# Patient Record
Sex: MALE | Race: WHITE | Employment: FULL TIME | URBAN - METROPOLITAN AREA
[De-identification: names, ages, dates, MRNs, and addresses within clinical notes are randomized per-mention and may not be internally consistent; named-entity substitution may affect disease eponyms.]

---

## 2017-02-13 ENCOUNTER — OFFICE VISIT (OUTPATIENT)
Dept: INTERNAL MEDICINE CLINIC | Age: 37
End: 2017-02-13

## 2017-02-13 VITALS
RESPIRATION RATE: 14 BRPM | DIASTOLIC BLOOD PRESSURE: 90 MMHG | BODY MASS INDEX: 30.22 KG/M2 | WEIGHT: 248.2 LBS | HEART RATE: 100 BPM | OXYGEN SATURATION: 97 % | HEIGHT: 76 IN | SYSTOLIC BLOOD PRESSURE: 140 MMHG

## 2017-02-13 DIAGNOSIS — R63.5 WEIGHT GAIN: ICD-10-CM

## 2017-02-13 DIAGNOSIS — Z00.00 ROUTINE GENERAL MEDICAL EXAMINATION AT A HEALTH CARE FACILITY: ICD-10-CM

## 2017-02-13 DIAGNOSIS — Z00.00 ROUTINE GENERAL MEDICAL EXAMINATION AT A HEALTH CARE FACILITY: Primary | ICD-10-CM

## 2017-02-13 DIAGNOSIS — Q87.42 MARFAN'S SYNDROME WITH OCULAR MANIFESTATION: ICD-10-CM

## 2017-02-13 DIAGNOSIS — J01.00 ACUTE NON-RECURRENT MAXILLARY SINUSITIS: ICD-10-CM

## 2017-02-13 DIAGNOSIS — R03.0 ELEVATED BLOOD PRESSURE (NOT HYPERTENSION): ICD-10-CM

## 2017-02-13 LAB
A/G RATIO: 2.3 (ref 1.1–2.2)
ALBUMIN SERPL-MCNC: 4.8 G/DL (ref 3.4–5)
ALP BLD-CCNC: 85 U/L (ref 40–129)
ALT SERPL-CCNC: 32 U/L (ref 10–40)
ANION GAP SERPL CALCULATED.3IONS-SCNC: 20 MMOL/L (ref 3–16)
AST SERPL-CCNC: 24 U/L (ref 15–37)
BASOPHILS ABSOLUTE: 0 K/UL (ref 0–0.2)
BASOPHILS RELATIVE PERCENT: 0.2 %
BILIRUB SERPL-MCNC: 0.3 MG/DL (ref 0–1)
BUN BLDV-MCNC: 9 MG/DL (ref 7–20)
CALCIUM SERPL-MCNC: 9.3 MG/DL (ref 8.3–10.6)
CHLORIDE BLD-SCNC: 101 MMOL/L (ref 99–110)
CHOLESTEROL, TOTAL: 204 MG/DL (ref 0–199)
CO2: 24 MMOL/L (ref 21–32)
CREAT SERPL-MCNC: 0.9 MG/DL (ref 0.9–1.3)
EOSINOPHILS ABSOLUTE: 0.1 K/UL (ref 0–0.6)
EOSINOPHILS RELATIVE PERCENT: 1.3 %
GFR AFRICAN AMERICAN: >60
GFR NON-AFRICAN AMERICAN: >60
GLOBULIN: 2.1 G/DL
GLUCOSE BLD-MCNC: 87 MG/DL (ref 70–99)
HCT VFR BLD CALC: 50 % (ref 40.5–52.5)
HDLC SERPL-MCNC: 67 MG/DL (ref 40–60)
HEMOGLOBIN: 16.3 G/DL (ref 13.5–17.5)
LDL CHOLESTEROL CALCULATED: 106 MG/DL
LYMPHOCYTES ABSOLUTE: 1.1 K/UL (ref 1–5.1)
LYMPHOCYTES RELATIVE PERCENT: 22 %
MCH RBC QN AUTO: 30.1 PG (ref 26–34)
MCHC RBC AUTO-ENTMCNC: 32.6 G/DL (ref 31–36)
MCV RBC AUTO: 92.4 FL (ref 80–100)
MONOCYTES ABSOLUTE: 0.8 K/UL (ref 0–1.3)
MONOCYTES RELATIVE PERCENT: 16.4 %
NEUTROPHILS ABSOLUTE: 3 K/UL (ref 1.7–7.7)
NEUTROPHILS RELATIVE PERCENT: 60.1 %
PDW BLD-RTO: 13.2 % (ref 12.4–15.4)
PLATELET # BLD: 148 K/UL (ref 135–450)
PMV BLD AUTO: 9 FL (ref 5–10.5)
POTASSIUM SERPL-SCNC: 4.4 MMOL/L (ref 3.5–5.1)
RBC # BLD: 5.41 M/UL (ref 4.2–5.9)
SODIUM BLD-SCNC: 145 MMOL/L (ref 136–145)
TOTAL PROTEIN: 6.9 G/DL (ref 6.4–8.2)
TRIGL SERPL-MCNC: 157 MG/DL (ref 0–150)
TSH SERPL DL<=0.05 MIU/L-ACNC: 1.73 UIU/ML (ref 0.27–4.2)
VLDLC SERPL CALC-MCNC: 31 MG/DL
WBC # BLD: 5 K/UL (ref 4–11)

## 2017-02-13 PROCEDURE — 99395 PREV VISIT EST AGE 18-39: CPT | Performed by: INTERNAL MEDICINE

## 2017-02-13 RX ORDER — BENZONATATE 100 MG/1
100 CAPSULE ORAL 3 TIMES DAILY PRN
Qty: 30 CAPSULE | Refills: 0 | Status: SHIPPED | OUTPATIENT
Start: 2017-02-13 | End: 2017-02-23

## 2017-02-13 RX ORDER — PREDNISONE 1 MG/1
TABLET ORAL
Qty: 21 TABLET | Refills: 0 | Status: SHIPPED | OUTPATIENT
Start: 2017-02-13 | End: 2018-01-08 | Stop reason: ALTCHOICE

## 2017-02-13 ASSESSMENT — PATIENT HEALTH QUESTIONNAIRE - PHQ9
SUM OF ALL RESPONSES TO PHQ9 QUESTIONS 1 & 2: 0
2. FEELING DOWN, DEPRESSED OR HOPELESS: 0
SUM OF ALL RESPONSES TO PHQ QUESTIONS 1-9: 0
1. LITTLE INTEREST OR PLEASURE IN DOING THINGS: 0

## 2018-01-08 ENCOUNTER — OFFICE VISIT (OUTPATIENT)
Dept: INTERNAL MEDICINE CLINIC | Age: 38
End: 2018-01-08

## 2018-01-08 VITALS
SYSTOLIC BLOOD PRESSURE: 146 MMHG | HEIGHT: 76 IN | RESPIRATION RATE: 16 BRPM | HEART RATE: 79 BPM | DIASTOLIC BLOOD PRESSURE: 105 MMHG | OXYGEN SATURATION: 97 % | WEIGHT: 242 LBS | BODY MASS INDEX: 29.47 KG/M2

## 2018-01-08 DIAGNOSIS — M79.601 RIGHT ARM PAIN: ICD-10-CM

## 2018-01-08 DIAGNOSIS — Z00.00 ROUTINE GENERAL MEDICAL EXAMINATION AT A HEALTH CARE FACILITY: Primary | ICD-10-CM

## 2018-01-08 DIAGNOSIS — G89.29 CHRONIC MIDLINE LOW BACK PAIN WITHOUT SCIATICA: ICD-10-CM

## 2018-01-08 DIAGNOSIS — M54.50 CHRONIC MIDLINE LOW BACK PAIN WITHOUT SCIATICA: ICD-10-CM

## 2018-01-08 DIAGNOSIS — I10 ESSENTIAL HYPERTENSION: ICD-10-CM

## 2018-01-08 PROCEDURE — 99395 PREV VISIT EST AGE 18-39: CPT | Performed by: INTERNAL MEDICINE

## 2018-01-08 RX ORDER — AMLODIPINE BESYLATE 5 MG/1
5 TABLET ORAL DAILY
Qty: 30 TABLET | Refills: 3 | Status: SHIPPED | OUTPATIENT
Start: 2018-01-08 | End: 2019-08-05 | Stop reason: SDUPTHER

## 2018-01-08 RX ORDER — PREDNISONE 1 MG/1
TABLET ORAL
Qty: 36 TABLET | Refills: 0 | Status: SHIPPED | OUTPATIENT
Start: 2018-01-08 | End: 2019-05-10

## 2018-01-08 RX ORDER — CETIRIZINE HYDROCHLORIDE 10 MG/1
10 TABLET ORAL DAILY
Status: ON HOLD | COMMUNITY
End: 2022-05-02

## 2018-01-08 RX ORDER — CYCLOBENZAPRINE HCL 10 MG
10 TABLET ORAL 2 TIMES DAILY PRN
Qty: 30 TABLET | Refills: 3 | Status: SHIPPED | OUTPATIENT
Start: 2018-01-08 | End: 2019-05-10

## 2018-01-08 NOTE — PATIENT INSTRUCTIONS
Consider a BP monitor at home. Check BP daily for a week, then if fluctuating high >140/90, start norvasc once a day. Watch for leg/ankle swelling, lt headedness. Call us back w BP readings off first, then on med. Patient Education        DASH Diet: Care Instructions  Your Care Instructions    The DASH diet is an eating plan that can help lower your blood pressure. DASH stands for Dietary Approaches to Stop Hypertension. Hypertension is high blood pressure. The DASH diet focuses on eating foods that are high in calcium, potassium, and magnesium. These nutrients can lower blood pressure. The foods that are highest in these nutrients are fruits, vegetables, low-fat dairy products, nuts, seeds, and legumes. But taking calcium, potassium, and magnesium supplements instead of eating foods that are high in those nutrients does not have the same effect. The DASH diet also includes whole grains, fish, and poultry. The DASH diet is one of several lifestyle changes your doctor may recommend to lower your high blood pressure. Your doctor may also want you to decrease the amount of sodium in your diet. Lowering sodium while following the DASH diet can lower blood pressure even further than just the DASH diet alone. Follow-up care is a key part of your treatment and safety. Be sure to make and go to all appointments, and call your doctor if you are having problems. It's also a good idea to know your test results and keep a list of the medicines you take. How can you care for yourself at home? Following the DASH diet  · Eat 4 to 5 servings of fruit each day. A serving is 1 medium-sized piece of fruit, ½ cup chopped or canned fruit, 1/4 cup dried fruit, or 4 ounces (½ cup) of fruit juice. Choose fruit more often than fruit juice. · Eat 4 to 5 servings of vegetables each day. A serving is 1 cup of lettuce or raw leafy vegetables, ½ cup of chopped or cooked vegetables, or 4 ounces (½ cup) of vegetable juice.  Choose

## 2018-01-08 NOTE — PROGRESS NOTES
N/A     Occupational History          Social History Main Topics    Smoking status: Never Smoker    Smokeless tobacco: Never Used    Alcohol use Yes      Comment: Rarely    Drug use: Unknown    Sexual activity: Not on file     Other Topics Concern    Not on file     Social History Narrative    No narrative on file         OBJECTIVE:    BP (!) 146/105   Pulse 79   Resp 16   Ht 6' 4\" (1.93 m)   Wt 242 lb (109.8 kg)   SpO2 97%   BMI 29.46 kg/m²     Physical Exam   Constitutional: He appears well-developed and well-nourished. Neck: Neck supple. Cardiovascular: Normal rate, regular rhythm and normal heart sounds. Exam reveals no gallop and no friction rub. No murmur heard. Pulmonary/Chest: Effort normal and breath sounds normal. No respiratory distress. He has no wheezes. He has no rales. Abdominal: Soft. Bowel sounds are normal. He exhibits no distension. There is no tenderness. Musculoskeletal: He exhibits tenderness (R biceps tender and sl worse w pallpation, also forced resistance to pronation/supination, biceps flexion). He exhibits no edema. Neurological: He is alert. Psychiatric: He has a normal mood and affect. Vitals reviewed. ASSESSMENT:    1. Routine general medical examination at a health care facility    2. Right arm pain    3. Acute midline low back pain without sciatica    4. Chronic midline low back pain without sciatica    5. Essential hypertension        PLAN:    Kwaku Martin was seen today for arm pain. Diagnoses and all orders for this visit:    Routine general medical examination at a health care facility - Overall general medical exam appears benign, other assessments as noted and testing is as noted below.     -     Urinalysis  -     Lipid Panel  -     Comprehensive Metabolic Panel  -     TSH with Reflex  -     CBC Auto Differential    Right arm pain - suspect sprain and tendinitis, trial pred taper and set up for PT eval, already sched for first

## 2019-05-10 ENCOUNTER — HOSPITAL ENCOUNTER (EMERGENCY)
Age: 39
Discharge: HOME | End: 2019-05-10
Attending: EMERGENCY MEDICINE

## 2019-05-10 ENCOUNTER — APPOINTMENT (OUTPATIENT)
Dept: CT IMAGING | Age: 39
End: 2019-05-10

## 2019-05-10 VITALS
OXYGEN SATURATION: 100 % | RESPIRATION RATE: 17 BRPM | SYSTOLIC BLOOD PRESSURE: 145 MMHG | TEMPERATURE: 98.2 F | DIASTOLIC BLOOD PRESSURE: 91 MMHG | WEIGHT: 230 LBS | HEART RATE: 74 BPM | HEIGHT: 76 IN | BODY MASS INDEX: 28.01 KG/M2

## 2019-05-10 DIAGNOSIS — R10.9 RIGHT FLANK PAIN: Primary | ICD-10-CM

## 2019-05-10 LAB
ALBUMIN SERPL-MCNC: 5 GM/DL (ref 3.4–5)
ALP BLD-CCNC: 61 IU/L (ref 40–129)
ALT SERPL-CCNC: 24 U/L (ref 10–40)
ANION GAP SERPL CALCULATED.3IONS-SCNC: 8 MMOL/L (ref 4–16)
AST SERPL-CCNC: 19 IU/L (ref 15–37)
BACTERIA: NEGATIVE /HPF
BASOPHILS ABSOLUTE: 0 K/CU MM
BASOPHILS RELATIVE PERCENT: 0.5 % (ref 0–1)
BILIRUB SERPL-MCNC: 0.5 MG/DL (ref 0–1)
BILIRUBIN URINE: NEGATIVE MG/DL
BLOOD, URINE: NEGATIVE
BUN BLDV-MCNC: 8 MG/DL (ref 6–23)
CALCIUM SERPL-MCNC: 9.3 MG/DL (ref 8.3–10.6)
CHLORIDE BLD-SCNC: 100 MMOL/L (ref 99–110)
CLARITY: CLEAR
CO2: 31 MMOL/L (ref 21–32)
COLOR: YELLOW
CREAT SERPL-MCNC: 0.8 MG/DL (ref 0.9–1.3)
DIFFERENTIAL TYPE: ABNORMAL
EOSINOPHILS ABSOLUTE: 0.2 K/CU MM
EOSINOPHILS RELATIVE PERCENT: 2.2 % (ref 0–3)
GFR AFRICAN AMERICAN: >60 ML/MIN/1.73M2
GFR NON-AFRICAN AMERICAN: >60 ML/MIN/1.73M2
GLUCOSE BLD-MCNC: 96 MG/DL (ref 70–99)
GLUCOSE, URINE: NEGATIVE MG/DL
HCT VFR BLD CALC: 50.1 % (ref 42–52)
HEMOGLOBIN: 16.5 GM/DL (ref 13.5–18)
IMMATURE NEUTROPHIL %: 0.1 % (ref 0–0.43)
KETONES, URINE: NEGATIVE MG/DL
LEUKOCYTE ESTERASE, URINE: NEGATIVE
LYMPHOCYTES ABSOLUTE: 1.4 K/CU MM
LYMPHOCYTES RELATIVE PERCENT: 18.4 % (ref 24–44)
MCH RBC QN AUTO: 31.2 PG (ref 27–31)
MCHC RBC AUTO-ENTMCNC: 32.9 % (ref 32–36)
MCV RBC AUTO: 94.7 FL (ref 78–100)
MONOCYTES ABSOLUTE: 0.9 K/CU MM
MONOCYTES RELATIVE PERCENT: 12.5 % (ref 0–4)
MUCUS: ABNORMAL HPF
NITRITE URINE, QUANTITATIVE: NEGATIVE
NUCLEATED RBC %: 0 %
PDW BLD-RTO: 12.4 % (ref 11.7–14.9)
PH, URINE: 5 (ref 5–8)
PLATELET # BLD: 228 K/CU MM (ref 140–440)
PMV BLD AUTO: 10 FL (ref 7.5–11.1)
POTASSIUM SERPL-SCNC: 4.7 MMOL/L (ref 3.5–5.1)
PROTEIN UA: NEGATIVE MG/DL
RBC # BLD: 5.29 M/CU MM (ref 4.6–6.2)
RBC URINE: <1 /HPF (ref 0–3)
SEGMENTED NEUTROPHILS ABSOLUTE COUNT: 4.9 K/CU MM
SEGMENTED NEUTROPHILS RELATIVE PERCENT: 66.3 % (ref 36–66)
SODIUM BLD-SCNC: 139 MMOL/L (ref 135–145)
SPECIFIC GRAVITY UA: 1.02 (ref 1–1.03)
SQUAMOUS EPITHELIAL: <1 /HPF
TOTAL IMMATURE NEUTOROPHIL: 0.01 K/CU MM
TOTAL NUCLEATED RBC: 0 K/CU MM
TOTAL PROTEIN: 7.2 GM/DL (ref 6.4–8.2)
TRICHOMONAS: ABNORMAL /HPF
UROBILINOGEN, URINE: NORMAL MG/DL (ref 0.2–1)
WBC # BLD: 7.4 K/CU MM (ref 4–10.5)
WBC UA: <1 /HPF (ref 0–2)

## 2019-05-10 PROCEDURE — 6360000002 HC RX W HCPCS: Performed by: EMERGENCY MEDICINE

## 2019-05-10 PROCEDURE — 96374 THER/PROPH/DIAG INJ IV PUSH: CPT

## 2019-05-10 PROCEDURE — 85025 COMPLETE CBC W/AUTO DIFF WBC: CPT

## 2019-05-10 PROCEDURE — 99284 EMERGENCY DEPT VISIT MOD MDM: CPT

## 2019-05-10 PROCEDURE — 74176 CT ABD & PELVIS W/O CONTRAST: CPT

## 2019-05-10 PROCEDURE — 80053 COMPREHEN METABOLIC PANEL: CPT

## 2019-05-10 PROCEDURE — 81001 URINALYSIS AUTO W/SCOPE: CPT

## 2019-05-10 PROCEDURE — 96375 TX/PRO/DX INJ NEW DRUG ADDON: CPT

## 2019-05-10 RX ORDER — CYCLOBENZAPRINE HCL 10 MG
10 TABLET ORAL NIGHTLY PRN
Qty: 10 TABLET | Refills: 0 | Status: SHIPPED | OUTPATIENT
Start: 2019-05-10 | End: 2019-05-20

## 2019-05-10 RX ORDER — NAPROXEN 500 MG/1
500 TABLET ORAL 2 TIMES DAILY PRN
Qty: 30 TABLET | Refills: 0 | Status: SHIPPED | OUTPATIENT
Start: 2019-05-10 | End: 2020-06-30

## 2019-05-10 RX ORDER — MORPHINE SULFATE 4 MG/ML
6 INJECTION, SOLUTION INTRAMUSCULAR; INTRAVENOUS EVERY 30 MIN PRN
Status: DISCONTINUED | OUTPATIENT
Start: 2019-05-10 | End: 2019-05-10 | Stop reason: HOSPADM

## 2019-05-10 RX ORDER — ONDANSETRON 2 MG/ML
4 INJECTION INTRAMUSCULAR; INTRAVENOUS EVERY 6 HOURS PRN
Status: DISCONTINUED | OUTPATIENT
Start: 2019-05-10 | End: 2019-05-10 | Stop reason: HOSPADM

## 2019-05-10 RX ADMIN — MORPHINE SULFATE 6 MG: 4 INJECTION INTRAVENOUS at 10:59

## 2019-05-10 RX ADMIN — ONDANSETRON 4 MG: 2 INJECTION INTRAMUSCULAR; INTRAVENOUS at 11:00

## 2019-05-10 ASSESSMENT — PAIN - FUNCTIONAL ASSESSMENT: PAIN_FUNCTIONAL_ASSESSMENT: 0-10

## 2019-05-10 ASSESSMENT — PAIN DESCRIPTION - LOCATION
LOCATION: FLANK
LOCATION: FLANK

## 2019-05-10 ASSESSMENT — PAIN DESCRIPTION - PAIN TYPE
TYPE: ACUTE PAIN
TYPE: ACUTE PAIN

## 2019-05-10 ASSESSMENT — PAIN SCALES - GENERAL
PAINLEVEL_OUTOF10: 6
PAINLEVEL_OUTOF10: 5
PAINLEVEL_OUTOF10: 3

## 2019-05-10 ASSESSMENT — PAIN DESCRIPTION - FREQUENCY
FREQUENCY: CONTINUOUS
FREQUENCY: CONTINUOUS

## 2019-05-10 ASSESSMENT — PAIN DESCRIPTION - DESCRIPTORS: DESCRIPTORS: DULL;SHARP

## 2019-05-10 ASSESSMENT — PAIN DESCRIPTION - ORIENTATION: ORIENTATION: RIGHT

## 2019-05-10 NOTE — ED PROVIDER NOTES
eMERGENCY dEPARTMENT eNCOUnter        [facility]    Chief Complaint   Patient presents with    Flank Pain     right       HPI    Mya Murry is a 45 y.o. male who presents with Right flank pain. Onset of right flank pain suddenly 2 days ago. States that the pain is been constant since onset, but intermittently worsens with sharp pain. Reports a little bit of discomfort in the suprapubic area. Denies history of similar symptoms. Reports dark urine, and denies blood in urine, nausea, vomiting, dysuria. REVIEW OF SYSTEMS    Constitutional:  Denies fever or chills. Eyes:  Denies vision change. HENT:  Denies sore throat or ear pain   Respiratory:  Denies cough or shortness of breath   Cardiovascular:  Denies chest pain, palpitations or swelling. :  + flank pain  GI:  Denies diarrhea, constipation  Musculoskeletal:  Other than flank pain, denies back pain   Skin:  Denies rash, color change  Neurologic:  Denies headache, focal weakness or sensory changes     See HPI and nursing notes additional information  All other review of systems negative at this time      PAST MEDICAL HISTORY/SURGICAL HISTORY    Past Medical History:   Diagnosis Date    Allergic rhinitis     Ankle pain     INACTIVE PROBLEM    Anxiety     Epigastric abdominal pain     INACTIVE PROBLEM    Essential hypertension 01/08/2018    Family history of coronary artery disease     Herniated lumbar intervertebral disc     LBP (low back pain)     Marfan's syndrome with ocular manifestation     DX'D BY OPT[employer] DR CANTU AT 6Y0    Oral herpes      Past Surgical History:   Procedure Laterality Date    TONSILLECTOMY         CURRENT MEDICATIONS    Current Outpatient Rx   Medication Sig Dispense Refill    cetirizine (ZYRTEC) 10 MG tablet Take 10 mg by mouth daily      fluticasone (FLONASE) 50 MCG/ACT nasal spray 1 spray by Nasal route daily.  1 Bottle 1    amLODIPine (NORVASC) 5 MG tablet Take 1 tablet by mouth daily 30 tablet 3       ALLERGIES    No Known Allergies    FAMILY HISTORY/SOCIAL HISTORY  History reviewed. No pertinent family history. Social History     Socioeconomic History    Marital status:      Spouse name: None    Number of children: None    Years of education: None    Highest education level: None   Occupational History    Occupation: [employer]   Social Needs    Financial resource strain: None    Food insecurity:     Worry: None     Inability: None    Transportation needs:     Medical: None     Non-medical: None   Tobacco Use    Smoking status: Never Smoker    Smokeless tobacco: Never Used   Substance and Sexual Activity    Alcohol use: Yes     Comment: Rarely    Drug use: None    Sexual activity: None   Lifestyle    Physical activity:     Days per week: None     Minutes per session: None    Stress: None   Relationships    Social connections:     Talks on phone: None     Gets together: None     Attends Protestant service: None     Active member of club or organization: None     Attends meetings of clubs or organizations: None     Relationship status: None    Intimate partner violence:     Fear of current or ex partner: None     Emotionally abused: None     Physically abused: None     Forced sexual activity: None   Other Topics Concern    None   Social History Narrative    None       PHYSICAL EXAM    VITAL SIGNS: BP (!) 160/115   Pulse 80   Temp 98.2 °F (36.8 °C) (Oral)   Resp 18   Ht 6' 4\" (1.93 m)   Wt 230 lb (104.3 kg)   SpO2 100%   BMI 28.00 kg/m²    Constitutional:  Awake, alert. No acute distress. Eyes:  PERRL. EOM intact. HENT:  Normocephalic, atraumatic, external ears normal, nose normal, oropharynx moist.  Neck: supple without rigidity  Respiratory:  No respiratory distress, normal breath sounds   Cardiovascular:  Regular rate and rhythm, no murmurs  GI:  Nontender, nondistended. No discoloration. Bowel sounds present. No abdominal bruit. No guarding or rebound.   :  right CVA tenderness  Integument:  Well hydrated, nondiaphoretic, no obvious rashes,      LABS:  Labs Reviewed   URINALYSIS   CBC WITH AUTO DIFFERENTIAL   COMPREHENSIVE METABOLIC PANEL     Labs Reviewed   URINALYSIS - Abnormal; Notable for the following components:       Result Value    Mucus, UA RARE (*)     All other components within normal limits   CBC WITH AUTO DIFFERENTIAL - Abnormal; Notable for the following components:    MCH 31.2 (*)     Segs Relative 66.3 (*)     Lymphocytes % 18.4 (*)     Monocytes % 12.5 (*)     All other components within normal limits   COMPREHENSIVE METABOLIC PANEL - Abnormal; Notable for the following components:    CREATININE 0.8 (*)     All other components within normal limits         RADIOLOGY/PROCEDURES    Ct Abdomen Pelvis Wo Contrast    Result Date: 5/10/2019  EXAMINATION: CT OF THE ABDOMEN AND PELVIS WITHOUT CONTRAST 5/10/2019 10:29 am TECHNIQUE: CT of the abdomen and pelvis was performed without the administration of intravenous contrast. Multiplanar reformatted images are provided for review. Dose modulation, iterative reconstruction, and/or weight based adjustment of the mA/kV was utilized to reduce the radiation dose to as low as reasonably achievable. COMPARISON: None HISTORY: ORDERING SYSTEM PROVIDED HISTORY: right flank pain TECHNOLOGIST PROVIDED HISTORY: Ordering Physician Provided Reason for Exam: right flank pain; nki; came out of nowhere Acuity: Acute Type of Exam: Initial Relevant Medical/Surgical History: none FINDINGS: Lower Chest: Visualized portions of the lower thorax are unremarkable. Organs: Unenhanced liver, spleen, pancreas, adrenal glands, and kidneys are unremarkable. No radiodense gallstones. GI/Bowel: No bowel obstruction. Normal appendix. Pelvis: Urinary bladder is unremarkable. Peritoneum/Retroperitoneum: No free intraperitoneal air, fluid, or lymphadenopathy by size criteria. Bones/Soft Tissues: Osseous structures appear unremarkable.      No CT evidence of acute intra-abdominal process. ED COURSE & MEDICAL DECISION MAKING      Vital signs and nursing notes reviewed during ED course. All pertinent Lab data and radiographic results reviewed with patient at bedside. The patient and / or the family were informed of the results of any tests, a time was given to answer questions, a plan was proposed and they agreed with plan. 43-year-old male who presented with right-sided flank pain. Workup was initiated. CT scan shows no evidence of acute abnormality, no struck uropathy. Urinalysis without acute findings. Laboratory workup unremarkable. At this point suspect musculoskeletal etiology. We'll treat for musculoskeletal pain and have him follow up outpatient with his primary care physician. Is instructed to return here at any point with any new or worsening signs or symptoms. Clinical  IMPRESSION    Flank pain     Diagnosis and plan discussed in detail with patient who understands and agrees. Patient agrees to return emergency department if symptoms worsen or any new symptoms develop.       (Please note the MDM and HPI sections of this note were completed with a voice recognition program.  Efforts were made to edit the dictations but occasionally words are mis-transcribed.)     Shelia Craft, DO  05/10/19 [id number]

## 2019-05-10 NOTE — ED TRIAGE NOTES
Patient presents to the ED with complaints of right flank pain that started on Wednesday and progressively became worse the past 2 days. Patient states he has never had pain like this before and no immediate family history of kidney problems or kidney stones. Patient rates pain constant 5/10 with periods of sharp 9/10 pain. Patient denies change of color, oror, or blood in urine, but affirms increased frequency.

## 2019-05-10 NOTE — ED NOTES
Patient discharged reviewed with patient and SO; verbalizes understanding and denies questions. Patient appears in no acute distress; A+Ox4, respirations equal and unlabored, pain better controlled. Patient with all belongings, including discharge paperwork and prescriptions, and ambulated from the ED to the waiting area with a steady gait without incident.      Amanda Peñaloza RN  05/10/19 [id number]

## 2019-08-05 ENCOUNTER — OFFICE VISIT (OUTPATIENT)
Dept: INTERNAL MEDICINE CLINIC | Age: 39
End: 2019-08-05
Payer: COMMERCIAL

## 2019-08-05 VITALS
HEART RATE: 84 BPM | SYSTOLIC BLOOD PRESSURE: 144 MMHG | BODY MASS INDEX: 28.48 KG/M2 | RESPIRATION RATE: 15 BRPM | OXYGEN SATURATION: 97 % | DIASTOLIC BLOOD PRESSURE: 94 MMHG | WEIGHT: 234 LBS

## 2019-08-05 DIAGNOSIS — J02.9 ACUTE PHARYNGITIS, UNSPECIFIED ETIOLOGY: ICD-10-CM

## 2019-08-05 DIAGNOSIS — Z00.00 ROUTINE GENERAL MEDICAL EXAMINATION AT A HEALTH CARE FACILITY: Primary | ICD-10-CM

## 2019-08-05 DIAGNOSIS — I10 ESSENTIAL HYPERTENSION: ICD-10-CM

## 2019-08-05 DIAGNOSIS — M25.512 ACUTE PAIN OF LEFT SHOULDER: ICD-10-CM

## 2019-08-05 LAB — S PYO AG THROAT QL: NORMAL

## 2019-08-05 PROCEDURE — 87880 STREP A ASSAY W/OPTIC: CPT | Performed by: INTERNAL MEDICINE

## 2019-08-05 PROCEDURE — 99395 PREV VISIT EST AGE 18-39: CPT | Performed by: INTERNAL MEDICINE

## 2019-08-05 RX ORDER — CYCLOBENZAPRINE HCL 10 MG
10 TABLET ORAL 2 TIMES DAILY PRN
Qty: 60 TABLET | Refills: 0 | Status: SHIPPED | OUTPATIENT
Start: 2019-08-05 | End: 2019-09-30 | Stop reason: SDUPTHER

## 2019-08-05 RX ORDER — AMLODIPINE BESYLATE 5 MG/1
5 TABLET ORAL DAILY
Qty: 30 TABLET | Refills: 90 | Status: SHIPPED | OUTPATIENT
Start: 2019-08-05 | End: 2020-06-11 | Stop reason: SDUPTHER

## 2019-08-05 RX ORDER — LIDOCAINE HYDROCHLORIDE 20 MG/ML
15 SOLUTION OROPHARYNGEAL 4 TIMES DAILY PRN
Qty: 1 BOTTLE | Refills: 0 | Status: SHIPPED | OUTPATIENT
Start: 2019-08-05 | End: 2019-08-15

## 2019-08-05 RX ORDER — AZITHROMYCIN 250 MG/1
250 TABLET, FILM COATED ORAL SEE ADMIN INSTRUCTIONS
Qty: 6 TABLET | Refills: 0 | Status: SHIPPED | OUTPATIENT
Start: 2019-08-05 | End: 2019-08-10

## 2019-08-05 RX ORDER — PREDNISONE 1 MG/1
TABLET ORAL
Qty: 21 TABLET | Refills: 0 | Status: SHIPPED | OUTPATIENT
Start: 2019-08-05 | End: 2019-09-18 | Stop reason: CLARIF

## 2019-08-05 ASSESSMENT — PATIENT HEALTH QUESTIONNAIRE - PHQ9
1. LITTLE INTEREST OR PLEASURE IN DOING THINGS: 0
SUM OF ALL RESPONSES TO PHQ QUESTIONS 1-9: 0
SUM OF ALL RESPONSES TO PHQ9 QUESTIONS 1 & 2: 0
2. FEELING DOWN, DEPRESSED OR HOPELESS: 0
SUM OF ALL RESPONSES TO PHQ QUESTIONS 1-9: 0

## 2019-08-05 NOTE — PROGRESS NOTES
 fluticasone (FLONASE) 50 MCG/ACT nasal spray 1 spray by Nasal route daily. 1 Bottle 1     No current facility-administered medications for this visit. Past Medical History:   Diagnosis Date    Allergic rhinitis     Ankle pain     INACTIVE PROBLEM    Anxiety     Epigastric abdominal pain     INACTIVE PROBLEM    Essential hypertension 01/08/2018    Family history of coronary artery disease     Herniated lumbar intervertebral disc     LBP (low back pain)     Marfan's syndrome with ocular manifestation     DX'D BY OPT[employer] DR CANTU AT 6Y0    Oral herpes      Past Surgical History:   Procedure Laterality Date    TONSILLECTOMY       No family history on file.   Social History     Socioeconomic History    Marital status:      Spouse name: Not on file    Number of children: Not on file    Years of education: Not on file    Highest education level: Not on file   Occupational History    Occupation:    Social Needs    Financial resource strain: Not on file    Food insecurity:     Worry: Not on file     Inability: Not on file    Transportation needs:     Medical: Not on file     Non-medical: Not on file   Tobacco Use    Smoking status: Never Smoker    Smokeless tobacco: Never Used   Substance and Sexual Activity    Alcohol use: Yes     Comment: Rarely    Drug use: Not on file    Sexual activity: Not on file   Lifestyle    Physical activity:     Days per week: Not on file     Minutes per session: Not on file    Stress: Not on file   Relationships    Social connections:     Talks on phone: Not on file     Gets together: Not on file     Attends Muslim service: Not on file     Active member of club or organization: Not on file     Attends meetings of clubs or organizations: Not on file     Relationship status: Not on file    Intimate partner violence:     Fear of current or ex partner: Not on file     Emotionally abused: Not on file     Physically abused: Not on file

## 2019-08-08 ENCOUNTER — TELEPHONE (OUTPATIENT)
Dept: INTERNAL MEDICINE CLINIC | Age: 39
End: 2019-08-08

## 2019-08-08 RX ORDER — AMOXICILLIN 500 MG/1
500 CAPSULE ORAL 2 TIMES DAILY
Qty: 20 CAPSULE | Refills: 0 | Status: SHIPPED | OUTPATIENT
Start: 2019-08-08 | End: 2019-08-18

## 2019-08-08 NOTE — TELEPHONE ENCOUNTER
Pt called stating he started a zpak on Monday. He is not feeling any better and has the same symptoms. Please advise.

## 2019-09-18 ENCOUNTER — OFFICE VISIT (OUTPATIENT)
Dept: INTERNAL MEDICINE CLINIC | Age: 39
End: 2019-09-18
Payer: COMMERCIAL

## 2019-09-18 VITALS
SYSTOLIC BLOOD PRESSURE: 160 MMHG | HEIGHT: 76 IN | BODY MASS INDEX: 28.27 KG/M2 | DIASTOLIC BLOOD PRESSURE: 101 MMHG | RESPIRATION RATE: 20 BRPM | OXYGEN SATURATION: 98 % | HEART RATE: 90 BPM | WEIGHT: 232.2 LBS

## 2019-09-18 DIAGNOSIS — R11.0 NAUSEA: ICD-10-CM

## 2019-09-18 DIAGNOSIS — J01.00 ACUTE NON-RECURRENT MAXILLARY SINUSITIS: Primary | ICD-10-CM

## 2019-09-18 PROCEDURE — 99213 OFFICE O/P EST LOW 20 MIN: CPT | Performed by: NURSE PRACTITIONER

## 2019-09-18 RX ORDER — PROMETHAZINE HYDROCHLORIDE 12.5 MG/1
12.5 TABLET ORAL EVERY 8 HOURS PRN
Qty: 15 TABLET | Refills: 0 | Status: SHIPPED | OUTPATIENT
Start: 2019-09-18 | End: 2020-04-01 | Stop reason: SDUPTHER

## 2019-09-18 RX ORDER — AMOXICILLIN AND CLAVULANATE POTASSIUM 875; 125 MG/1; MG/1
1 TABLET, FILM COATED ORAL 2 TIMES DAILY
Qty: 14 TABLET | Refills: 0 | Status: SHIPPED | OUTPATIENT
Start: 2019-09-18 | End: 2019-09-25

## 2019-09-18 RX ORDER — PREDNISONE 10 MG/1
TABLET ORAL
Qty: 20 TABLET | Refills: 0 | Status: SHIPPED | OUTPATIENT
Start: 2019-09-18 | End: 2020-06-11 | Stop reason: ALTCHOICE

## 2019-09-18 ASSESSMENT — ENCOUNTER SYMPTOMS
COUGH: 0
WHEEZING: 0
RHINORRHEA: 0
VOMITING: 1
CONSTIPATION: 0
EYES NEGATIVE: 1
ABDOMINAL DISTENTION: 0
SHORTNESS OF BREATH: 0
SINUS PAIN: 1
COLOR CHANGE: 0
ALLERGIC/IMMUNOLOGIC NEGATIVE: 1
ABDOMINAL PAIN: 0
CHEST TIGHTNESS: 0
DIARRHEA: 0
SINUS PRESSURE: 1
SORE THROAT: 0
NAUSEA: 1

## 2019-09-30 DIAGNOSIS — M25.512 ACUTE PAIN OF LEFT SHOULDER: ICD-10-CM

## 2019-09-30 RX ORDER — CYCLOBENZAPRINE HCL 10 MG
10 TABLET ORAL 2 TIMES DAILY PRN
Qty: 60 TABLET | Refills: 0 | Status: SHIPPED | OUTPATIENT
Start: 2019-09-30 | End: 2019-11-29

## 2020-04-01 ENCOUNTER — TELEPHONE (OUTPATIENT)
Dept: INTERNAL MEDICINE CLINIC | Age: 40
End: 2020-04-01

## 2020-04-01 RX ORDER — PROMETHAZINE HYDROCHLORIDE 12.5 MG/1
12.5 TABLET ORAL EVERY 8 HOURS PRN
Qty: 15 TABLET | Refills: 0 | Status: SHIPPED | OUTPATIENT
Start: 2020-04-01 | End: 2020-10-26 | Stop reason: SDUPTHER

## 2020-04-01 NOTE — TELEPHONE ENCOUNTER
Pt's wife Robin Addison called stating pt was having nausea requesting phenergan. She stated pt denies fever, cough, SOB, V/D. Per Dr. Eliana Henriquez verbal, okay to refill prior rx for phenergan. Order placed to [facility]. [address] [place] [address].

## 2020-06-11 ENCOUNTER — OFFICE VISIT (OUTPATIENT)
Dept: INTERNAL MEDICINE CLINIC | Age: 40
End: 2020-06-11
Payer: COMMERCIAL

## 2020-06-11 VITALS
DIASTOLIC BLOOD PRESSURE: 110 MMHG | RESPIRATION RATE: 16 BRPM | HEART RATE: 80 BPM | WEIGHT: 239.6 LBS | SYSTOLIC BLOOD PRESSURE: 158 MMHG | BODY MASS INDEX: 29.17 KG/M2

## 2020-06-11 PROCEDURE — 99395 PREV VISIT EST AGE 18-39: CPT | Performed by: INTERNAL MEDICINE

## 2020-06-11 RX ORDER — ATENOLOL 25 MG/1
25 TABLET ORAL DAILY
Qty: 90 TABLET | Refills: 3 | Status: SHIPPED | OUTPATIENT
Start: 2020-06-11 | End: 2021-11-17 | Stop reason: SDUPTHER

## 2020-06-11 RX ORDER — AMLODIPINE BESYLATE 5 MG/1
5 TABLET ORAL DAILY
Qty: 90 TABLET | Refills: 3 | Status: SHIPPED | OUTPATIENT
Start: 2020-06-11 | End: 2022-02-10 | Stop reason: ALTCHOICE

## 2020-06-11 ASSESSMENT — PATIENT HEALTH QUESTIONNAIRE - PHQ9
2. FEELING DOWN, DEPRESSED OR HOPELESS: 0
SUM OF ALL RESPONSES TO PHQ QUESTIONS 1-9: 0
SUM OF ALL RESPONSES TO PHQ QUESTIONS 1-9: 0
1. LITTLE INTEREST OR PLEASURE IN DOING THINGS: 0
SUM OF ALL RESPONSES TO PHQ9 QUESTIONS 1 & 2: 0

## 2020-06-11 NOTE — PROGRESS NOTES
visit:    Routine general medical examination at a health care facility - Overall general medical exam appears benign, other assessments as noted and testing is as noted below. -     Comprehensive Metabolic Panel; Future  -     CBC Auto Differential; Future  -     Lipid Panel; Future  -     Urinalysis with Microscopic; Future    Essential hypertension- CONT NORVASC AND ADD ATENOLOL. CHECK LAB, IS TO CALL US BACK W HIS BPs IN ~ 2 WEEKS. -     amLODIPine (NORVASC) 5 MG tablet; Take 1 tablet by mouth daily  -     atenolol (TENORMIN) 25 MG tablet; Take 1 tablet by mouth daily  -     Comprehensive Metabolic Panel; Future  -     CBC Auto Differential; Future  -     Lipid Panel; Future  -     Urinalysis with Microscopic;  Future    Umbilical hernia without obstruction and without gangrene- [id number] Reuben Jordan MD, General Surgery, [facility]

## 2020-06-11 NOTE — PATIENT INSTRUCTIONS
Please check your bp periodically, if remains consistently high >140/90, (better if <130/80), then call us back. Call us back regardless in 2 weeks w your BPs after starting atenolol in addition to norvasc. Patient Education        DASH Diet: Care Instructions  Your Care Instructions     The DASH diet is an eating plan that can help lower your blood pressure. DASH stands for Dietary Approaches to Stop Hypertension. Hypertension is high blood pressure. The DASH diet focuses on eating foods that are high in calcium, potassium, and magnesium. These nutrients can lower blood pressure. The foods that are highest in these nutrients are fruits, vegetables, low-fat dairy products, nuts, seeds, and legumes. But taking calcium, potassium, and magnesium supplements instead of eating foods that are high in those nutrients does not have the same effect. The DASH diet also includes whole grains, fish, and poultry. The DASH diet is one of several lifestyle changes your doctor may recommend to lower your high blood pressure. Your doctor may also want you to decrease the amount of sodium in your diet. Lowering sodium while following the DASH diet can lower blood pressure even further than just the DASH diet alone. Follow-up care is a key part of your treatment and safety. Be sure to make and go to all appointments, and call your doctor if you are having problems. It's also a good idea to know your test results and keep a list of the medicines you take. How can you care for yourself at home? Following the DASH diet  · Eat 4 to 5 servings of fruit each day. A serving is 1 medium-sized piece of fruit, ½ cup chopped or canned fruit, 1/4 cup dried fruit, or 4 ounces (½ cup) of fruit juice. Choose fruit more often than fruit juice. · Eat 4 to 5 servings of vegetables each day. A serving is 1 cup of lettuce or raw leafy vegetables, ½ cup of chopped or cooked vegetables, or 4 ounces (½ cup) of vegetable juice.  Choose vegetables more often than vegetable juice. · Get 2 to 3 servings of low-fat and fat-free dairy each day. A serving is 8 ounces of milk, 1 cup of yogurt, or 1 ½ ounces of cheese. · Eat 6 to 8 servings of grains each day. A serving is 1 slice of bread, 1 ounce of dry cereal, or ½ cup of cooked rice, pasta, or cooked cereal. Try to choose whole-grain products as much as possible. · Limit lean meat, poultry, and fish to 2 servings each day. A serving is 3 ounces, about the size of a deck of cards. · Eat 4 to 5 servings of nuts, seeds, and legumes (cooked dried beans, lentils, and split peas) each week. A serving is 1/3 cup of nuts, 2 tablespoons of seeds, or ½ cup of cooked beans or peas. · Limit fats and oils to 2 to 3 servings each day. A serving is 1 teaspoon of vegetable oil or 2 tablespoons of salad dressing. · Limit sweets and added sugars to 5 servings or less a week. A serving is 1 tablespoon jelly or jam, ½ cup sorbet, or 1 cup of lemonade. · Eat less than 2,300 milligrams (mg) of sodium a day. If you limit your sodium to 1,500 mg a day, you can lower your blood pressure even more. Tips for success  · Start small. Do not try to make dramatic changes to your diet all at once. You might feel that you are missing out on your favorite foods and then be more likely to not follow the plan. Make small changes, and stick with them. Once those changes become habit, add a few more changes. · Try some of the following:  ? Make it a goal to eat a fruit or vegetable at every meal and at snacks. This will make it easy to get the recommended amount of fruits and vegetables each day. ? Try yogurt topped with fruit and nuts for a snack or healthy dessert. ? Add lettuce, tomato, cucumber, and onion to sandwiches. ? Combine a ready-made pizza crust with low-fat mozzarella cheese and lots of vegetable toppings. Try using tomatoes, squash, spinach, broccoli, carrots, cauliflower, and onions. ?  Have a variety of cut-up

## 2020-06-12 ENCOUNTER — TELEPHONE (OUTPATIENT)
Dept: SURGERY | Age: 40
End: 2020-06-12

## 2020-06-15 ENCOUNTER — TELEMEDICINE (OUTPATIENT)
Dept: SURGERY | Age: 40
End: 2020-06-15
Payer: COMMERCIAL

## 2020-06-15 PROCEDURE — 99203 OFFICE O/P NEW LOW 30 MIN: CPT | Performed by: SURGERY

## 2020-06-22 ENCOUNTER — TELEPHONE (OUTPATIENT)
Dept: SURGERY | Age: 40
End: 2020-06-22

## 2020-06-29 ENCOUNTER — HOSPITAL ENCOUNTER (OUTPATIENT)
Age: 40
Discharge: HOME | End: 2020-06-29
Payer: COMMERCIAL

## 2020-06-29 PROCEDURE — U0002 COVID-19 LAB TEST NON-CDC: HCPCS

## 2020-07-01 ENCOUNTER — ANESTHESIA EVENT (OUTPATIENT)
Dept: OPERATING ROOM | Age: 40
End: 2020-07-01

## 2020-07-01 ASSESSMENT — ENCOUNTER SYMPTOMS
NAUSEA: 1
BACK PAIN: 0
RECTAL PAIN: 0
SORE THROAT: 0
EYE ITCHING: 0
COLOR CHANGE: 0
PHOTOPHOBIA: 0
CONSTIPATION: 0
EYE REDNESS: 0
STRIDOR: 0
APNEA: 0
ABDOMINAL PAIN: 1
ANAL BLEEDING: 0
CHOKING: 0

## 2020-07-01 NOTE — ANESTHESIA PRE PROCEDURE
Department of Anesthesiology  Preprocedure Note       Name:  Ashleigh Oden   Age:  44 y.o.  :  1980                                          MRN:  [id number]         Date:  2020      Surgeon: Miguel Baptiste):  Gavi Bentley MD    Procedure: Procedure(s):  OPEN HERNIA UMBILICAL REPAIR    Medications prior to admission:   Prior to Admission medications    Medication Sig Start Date End Date Taking? Authorizing Provider   amLODIPine (NORVASC) 5 MG tablet Take 1 tablet by mouth daily 20   Melvia Landau, MD   atenolol (TENORMIN) 25 MG tablet Take 1 tablet by mouth daily 20   Melvia Landau, MD   cetirizine (ZYRTEC) 10 MG tablet Take 10 mg by mouth daily    Historical Provider, MD   fluticasone (FLONASE) 50 MCG/ACT nasal spray 1 spray by Nasal route daily. Patient taking differently: 1 spray by Nasal route daily as needed  14  Lorraine Covington MD       Current medications:    No current facility-administered medications for this encounter. Current Outpatient Medications   Medication Sig Dispense Refill    amLODIPine (NORVASC) 5 MG tablet Take 1 tablet by mouth daily 90 tablet 3    atenolol (TENORMIN) 25 MG tablet Take 1 tablet by mouth daily 90 tablet 3    cetirizine (ZYRTEC) 10 MG tablet Take 10 mg by mouth daily      fluticasone (FLONASE) 50 MCG/ACT nasal spray 1 spray by Nasal route daily. (Patient taking differently: 1 spray by Nasal route daily as needed ) 1 Bottle 1       Allergies:     Allergies   Allergen Reactions    No Known Allergies        Problem List:    Patient Active Problem List   Diagnosis Code    Low back pain M54.5    Herniated lumbar intervertebral disc M51.26    Marfan's syndrome with ocular manifestation Q87.42    Essential hypertension I10       Past Medical History:        Diagnosis Date    Allergic rhinitis     Ankle pain     INACTIVE PROBLEM    Anxiety     Epigastric abdominal pain     INACTIVE PROBLEM    Essential hypertension Applicable): No results found for: PREGTESTUR, PREGSERUM, HCG, HCGQUANT     ABGs: No results found for: PHART, PO2ART, ZIN7NZW, YOA8ZVB, BEART, I7CCIPWD     Type & Screen (If Applicable):  No results found for: LABABO, LABRH    Drug/Infectious Status (If Applicable):  No results found for: HIV, HEPCAB    COVID-19 Screening (If Applicable): No results found for: COVID19      Anesthesia Evaluation    Airway: Mallampati: II  TM distance: >3 FB   Neck ROM: full  Mouth opening: > = 3 FB Dental: normal exam         Pulmonary:normal exam                               Cardiovascular:    (+) hypertension:,                ROS comment: Marfan's syndrome     Neuro/Psych:   (+) depression/anxiety              ROS comment: Marfan's Syndrome GI/Hepatic/Renal:             Endo/Other:                     Abdominal:           Vascular:                                      Anesthesia Plan      general and regional     ASA 2     (Chart review)  Induction: intravenous. MIPS: Postoperative opioids intended. Anesthetic plan and risks discussed with patient. Plan discussed with CRNA.     Attending anesthesiologist reviewed and agrees with Pre Eval content            FLORIDA Villaseñor - CRNA   7/1/2020

## 2020-07-02 ENCOUNTER — HOSPITAL ENCOUNTER (OUTPATIENT)
Age: 40
Setting detail: OUTPATIENT SURGERY
Discharge: HOME | End: 2020-07-02
Attending: SURGERY | Admitting: SURGERY
Payer: COMMERCIAL

## 2020-07-02 ENCOUNTER — ANESTHESIA (OUTPATIENT)
Dept: OPERATING ROOM | Age: 40
End: 2020-07-02

## 2020-07-02 VITALS
HEART RATE: 64 BPM | WEIGHT: 235 LBS | SYSTOLIC BLOOD PRESSURE: 174 MMHG | HEIGHT: 76 IN | OXYGEN SATURATION: 96 % | DIASTOLIC BLOOD PRESSURE: 102 MMHG | TEMPERATURE: 97.6 F | RESPIRATION RATE: 16 BRPM | BODY MASS INDEX: 28.62 KG/M2

## 2020-07-02 VITALS
DIASTOLIC BLOOD PRESSURE: 93 MMHG | SYSTOLIC BLOOD PRESSURE: 155 MMHG | TEMPERATURE: 97.8 F | OXYGEN SATURATION: 94 % | RESPIRATION RATE: 5 BRPM

## 2020-07-02 PROBLEM — K42.9 UMBILICAL HERNIA WITHOUT OBSTRUCTION AND WITHOUT GANGRENE: Status: ACTIVE | Noted: 2020-07-02

## 2020-07-02 LAB
SARS-COV-2: NOT DETECTED
SOURCE: NORMAL

## 2020-07-02 PROCEDURE — 3600000013 HC SURGERY LEVEL 3 ADDTL 15MIN: Performed by: SURGERY

## 2020-07-02 PROCEDURE — 3600000003 HC SURGERY LEVEL 3 BASE: Performed by: SURGERY

## 2020-07-02 PROCEDURE — 6360000002 HC RX W HCPCS: Performed by: PHYSICIAN ASSISTANT

## 2020-07-02 PROCEDURE — 6360000002 HC RX W HCPCS: Performed by: ANESTHESIOLOGY

## 2020-07-02 PROCEDURE — 2580000003 HC RX 258: Performed by: ANESTHESIOLOGY

## 2020-07-02 PROCEDURE — 7100000000 HC PACU RECOVERY - FIRST 15 MIN: Performed by: SURGERY

## 2020-07-02 PROCEDURE — 3700000000 HC ANESTHESIA ATTENDED CARE: Performed by: SURGERY

## 2020-07-02 PROCEDURE — 7100000001 HC PACU RECOVERY - ADDTL 15 MIN: Performed by: SURGERY

## 2020-07-02 PROCEDURE — 7100000010 HC PHASE II RECOVERY - FIRST 15 MIN: Performed by: SURGERY

## 2020-07-02 PROCEDURE — 6360000002 HC RX W HCPCS

## 2020-07-02 PROCEDURE — 2709999900 HC NON-CHARGEABLE SUPPLY: Performed by: SURGERY

## 2020-07-02 PROCEDURE — 2500000003 HC RX 250 WO HCPCS: Performed by: SURGERY

## 2020-07-02 PROCEDURE — 2500000003 HC RX 250 WO HCPCS: Performed by: NURSE ANESTHETIST, CERTIFIED REGISTERED

## 2020-07-02 PROCEDURE — C1781 MESH (IMPLANTABLE): HCPCS | Performed by: SURGERY

## 2020-07-02 PROCEDURE — 7100000011 HC PHASE II RECOVERY - ADDTL 15 MIN: Performed by: SURGERY

## 2020-07-02 PROCEDURE — 2720000010 HC SURG SUPPLY STERILE: Performed by: SURGERY

## 2020-07-02 PROCEDURE — 49585 REPAIR UMBILICAL HERN,5+Y/O,REDUC: CPT | Performed by: SURGERY

## 2020-07-02 PROCEDURE — 3700000001 HC ADD 15 MINUTES (ANESTHESIA): Performed by: SURGERY

## 2020-07-02 PROCEDURE — 6360000002 HC RX W HCPCS: Performed by: NURSE ANESTHETIST, CERTIFIED REGISTERED

## 2020-07-02 DEVICE — MESH HERN M DIA6.4CM VENTRAL POLYPR EPTFE CIR SELF EXP PTCH: Type: IMPLANTABLE DEVICE | Site: UMBILICAL | Status: FUNCTIONAL

## 2020-07-02 RX ORDER — FENTANYL CITRATE 50 UG/ML
25 INJECTION, SOLUTION INTRAMUSCULAR; INTRAVENOUS EVERY 5 MIN PRN
Status: DISCONTINUED | OUTPATIENT
Start: 2020-07-02 | End: 2020-07-02 | Stop reason: HOSPADM

## 2020-07-02 RX ORDER — MORPHINE SULFATE 2 MG/ML
2 INJECTION, SOLUTION INTRAMUSCULAR; INTRAVENOUS ONCE
Status: COMPLETED | OUTPATIENT
Start: 2020-07-02 | End: 2020-07-02

## 2020-07-02 RX ORDER — ONDANSETRON 2 MG/ML
INJECTION INTRAMUSCULAR; INTRAVENOUS PRN
Status: DISCONTINUED | OUTPATIENT
Start: 2020-07-02 | End: 2020-07-02 | Stop reason: SDUPTHER

## 2020-07-02 RX ORDER — BUPIVACAINE HYDROCHLORIDE 5 MG/ML
INJECTION, SOLUTION EPIDURAL; INTRACAUDAL
Status: COMPLETED | OUTPATIENT
Start: 2020-07-02 | End: 2020-07-02

## 2020-07-02 RX ORDER — HYDROCODONE BITARTRATE AND ACETAMINOPHEN 5; 325 MG/1; MG/1
1 TABLET ORAL EVERY 6 HOURS PRN
Qty: 20 TABLET | Refills: 0 | Status: SHIPPED | OUTPATIENT
Start: 2020-07-02 | End: 2020-07-09

## 2020-07-02 RX ORDER — PROPOFOL 10 MG/ML
INJECTION, EMULSION INTRAVENOUS PRN
Status: DISCONTINUED | OUTPATIENT
Start: 2020-07-02 | End: 2020-07-02 | Stop reason: SDUPTHER

## 2020-07-02 RX ORDER — CEFAZOLIN SODIUM 2 G/100ML
2 INJECTION, SOLUTION INTRAVENOUS ONCE
Status: COMPLETED | OUTPATIENT
Start: 2020-07-02 | End: 2020-07-02

## 2020-07-02 RX ORDER — KETAMINE HYDROCHLORIDE 10 MG/ML
INJECTION, SOLUTION INTRAMUSCULAR; INTRAVENOUS PRN
Status: DISCONTINUED | OUTPATIENT
Start: 2020-07-02 | End: 2020-07-02 | Stop reason: SDUPTHER

## 2020-07-02 RX ORDER — HYDRALAZINE HYDROCHLORIDE 20 MG/ML
5 INJECTION INTRAMUSCULAR; INTRAVENOUS EVERY 10 MIN PRN
Status: DISCONTINUED | OUTPATIENT
Start: 2020-07-02 | End: 2020-07-02 | Stop reason: HOSPADM

## 2020-07-02 RX ORDER — LIDOCAINE HYDROCHLORIDE 20 MG/ML
INJECTION, SOLUTION INTRAVENOUS PRN
Status: DISCONTINUED | OUTPATIENT
Start: 2020-07-02 | End: 2020-07-02 | Stop reason: SDUPTHER

## 2020-07-02 RX ORDER — FENTANYL CITRATE 50 UG/ML
INJECTION, SOLUTION INTRAMUSCULAR; INTRAVENOUS PRN
Status: DISCONTINUED | OUTPATIENT
Start: 2020-07-02 | End: 2020-07-02 | Stop reason: SDUPTHER

## 2020-07-02 RX ORDER — DEXAMETHASONE SODIUM PHOSPHATE 4 MG/ML
INJECTION, SOLUTION INTRA-ARTICULAR; INTRALESIONAL; INTRAMUSCULAR; INTRAVENOUS; SOFT TISSUE PRN
Status: DISCONTINUED | OUTPATIENT
Start: 2020-07-02 | End: 2020-07-02 | Stop reason: SDUPTHER

## 2020-07-02 RX ORDER — KETOROLAC TROMETHAMINE 30 MG/ML
INJECTION, SOLUTION INTRAMUSCULAR; INTRAVENOUS PRN
Status: DISCONTINUED | OUTPATIENT
Start: 2020-07-02 | End: 2020-07-02 | Stop reason: SDUPTHER

## 2020-07-02 RX ORDER — SODIUM CHLORIDE, SODIUM LACTATE, POTASSIUM CHLORIDE, CALCIUM CHLORIDE 600; 310; 30; 20 MG/100ML; MG/100ML; MG/100ML; MG/100ML
INJECTION, SOLUTION INTRAVENOUS CONTINUOUS
Status: DISCONTINUED | OUTPATIENT
Start: 2020-07-02 | End: 2020-07-02 | Stop reason: HOSPADM

## 2020-07-02 RX ORDER — MIDAZOLAM HYDROCHLORIDE 1 MG/ML
INJECTION INTRAMUSCULAR; INTRAVENOUS PRN
Status: DISCONTINUED | OUTPATIENT
Start: 2020-07-02 | End: 2020-07-02 | Stop reason: SDUPTHER

## 2020-07-02 RX ORDER — FENTANYL CITRATE 50 UG/ML
INJECTION, SOLUTION INTRAMUSCULAR; INTRAVENOUS
Status: COMPLETED
Start: 2020-07-02 | End: 2020-07-02

## 2020-07-02 RX ORDER — ROCURONIUM BROMIDE 10 MG/ML
INJECTION, SOLUTION INTRAVENOUS PRN
Status: DISCONTINUED | OUTPATIENT
Start: 2020-07-02 | End: 2020-07-02 | Stop reason: SDUPTHER

## 2020-07-02 RX ORDER — FENTANYL CITRATE 50 UG/ML
25 INJECTION, SOLUTION INTRAMUSCULAR; INTRAVENOUS EVERY 5 MIN PRN
Status: COMPLETED | OUTPATIENT
Start: 2020-07-02 | End: 2020-07-02

## 2020-07-02 RX ORDER — ONDANSETRON 2 MG/ML
4 INJECTION INTRAMUSCULAR; INTRAVENOUS
Status: DISCONTINUED | OUTPATIENT
Start: 2020-07-02 | End: 2020-07-02 | Stop reason: HOSPADM

## 2020-07-02 RX ADMIN — FENTANYL CITRATE 25 MCG: 50 INJECTION, SOLUTION INTRAMUSCULAR; INTRAVENOUS at 11:55

## 2020-07-02 RX ADMIN — KETAMINE HYDROCHLORIDE 10 MG: 10 INJECTION INTRAMUSCULAR; INTRAVENOUS at 11:30

## 2020-07-02 RX ADMIN — KETAMINE HYDROCHLORIDE 20 MG: 10 INJECTION INTRAMUSCULAR; INTRAVENOUS at 10:53

## 2020-07-02 RX ADMIN — SUGAMMADEX 100 MG: 100 INJECTION, SOLUTION INTRAVENOUS at 11:30

## 2020-07-02 RX ADMIN — DEXAMETHASONE SODIUM PHOSPHATE 8 MG: 4 INJECTION, SOLUTION INTRAMUSCULAR; INTRAVENOUS at 11:11

## 2020-07-02 RX ADMIN — KETOROLAC TROMETHAMINE 15 MG: 30 INJECTION, SOLUTION INTRAMUSCULAR; INTRAVENOUS at 11:32

## 2020-07-02 RX ADMIN — SODIUM CHLORIDE, POTASSIUM CHLORIDE, SODIUM LACTATE AND CALCIUM CHLORIDE: 600; 310; 30; 20 INJECTION, SOLUTION INTRAVENOUS at 10:50

## 2020-07-02 RX ADMIN — SODIUM CHLORIDE, POTASSIUM CHLORIDE, SODIUM LACTATE AND CALCIUM CHLORIDE: 600; 310; 30; 20 INJECTION, SOLUTION INTRAVENOUS at 08:18

## 2020-07-02 RX ADMIN — SUGAMMADEX 100 MG: 100 INJECTION, SOLUTION INTRAVENOUS at 11:32

## 2020-07-02 RX ADMIN — FENTANYL CITRATE 100 MCG: 50 INJECTION INTRAMUSCULAR; INTRAVENOUS at 10:57

## 2020-07-02 RX ADMIN — ONDANSETRON 4 MG: 2 INJECTION INTRAMUSCULAR; INTRAVENOUS at 11:30

## 2020-07-02 RX ADMIN — PROPOFOL 200 MG: 10 INJECTION, EMULSION INTRAVENOUS at 10:57

## 2020-07-02 RX ADMIN — FENTANYL CITRATE 25 MCG: 50 INJECTION, SOLUTION INTRAMUSCULAR; INTRAVENOUS at 12:20

## 2020-07-02 RX ADMIN — MORPHINE SULFATE 2 MG: 2 INJECTION, SOLUTION INTRAMUSCULAR; INTRAVENOUS at 13:32

## 2020-07-02 RX ADMIN — CEFAZOLIN SODIUM 2 G: 2 INJECTION, SOLUTION INTRAVENOUS at 11:05

## 2020-07-02 RX ADMIN — FENTANYL CITRATE 25 MCG: 50 INJECTION, SOLUTION INTRAMUSCULAR; INTRAVENOUS at 12:05

## 2020-07-02 RX ADMIN — FENTANYL CITRATE 25 MCG: 50 INJECTION, SOLUTION INTRAMUSCULAR; INTRAVENOUS at 12:10

## 2020-07-02 RX ADMIN — ROCURONIUM BROMIDE 50 MG: 10 INJECTION INTRAVENOUS at 10:58

## 2020-07-02 RX ADMIN — LIDOCAINE HYDROCHLORIDE 100 MG: 20 INJECTION, SOLUTION INTRAVENOUS at 10:57

## 2020-07-02 RX ADMIN — MIDAZOLAM 2 MG: 1 INJECTION INTRAMUSCULAR; INTRAVENOUS at 10:48

## 2020-07-02 ASSESSMENT — PAIN DESCRIPTION - PAIN TYPE
TYPE: SURGICAL PAIN

## 2020-07-02 ASSESSMENT — PULMONARY FUNCTION TESTS
PIF_VALUE: 20
PIF_VALUE: 5
PIF_VALUE: 18
PIF_VALUE: 24
PIF_VALUE: 23
PIF_VALUE: 21
PIF_VALUE: 26
PIF_VALUE: 20
PIF_VALUE: 1
PIF_VALUE: 2
PIF_VALUE: 18
PIF_VALUE: 19
PIF_VALUE: 19
PIF_VALUE: 18
PIF_VALUE: 18
PIF_VALUE: 20
PIF_VALUE: 20
PIF_VALUE: 3
PIF_VALUE: 18
PIF_VALUE: 2
PIF_VALUE: 27
PIF_VALUE: 19
PIF_VALUE: 20
PIF_VALUE: 20
PIF_VALUE: 4
PIF_VALUE: 20
PIF_VALUE: 20
PIF_VALUE: 18
PIF_VALUE: 19
PIF_VALUE: 22
PIF_VALUE: 20
PIF_VALUE: 19
PIF_VALUE: 24
PIF_VALUE: 18
PIF_VALUE: 21
PIF_VALUE: 1
PIF_VALUE: 2
PIF_VALUE: 19
PIF_VALUE: 22
PIF_VALUE: 19
PIF_VALUE: 18
PIF_VALUE: 1
PIF_VALUE: 20
PIF_VALUE: 20
PIF_VALUE: 18
PIF_VALUE: 3
PIF_VALUE: 18
PIF_VALUE: 1

## 2020-07-02 ASSESSMENT — PAIN SCALES - GENERAL
PAINLEVEL_OUTOF10: 5
PAINLEVEL_OUTOF10: 4
PAINLEVEL_OUTOF10: 5
PAINLEVEL_OUTOF10: 6
PAINLEVEL_OUTOF10: 7
PAINLEVEL_OUTOF10: 4
PAINLEVEL_OUTOF10: 6
PAINLEVEL_OUTOF10: 0
PAINLEVEL_OUTOF10: 4

## 2020-07-02 ASSESSMENT — PAIN DESCRIPTION - LOCATION
LOCATION: ABDOMEN

## 2020-07-02 ASSESSMENT — PAIN - FUNCTIONAL ASSESSMENT: PAIN_FUNCTIONAL_ASSESSMENT: 0-10

## 2020-07-02 NOTE — PROGRESS NOTES
Voices relief from pain med given earlier. Dressing dry. Binder on. Abdomen soft. Ambulated to bathroom, Gait steady. Void qs and returned to bed. Awaiting Dr Roberto Carlos Sood to return call for  discharge order and home RX.

## 2020-07-02 NOTE — OP NOTE
subcuticular closure. Steri-Strips were applied at the end of the operation. Instrument, sponge, and needle counts were correct prior to closure and at the conclusion of the case.              Feliciano Vera MD

## 2020-07-02 NOTE — H&P
Kristie Moss MD      General Surgery       Subjective:     Patient is a 44 y.o. male scheduled for umbilical hernia repair. Indications for procedure are umbilical hernia that has been present for about 6 months with associated pain and intermittent nausea. The hernia has been reduced by the pt in the past to help relieve his symptoms. Patient Active Problem List    Diagnosis Date Noted    Essential hypertension 01/08/2018    Marfan's syndrome with ocular manifestation     Low back pain     Herniated lumbar intervertebral disc      Past Medical History:   Diagnosis Date    Allergic rhinitis     Ankle pain     INACTIVE PROBLEM    Anxiety     Epigastric abdominal pain     INACTIVE PROBLEM    Essential hypertension 01/08/2018    Family history of coronary artery disease     Herniated lumbar intervertebral disc     LBP (low back pain)     Marfan's syndrome with ocular manifestation     DX'D BY OPT[employer] DR CANTU AT 6Y0    Oral herpes       Past Surgical History:   Procedure Laterality Date    BACK SURGERY      herniated ruptured disc 2008    TONSILLECTOMY        Prior to Admission medications    Medication Sig Start Date End Date Taking? Authorizing Provider   amLODIPine (NORVASC) 5 MG tablet Take 1 tablet by mouth daily 6/11/20   Tray Mccormick MD   atenolol (TENORMIN) 25 MG tablet Take 1 tablet by mouth daily 6/11/20   Tray Mccormick MD   cetirizine (ZYRTEC) 10 MG tablet Take 10 mg by mouth daily    Historical Provider, MD   fluticasone (FLONASE) 50 MCG/ACT nasal spray 1 spray by Nasal route daily. Patient taking differently: 1 spray by Nasal route daily as needed  7/23/14 6/11/20  Darletta Blizzard, MD     Allergies   Allergen Reactions    No Known Allergies       Social History     Tobacco Use    Smoking status: Never Smoker    Smokeless tobacco: Never Used   Substance Use Topics    Alcohol use: Yes     Comment: Rarely      History reviewed.  No pertinent family history. Review of Systems  Review of Systems   Constitutional: Negative for chills and fever. HENT: Negative for ear pain, mouth sores, sore throat and tinnitus. Eyes: Negative for photophobia, redness and itching. Respiratory: Negative for apnea, choking and stridor. Cardiovascular: Negative for chest pain and palpitations. Gastrointestinal: Positive for abdominal pain and nausea (intermittent). Negative for anal bleeding, constipation and rectal pain. Endocrine: Negative for polydipsia. Genitourinary: Negative for enuresis, flank pain and hematuria. Musculoskeletal: Negative for back pain, joint swelling and myalgias. Skin: Negative for color change and pallor. Allergic/Immunologic: Negative for environmental allergies. Neurological: Negative for syncope and speech difficulty. Psychiatric/Behavioral: Negative for confusion and hallucinations. Objective:     No data found. Physical Exam  Constitutional:       Appearance: He is well-developed. HENT:      Head: Normocephalic. Eyes:      Pupils: Pupils are equal, round, and reactive to light. Neck:      Musculoskeletal: Normal range of motion and neck supple. Cardiovascular:      Rate and Rhythm: Normal rate. Pulmonary:      Effort: Pulmonary effort is normal.   Abdominal:      General: There is no distension. Palpations: Abdomen is soft. There is no mass. Tenderness: There is abdominal tenderness. There is no guarding or rebound. Hernia: A hernia (umbilical) is present. Musculoskeletal: Normal range of motion. Skin:     General: Skin is warm. Neurological:      Mental Status: He is alert and oriented to person, place, and time.          Data Review  CBC:   Lab Results   Component Value Date    WBC 7.4 05/10/2019    RBC 5.29 05/10/2019     BMP:   Lab Results   Component Value Date    GLUCOSE 96 05/10/2019    CO2 31 05/10/2019    BUN 8 05/10/2019    CREATININE 0.8 05/10/2019    CALCIUM 9.3 05/10/2019       Assessment:     Umbilical hernia    Plan: Will proceed with open umbilical hernia repair. Risks, benefits and alternatives to treatment discussed with the pt, who has elected to proceed. The patient was counseled at length about the risks of anmol Covid-19 during their perioperative period and any recovery window from their procedure. The patient was made aware that anmol Covid-19  may worsen their prognosis for recovering from their procedure  and lend to a higher morbidity and/or mortality risk. All material risks, benefits, and reasonable alternatives including postponing the procedure were discussed. The patient does wish to proceed with the procedure at this time.       Miladys Paula PA-C

## 2020-07-02 NOTE — PROGRESS NOTES
Returned to room from  PACU. Awake, alert. Abdomen soft with Abdominal Binder on. Small dressing to umbilical area dry and intact. No C/O nausea. Marla Mist and Crackers given po.

## 2020-07-03 NOTE — ANESTHESIA POSTPROCEDURE EVALUATION
Department of Anesthesiology  Postprocedure Note    Patient: Jennifer Trivedi  MRN: [id number]  YOB: 1980  Date of evaluation: 7/2/2020  Time:  8:16 PM     Procedure Summary     Date:  07/02/20 Room / Location:  [address] [place] [address]    Anesthesia Start:  1050 Anesthesia Stop:  1149    Procedure:  OPEN HERNIA UMBILICAL REPAIR (N/A Abdomen) Diagnosis:  (Umbilical Hernia)    Surgeon:  Christiano Ricci MD Responsible Provider:  FLORIDA Johnson CRNA    Anesthesia Type:  general, regional ASA Status:  2          Anesthesia Type: general, regional    Laura Phase I: Laura Score: 10    Laura Phase II: Laura Score: 10    Last vitals: Reviewed and per EMR flowsheets.        Anesthesia Post Evaluation    Patient location during evaluation: PACU  Patient participation: complete - patient participated  Level of consciousness: awake  Airway patency: patent  Nausea & Vomiting: no nausea and no vomiting  Complications: no  Cardiovascular status: blood pressure returned to baseline  Respiratory status: acceptable  Hydration status: euvolemic

## 2020-07-13 ENCOUNTER — OFFICE VISIT (OUTPATIENT)
Dept: SURGERY | Age: 40
End: 2020-07-13

## 2020-07-13 VITALS
BODY MASS INDEX: 29.64 KG/M2 | TEMPERATURE: 97.9 F | DIASTOLIC BLOOD PRESSURE: 100 MMHG | SYSTOLIC BLOOD PRESSURE: 160 MMHG | WEIGHT: 243.4 LBS | OXYGEN SATURATION: 98 % | HEIGHT: 76 IN | HEART RATE: 85 BPM

## 2020-07-13 PROCEDURE — 99024 POSTOP FOLLOW-UP VISIT: CPT | Performed by: SURGERY

## 2020-07-16 ASSESSMENT — ENCOUNTER SYMPTOMS
EYE ITCHING: 0
RECTAL PAIN: 0
CONSTIPATION: 0
ABDOMINAL PAIN: 1
BACK PAIN: 0
ANAL BLEEDING: 0
APNEA: 0
PHOTOPHOBIA: 0
EYE REDNESS: 0
COLOR CHANGE: 0
CHOKING: 0
STRIDOR: 0
SORE THROAT: 0

## 2020-07-16 NOTE — PROGRESS NOTES
Chief Complaint   Patient presents with    Post-Op Check     Pt 1st PO Visit Unbilical hernia repair 7/2 wound check         SUBJECTIVE:  Patient here for post op visit  Pain is minimal.  Wounds: minbruising and no discharge. Past Surgical History:   Procedure Laterality Date    BACK SURGERY      herniated ruptured disc 2008    TONSILLECTOMY      UMBILICAL HERNIA REPAIR N/A 7/2/2020    OPEN HERNIA UMBILICAL REPAIR performed by Canelo Ramos MD at [facility] OR     Past Medical History:   Diagnosis Date    Allergic rhinitis     Ankle pain     INACTIVE PROBLEM    Anxiety     Epigastric abdominal pain     INACTIVE PROBLEM    Essential hypertension 01/08/2018    Family history of coronary artery disease     Herniated lumbar intervertebral disc     LBP (low back pain)     Marfan's syndrome with ocular manifestation     DX'D BY OPT[employer] DR CANTU AT 6Y0    Oral herpes      No family history on file.   Social History     Socioeconomic History    Marital status:      Spouse name: Not on file    Number of children: Not on file    Years of education: Not on file    Highest education level: Not on file   Occupational History    Occupation:    Social Needs    Financial resource strain: Not on file    Food insecurity     Worry: Not on file     Inability: Not on file   [employer] needs     Medical: Not on file     Non-medical: Not on file   Tobacco Use    Smoking status: Never Smoker    Smokeless tobacco: Never Used   Substance and Sexual Activity    Alcohol use: Yes     Comment: Rarely    Drug use: Not on file    Sexual activity: Not on file   Lifestyle    Physical activity     Days per week: Not on file     Minutes per session: Not on file    Stress: Not on file   Relationships    Social connections     Talks on phone: Not on file     Gets together: Not on file     Attends Worship service: Not on file     Active member of club or organization: Not on file     Attends meetings of clubs or organizations: Not on file     Relationship status: Not on file    Intimate partner violence     Fear of current or ex partner: Not on file     Emotionally abused: Not on file     Physically abused: Not on file     Forced sexual activity: Not on file   Other Topics Concern    Not on file   Social History Narrative    Not on file       OBJECTIVE:   Physical Exam    Wound well healed without signs of active infection. Suture line intact. Abdomen soft, nontender, nondistended. ASSESSMENT:  Umbilical swelling  Patient doing well on this post operative check. Wounds well healed. 1. Umbilical hernia without obstruction and without gangrene        PLAN:  F/u in 2 wks via video call  Continue same  Increase activity as tolerated        No orders of the defined types were placed in this encounter. No orders of the defined types were placed in this encounter. Follow Up: No follow-ups on file.     Rubio Gupta MD

## 2020-07-16 NOTE — PROGRESS NOTES
file     Minutes per session: Not on file    Stress: Not on file   Relationships    Social connections     Talks on phone: Not on file     Gets together: Not on file     Attends Hinduism service: Not on file     Active member of club or organization: Not on file     Attends meetings of clubs or organizations: Not on file     Relationship status: Not on file    Intimate partner violence     Fear of current or ex partner: Not on file     Emotionally abused: Not on file     Physically abused: Not on file     Forced sexual activity: Not on file   Other Topics Concern    Not on file   Social History Narrative    Not on file       Current Outpatient Medications   Medication Sig Dispense Refill    amLODIPine (NORVASC) 5 MG tablet Take 1 tablet by mouth daily 90 tablet 3    atenolol (TENORMIN) 25 MG tablet Take 1 tablet by mouth daily 90 tablet 3    cetirizine (ZYRTEC) 10 MG tablet Take 10 mg by mouth daily      fluticasone (FLONASE) 50 MCG/ACT nasal spray 1 spray by Nasal route daily. (Patient taking differently: 1 spray by Nasal route daily as needed ) 1 Bottle 1     No current facility-administered medications for this visit. Allergies   Allergen Reactions    No Known Allergies        Review of Systems:         Review of Systems   Constitutional: Negative for chills and fever. HENT: Negative for ear pain, mouth sores, sore throat and tinnitus. Eyes: Negative for photophobia, redness and itching. Respiratory: Negative for apnea, choking and stridor. Cardiovascular: Negative for chest pain and palpitations. Gastrointestinal: Positive for abdominal pain. Negative for anal bleeding, constipation and rectal pain. Endocrine: Negative for polydipsia. Genitourinary: Negative for enuresis, flank pain and hematuria. Musculoskeletal: Negative for back pain, joint swelling and myalgias. Skin: Negative for color change and pallor. Allergic/Immunologic: Negative for environmental allergies. Neurological: Negative for syncope and speech difficulty. Psychiatric/Behavioral: Negative for confusion and hallucinations. OBJECTIVE:  Physical Exam:    There were no vitals taken for this visit. Physical Exam  Constitutional:       Appearance: He is well-developed. HENT:      Head: Normocephalic. Eyes:      Pupils: Pupils are equal, round, and reactive to light. Neck:      Musculoskeletal: Normal range of motion and neck supple. Cardiovascular:      Rate and Rhythm: Normal rate. Pulmonary:      Effort: Pulmonary effort is normal.   Abdominal:      General: There is no distension. Palpations: Abdomen is soft. There is no mass. Tenderness: There is abdominal tenderness. There is no guarding or rebound. Hernia: A hernia (umbilical hernia) is present. Musculoskeletal: Normal range of motion. Skin:     General: Skin is warm. Neurological:      Mental Status: He is alert and oriented to person, place, and time. ASSESSMENT:  1. Umbilical hernia without obstruction and without gangrene          PLAN:  Treatment: We will proceed with open umbilical hernia repair with mesh. Patient counseled on risks, benefits, and alternatives of treatment plan at length. Patient states anunderstanding and willingness to proceed with plan. No orders of the defined types were placed in this encounter. No orders of the defined types were placed in this encounter. Follow Up:  No follow-ups on file.       Shahla Renee MD

## 2020-07-30 ENCOUNTER — VIRTUAL VISIT (OUTPATIENT)
Dept: SURGERY | Age: 40
End: 2020-07-30

## 2020-07-30 PROCEDURE — 99024 POSTOP FOLLOW-UP VISIT: CPT | Performed by: SURGERY

## 2020-09-03 ENCOUNTER — VIRTUAL VISIT (OUTPATIENT)
Dept: INTERNAL MEDICINE CLINIC | Age: 40
End: 2020-09-03
Payer: COMMERCIAL

## 2020-09-03 PROCEDURE — 99213 OFFICE O/P EST LOW 20 MIN: CPT | Performed by: INTERNAL MEDICINE

## 2020-09-03 RX ORDER — AMOXICILLIN 500 MG/1
500 CAPSULE ORAL 2 TIMES DAILY
Qty: 20 CAPSULE | Refills: 0 | Status: SHIPPED | OUTPATIENT
Start: 2020-09-03 | End: 2020-09-13

## 2020-09-03 RX ORDER — ONDANSETRON 4 MG/1
4 TABLET, FILM COATED ORAL 3 TIMES DAILY PRN
Qty: 30 TABLET | Refills: 0 | Status: SHIPPED | OUTPATIENT
Start: 2020-09-03 | End: 2020-10-26 | Stop reason: ALTCHOICE

## 2020-09-03 RX ORDER — PREDNISONE 1 MG/1
TABLET ORAL
Qty: 36 TABLET | Refills: 0 | Status: SHIPPED | OUTPATIENT
Start: 2020-09-03 | End: 2020-10-26 | Stop reason: ALTCHOICE

## 2020-09-03 NOTE — LETTER
[facility] INTERNAL MEDICINE  [address] [facility] [address]  Phone: [phone]  Fax: [phone]    Napoleon Montes MD        September 3, 2020     Patient: Paulina Rivera   YOB: 1980   Date of Visit: 9/3/2020       To Whom it May Concern:    Christine Gramajo was had a visit today 9/3/20. He may be excused from work 8/31/20 through 9/8/20. If you have any questions or concerns, please don't hesitate to call.     Sincerely,         Napoleon Montes MD

## 2020-09-03 NOTE — PROGRESS NOTES
[] No visualized mass     Pulmonary/Chest: [] Respiratory effort normal.  [] No visualized signs of difficulty breathing or respiratory distress        [] Abnormal-      Musculoskeletal:   [] Normal gait with no signs of ataxia         [] Normal range of motion of neck        [] Abnormal-       Neurological:        [] No Facial Asymmetry (Cranial nerve 7 motor function) (limited exam to video visit)          [] No gaze palsy        [] Abnormal-         Skin:        [] No significant exanthematous lesions or discoloration noted on facial skin         [] Abnormal-            Psychiatric:       [x] Normal Affect [] No Hallucinations        [] Abnormal-     Other pertinent observable physical exam findings-     ASSESSMENT/PLAN:  1. Acute non-recurrent maxillary sinusitis  Consistent w presentation, rec rx as below and fluids, rest.  Pt to call back one week if not improving.      - predniSONE (DELTASONE) 5 MG tablet; Take 8 pills, then 7,6,5,4,3,2,1  Dispense: 36 tablet; Refill: 0  - amoxicillin (AMOXIL) 500 MG capsule; Take 1 capsule by mouth 2 times daily for 10 days  Dispense: 20 capsule; Refill: 0  - ondansetron (ZOFRAN) 4 MG tablet; Take 1 tablet by mouth 3 times daily as needed for Nausea or Vomiting  Dispense: 30 tablet; Refill: 0    2. Essential hypertension  The current medical regimen is effective;  continue present plan and medications. 3. Flu-like symptoms  rec also screen for COVID  - Covid-19, Antibody; Future      Return if symptoms worsen or fail to improve. Laine Mcgee is a 44 y.o. male being evaluated by a Virtual Visit (video visit) encounter to address concerns as mentioned above. A caregiver was present when appropriate. Due to this being a TeleHealth encounter (During [facility]- public health emergency), evaluation of the following organ systems was limited: Vitals/Constitutional/EENT/Resp/CV/GI//MS/Neuro/Skin/Heme-Lymph-Imm.   Pursuant to the emergency declaration under the [facility]3] Act and the [address] waiver authority and the [employer] and [employer] Act, this Virtual Visit was conducted with patient's (and/or legal guardian's) consent, to reduce the patient's risk of exposure to COVID-19 and provide necessary medical care. The patient (and/or legal guardian) has also been advised to contact this office for worsening conditions or problems, and seek emergency medical treatment and/or call 911 if deemed necessary. Patient identification was verified at the start of the visit: Yes    Total time spent on this encounter: Not billed by time    Services were provided through a video synchronous discussion virtually to substitute for in-person clinic visit. Patient and provider were located at their individual homes. --Ernesto Salas MD on 9/3/2020 at 11:02 AM    An electronic signature was used to authenticate this note.

## 2020-09-10 ENCOUNTER — TELEPHONE (OUTPATIENT)
Dept: INTERNAL MEDICINE CLINIC | Age: 40
End: 2020-09-10

## 2020-10-26 RX ORDER — PROMETHAZINE HYDROCHLORIDE 12.5 MG/1
12.5 TABLET ORAL EVERY 8 HOURS PRN
Qty: 15 TABLET | Refills: 0 | Status: SHIPPED | OUTPATIENT
Start: 2020-10-26 | End: 2020-10-31

## 2020-11-23 NOTE — PROGRESS NOTES
Chief Complaint   Patient presents with    Post-Op Check         SUBJECTIVE:  Patient here for post op visit. Pain is minimal.  Wounds: minbruising and no discharge. Past Surgical History:   Procedure Laterality Date    BACK SURGERY      herniated ruptured disc 2008    TONSILLECTOMY      UMBILICAL HERNIA REPAIR N/A 7/2/2020    OPEN HERNIA UMBILICAL REPAIR performed by Monica Skinner MD at [facility] OR     Past Medical History:   Diagnosis Date    Allergic rhinitis     Ankle pain     INACTIVE PROBLEM    Anxiety     Epigastric abdominal pain     INACTIVE PROBLEM    Essential hypertension 01/08/2018    Family history of coronary artery disease     Herniated lumbar intervertebral disc     LBP (low back pain)     Marfan's syndrome with ocular manifestation     DX'D BY OPT[employer] DR CANTU AT 6Y0    Oral herpes      No family history on file.   Social History     Socioeconomic History    Marital status:      Spouse name: Not on file    Number of children: Not on file    Years of education: Not on file    Highest education level: Not on file   Occupational History    Occupation:    Social Needs    Financial resource strain: Not on file    Food insecurity     Worry: Not on file     Inability: Not on file   [employer] needs     Medical: Not on file     Non-medical: Not on file   Tobacco Use    Smoking status: Never Smoker    Smokeless tobacco: Never Used   Substance and Sexual Activity    Alcohol use: Yes     Comment: Rarely    Drug use: Not on file    Sexual activity: Not on file   Lifestyle    Physical activity     Days per week: Not on file     Minutes per session: Not on file    Stress: Not on file   Relationships    Social connections     Talks on phone: Not on file     Gets together: Not on file     Attends Holiness service: Not on file     Active member of club or organization: Not on file     Attends meetings of clubs or organizations: Not on file     Relationship status: Not on file    Intimate partner violence     Fear of current or ex partner: Not on file     Emotionally abused: Not on file     Physically abused: Not on file     Forced sexual activity: Not on file   Other Topics Concern    Not on file   Social History Narrative    Not on file       OBJECTIVE:   Physical Exam    Wound well healed without signs of active infection. Suture line intact. Abdomen soft, nontender, nondistended. ASSESSMENT:  Patient doing well on this post operative check. Wounds well healed. 1. Umbilical hernia without obstruction and without gangrene        PLAN:  Continue same  Increase activity as tolerated        No orders of the defined types were placed in this encounter. No orders of the defined types were placed in this encounter. Follow Up: No follow-ups on file.     Min Jamison MD

## 2021-11-08 PROCEDURE — 30903 CONTROL OF NOSEBLEED: CPT

## 2021-11-08 PROCEDURE — 99283 EMERGENCY DEPT VISIT LOW MDM: CPT

## 2021-11-09 ENCOUNTER — HOSPITAL ENCOUNTER (EMERGENCY)
Age: 41
Discharge: HOME | End: 2021-11-09
Attending: EMERGENCY MEDICINE

## 2021-11-09 VITALS
BODY MASS INDEX: 28.62 KG/M2 | HEART RATE: 93 BPM | TEMPERATURE: 97.9 F | WEIGHT: 235 LBS | SYSTOLIC BLOOD PRESSURE: 215 MMHG | RESPIRATION RATE: 18 BRPM | HEIGHT: 76 IN | DIASTOLIC BLOOD PRESSURE: 143 MMHG | OXYGEN SATURATION: 96 %

## 2021-11-09 DIAGNOSIS — R04.0 EPISTAXIS: Primary | ICD-10-CM

## 2021-11-09 DIAGNOSIS — I10 UNCONTROLLED HYPERTENSION: ICD-10-CM

## 2021-11-09 PROCEDURE — 6370000000 HC RX 637 (ALT 250 FOR IP): Performed by: EMERGENCY MEDICINE

## 2021-11-09 RX ORDER — ATENOLOL 25 MG/1
25 TABLET ORAL DAILY
Qty: 14 TABLET | Refills: 0 | Status: SHIPPED | OUTPATIENT
Start: 2021-11-09 | End: 2021-11-17

## 2021-11-09 RX ORDER — ATENOLOL 25 MG/1
25 TABLET ORAL ONCE
Status: COMPLETED | OUTPATIENT
Start: 2021-11-09 | End: 2021-11-09

## 2021-11-09 RX ORDER — CEPHALEXIN 250 MG/1
500 CAPSULE ORAL ONCE
Status: COMPLETED | OUTPATIENT
Start: 2021-11-09 | End: 2021-11-09

## 2021-11-09 RX ORDER — AMLODIPINE BESYLATE 5 MG/1
5 TABLET ORAL DAILY
Qty: 14 TABLET | Refills: 0 | Status: SHIPPED | OUTPATIENT
Start: 2021-11-09 | End: 2021-11-17

## 2021-11-09 RX ORDER — ONDANSETRON 4 MG/1
8 TABLET, ORALLY DISINTEGRATING ORAL ONCE
Status: COMPLETED | OUTPATIENT
Start: 2021-11-09 | End: 2021-11-09

## 2021-11-09 RX ORDER — CEPHALEXIN 500 MG/1
500 CAPSULE ORAL 4 TIMES DAILY
Qty: 28 CAPSULE | Refills: 0 | Status: SHIPPED | OUTPATIENT
Start: 2021-11-09 | End: 2021-11-16

## 2021-11-09 RX ORDER — OXYMETAZOLINE HYDROCHLORIDE 0.05 G/100ML
2 SPRAY NASAL ONCE
Status: COMPLETED | OUTPATIENT
Start: 2021-11-09 | End: 2021-11-09

## 2021-11-09 RX ORDER — AMLODIPINE BESYLATE 5 MG/1
5 TABLET ORAL ONCE
Status: COMPLETED | OUTPATIENT
Start: 2021-11-09 | End: 2021-11-09

## 2021-11-09 RX ADMIN — ONDANSETRON 8 MG: 4 TABLET, ORALLY DISINTEGRATING ORAL at 01:57

## 2021-11-09 RX ADMIN — CEPHALEXIN 500 MG: 250 CAPSULE ORAL at 02:58

## 2021-11-09 RX ADMIN — ATENOLOL 25 MG: 25 TABLET ORAL at 02:58

## 2021-11-09 RX ADMIN — OXYMETAZOLINE HYDROCHLORIDE 2 SPRAY: 5 SPRAY NASAL at 01:34

## 2021-11-09 RX ADMIN — AMLODIPINE BESYLATE 5 MG: 5 TABLET ORAL at 01:58

## 2021-11-09 NOTE — ED TRIAGE NOTES
C/o hypertension,patient has not been taking his b/p medication x 4 months but restarted it today also has a nose bleed since 1600

## 2021-11-09 NOTE — ED NOTES
Discharge paperwork reviewed with Pt, all questions answered.  Pt ambulated to sahara Berman RN  11/09/21 3640

## 2021-11-09 NOTE — ED PROVIDER NOTES
CHIEF COMPLAINT    Chief Complaint   Patient presents with    Epistaxis    Hypertension     HPI  Riana Salinas is a 36 y.o. male with history of anxiety and hypertension who presents to the ED with complaints of elevated blood pressure and nosebleed. Patient has known diagnosis of hypertension and is supposed to take atenolol as well as amlodipine but has been noncompliant over the last 4 months or so. He has noticed blood pressure has been elevated recently and he started taking his amlodipine during the day today. He went to go to the gym around 4 PM today and started to notice bleeding from his right nare. Bleeding has continued since that time which prompted him to seek evaluation in the emergency department. No known trauma to the nose. Patient has not been picking at his nose. He has not experienced any episode of nosebleeds in the past.  Does not take blood thinners. His symptoms as moderate to severe in severity. He denies fevers, chills, chest pain, shortness of breath, headache, numbness, tingling. REVIEW OF SYSTEMS  Constitutional: No fever, chills or recent illness. Eye: No visual changes  HENT: No earache or sore throat. Complains of nosebleed  Resp: No SOB or productive cough. Cardio: No chest pain or palpitations. GI: No abdominal pain, nausea, vomiting, constipation or diarrhea. No melena. : No dysuria, urgency or frequency. Endocrine: No heat intolerance, no cold intolerance, no polydipsia   Lymphatics: No adenopathy  Musculoskeletal: No new muscle aches or joint pain. Neuro: No headaches. Psych: No homicidal or suicidal thoughts  Skin: No rash, No itching. ?  ?   PAST MEDICAL HISTORY  Past Medical History:   Diagnosis Date    Allergic rhinitis     Ankle pain     INACTIVE PROBLEM    Anxiety     Epigastric abdominal pain     INACTIVE PROBLEM    Essential hypertension 01/08/2018    Family history of coronary artery disease     Herniated lumbar intervertebral disc     LBP (low back pain)     Marfan's syndrome with ocular manifestation     DX'D BY OPT[employer] DR CANTU AT 6Y0    Oral herpes      FAMILY HISTORY  History reviewed. No pertinent family history. SOCIAL HISTORY  Social History     Socioeconomic History    Marital status:      Spouse name: None    Number of children: None    Years of education: None    Highest education level: None   Occupational History    Occupation:    Tobacco Use    Smoking status: Never Smoker    Smokeless tobacco: Never Used   Vaping Use    Vaping Use: None   Substance and Sexual Activity    Alcohol use: Yes     Comment: Rarely    Drug use: Never    Sexual activity: None   Other Topics Concern    None   Social History Narrative    None     Social Determinants of Health     Financial Resource Strain:     Difficulty of Paying Living Expenses: Not on file   Food Insecurity:     Worried About Running Out of Food in the Last Year: Not on file    Yeimy of Food in the Last Year: Not on file   Transportation Needs:     Lack of Transportation (Medical): Not on file    Lack of Transportation (Non-Medical):  Not on file   Physical Activity:     Days of Exercise per Week: Not on file    Minutes of Exercise per Session: Not on file   Stress:     Feeling of Stress : Not on file   Social Connections:     Frequency of Communication with Friends and Family: Not on file    Frequency of Social Gatherings with Friends and Family: Not on file    Attends Religion Services: Not on file    Active Member of Clubs or Organizations: Not on file    Attends Club or Organization Meetings: Not on file    Marital Status: Not on file   Intimate Partner Violence:     Fear of Current or Ex-Partner: Not on file    Emotionally Abused: Not on file    Physically Abused: Not on file    Sexually Abused: Not on file   Housing Stability:     Unable to Pay for Housing in the Last Year: Not on file    Number of [place] in the Last Year: Not on file    Unstable Housing in the Last Year: Not on file       SURGICAL HISTORY  Past Surgical History:   Procedure Laterality Date    BACK SURGERY      herniated ruptured disc 2008    TONSILLECTOMY      UMBILICAL HERNIA REPAIR N/A 7/2/2020    OPEN HERNIA UMBILICAL REPAIR performed by Kerrie Florez MD at [facility]  Previous Medications    AMLODIPINE (NORVASC) 5 MG TABLET    Take 1 tablet by mouth daily    ATENOLOL (TENORMIN) 25 MG TABLET    Take 1 tablet by mouth daily    CETIRIZINE (ZYRTEC) 10 MG TABLET    Take 10 mg by mouth daily    FLUTICASONE (FLONASE) 50 MCG/ACT NASAL SPRAY    1 spray by Nasal route daily. ALLERGIES  Allergies   Allergen Reactions    No Known Allergies        Nursing notes reviewed by myself for past medical history, family history, social history, surgical history, current medications, and allergies. PHYSICAL EXAM  VITAL SIGNS: Triage VS:    ED Triage Vitals [11/08/21 2320]   Enc Vitals Group      BP (!) 229/140      Pulse 93      Resp 18      Temp 97.9 °F (36.6 °C)      Temp src       SpO2 98 %      Weight 235 lb (106.6 kg)      Height 6' 4\" (1.93 m)      Head Circumference       Peak Flow       Pain Score       Pain Loc       Pain Edu? Excl. in [address]? Constitutional: Well developed, Well nourished, nontoxic appearing  HENT: Normocephalic, Atraumatic, Bilateral external ears normal, Oropharynx moist, amount of blood present within the posterior oropharynx, No oral exudates, patient has small amount of venous bleeding within the right nare that is nonpulsatile, no active bleeding from left nare,  Eyes:  Conjunctiva normal, No discharge. No scleral icterus. Neck: Trachea is midline  Lymphatic: No lymphadenopathy noted. Cardiovascular: Normal heart rate  Thorax & Lungs:  No respiratory distress  Skin: Warm, Dry, Pink, No mottling, No erythema, No rash. Extremities: No edema, No tenderness, No cyanosis, Normal perfusion, No clubbing. Musculoskeletal:  No major deformities noted. Neurologic: Alert & oriented x 3,No focal deficits noted. Psychiatric: Affect normal, Judgment normal, Mood normal.       RADIOLOGY  Labs Reviewed - No data to display  I personally reviewed the images. The radiologist's interpretation reveals:  Last Imaging results   No orders to display       MEDS GIVEN IN ED:  Medications   oxymetazoline (AFRIN) 0.05 % nasal spray 2 spray (2 sprays Each Nostril Given 11/9/21 0134)   ondansetron (ZOFRAN-ODT) disintegrating tablet 8 mg (8 mg Oral Given 11/9/21 0157)   amLODIPine (NORVASC) tablet 5 mg (5 mg Oral Given 11/9/21 0158)   atenolol (TENORMIN) tablet 25 mg (25 mg Oral Given 11/9/21 0258)   cephALEXin (KEFLEX) capsule 500 mg (500 mg Oral Given 11/9/21 0258)     COURSE & MEDICAL DECISION MAKING  77-year-old male presents emergency department complaints of nosebleed that started around 4 PM.  Also complains of elevated blood pressure. Initial vital signs demonstrate blood pressure was systolic in the 090F. On exam he is active bleeding from the right nare but no bleeding from the left. There is a small amount of blood in the posterior oropharynx. Patient was provided with a dose of his normal amlodipine and atenolol while here. We first attempted to use Afrin after patient blew his nose and applied nasal clamp for 15 minutes. Unfortunately continue to have bleeding and we packed his nose with Afrin soaked gauze for 20 minutes. He continued to have significant bleeding from the right nare and therefore we did place a 7.5 cm Rhino Rocket. Following this the patient had cessation of bleeding. His blood pressure remains elevated but he remains uncomfortable and has been noncompliant with his blood pressure medication for 4 months.   He has no other complaints of chest pain or shortness of breath and therefore we will refill his prescriptions for atenolol and amlodipine and have him follow-up with his primary care provider with respect to his hypertension. We also provide the patient with follow-up resource information for ENT to have the Rhino Rocket removed. Provided the dose of Keflex here to prevent toxic shock syndrome and discharged home with a prescription for the same. Return precautions provided. Appropriate PPE utilized as indicated for entire patient encounter? Time of Disposition: See timeline  ? New Prescriptions    AMLODIPINE (NORVASC) 5 MG TABLET    Take 1 tablet by mouth daily    ATENOLOL (TENORMIN) 25 MG TABLET    Take 1 tablet by mouth daily    CEPHALEXIN (KEFLEX) 500 MG CAPSULE    Take 1 capsule by mouth 4 times daily for 7 days     FINAL IMPRESSION  1. Epistaxis    2. Uncontrolled hypertension        Electronically signed by:  Solo Garcia DO, 11/9/2021         Solo Garcia DO  11/09/21 [id number]

## 2021-11-17 ENCOUNTER — VIRTUAL VISIT (OUTPATIENT)
Dept: INTERNAL MEDICINE CLINIC | Age: 41
End: 2021-11-17
Payer: COMMERCIAL

## 2021-11-17 DIAGNOSIS — I10 ESSENTIAL HYPERTENSION: Primary | ICD-10-CM

## 2021-11-17 DIAGNOSIS — R04.0 EPISTAXIS: ICD-10-CM

## 2021-11-17 PROCEDURE — 99213 OFFICE O/P EST LOW 20 MIN: CPT | Performed by: INTERNAL MEDICINE

## 2021-11-17 RX ORDER — ATENOLOL 50 MG/1
50 TABLET ORAL DAILY
Qty: 90 TABLET | Refills: 3 | Status: SHIPPED | OUTPATIENT
Start: 2021-11-17 | End: 2022-02-10 | Stop reason: SDUPTHER

## 2021-11-17 RX ORDER — LOSARTAN POTASSIUM AND HYDROCHLOROTHIAZIDE 12.5; 5 MG/1; MG/1
1 TABLET ORAL DAILY
Qty: 90 TABLET | Refills: 3 | Status: SHIPPED | OUTPATIENT
Start: 2021-11-17 | End: 2022-03-02 | Stop reason: SDUPTHER

## 2021-11-17 RX ORDER — AMLODIPINE BESYLATE 5 MG/1
5 TABLET ORAL DAILY
Qty: 90 TABLET | Refills: 3 | Status: CANCELLED | OUTPATIENT
Start: 2021-11-17

## 2021-11-17 NOTE — PROGRESS NOTES
2021    TELEHEALTH EVALUATION -- Audio/Visual (During PVQEM-50 public health emergency)    HPI:    Danny Lopez (:  1980) has requested an audio/video evaluation for the following concern(s):    Nasal bleeding noted the past week. R sided and started after was to gym. Presented to ED , had packing and afrin, balloon. No recurrence. However BP high- been restarted fr ED both norvasc and atenolol. Max in ED was 229/140. Now at home 160/105. Pulse at home e is unsure. No sx cp or sob, headaches. Had leg swelling on norvasc and req for alt medication. Review of Systems    Prior to Visit Medications    Medication Sig Taking? Authorizing Provider   amLODIPine (NORVASC) 5 MG tablet Take 1 tablet by mouth daily Yes Stormy Wise MD   atenolol (TENORMIN) 25 MG tablet Take 1 tablet by mouth daily  Stormy Wise MD   cetirizine (ZYRTEC) 10 MG tablet Take 10 mg by mouth daily  Historical Provider, MD   fluticasone (FLONASE) 50 MCG/ACT nasal spray 1 spray by Nasal route daily.   Patient taking differently: 1 spray by Nasal route daily as needed   Antonio Howell MD       Social History     Tobacco Use    Smoking status: Never Smoker    Smokeless tobacco: Never Used   Vaping Use    Vaping Use: Not on file   Substance Use Topics    Alcohol use: Yes     Comment: Rarely    Drug use: Never           PHYSICAL EXAMINATION:  [ INSTRUCTIONS:  \"[x]\" Indicates a positive item  \"[]\" Indicates a negative item  -- DELETE ALL ITEMS NOT EXAMINED]  Vital Signs: (As obtained by patient/caregiver or practitioner observation)    Blood pressure-  Heart rate-    Respiratory rate-    Temperature-  Pulse oximetry-     Constitutional: [x] Appears well-developed and well-nourished [] No apparent distress      [] Abnormal-   Mental status  [] Alert and awake  [] Oriented to person/place/time []Able to follow commands      Eyes:  EOM    []  Normal  [] Abnormal-  Sclera  []  Normal  [] Abnormal -         Discharge []  None visible  [] Abnormal -    HENT:   [] Normocephalic, atraumatic. [] Abnormal   [] Mouth/Throat: Mucous membranes are moist.     External Ears [] Normal  [] Abnormal-     Neck: [] No visualized mass     Pulmonary/Chest: [x] Respiratory effort normal.  [] No visualized signs of difficulty breathing or respiratory distress        [] Abnormal-      Musculoskeletal:   [] Normal gait with no signs of ataxia         [] Normal range of motion of neck        [] Abnormal-       Neurological:        [] No Facial Asymmetry (Cranial nerve 7 motor function) (limited exam to video visit)          [] No gaze palsy        [] Abnormal-         Skin:        [] No significant exanthematous lesions or discoloration noted on facial skin         [] Abnormal-            Psychiatric:       [] Normal Affect [] No Hallucinations        [] Abnormal-     Other pertinent observable physical exam findings-     ASSESSMENT/PLAN:  1. Essential hypertension  INCR ATENOLOL, SWITCH NORVASC TO HYZAAR THEN F/U LAB 2-3 WEEKS. ALSO TO CALL BACK W BP AND HR 1-2 WEEKS. - atenolol (TENORMIN) 50 MG tablet; Take 1 tablet by mouth daily  Dispense: 90 tablet; Refill: 3  - losartan-hydroCHLOROthiazide (HYZAAR) 50-12.5 MG per tablet; Take 1 tablet by mouth daily  Dispense: 90 tablet; Refill: 3    2. Epistaxis  RESOLVED BUT IF RECURS WILL NEED REFERRAL TO ENT      Return in about 1 year (around 11/17/2022) for routine. Ginny Le, was evaluated through a synchronous (real-time) audio-video encounter. The patient (or guardian if applicable) is aware that this is a billable service. Verbal consent to proceed has been obtained within the past 12 months. The visit was conducted pursuant to the emergency declaration under the [facility], [address] [place] [address] authority and the [employer] and [employer] Act.   Patient identification was verified, and a caregiver was present when appropriate. The patient was located in a state where the provider was credentialed to provide care. Total time spent on this encounter: Not billed by time    --Ottoniel Bertrand MD on 11/17/2021 at 10:51 AM    An electronic signature was used to authenticate this note.

## 2021-11-24 ENCOUNTER — TELEPHONE (OUTPATIENT)
Dept: INTERNAL MEDICINE CLINIC | Age: 41
End: 2021-11-24

## 2021-11-24 RX ORDER — ONDANSETRON 4 MG/1
4 TABLET, FILM COATED ORAL 2 TIMES DAILY PRN
Qty: 30 TABLET | Refills: 0 | Status: SHIPPED | OUTPATIENT
Start: 2021-11-24 | End: 2022-01-01 | Stop reason: SDUPTHER

## 2021-11-24 NOTE — TELEPHONE ENCOUNTER
Patient is has had a lot of nausea and vomiting. He is still very fatigued. All vitals have been in good range per wife. He just doesn't feel any better from his virtual val with you on 11/17. Please advise.

## 2021-11-24 NOTE — TELEPHONE ENCOUNTER
IF HIS VITALS ESPEC BP HAVE BEEN STABLE, I THINK HIS BODY IS STILL GETTING USED TO THE LOWER BP WHICH MAY HAVE BEEN HIGH FOR MONTHS. I'D SUGGEST FOR BOTH HIS ATENOLOL AND HYZAAR TO TRY SPLITTING TO 1/2 TAB OF EACH TWICE/DAY WHICH MAY BE MORE EASY TO TOLERATE, THEN WE CAN ALSO CALL IN SCRIPT FOR ZOFRAN 4MG PO BID PRN FOR ANY SEVERE NAUSEA. REC BLAND DIET FOR THE NEXT WEEK THEN ADVANCE AS TOLERATED. IF HOWEVER HIS NAUSEA IS NO BETTER NEXT 2-3 WEEKS THEN TO CALL BACK.

## 2021-11-29 ENCOUNTER — TELEPHONE (OUTPATIENT)
Dept: INTERNAL MEDICINE CLINIC | Age: 41
End: 2021-11-29

## 2021-11-29 RX ORDER — AMOXICILLIN 500 MG/1
500 CAPSULE ORAL 3 TIMES DAILY
Qty: 30 CAPSULE | Refills: 0 | Status: SHIPPED | OUTPATIENT
Start: 2021-11-29 | End: 2021-12-09

## 2021-11-29 NOTE — TELEPHONE ENCOUNTER
Had recent epistaxis so the infection could be related to the balloon placement. Ok for amox 500mg TID for 10d. However would need appt if sx do not improve after this. no

## 2021-11-29 NOTE — TELEPHONE ENCOUNTER
Patients wife calls stating patient has been having issues with sinus   congestion, yellow sinus drainage and nausea x 1 week. Requesting and ABX. Reported that patient just had a VV on 11/17/21. Please advise.

## 2022-01-01 ENCOUNTER — TELEPHONE (OUTPATIENT)
Dept: INTERNAL MEDICINE CLINIC | Age: 42
End: 2022-01-01

## 2022-01-01 RX ORDER — ONDANSETRON 4 MG/1
4 TABLET, FILM COATED ORAL EVERY 8 HOURS PRN
Qty: 30 TABLET | Refills: 0 | Status: SHIPPED | OUTPATIENT
Start: 2022-01-01 | End: 2022-01-01 | Stop reason: SDUPTHER

## 2022-01-01 RX ORDER — ONDANSETRON 4 MG/1
4 TABLET, FILM COATED ORAL EVERY 8 HOURS PRN
Qty: 30 TABLET | Refills: 0 | Status: SHIPPED | OUTPATIENT
Start: 2022-01-01 | End: 2022-01-03

## 2022-01-01 NOTE — PROGRESS NOTES
Continues epistaxis causing nausea and vomiting. Did not make it to f/u ENT appt.  will send in [place], but strongly encourage f/u with ENT

## 2022-01-03 RX ORDER — PROMETHAZINE HYDROCHLORIDE 25 MG/1
25 TABLET ORAL 4 TIMES DAILY PRN
Qty: 20 TABLET | Refills: 0 | Status: SHIPPED | OUTPATIENT
Start: 2022-01-03 | End: 2022-01-10 | Stop reason: SDUPTHER

## 2022-01-10 ENCOUNTER — VIRTUAL VISIT (OUTPATIENT)
Dept: INTERNAL MEDICINE CLINIC | Age: 42
End: 2022-01-10
Payer: COMMERCIAL

## 2022-01-10 DIAGNOSIS — R11.0 NAUSEA: Primary | ICD-10-CM

## 2022-01-10 PROCEDURE — 99213 OFFICE O/P EST LOW 20 MIN: CPT | Performed by: NURSE PRACTITIONER

## 2022-01-10 RX ORDER — PROMETHAZINE HYDROCHLORIDE 25 MG/1
25 TABLET ORAL EVERY 8 HOURS PRN
Qty: 21 TABLET | Refills: 0 | Status: SHIPPED | OUTPATIENT
Start: 2022-01-10 | End: 2022-01-17

## 2022-01-10 ASSESSMENT — ENCOUNTER SYMPTOMS
COUGH: 0
SINUS PAIN: 1
NAUSEA: 0
EYES NEGATIVE: 1
SHORTNESS OF BREATH: 0
CHEST TIGHTNESS: 0
COLOR CHANGE: 0
ABDOMINAL PAIN: 0
WHEEZING: 0
CONSTIPATION: 0
SORE THROAT: 0
SINUS PRESSURE: 1
DIARRHEA: 0
ABDOMINAL DISTENTION: 0
RHINORRHEA: 1

## 2022-01-10 ASSESSMENT — PATIENT HEALTH QUESTIONNAIRE - PHQ9
2. FEELING DOWN, DEPRESSED OR HOPELESS: 0
DEPRESSION UNABLE TO ASSESS: FUNCTIONAL CAPACITY MOTIVATION LIMITS ACCURACY
SUM OF ALL RESPONSES TO PHQ QUESTIONS 1-9: 0
SUM OF ALL RESPONSES TO PHQ9 QUESTIONS 1 & 2: 0
1. LITTLE INTEREST OR PLEASURE IN DOING THINGS: 0

## 2022-01-10 NOTE — LETTER
[facility]  [address] [address]  Phone: [phone]  Fax: [phone]    FLORIDA Nettles CNP        January 10, 2022     Patient: Adenike Abdi   YOB: 1980   Date of Visit: 1/10/2022       To Whom it May Concern:    Christel Jimenez was seen in my clinic on 1/10/2022. He may return to work on 1/12/2022. If you have any questions or concerns, please don't hesitate to call.     Sincerely,         [place]OR[place] Nettles CNP

## 2022-01-10 NOTE — PROGRESS NOTES
1/10/2022    TELEHEALTH EVALUATION -- Audio/Visual (During NVHLS-34 public health emergency)    HPI:    Shania Chavez (:  1980) has requested an audio/video evaluation for the following concern(s):    Mr Anson Bae is a current patient of Dr Jason Danielson who presents to the office this afternoon for c/o ongoing fatigue, nausea, and emesis over the last 2 weeks. Denies any chills, but had occasional low grade fever around 99-99.9 orally. Denies any abdominal pain or diarrhea. Has not had any cough, rhinorrhea, nasal congestion. Did recently take a home COVID test which was negative. Has been using phenergan which helps his nausea, however, he is out at this time. Review of Systems   Constitutional: Positive for chills. Negative for activity change, appetite change, fatigue and fever. HENT: Positive for postnasal drip, rhinorrhea, sinus pressure and sinus pain. Negative for congestion and sore throat. Eyes: Negative. Respiratory: Negative for cough, chest tightness, shortness of breath and wheezing. Cardiovascular: Negative for chest pain and palpitations. Gastrointestinal: Negative for abdominal distention, abdominal pain, constipation, diarrhea and nausea. Genitourinary: Negative for difficulty urinating, dysuria, flank pain, frequency and hematuria. Musculoskeletal: Negative for arthralgias, gait problem, joint swelling and myalgias. Skin: Negative for color change and rash. Neurological: Negative for dizziness, light-headedness and numbness. Hematological: Negative. Psychiatric/Behavioral: Negative. Prior to Visit Medications    Medication Sig Taking?  Authorizing Provider   promethazine (PHENERGAN) 25 MG tablet Take 1 tablet by mouth every 8 hours as needed for Nausea Yes FLORIDA Mclean - CNP   atenolol (TENORMIN) 50 MG tablet Take 1 tablet by mouth daily Yes Arsh Haney MD   losartan-hydroCHLOROthiazide (HYZAAR) 50-12.5 MG per tablet Take 1 tablet by mouth daily Yes Rebecca Trivedi MD   amLODIPine (NORVASC) 5 MG tablet Take 1 tablet by mouth daily Yes Rebecca Trivedi MD   cetirizine (ZYRTEC) 10 MG tablet Take 10 mg by mouth daily Yes Historical Provider, MD   fluticasone (FLONASE) 50 MCG/ACT nasal spray 1 spray by Nasal route daily.   Patient taking differently: 1 spray by Nasal route daily as needed   Jen Hinojosa MD       Social History     Tobacco Use    Smoking status: Never Smoker    Smokeless tobacco: Never Used   Vaping Use    Vaping Use: Not on file   Substance Use Topics    Alcohol use: Yes     Comment: Rarely    Drug use: Never        Allergies   Allergen Reactions    No Known Allergies    ,   Past Medical History:   Diagnosis Date    Allergic rhinitis     Ankle pain     INACTIVE PROBLEM    Anxiety     Epigastric abdominal pain     INACTIVE PROBLEM    Essential hypertension 01/08/2018    Family history of coronary artery disease     Herniated lumbar intervertebral disc     LBP (low back pain)     Marfan's syndrome with ocular manifestation     DX'D BY OPT[employer] DR CANTU AT 6Y0    Oral herpes    ,   Past Surgical History:   Procedure Laterality Date    BACK SURGERY      herniated ruptured disc 2008    TONSILLECTOMY      UMBILICAL HERNIA REPAIR N/A 7/2/2020    OPEN HERNIA UMBILICAL REPAIR performed by Cady Dallas [facility]   ,   Social History     Tobacco Use    Smoking status: Never Smoker    Smokeless tobacco: Never Used   Vaping Use    Vaping Use: Not on file   Substance Use Topics    Alcohol use: Yes     Comment: Rarely    Drug use: Never       PHYSICAL EXAMINATION:  [ INSTRUCTIONS:  \"[x]\" Indicates a positive item  \"[]\" Indicates a negative item  -- DELETE ALL ITEMS NOT EXAMINED]  Vital Signs: (As obtained by patient/caregiver or practitioner observation)    Blood pressure-  Heart rate-    Respiratory rate-    Temperature-  Pulse oximetry-     Constitutional: [x] Appears well-developed and well-nourished [x] No apparent distress      [] Abnormal-   Mental status  [x] Alert and awake  [x] Oriented to person/place/time [x]Able to follow commands      Eyes:  EOM    [x]  Normal  [] Abnormal-  Sclera  [x]  Normal  [] Abnormal -         Discharge [x]  None visible  [] Abnormal -    HENT:   [x] Normocephalic, atraumatic. [] Abnormal   [x] Mouth/Throat: Mucous membranes are moist.     External Ears [x] Normal  [] Abnormal-     Neck: [x] No visualized mass     Pulmonary/Chest: [x] Respiratory effort normal.  [x] No visualized signs of difficulty breathing or respiratory distress        [] Abnormal-      Musculoskeletal:   [x] Normal gait with no signs of ataxia         [x] Normal range of motion of neck        [] Abnormal-       Neurological:        [x] No Facial Asymmetry (Cranial nerve 7 motor function) (limited exam to video visit)          [x] No gaze palsy        [] Abnormal-         Skin:        [x] No significant exanthematous lesions or discoloration noted on facial skin         [] Abnormal-            Psychiatric:       [x] Normal Affect [x] No Hallucinations        [] Abnormal-     Other pertinent observable physical exam findings-     ASSESSMENT/PLAN:  1. Nausea- likely viral etiology; no other URI symptoms and pt also denies other GI distress. Start bland diet and advance slowly as tolerated; get labs completed as ordered per Dr Zackary Montano. If no improvement throughout week, consider GI re-eval given hiatal hernia hx.   - promethazine (PHENERGAN) 25 MG tablet; Take 1 tablet by mouth every 8 hours as needed for Nausea  Dispense: 21 tablet; Refill: 0      Return if symptoms worsen or fail to improve. Lilian Foster, was evaluated through a synchronous (real-time) audio-video encounter. The patient (or guardian if applicable) is aware that this is a billable service. Verbal consent to proceed has been obtained within the past 12 months.  The visit was conducted pursuant to the emergency declaration under the [facility]

## 2022-02-09 ENCOUNTER — NURSE TRIAGE (OUTPATIENT)
Dept: OTHER | Facility: CLINIC | Age: 42
End: 2022-02-09

## 2022-02-09 NOTE — TELEPHONE ENCOUNTER
Limited triage due to pt not being present during call. Received call from [facility] with [employer]. Subjective: Caller states \"Lethargic\"     Current Symptoms:  Lethargic for a few months. \"everything hurts\". Nausea. /90. Onset: a few months ago; unchanged    Associated Symptoms: reduced activity    Pain Severity: body aches    Temperature: 98.1    What has been tried: None    LMP: NA Pregnant: NA    Recommended disposition: PCP within 3 days. UCC/ED as backup plan. Care advice provided, patient verbalizes understanding; denies any other questions or concerns; instructed to call back for any new or worsening symptoms. Writer provided warm transfer to [facility] for appointment scheduling     Attention Provider: Thank you for allowing me to participate in the care of your patient. The patient was connected to triage in response to information provided to the ECC/PSC. Please do not respond through this encounter as the response is not directed to a shared pool.         Reason for Disposition   [1] Fatigue (i.e., tires easily, decreased energy) AND [2] persists > 1 week    Protocols used: WEAKNESS (GENERALIZED) AND FATIGUE-ADULT-

## 2022-02-09 NOTE — TELEPHONE ENCOUNTER
Meg, pls call Juan to check on him. BP still sl high but improved. Unsure about his recurring lethargy, may need office f/u.

## 2022-02-10 ENCOUNTER — VIRTUAL VISIT (OUTPATIENT)
Dept: INTERNAL MEDICINE CLINIC | Age: 42
End: 2022-02-10
Payer: COMMERCIAL

## 2022-02-10 DIAGNOSIS — I10 ESSENTIAL HYPERTENSION: ICD-10-CM

## 2022-02-10 DIAGNOSIS — J30.89 SEASONAL ALLERGIC RHINITIS DUE TO OTHER ALLERGIC TRIGGER: ICD-10-CM

## 2022-02-10 DIAGNOSIS — K52.9 ACUTE GASTROENTERITIS: Primary | ICD-10-CM

## 2022-02-10 PROCEDURE — 99213 OFFICE O/P EST LOW 20 MIN: CPT | Performed by: INTERNAL MEDICINE

## 2022-02-10 RX ORDER — ATENOLOL 50 MG/1
50 TABLET ORAL 2 TIMES DAILY
Qty: 180 TABLET | Refills: 1 | Status: SHIPPED | OUTPATIENT
Start: 2022-02-10 | End: 2022-03-02 | Stop reason: SDUPTHER

## 2022-02-10 RX ORDER — ONDANSETRON 4 MG/1
4 TABLET, FILM COATED ORAL 3 TIMES DAILY PRN
Qty: 30 TABLET | Refills: 0 | Status: SHIPPED | OUTPATIENT
Start: 2022-02-10 | End: 2022-03-11 | Stop reason: ALTCHOICE

## 2022-02-10 RX ORDER — FLUTICASONE PROPIONATE 50 MCG
1 SPRAY, SUSPENSION (ML) NASAL DAILY PRN
Qty: 1 EACH | Refills: 0 | Status: SHIPPED
Start: 2022-02-10 | End: 2022-03-02 | Stop reason: SDUPTHER

## 2022-02-10 NOTE — PROGRESS NOTES
2/10/2022    TELEHEALTH EVALUATION -- Audio/Visual (During [id number]-08 public health emergency)    HPI:    Kevin Muhammad (:  1980) has requested an audio/video evaluation for the following concern(s):    bp still high at home ~150s/90s but today this am 150/114. HR 97 HR  Recheck 169/104,     Has had significant fatigue since Mon 3d ago. Sudden onset. Some sx of n/v also since Mon, no diarrhea. No ST, cough, sinus congestion. Did do covid test which was neg on TUE. No fever or chills. No abd cramping noted. Review of Systems    Prior to Visit Medications    Medication Sig Taking?  Authorizing Provider   fluticasone (FLONASE) 50 MCG/ACT nasal spray 1 spray by Nasal route daily as needed Yes Juan Peterson MD   atenolol (TENORMIN) 50 MG tablet Take 1 tablet by mouth daily Yes Juan Pteerson MD   losartan-hydroCHLOROthiazide (HYZAAR) 50-12.5 MG per tablet Take 1 tablet by mouth daily Yes Juan Peterson MD   cetirizine (ZYRTEC) 10 MG tablet Take 10 mg by mouth daily Yes Historical Provider, MD       Social History     Tobacco Use    Smoking status: Never Smoker    Smokeless tobacco: Never Used   Vaping Use    Vaping Use: Not on file   Substance Use Topics    Alcohol use: Yes     Comment: Rarely    Drug use: Never             PHYSICAL EXAMINATION:  [ INSTRUCTIONS:  \"[x]\" Indicates a positive item  \"[]\" Indicates a negative item  -- DELETE ALL ITEMS NOT EXAMINED]  Vital Signs: (As obtained by patient/caregiver or practitioner observation)    Blood pressure-  Heart rate-    Respiratory rate-    Temperature-  Pulse oximetry-     Constitutional: [x] Appears well-developed and well-nourished [] No apparent distress      [] Abnormal-   Mental status  [] Alert and awake  [] Oriented to person/place/time []Able to follow commands      Eyes:  EOM    []  Normal  [] Abnormal-  Sclera  []  Normal  [] Abnormal -         Discharge []  None visible  [] Abnormal -    HENT:   [] Normocephalic, atraumatic. [] Abnormal   [] Mouth/Throat: Mucous membranes are moist.     External Ears [] Normal  [] Abnormal-     Neck: [] No visualized mass     Pulmonary/Chest: [x] Respiratory effort normal.  [] No visualized signs of difficulty breathing or respiratory distress        [] Abnormal-      Musculoskeletal:   [] Normal gait with no signs of ataxia         [] Normal range of motion of neck        [] Abnormal-       Neurological:        [] No Facial Asymmetry (Cranial nerve 7 motor function) (limited exam to video visit)          [] No gaze palsy        [] Abnormal-         Skin:        [] No significant exanthematous lesions or discoloration noted on facial skin         [] Abnormal-            Psychiatric:       [] Normal Affect [] No Hallucinations        [] Abnormal-     Other pertinent observable physical exam findings-     ASSESSMENT/PLAN:  1. Acute gastroenteritis  SUSPECT AC VIRAL GASTROENTERITIS, REC FOR FLUIDS, REST AND PRN ZOFRAN BUT WE'LL CONSIDER CHECK LAB AND POSSIBLE IMAGING IF SX NOT IMPROVING OVER THE WEEKEND  - ondansetron (ZOFRAN) 4 MG tablet; Take 1 tablet by mouth 3 times daily as needed for Nausea or Vomiting  Dispense: 30 tablet; Refill: 0    2. Essential hypertension  BP HIGH, WE'LL INCR ATENOLOL FROM 50MG DAILY TO BID AND IS TO ALSO UPDATE US W BP NEXT WEEK  - atenolol (TENORMIN) 50 MG tablet; Take 1 tablet by mouth 2 times daily  Dispense: 180 tablet; Refill: 1    3. Seasonal allergic rhinitis due to other allergic trigger  Allergies controlled on current medical regimen which will be continued. - fluticasone (FLONASE) 50 MCG/ACT nasal spray; 1 spray by Nasal route daily as needed  Dispense: 1 each; Refill: 0      Return if symptoms worsen or fail to improve. Nyasia Sheffield, was evaluated through a synchronous (real-time) audio-video encounter. The patient (or guardian if applicable) is aware that this is a billable service, which includes applicable co-pays.  This Virtual Visit was conducted with patient's (and/or legal guardian's) consent. The visit was conducted pursuant to the emergency declaration under the [address] [place] [address] authority and the [employer] Act. Patient identification was verified, and a caregiver was present when appropriate. The patient was located at home in a state where the provider was licensed to provide care. Total time spent on this encounter: Not billed by time    --Amanda Hinson MD on 2/10/2022 at 11:37 AM    An electronic signature was used to authenticate this note.

## 2022-02-18 ENCOUNTER — TELEPHONE (OUTPATIENT)
Dept: INTERNAL MEDICINE CLINIC | Age: 42
End: 2022-02-18

## 2022-02-18 NOTE — TELEPHONE ENCOUNTER
Patients wife call- she states patient is extremely weak, sleeps 20 hours a day, nausea. He is so weak he can't stand. I advised Jayesh Montero to bring Dee Chang in for appt and she said she can't get him out of bed. I talked to Dr. Meagan Crowe and he advised ER. Could possibly be septic? Infection? Patient will need STAT labs and imaging done to r/o any gallbladder issues. Jayesh Montero stated she will try and get patient to go to ED and let us know. CMP, CBC, Sed Rate, Amylase, Lipase, needs BP checked.

## 2022-02-23 ENCOUNTER — APPOINTMENT (OUTPATIENT)
Dept: ULTRASOUND IMAGING | Age: 42
End: 2022-02-23
Payer: COMMERCIAL

## 2022-02-23 ENCOUNTER — APPOINTMENT (OUTPATIENT)
Dept: CT IMAGING | Age: 42
End: 2022-02-23
Payer: COMMERCIAL

## 2022-02-23 ENCOUNTER — TELEPHONE (OUTPATIENT)
Dept: INTERNAL MEDICINE CLINIC | Age: 42
End: 2022-02-23

## 2022-02-23 ENCOUNTER — HOSPITAL ENCOUNTER (EMERGENCY)
Age: 42
Discharge: HOME | End: 2022-02-24
Attending: EMERGENCY MEDICINE
Payer: COMMERCIAL

## 2022-02-23 ENCOUNTER — NURSE TRIAGE (OUTPATIENT)
Dept: OTHER | Facility: CLINIC | Age: 42
End: 2022-02-23

## 2022-02-23 VITALS
DIASTOLIC BLOOD PRESSURE: 67 MMHG | WEIGHT: 240 LBS | RESPIRATION RATE: 15 BRPM | HEIGHT: 75 IN | BODY MASS INDEX: 29.84 KG/M2 | TEMPERATURE: 97.8 F | HEART RATE: 91 BPM | SYSTOLIC BLOOD PRESSURE: 125 MMHG | OXYGEN SATURATION: 95 %

## 2022-02-23 DIAGNOSIS — R74.8 ELEVATED LIVER ENZYMES: ICD-10-CM

## 2022-02-23 DIAGNOSIS — R29.6 FREQUENT FALLS: ICD-10-CM

## 2022-02-23 DIAGNOSIS — R53.1 GENERALIZED WEAKNESS: Primary | ICD-10-CM

## 2022-02-23 DIAGNOSIS — R53.83 FATIGUE, UNSPECIFIED TYPE: ICD-10-CM

## 2022-02-23 DIAGNOSIS — K82.8 GALLBLADDER SLUDGE: ICD-10-CM

## 2022-02-23 DIAGNOSIS — F10.920 ACUTE ALCOHOLIC INTOXICATION WITHOUT COMPLICATION (HCC): ICD-10-CM

## 2022-02-23 DIAGNOSIS — R11.2 NON-INTRACTABLE VOMITING WITH NAUSEA, UNSPECIFIED VOMITING TYPE: ICD-10-CM

## 2022-02-23 DIAGNOSIS — K76.0 HEPATIC STEATOSIS: ICD-10-CM

## 2022-02-23 LAB
ACETAMINOPHEN LEVEL: <5 UG/ML (ref 15–30)
ADENOVIRUS DETECTION BY PCR: NOT DETECTED
ALBUMIN SERPL-MCNC: 3.7 GM/DL (ref 3.4–5)
ALCOHOL SCREEN SERUM: 0.53 %WT/VOL
ALP BLD-CCNC: 90 IU/L (ref 40–129)
ALT SERPL-CCNC: 85 U/L (ref 10–40)
AMMONIA: 27 UMOL/L (ref 16–60)
AMPHETAMINES: NEGATIVE
ANION GAP SERPL CALCULATED.3IONS-SCNC: 22 MMOL/L (ref 4–16)
AST SERPL-CCNC: 285 IU/L (ref 15–37)
BACTERIA: NEGATIVE /HPF
BARBITURATE SCREEN URINE: NEGATIVE
BASE EXCESS MIXED: 5.2 (ref 0–1.2)
BASOPHILS ABSOLUTE: 0 K/CU MM
BASOPHILS RELATIVE PERCENT: 0.4 % (ref 0–1)
BENZODIAZEPINE SCREEN, URINE: NEGATIVE
BILIRUB SERPL-MCNC: 1.6 MG/DL (ref 0–1)
BILIRUBIN URINE: ABNORMAL MG/DL
BLOOD, URINE: ABNORMAL
BORDETELLA PARAPERTUSSIS BY PCR: NOT DETECTED
BORDETELLA PERTUSSIS PCR: NOT DETECTED
BUN BLDV-MCNC: 10 MG/DL (ref 6–23)
CALCIUM SERPL-MCNC: 7.7 MG/DL (ref 8.3–10.6)
CANNABINOID SCREEN URINE: NEGATIVE
CARBON MONOXIDE, BLOOD: 2.4 % (ref 0–5)
CHLAMYDOPHILA PNEUMONIA PCR: NOT DETECTED
CHLORIDE BLD-SCNC: 88 MMOL/L (ref 99–110)
CHP ED QC CHECK: NORMAL
CLARITY: CLEAR
CO2 CONTENT: 30.2 MMOL/L (ref 19–24)
CO2: 21 MMOL/L (ref 21–32)
COCAINE METABOLITE: NEGATIVE
COLOR: ABNORMAL
COMMENT: ABNORMAL
CORONAVIRUS 229E PCR: NOT DETECTED
CORONAVIRUS HKU1 PCR: NOT DETECTED
CORONAVIRUS NL63 PCR: NOT DETECTED
CORONAVIRUS OC43 PCR: NOT DETECTED
CREAT SERPL-MCNC: 0.6 MG/DL (ref 0.9–1.3)
DIFFERENTIAL TYPE: ABNORMAL
DOSE AMOUNT: ABNORMAL
DOSE AMOUNT: ABNORMAL
DOSE TIME: ABNORMAL
DOSE TIME: ABNORMAL
EOSINOPHILS ABSOLUTE: 0.2 K/CU MM
EOSINOPHILS RELATIVE PERCENT: 2.2 % (ref 0–3)
GFR AFRICAN AMERICAN: >60 ML/MIN/1.73M2
GFR NON-AFRICAN AMERICAN: >60 ML/MIN/1.73M2
GLUCOSE BLD-MCNC: 133 MG/DL (ref 70–99)
GLUCOSE BLD-MCNC: 141 MG/DL
GLUCOSE BLD-MCNC: 141 MG/DL (ref 70–99)
GLUCOSE, URINE: NEGATIVE MG/DL
HCO3 ARTERIAL: 29 MMOL/L (ref 18–23)
HCT VFR BLD CALC: 42.1 % (ref 42–52)
HEMOGLOBIN: 14.6 GM/DL (ref 13.5–18)
HETEROPHILE ANTIBODIES: NEGATIVE
HUMAN METAPNEUMOVIRUS PCR: NOT DETECTED
HYALINE CASTS: 0 /LPF
ICTOTEST: NEGATIVE
IMMATURE NEUTROPHIL %: 0.6 % (ref 0–0.43)
INFLUENZA A BY PCR: NOT DETECTED
INFLUENZA A H1 (2009) PCR: NOT DETECTED
INFLUENZA A H1 PANDEMIC PCR: NOT DETECTED
INFLUENZA A H3 PCR: NOT DETECTED
INFLUENZA B BY PCR: NOT DETECTED
KETONES, URINE: ABNORMAL MG/DL
LEUKOCYTE ESTERASE, URINE: NEGATIVE
LYMPHOCYTES ABSOLUTE: 1.2 K/CU MM
LYMPHOCYTES RELATIVE PERCENT: 18.4 % (ref 24–44)
MCH RBC QN AUTO: 35 PG (ref 27–31)
MCHC RBC AUTO-ENTMCNC: 34.7 % (ref 32–36)
MCV RBC AUTO: 101 FL (ref 78–100)
METHEMOGLOBIN ARTERIAL: 1.4 %
MONOCYTES ABSOLUTE: 0.7 K/CU MM
MONOCYTES RELATIVE PERCENT: 10.4 % (ref 0–4)
MUCUS: ABNORMAL HPF
MYCOPLASMA PNEUMONIAE PCR: NOT DETECTED
NITRITE URINE, QUANTITATIVE: NEGATIVE
NUCLEATED RBC %: 0 %
O2 SATURATION: 90.4 % (ref 96–97)
OPIATES, URINE: NEGATIVE
OXYCODONE: NEGATIVE
PARAINFLUENZA 1 PCR: NOT DETECTED
PARAINFLUENZA 2 PCR: NOT DETECTED
PARAINFLUENZA 3 PCR: NOT DETECTED
PARAINFLUENZA 4 PCR: NOT DETECTED
PCO2 ARTERIAL: 39 MMHG (ref 32–45)
PDW BLD-RTO: 18.5 % (ref 11.7–14.9)
PH BLOOD: 7.48 (ref 7.34–7.45)
PH, URINE: 6 (ref 5–8)
PHENCYCLIDINE, URINE: NEGATIVE
PLATELET # BLD: 197 K/CU MM (ref 140–440)
PMV BLD AUTO: 11.7 FL (ref 7.5–11.1)
PO2 ARTERIAL: 70 MMHG (ref 75–100)
POTASSIUM SERPL-SCNC: 3.6 MMOL/L (ref 3.5–5.1)
PRO-BNP: 40.56 PG/ML
PROTEIN UA: 100 MG/DL
RBC # BLD: 4.17 M/CU MM (ref 4.6–6.2)
RBC URINE: 1 /HPF (ref 0–3)
RHINOVIRUS ENTEROVIRUS PCR: NOT DETECTED
RSV PCR: NOT DETECTED
SALICYLATE LEVEL: <0.3 MG/DL (ref 15–30)
SARS-COV-2: NOT DETECTED
SEGMENTED NEUTROPHILS ABSOLUTE COUNT: 4.6 K/CU MM
SEGMENTED NEUTROPHILS RELATIVE PERCENT: 68 % (ref 36–66)
SODIUM BLD-SCNC: 131 MMOL/L (ref 135–145)
SPECIFIC GRAVITY UA: 1.02 (ref 1–1.03)
TOTAL IMMATURE NEUTOROPHIL: 0.04 K/CU MM
TOTAL NUCLEATED RBC: 0 K/CU MM
TOTAL PROTEIN: 5.7 GM/DL (ref 6.4–8.2)
TRICHOMONAS: ABNORMAL /HPF
TROPONIN T: <0.01 NG/ML
TSH HIGH SENSITIVITY: 1.62 UIU/ML (ref 0.27–4.2)
UROBILINOGEN, URINE: 1 MG/DL (ref 0.2–1)
WBC # BLD: 6.7 K/CU MM (ref 4–10.5)
WBC UA: 2 /HPF (ref 0–2)

## 2022-02-23 PROCEDURE — 6360000004 HC RX CONTRAST MEDICATION: Performed by: EMERGENCY MEDICINE

## 2022-02-23 PROCEDURE — 87040 BLOOD CULTURE FOR BACTERIA: CPT

## 2022-02-23 PROCEDURE — 0202U NFCT DS 22 TRGT SARS-COV-2: CPT

## 2022-02-23 PROCEDURE — 82140 ASSAY OF AMMONIA: CPT

## 2022-02-23 PROCEDURE — G0480 DRUG TEST DEF 1-7 CLASSES: HCPCS

## 2022-02-23 PROCEDURE — 71275 CT ANGIOGRAPHY CHEST: CPT

## 2022-02-23 PROCEDURE — 74177 CT ABD & PELVIS W/CONTRAST: CPT

## 2022-02-23 PROCEDURE — 81001 URINALYSIS AUTO W/SCOPE: CPT

## 2022-02-23 PROCEDURE — 86318 IA INFECTIOUS AGENT ANTIBODY: CPT

## 2022-02-23 PROCEDURE — 83880 ASSAY OF NATRIURETIC PEPTIDE: CPT

## 2022-02-23 PROCEDURE — 82962 GLUCOSE BLOOD TEST: CPT

## 2022-02-23 PROCEDURE — 82803 BLOOD GASES ANY COMBINATION: CPT

## 2022-02-23 PROCEDURE — 80307 DRUG TEST PRSMV CHEM ANLYZR: CPT

## 2022-02-23 PROCEDURE — 80053 COMPREHEN METABOLIC PANEL: CPT

## 2022-02-23 PROCEDURE — 85025 COMPLETE CBC W/AUTO DIFF WBC: CPT

## 2022-02-23 PROCEDURE — 99285 EMERGENCY DEPT VISIT HI MDM: CPT

## 2022-02-23 PROCEDURE — 36415 COLL VENOUS BLD VENIPUNCTURE: CPT

## 2022-02-23 PROCEDURE — 70450 CT HEAD/BRAIN W/O DYE: CPT

## 2022-02-23 PROCEDURE — 84443 ASSAY THYROID STIM HORMONE: CPT

## 2022-02-23 PROCEDURE — 2580000003 HC RX 258: Performed by: EMERGENCY MEDICINE

## 2022-02-23 PROCEDURE — 93005 ELECTROCARDIOGRAM TRACING: CPT | Performed by: EMERGENCY MEDICINE

## 2022-02-23 PROCEDURE — 84484 ASSAY OF TROPONIN QUANT: CPT

## 2022-02-23 PROCEDURE — 36600 WITHDRAWAL OF ARTERIAL BLOOD: CPT

## 2022-02-23 PROCEDURE — 76705 ECHO EXAM OF ABDOMEN: CPT

## 2022-02-23 RX ORDER — 0.9 % SODIUM CHLORIDE 0.9 %
1000 INTRAVENOUS SOLUTION INTRAVENOUS ONCE
Status: COMPLETED | OUTPATIENT
Start: 2022-02-23 | End: 2022-02-23

## 2022-02-23 RX ORDER — PROMETHAZINE HYDROCHLORIDE 25 MG/1
25 TABLET ORAL EVERY 6 HOURS PRN
Qty: 15 TABLET | Refills: 0 | Status: SHIPPED | OUTPATIENT
Start: 2022-02-23 | End: 2022-03-02 | Stop reason: SDUPTHER

## 2022-02-23 RX ADMIN — SODIUM CHLORIDE 1000 ML: 9 INJECTION, SOLUTION INTRAVENOUS at 22:22

## 2022-02-23 RX ADMIN — IOPAMIDOL 90 ML: 755 INJECTION, SOLUTION INTRAVENOUS at 19:55

## 2022-02-23 ASSESSMENT — PAIN - FUNCTIONAL ASSESSMENT: PAIN_FUNCTIONAL_ASSESSMENT: 0-10

## 2022-02-23 ASSESSMENT — PAIN SCALES - GENERAL: PAINLEVEL_OUTOF10: 0

## 2022-02-23 NOTE — TELEPHONE ENCOUNTER
I called Juan Coombs's wife back. Strongly rec for ED eval ASAP w prolonged symptoms and need for exam, diagnostic testing. We can then discuss for possible STDisability forms to be done later after we have some objective data back.

## 2022-02-23 NOTE — ED PROVIDER NOTES
EMERGENCY DEPARTMENT ENCOUNTER      CHIEF COMPLAINT:   Fatigue  Malaise  Frequent falls    CRITICAL CARE NOTE:  There was a high probability of clinically significant life-threatening deterioration of the patient's condition requiring my urgent intervention. Total critical care time is 30 minutes  This includes vital sign monitoring, pulse oximetry monitoring, telemetry monitoring, clinical response to the IV medications, reviewing the nursing notes, consultation time, dictation/documetation time. (This time excludes time spent performing procedures). HPI: Vladimir Corbin is a 39 y.o. male who presents to the emergency department, via EMS, for evaluation of fatigue, malaise and frequent falls. The patient is here with his wife who helps provide the history as the patient can hardly stay awake to answer my questions. The patient was in his usual state of health until the end of November when he had a spontaneous nosebleed. His blood pressure was very high at that emergency room visit and he was restarted on Norvasc and atenolol which he had taken in the past but had stopped taking. Since that time. He has had intermittent nausea and vomiting. Over the past 8 weeks he has had increasing fatigue, malaise and frequent falls. He states he feels exhausted. He has been having difficulty walking due to generalized weakness. Family found him on the floor of the kitchen today. The patient does not think he hit his head or sustain any injuries from the fall. Generalized weakness and fatigue have been constant. There are no exacerbating or alleviating factors. He has missed 6 weeks of work over the past 8 weeks due to his symptoms. Denies any other complaints.     REVIEW OF SYSTEMS:   Constitutional: See HPI  Eyes:  Denies change in visual acuity  HENT:  Denies nasal congestion or sore throat  Respiratory:  Denies cough or shortness of breath  Cardiovascular:  Denies chest pain or edema  GI: See HPI  : in the Last Year: Not on file   Transportation Needs:     Lack of Transportation (Medical): Not on file    Lack of Transportation (Non-Medical): Not on file   Physical Activity:     Days of Exercise per Week: Not on file    Minutes of Exercise per Session: Not on file   Stress:     Feeling of Stress : Not on file   Social Connections:     Frequency of Communication with Friends and Family: Not on file    Frequency of Social Gatherings with Friends and Family: Not on file    Attends Gnosticism Services: Not on file    Active Member of [address] [place] [address] [employer] or Organizations: Not on file    Attends Club or Organization Meetings: Not on file    Marital Status: Not on file   Intimate Partner Violence:     Fear of Current or Ex-Partner: Not on file    Emotionally Abused: Not on file    Physically Abused: Not on file    Sexually Abused: Not on file   Housing Stability:     Unable to Pay for Housing in the Last Year: Not on file    Number of [place] in the Last Year: Not on file    Unstable Housing in the Last Year: Not on file       ALLERGIES: No known allergies    PHYSICAL EXAM:  VITAL SIGNS:   ED Triage Vitals [02/23/22 1618]   Enc Vitals Group      BP (!) 156/104      Pulse 94      Resp 16      Temp 97.8 °F (36.6 °C)      Temp src       SpO2 94 %      Weight 240 lb (108.9 kg)      Height 6' 3\" (1.905 m)      Head Circumference       Peak Flow       Pain Score       Pain Loc       Pain Edu? Excl. in [address]? Constitutional: Lying in bed, falls asleep frequently during exam, ill-appearing  HENT: Normocephalic, Atraumatic, Bilateral external ears normal, Oropharynx dry, No oral exudates, Nose normal.  Eyes:  PERRL, EOMI, Conjunctiva normal, No discharge. Neck: Normal range of motion, No tenderness, Supple, No stridor, No lymphadenopathy. Cardiovascular:  Normal heart rate, Normal rhythm  Pulmonary/Chest:  Normal breath sounds, No respiratory distress, No wheezing  Abdomen:   Bowel sounds normal, Soft, No tenderness, No masses, No pulsatile masses  Back:  No tenderness, No CVA tenderness  Extremities:  Normal range of motion, Intact distal pulses, No edema, No tenderness  Neurologic:  Alert & oriented x 3, Normal motor function, Sensation intact to light touch throughout, No focal deficits  Skin:  Warm, Dry, No erythema, No rash      EKG Interpretation  Interpreted by me  No prior EKG to compare to  Rhythm: normal sinus  Rate: normal 99  Axis: normal  Ectopy: none  Conduction: Prolonged QTC (477 ms)  ST Segments: normal  T Waves: t-wave inversions in leads II, 3, aVF, V3 to V6  Clinical Impression: normal sinus rhythm, t-wave inversions in leads II, 3, aVF, V3 to V6, prolonged QT    Cardiac Monitor Strip Interpretation  Interpreted by me  Monitor strip interpreted for greater than 10 seconds  Rhythm: normal sinus  Rate: normal  Ectopy: none  ST Segments: normal      Radiology / Procedures:  US ABDOMEN LIMITED (Final result)  Result time 02/23/22 23:15:26  Final result by Ray Graves MD (02/23/22 23:15:26)                Impression:    Mild gallbladder distension, with sludge.  No shadowing stones, gallbladder   wall thickening, pericholecystic fluid, or sonographic Reynoso's sign. Findings not felt to represent acute cholecystitis by sonography. Coarsened echogenic liver suggestive of hepatic steatosis. Narrative:    EXAMINATION:   RIGHT UPPER QUADRANT ULTRASOUND     2/23/2022 10:59 pm     COMPARISON:   CT study performed the same day. HISTORY:   ORDERING SYSTEM PROVIDED HISTORY: RUQ pain   TECHNOLOGIST PROVIDED HISTORY:   Reason for exam:->RUQ pain   Reason for Exam: Fatigue.  Confusion.  Collapse   Relevant Medical/Surgical History: CT also     FINDINGS:   LIVER:  Coarsened hyperechoic liver.  No focal liver mass is identified.      BILIARY SYSTEM:  Gallbladder sludge is identified.  No shadowing stones are   seen.  No gallbladder wall thickening is identified.  Negative sonographic Reynoso's sign per the ultrasound technologist.     Common bile duct is within normal limits measuring 3 mm. RIGHT KIDNEY: The right kidney is grossly unremarkable without evidence of   hydronephrosis. PANCREAS:  Visualized portions of the pancreas are unremarkable. OTHER: No evidence of right upper quadrant ascites.                         CT ABDOMEN PELVIS W IV CONTRAST Additional Contrast? None (Final result)  Result time 02/23/22 20:44:28  Final result by Payton Park MD (02/23/22 20:44:28)                Impression:    Hepatic steatosis. Moderately distended gallbladder, without wall thickening or surrounding   inflammation or fluid to suggest acute cholecystitis.  However, if concerned   clinically for acute cholecystitis, a gallbladder ultrasound or nuclear   medicine hepatobiliary scan is recommended. Otherwise, unremarkable contrast-enhanced CT examination of the abdomen and   pelvis, without finding to account for the patient's symptoms. Narrative:    EXAMINATION:   CT OF THE ABDOMEN AND PELVIS WITH CONTRAST 2/23/2022 6:50 pm     TECHNIQUE:   CT of the abdomen and pelvis was performed with the administration of   intravenous contrast. Multiplanar reformatted images are provided for review. Dose modulation, iterative reconstruction, and/or weight based adjustment of   the mA/kV was utilized to reduce the radiation dose to as low as reasonably   achievable. COMPARISON:   None. HISTORY:   ORDERING SYSTEM PROVIDED HISTORY: Vomiting   TECHNOLOGIST PROVIDED HISTORY:   Reason for exam:->Vomiting   Additional Contrast?->None   Decision Support Exception - unselect if not a suspected or confirmed   emergency medical condition->Emergency Medical Condition (MA)   Reason for Exam: vomiting     FINDINGS:   Lower chest: The visualized lung bases are grossly clear. Organs:  There is moderate diffuse fatty infiltration of the liver.  The   liver, spleen, pancreas, kidneys, and adrenal glands otherwise appear normal.   The gallbladder is moderately distended and fluid-filled without wall   thickening, radiopaque stones, or surrounding inflammation or fluid.  No   findings of acute pancreatitis.  No pancreatic or biliary ductal dilation. GI/Bowel:  The stomach and the small and large bowel loops are normal in   caliber, contour, and morphology, without acute or significant abnormality. No dilated loops or areas of bowel wall thickening. The appendix is not   visualized, although there are no findings of acute appendicitis     Peritoneum/Retroperitoneum: There is no free fluid or extraluminal gas.  No   enlarged or suspicious mesenteric or retroperitoneal lymphadenopathy. The   abdominal aorta and iliac arteries are patent and of normal caliber.  The   splenic, mesenteric, portal, and hepatic veins are patent and free of   thrombus. Pelvis: No pelvic free fluid or enlarged or suspicious pelvic or inguinal   lymphadenopathy.  The prostate gland is not enlarged. The urinary bladder and   the pelvic bowel loops are unremarkable. Bones/Soft Tissues: No significant osseous abnormality.  No acute fracture. No abdominal wall or inguinal hernia.                       CTA PULMONARY W CONTRAST (Final result)  Result time 02/23/22 20:41:41  Final result by José Madsen MD (02/23/22 20:41:41)                Impression:    No evidence of pulmonary embolism or acute thoracic aortic abnormality. Minimal right basilar atelectasis.  Otherwise, clear lungs. Cardiomegaly.  No pleural or pericardial effusion. Narrative:    EXAMINATION:   CTA OF THE CHEST 2/23/2022 6:47 pm     TECHNIQUE:   CTA of the chest was performed after the administration of intravenous   contrast.  Multiplanar reformatted images are provided for review.  MIP   images are provided for review.  Dose modulation, iterative reconstruction,   and/or weight based adjustment of the mA/kV was utilized to reduce the   radiation dose to as low as reasonably achievable. COMPARISON:   Abdominal CT study from the same day. HISTORY:   ORDERING SYSTEM PROVIDED HISTORY: Fatigue,hypoxia   TECHNOLOGIST PROVIDED HISTORY:   Reason for exam:->Fatigue,hypoxia   Decision Support Exception - unselect if not a suspected or confirmed   emergency medical condition->Emergency Medical Condition (MA)   Reason for Exam: fatigue,hypoxia     FINDINGS:   Pulmonary Arteries: Pulmonary arteries are adequately opacified for   evaluation. No evidence of intraluminal filling defect to suggest pulmonary   embolism.  Main pulmonary artery is normal in caliber. Mediastinum: The heart is mildly enlarged.  The thoracic aorta and proximal   great vessels are patent and of normal caliber. No pericardial effusion. No   enlarged or suspicious axillary, hilar, or mediastinal lymphadenopathy. Lungs/pleura: The trachea and mainstem bronchi are widely patent.  Minimal   atelectasis is present at the right lung base.  The lungs are otherwise   grossly clear.  No discrete pulmonary nodule or mass. No pleural effusion or   pneumothorax. Soft Tissues/Bones: Degenerative changes are present throughout the thoracic   spine.                       CT HEAD WO CONTRAST (Preliminary result)  Result time 02/23/22 50:93:36  Preliminary result by Chel Ruano MD (02/23/22 87:10:33)                Impression:    No acute intracranial abnormality. Narrative:    EXAMINATION:   CT OF THE HEAD WITHOUT CONTRAST  2/23/2022 7:47 pm     TECHNIQUE:   CT of the head was performed without the administration of intravenous   contrast. Dose modulation, iterative reconstruction, and/or weight based   adjustment of the mA/kV was utilized to reduce the radiation dose to as low   as reasonably achievable. COMPARISON:   None.      HISTORY:   ORDERING SYSTEM PROVIDED HISTORY: Fatigue, AMS   TECHNOLOGIST PROVIDED HISTORY:   Has a \"code stroke\" or \"stroke alert\" been called? ->No   Reason for exam:->Fatigue, AMS   Decision Support Exception - unselect if not a suspected or confirmed   emergency medical condition->Emergency Medical Condition (MA)   Reason for Exam: fatigue,AMS     Initial evaluation     FINDINGS:   BRAIN/VENTRICLES:  The ventricles and cisternal spaces are normal in size,   shape, and configuration for the age of the patient. No areas of abnormal   attenuation are identified in the brain parenchyma.  There is no midline   shift or mass effect. No hemorrhage is identified in the brain parenchyma. ORBITS: The visualized portion of the orbits demonstrate no acute abnormality. SINUSES: There is mild mucoperiosteal thickening of the maxillary sinuses and   ethmoid air cells. The remainder of the sinuses are clear. The mastoid air   cells are clear. SOFT TISSUES/SKULL:  No acute abnormality of the visualized skull or soft   tissues.                 Preliminary result by Ruslan Cobb MD (02/23/22 20:35:30)                Impression:    No acute intracranial abnormality.                  Labs Reviewed   CBC WITH AUTO DIFFERENTIAL - Abnormal; Notable for the following components:       Result Value    RBC 4.17 (*)     .0 (*)     MCH 35.0 (*)     RDW 18.5 (*)     MPV 11.7 (*)     Segs Relative 68.0 (*)     Lymphocytes % 18.4 (*)     Monocytes % 10.4 (*)     Immature Neutrophil % 0.6 (*)     All other components within normal limits   COMPREHENSIVE METABOLIC PANEL - Abnormal; Notable for the following components:    Sodium 131 (*)     Chloride 88 (*)     CREATININE 0.6 (*)     Glucose 133 (*)     Calcium 7.7 (*)     Total Protein 5.7 (*)     Total Bilirubin 1.6 (*)     ALT 85 (*)      (*)     Anion Gap 22 (*)     All other components within normal limits   BLOOD GAS, ARTERIAL - Abnormal; Notable for the following components:    pH, Bld 7.48 (*)     pO2, Arterial 70 (*)     Base Exc, Mixed 5.2 (*)     HCO3, Arterial 29.0 (*)     CO2 Content 30.2 (*)     O2 Sat 90.4 (*)     All other components within normal limits   URINALYSIS WITH MICROSCOPIC - Abnormal; Notable for the following components:    Color, UA JOSUE (*)     Protein,  (*)     Mucus, UA FEW (*)     All other components within normal limits   ETHANOL - Abnormal; Notable for the following components:    Alcohol Scrn 0.53 (*)     All other components within normal limits   ACETAMINOPHEN LEVEL - Abnormal; Notable for the following components:    Acetaminophen Level <5.0 (*)     All other components within normal limits   SALICYLATE LEVEL - Abnormal; Notable for the following components:    Salicylate Lvl <8.3 (*)     All other components within normal limits   POCT GLUCOSE - Abnormal; Notable for the following components:    POC Glucose 141 (*)     All other components within normal limits   POCT GLUCOSE - Normal   RESPIRATORY PANEL, MOLECULAR, WITH COVID-19   CULTURE, BLOOD 2    Narrative:     SETUP DATE/TIME:  02/23/2022 1810   CULTURE, BLOOD 1    Narrative:     SETUP DATE/TIME:  02/23/2022 1811   TROPONIN   BRAIN NATRIURETIC PEPTIDE   MONONUCLEOSIS SCREEN   TSH   AMMONIA   URINE DRUG SCREEN   ICTOTEST, URINE       ED COURSE & MEDICAL DECISION MAKING:  Pertinent Labs & Imaging studies reviewed. (See chart for details)  On exam, the patient is afebrile. He is ill-appearing but does not appear toxic. He is mildly tachycardic at times. He is hypertensive at times with a known history of hypertension but is asymptomatic. Hypoxic to 88% while sleeping on his placed on 2 L of oxygen by nasal cannula with improvement. He is otherwise hemodynamically stable and neurologically intact. EKG shows a normal sinus rhythm with no ST elevation or depression.  Labs are obtained and are significant for an ABG with a slightly increased pH of 7.48, a normal PCO2 and a slightly decreased PO2, a glucose of 141, an alcohol level of 0.53, a normal TSH, mild hyponatremia, and elevated anion gap of 22, mildly elevated bilirubin and mildly elevated liver enzymes. Urine drug screen is negative. Respiratory disease panel is negative. CT head is negative for acute intracranial abnormality. CT abdomen pelvis shows hepatic steatosis. There is a moderately distended gallbladder without wall thickening or surrounding inflammatory fluid to suggest acute cholecystitis. CT chest is negative for pulmonary embolism or acute thoracic aortic abnormality. There is minimal right basilar atelectasis. Otherwise clear lungs. There is cardiomegaly with no pleural or pericardial effusions. Right upper quadrant ultrasound shows mild gallbladder distention with sludge. There is no shadowing stone, gallbladder wall thickening, pericholecystic fluid or sonographic Reynoso sign. Findings are not felt to represent acute cholecystitis. The patient was treated with IV normal saline. The patient was observed in the emergency department for 7-1/2 hours while his work-up was being pursued. He slept most of the time. He became increasingly more awake. He was able to come off oxygen with no further hypoxia. The etiology of the patient's generalized weakness and exhaustion is not entirely clear. It may have something to do with his elevated alcohol level. I suspect that he is drinking a large amount of alcohol on a regular basis as he has a very high alcohol level and is still able to be awake and interactive. He also has hepatic steatosis and gallbladder sludge, although I do not think this is causing his exhaustion. It may have something to do with his nausea and vomiting. I have a low suspicion for brain mass, cerebral edema, CVA, intracranial hemorrhage, meningitis, STEMI, PE, pneumonia, acute surgical abdomen or sepsis. I feel that the patient is stable for outpatient management follow up in 2-3 days. The patient and wife are given return precautions.  The patient and wife verbalized understanding, were agreeable with plan, and the patient was discharged home in stable condition. Clinical Impression:  1. Generalized weakness    2. Fatigue, unspecified type    3. Acute alcoholic intoxication without complication (HCC)    4. Elevated liver enzymes    5. Hepatic steatosis    6. Gallbladder sludge    7. Frequent falls    8. Non-intractable vomiting with nausea, unspecified vomiting type        Disposition referral (if applicable):  Tara Ramirez MD  [address]  [facility]  [phone]    Schedule an appointment as soon as possible for a visit       [facility] Emergency Department  [facility] [id number]  [phone]  Go to   If symptoms worsen      Disposition medications (if applicable):  Discharge Medication List as of 2/23/2022 11:48 PM      START taking these medications    Details   promethazine (PHENERGAN) 25 MG tablet Take 1 tablet by mouth every 6 hours as needed for Nausea, Disp-15 tablet, R-0Normal               Comment: Please note this report has been produced using speech recognition software and may contain errors related to that system including errors in grammar, punctuation, and spelling, as well as words and phrases that may be inappropriate. If there are any questions or concerns please feel free to contact the dictating provider for clarification.         Hossein Camargo MD  02/26/22 2371

## 2022-02-23 NOTE — ED NOTES
1700 paged Dr Wayne Mcgovern  02/23/22 1701  1707 Dr Cummings Dear returned call     Valeria Deras  02/23/22 1700
Bed: 01TR-01  Expected date: 2/23/22  Expected time:   Means of arrival: Ambulance  Comments:     Mary Kate Ken.  CECELIA De La Paz  02/23/22 6750
Pt requested to use the restroom, offered a urinal. Placed pt on 2L NC d/t pt O2 88% during sleep.       [place]  02/23/22 7920
Patent

## 2022-02-23 NOTE — TELEPHONE ENCOUNTER
Wife calls asking for forms to be filled out for STD. She said that Yuki Mcghee has been off work and his work has so far been working with them but now requiring STD paperwork. Dr. Dom Munoz explained to me that he is not able to complete paperwork without testing/documentation. Dr. Dom Munoz suggested [facility] to get testing needed to find out what is going on then after that he would be able to complete needed documentation for paperwork. Wellington Sumi told me that she is not able to afford the cost to call squad and has no one to come help her take Yuki Mcghee to the ER. She mentioned that Yuki Mcghee is still nausea, weak and can't walk. He fell out of bed twice last night and needed help to get to the bathroom. She said she would contact [facility] employer to see what else they can do.

## 2022-02-23 NOTE — TELEPHONE ENCOUNTER
We cannot fill out any forms, especially any disability forms, until has some formal testing in ED. He has to go today.

## 2022-02-24 LAB
EKG ATRIAL RATE: 99 BPM
EKG DIAGNOSIS: NORMAL
EKG P AXIS: 55 DEGREES
EKG P-R INTERVAL: 172 MS
EKG Q-T INTERVAL: 372 MS
EKG QRS DURATION: 92 MS
EKG QTC CALCULATION (BAZETT): 477 MS
EKG R AXIS: 6 DEGREES
EKG T AXIS: -39 DEGREES
EKG VENTRICULAR RATE: 99 BPM

## 2022-02-24 PROCEDURE — 93010 ELECTROCARDIOGRAM REPORT: CPT | Performed by: INTERNAL MEDICINE

## 2022-02-28 LAB
CULTURE: NORMAL
CULTURE: NORMAL
Lab: NORMAL
Lab: NORMAL
SPECIMEN: NORMAL
SPECIMEN: NORMAL

## 2022-03-02 ENCOUNTER — OFFICE VISIT (OUTPATIENT)
Dept: INTERNAL MEDICINE CLINIC | Age: 42
End: 2022-03-02
Payer: COMMERCIAL

## 2022-03-02 VITALS
DIASTOLIC BLOOD PRESSURE: 70 MMHG | BODY MASS INDEX: 31.61 KG/M2 | HEART RATE: 93 BPM | SYSTOLIC BLOOD PRESSURE: 112 MMHG | WEIGHT: 254.25 LBS | OXYGEN SATURATION: 97 % | HEIGHT: 75 IN

## 2022-03-02 DIAGNOSIS — B17.9 ACUTE HEPATITIS: Primary | ICD-10-CM

## 2022-03-02 DIAGNOSIS — B17.9 ACUTE HEPATITIS: ICD-10-CM

## 2022-03-02 DIAGNOSIS — I10 ESSENTIAL HYPERTENSION: ICD-10-CM

## 2022-03-02 DIAGNOSIS — J30.89 SEASONAL ALLERGIC RHINITIS DUE TO OTHER ALLERGIC TRIGGER: ICD-10-CM

## 2022-03-02 DIAGNOSIS — F41.9 ANXIETY: ICD-10-CM

## 2022-03-02 LAB
A/G RATIO: 2.5 (ref 1.1–2.2)
ALBUMIN SERPL-MCNC: 4.7 G/DL (ref 3.4–5)
ALP BLD-CCNC: 118 U/L (ref 40–129)
ALT SERPL-CCNC: 94 U/L (ref 10–40)
ANION GAP SERPL CALCULATED.3IONS-SCNC: 15 MMOL/L (ref 3–16)
AST SERPL-CCNC: 144 U/L (ref 15–37)
BILIRUB SERPL-MCNC: 0.9 MG/DL (ref 0–1)
BUN BLDV-MCNC: 11 MG/DL (ref 7–20)
CALCIUM SERPL-MCNC: 10.7 MG/DL (ref 8.3–10.6)
CHLORIDE BLD-SCNC: 97 MMOL/L (ref 99–110)
CO2: 28 MMOL/L (ref 21–32)
CREAT SERPL-MCNC: 0.9 MG/DL (ref 0.9–1.3)
GFR AFRICAN AMERICAN: >60
GFR NON-AFRICAN AMERICAN: >60
GLUCOSE BLD-MCNC: 131 MG/DL (ref 70–99)
POTASSIUM SERPL-SCNC: 3.3 MMOL/L (ref 3.5–5.1)
SODIUM BLD-SCNC: 140 MMOL/L (ref 136–145)
TOTAL PROTEIN: 6.6 G/DL (ref 6.4–8.2)

## 2022-03-02 PROCEDURE — 36415 COLL VENOUS BLD VENIPUNCTURE: CPT | Performed by: INTERNAL MEDICINE

## 2022-03-02 PROCEDURE — 99214 OFFICE O/P EST MOD 30 MIN: CPT | Performed by: INTERNAL MEDICINE

## 2022-03-02 RX ORDER — ALPRAZOLAM 0.25 MG/1
0.25 TABLET ORAL DAILY PRN
Qty: 10 TABLET | Refills: 0 | Status: SHIPPED | OUTPATIENT
Start: 2022-03-02 | End: 2022-04-05 | Stop reason: SDUPTHER

## 2022-03-02 RX ORDER — LOSARTAN POTASSIUM AND HYDROCHLOROTHIAZIDE 12.5; 5 MG/1; MG/1
1 TABLET ORAL DAILY
Qty: 90 TABLET | Refills: 1 | Status: SHIPPED | OUTPATIENT
Start: 2022-03-02 | End: 2022-03-11 | Stop reason: DRUGHIGH

## 2022-03-02 RX ORDER — FLUTICASONE PROPIONATE 50 MCG
1 SPRAY, SUSPENSION (ML) NASAL DAILY PRN
Qty: 1 EACH | Refills: 5 | Status: SHIPPED | OUTPATIENT
Start: 2022-03-02 | End: 2022-04-05

## 2022-03-02 RX ORDER — PROMETHAZINE HYDROCHLORIDE 25 MG/1
25 TABLET ORAL EVERY 6 HOURS PRN
Qty: 40 TABLET | Refills: 1 | Status: SHIPPED | OUTPATIENT
Start: 2022-03-02 | End: 2022-04-05 | Stop reason: SDUPTHER

## 2022-03-02 RX ORDER — ATENOLOL 50 MG/1
50 TABLET ORAL 2 TIMES DAILY
Qty: 180 TABLET | Refills: 1 | Status: SHIPPED | OUTPATIENT
Start: 2022-03-02 | End: 2022-03-11

## 2022-03-02 SDOH — ECONOMIC STABILITY: FOOD INSECURITY: WITHIN THE PAST 12 MONTHS, THE FOOD YOU BOUGHT JUST DIDN'T LAST AND YOU DIDN'T HAVE MONEY TO GET MORE.: NEVER TRUE

## 2022-03-02 SDOH — ECONOMIC STABILITY: FOOD INSECURITY: WITHIN THE PAST 12 MONTHS, YOU WORRIED THAT YOUR FOOD WOULD RUN OUT BEFORE YOU GOT MONEY TO BUY MORE.: NEVER TRUE

## 2022-03-02 ASSESSMENT — SOCIAL DETERMINANTS OF HEALTH (SDOH): HOW HARD IS IT FOR YOU TO PAY FOR THE VERY BASICS LIKE FOOD, HOUSING, MEDICAL CARE, AND HEATING?: NOT HARD AT ALL

## 2022-03-02 NOTE — PROGRESS NOTES
Waymond Bence  1980 03/02/22    SUBJECTIVE:  Has has recurring nausea and progressive weakness, decr appetite the past few months. Also had COVID after Xmas then acute gastroenteritis w n/v/ diarrhea in Jan.  Has missed multiple weeks of work, 3 weeks in [place] 1/10-1/31   then also recurring illness so missed work starting 2/7 till now, returned two days ago. Appetite is improving. During his illness was taking ibuprofen 2-3 tabs 2-3x/day  - on avg ~1200mg 3-4d/week. Needs letter documenting illness for his prior time off. He's also had significant anxiety and used some xanax for stress, req for rf. Controlled substances monitoring: possible medication side effects, risk of tolerance and/or dependence, and alternative treatments discussed, no signs of potential drug abuse or diversion identified and [place]AR[place] report reviewed today- activity consistent with treatment plan. OBJECTIVE:    /70   Pulse 93   Ht 6' 3\" (1.905 m)   Wt 254 lb 4 oz (115.3 kg)   SpO2 97%   BMI 31.78 kg/m²     Physical Exam  Vitals reviewed. Constitutional:       General: He is not in acute distress. Appearance: He is well-developed. HENT:      Head: Normocephalic and atraumatic. Eyes:      General: No scleral icterus. Right eye: No discharge. Left eye: No discharge. Conjunctiva/sclera: Conjunctivae normal.      Pupils: Pupils are equal, round, and reactive to light. Neck:      Thyroid: No thyromegaly. Vascular: No JVD. Trachea: No tracheal deviation. Cardiovascular:      Rate and Rhythm: Normal rate and regular rhythm. Heart sounds: Normal heart sounds. No murmur heard. No friction rub. No gallop. Pulmonary:      Effort: Pulmonary effort is normal. No respiratory distress. Breath sounds: Normal breath sounds. No wheezing or rales. Abdominal:      General: Bowel sounds are normal. There is no distension. Palpations: Abdomen is soft.  There is no mass.      Tenderness: There is no abdominal tenderness. There is no guarding or rebound. Musculoskeletal:         General: No tenderness. Cervical back: Normal range of motion and neck supple. Lymphadenopathy:      Cervical: No cervical adenopathy. Skin:     General: Skin is warm and dry. Neurological:      Mental Status: He is alert. Psychiatric:         Mood and Affect: Mood normal.         ASSESSMENT:    1. Acute hepatitis    2. Essential hypertension    3. Seasonal allergic rhinitis due to other allergic trigger    4. Anxiety        PLAN:    Yamel Mckeon was seen today for follow-up. Diagnoses and all orders for this visit:    Acute hepatitis- recheck lfts and if normalizing we'll monitor for now. If however persists may then also consider for eval w HIDA scan given sludge noted on u/s. His episode of n/v and weakness was likely multifactorial related to viral gastroenteritis, also COVID infection, compounded by nsaids and also alcohol intake, now clinically improving. Advised to decr alc intake to only weekends and special occasions to reduce risk for progression of fatty liver dz and cirrhosis  -     promethazine (PHENERGAN) 25 MG tablet; Take 1 tablet by mouth every 6 hours as needed for Nausea  -     Comprehensive Metabolic Panel; Future    Essential hypertension - Blood pressure stable and will continue current regimen. Will plan periodic monitoring of renal function, electrolytes     -     atenolol (TENORMIN) 50 MG tablet; Take 1 tablet by mouth 2 times daily  -     losartan-hydroCHLOROthiazide (HYZAAR) 50-12.5 MG per tablet; Take 1 tablet by mouth daily  -     Comprehensive Metabolic Panel; Future    Seasonal allergic rhinitis due to other allergic trigger- Allergies controlled on current medical regimen which will be continued.     -     fluticasone (FLONASE) 50 MCG/ACT nasal spray; 1 spray by Nasal route daily as needed for Rhinitis or Allergies    Anxiety- periodic and using xanax rarely, will cont w low dosing below prn  -     ALPRAZolam (XANAX) 0.25 MG tablet; Take 1 tablet by mouth daily as needed for Anxiety for up to 30 days.

## 2022-03-03 DIAGNOSIS — B17.9 ACUTE HEPATITIS: Primary | ICD-10-CM

## 2022-03-03 DIAGNOSIS — R73.9 HYPERGLYCEMIA: ICD-10-CM

## 2022-03-11 ENCOUNTER — TELEPHONE (OUTPATIENT)
Dept: INTERNAL MEDICINE CLINIC | Age: 42
End: 2022-03-11

## 2022-03-11 DIAGNOSIS — I10 ESSENTIAL HYPERTENSION: ICD-10-CM

## 2022-03-11 DIAGNOSIS — R79.89 ELEVATED LFTS: Primary | ICD-10-CM

## 2022-03-11 RX ORDER — ATENOLOL 50 MG/1
50 TABLET ORAL DAILY
Qty: 90 TABLET | Refills: 1
Start: 2022-03-11

## 2022-03-11 RX ORDER — LOSARTAN POTASSIUM AND HYDROCHLOROTHIAZIDE 12.5; 5 MG/1; MG/1
0.5 TABLET ORAL DAILY
Qty: 90 TABLET | Refills: 1 | Status: ON HOLD
Start: 2022-03-11 | End: 2022-05-02

## 2022-03-11 NOTE — TELEPHONE ENCOUNTER
Meg, jennifer call Tierra back. We can give an extension for his work excuse for this past week, I do wonder now w him decreasing alcohol intake that his BP is now trending lower--- that the BP meds may now be too high causing his fatigue and exhaustion. Rec now decr his atenolol to 1 tab/day-- 50mg daily, also decr his hyzaar to 1/2 tab daily. Then we need another recheck CMP on MON - dx elevated LFTSS- then f/u w me for TUE next week. Ask them to record BPs over the weekend too after the med changes and also make her he avoids all alcohol completely over the weekend prior to lab draw on mon.

## 2022-03-11 NOTE — TELEPHONE ENCOUNTER
Left detailed message for Tierra. Lab orders in Epic.  Octavio Ferraro was instructed to call us back to scheduled OV with Dr. Sander Ortega for TUES

## 2022-03-11 NOTE — TELEPHONE ENCOUNTER
Patients wife calls- she reports Erica Galindo was improving last week and did go to work. She said this week he did not go to work at all and it right back where he was. Complete exhaustion and his equilibrium is off to where John Carson needs to watch him 24/7 for fall risk. She said he is back to the way he was, sleeping all the time and getting out of bed.

## 2022-03-21 DIAGNOSIS — R79.89 ELEVATED LFTS: Primary | ICD-10-CM

## 2022-03-21 DIAGNOSIS — I10 ESSENTIAL HYPERTENSION: ICD-10-CM

## 2022-03-29 ENCOUNTER — TELEPHONE (OUTPATIENT)
Dept: INTERNAL MEDICINE CLINIC | Age: 42
End: 2022-03-29

## 2022-03-29 NOTE — TELEPHONE ENCOUNTER
Left message with patient to check on his status. He wanted us to fill out Disability paperwork for Long COVID. Patient was advised that Let[address]maur[address] 51 would need to be diagnosed by a pulmonologist. Dr. Jemma Henriquez wanted Najma Dominguez to follow up and do labs. Once he did the labs, then he can call the office to schedule an appt then at that time Dr. Jemma Henriquez can refer Najma Dominguez to pulmonologist for paperwork to be completed. Dr. Jemma Henriquez could only do a short time off work if needed while the labs, appt and referral gets completed.

## 2022-04-01 ENCOUNTER — TELEPHONE (OUTPATIENT)
Dept: INTERNAL MEDICINE CLINIC | Age: 42
End: 2022-04-01

## 2022-04-01 DIAGNOSIS — K52.9 ACUTE GASTROENTERITIS: Primary | ICD-10-CM

## 2022-04-01 RX ORDER — DICYCLOMINE HYDROCHLORIDE 10 MG/1
10 CAPSULE ORAL 3 TIMES DAILY
Qty: 21 CAPSULE | Refills: 0 | Status: ON HOLD | OUTPATIENT
Start: 2022-04-01 | End: 2022-05-02

## 2022-04-01 NOTE — TELEPHONE ENCOUNTER
Patients wife Praveen Edwards calls- she wanted to give update on patient. He still sleeps most of the day and just lays around on the couch. Very weak, equilibrium is off. Having panic attacks and at times feels like he can't catch his breath. Every 15-20 minutes gets hiccups then starts vomiting. His diaphragm is sore and gets spasms. Also went over disability paperwork. She states it is for STD only and not for long term. She states Eugenia Shook has been off work since 3/6. She told me that she knows she has to get Eugenia Shook in for an appointment but she never knows when he wakes up if it will be a good day or not. She said right now he is sleeping. I told Praveen Edwards that Dr. Jose Andrew will not fill out any paperwork unless Eugenia Shook as an appointment. She was asking about something to calm his stomach down. She said its a medication for stomach spasms? Please advise if you need Juan to be seen before anything can be done.

## 2022-04-05 ENCOUNTER — OFFICE VISIT (OUTPATIENT)
Dept: INTERNAL MEDICINE CLINIC | Age: 42
End: 2022-04-05
Payer: COMMERCIAL

## 2022-04-05 VITALS
OXYGEN SATURATION: 93 % | HEIGHT: 75 IN | DIASTOLIC BLOOD PRESSURE: 68 MMHG | WEIGHT: 254 LBS | RESPIRATION RATE: 16 BRPM | BODY MASS INDEX: 31.58 KG/M2 | SYSTOLIC BLOOD PRESSURE: 122 MMHG | HEART RATE: 75 BPM

## 2022-04-05 DIAGNOSIS — F41.9 ANXIETY: ICD-10-CM

## 2022-04-05 DIAGNOSIS — R53.82 CHRONIC FATIGUE: ICD-10-CM

## 2022-04-05 DIAGNOSIS — B17.9 ACUTE HEPATITIS: ICD-10-CM

## 2022-04-05 DIAGNOSIS — R79.89 ELEVATED LFTS: ICD-10-CM

## 2022-04-05 DIAGNOSIS — F51.01 PRIMARY INSOMNIA: ICD-10-CM

## 2022-04-05 DIAGNOSIS — R79.89 ELEVATED LFTS: Primary | ICD-10-CM

## 2022-04-05 LAB
A/G RATIO: 1.9 (ref 1.1–2.2)
ALBUMIN SERPL-MCNC: 3.9 G/DL (ref 3.4–5)
ALP BLD-CCNC: 539 U/L (ref 40–129)
ALT SERPL-CCNC: 104 U/L (ref 10–40)
ANION GAP SERPL CALCULATED.3IONS-SCNC: 18 MMOL/L (ref 3–16)
ANISOCYTOSIS: ABNORMAL
AST SERPL-CCNC: 314 U/L (ref 15–37)
BANDED NEUTROPHILS RELATIVE PERCENT: 1 % (ref 0–7)
BASOPHILS ABSOLUTE: 0 K/UL (ref 0–0.2)
BASOPHILS RELATIVE PERCENT: 0 %
BILIRUB SERPL-MCNC: 17.1 MG/DL (ref 0–1)
BUN BLDV-MCNC: 7 MG/DL (ref 7–20)
C-REACTIVE PROTEIN: 4.8 MG/L (ref 0–5.1)
CALCIUM SERPL-MCNC: 9.6 MG/DL (ref 8.3–10.6)
CHLORIDE BLD-SCNC: 82 MMOL/L (ref 99–110)
CO2: 28 MMOL/L (ref 21–32)
CREAT SERPL-MCNC: 0.6 MG/DL (ref 0.9–1.3)
EOSINOPHILS ABSOLUTE: 0.1 K/UL (ref 0–0.6)
EOSINOPHILS RELATIVE PERCENT: 1 %
GFR AFRICAN AMERICAN: >60
GFR NON-AFRICAN AMERICAN: >60
GLUCOSE BLD-MCNC: 123 MG/DL (ref 70–99)
HCT VFR BLD CALC: 47 % (ref 40.5–52.5)
HEMOGLOBIN: 15.7 G/DL (ref 13.5–17.5)
LYMPHOCYTES ABSOLUTE: 0.9 K/UL (ref 1–5.1)
LYMPHOCYTES RELATIVE PERCENT: 13 %
MCH RBC QN AUTO: 35.1 PG (ref 26–34)
MCHC RBC AUTO-ENTMCNC: 33.4 G/DL (ref 31–36)
MCV RBC AUTO: 105.1 FL (ref 80–100)
MONOCYTES ABSOLUTE: 0.8 K/UL (ref 0–1.3)
MONOCYTES RELATIVE PERCENT: 12 %
NEUTROPHILS ABSOLUTE: 5.2 K/UL (ref 1.7–7.7)
NEUTROPHILS RELATIVE PERCENT: 73 %
PDW BLD-RTO: 15.2 % (ref 12.4–15.4)
PLATELET # BLD: 148 K/UL (ref 135–450)
PLATELET SLIDE REVIEW: ADEQUATE
PMV BLD AUTO: 11.1 FL (ref 5–10.5)
POIKILOCYTES: ABNORMAL
POTASSIUM SERPL-SCNC: 3.1 MMOL/L (ref 3.5–5.1)
RBC # BLD: 4.48 M/UL (ref 4.2–5.9)
SLIDE REVIEW: ABNORMAL
SODIUM BLD-SCNC: 128 MMOL/L (ref 136–145)
STOMATOCYTES: ABNORMAL
TOTAL PROTEIN: 6 G/DL (ref 6.4–8.2)
TSH SERPL DL<=0.05 MIU/L-ACNC: 2.78 UIU/ML (ref 0.27–4.2)
VITAMIN B-12: >2000 PG/ML (ref 211–911)
WBC # BLD: 7 K/UL (ref 4–11)

## 2022-04-05 PROCEDURE — 99214 OFFICE O/P EST MOD 30 MIN: CPT | Performed by: INTERNAL MEDICINE

## 2022-04-05 PROCEDURE — 36415 COLL VENOUS BLD VENIPUNCTURE: CPT | Performed by: INTERNAL MEDICINE

## 2022-04-05 RX ORDER — ZOLPIDEM TARTRATE 5 MG/1
5 TABLET ORAL NIGHTLY PRN
Qty: 15 TABLET | Refills: 0 | Status: SHIPPED | OUTPATIENT
Start: 2022-04-05 | End: 2022-04-20

## 2022-04-05 RX ORDER — ALPRAZOLAM 0.25 MG/1
0.25 TABLET ORAL DAILY PRN
Qty: 10 TABLET | Refills: 0 | Status: ON HOLD | OUTPATIENT
Start: 2022-04-05 | End: 2022-05-02

## 2022-04-05 RX ORDER — PROMETHAZINE HYDROCHLORIDE 25 MG/1
25 TABLET ORAL EVERY 6 HOURS PRN
Status: ON HOLD | COMMUNITY
End: 2022-05-02

## 2022-04-05 RX ORDER — ALPRAZOLAM 0.25 MG/1
0.25 TABLET ORAL NIGHTLY PRN
Status: ON HOLD | COMMUNITY
End: 2022-05-02

## 2022-04-05 RX ORDER — PROMETHAZINE HYDROCHLORIDE 25 MG/1
25 TABLET ORAL EVERY 6 HOURS PRN
Qty: 40 TABLET | Refills: 1 | Status: ON HOLD | OUTPATIENT
Start: 2022-04-05 | End: 2022-05-02

## 2022-04-05 NOTE — PROGRESS NOTES
[facility]  1980 04/05/22    SUBJECTIVE:    EXTENDED 40MIN APPT TODAY. Has had recurring problems w fatigue. Is still drinking some alcohol, had 3 last night but has cut down avg to ~2-3/day and prior total was up to max of 6/day. Had extensive eval in ED 2/23 and had GB sludge noted at that time as well as elevated LFTs. Hx of COVID infection earlier this yr, early Feb.  Has been off work from Feb 6 when tested pos for [place]. Has some sporadic SOB/CALL[place]AN, no cough or wheezing. Did have CTA neg for pneumonia or PE. Appetite remains poor, keeping some food down but also sporadically w nausea and vomiting, last emesis was yesterday morning. Denies abd pain espec RUQ. He feels has significant fatigue which is limiting his ability to work. Has sporadic dizziness. Feels deconditioned. Has significant problems also w insomnia    HTN- bp well controlled on meds, is on1/2 tab hyzaar and one tab atenolol. OBJECTIVE:    /68   Pulse 75   Resp 16   Ht 6' 3\" (1.905 m)   Wt 254 lb (115.2 kg)   SpO2 93%   BMI 31.75 kg/m²     Physical Exam  Vitals reviewed. Constitutional:       Appearance: He is well-developed. Cardiovascular:      Rate and Rhythm: Normal rate and regular rhythm. Heart sounds: Normal heart sounds. No murmur heard. No friction rub. No gallop. Pulmonary:      Effort: Pulmonary effort is normal. No respiratory distress. Breath sounds: Normal breath sounds. No wheezing or rales. Abdominal:      General: Bowel sounds are normal. There is no distension. Palpations: Abdomen is soft. There is no mass. Tenderness: There is no abdominal tenderness. There is no guarding or rebound. Hernia: No hernia is present. Musculoskeletal:      Cervical back: Neck supple. Neurological:      Mental Status: He is alert. ASSESSMENT:    1. Elevated LFTs    2. Primary insomnia    3. Chronic fatigue    4. Acute hepatitis    5.  Anxiety PLAN:    Judi Murrieta was seen today for fatigue. Diagnoses and all orders for this visit:    Elevated LFTs- WE HAD A MEENA DISCUSSION. SINCE CT CHEST WAS BENIGN AND DID NOT HAVE PROLONGED PUL SX, LONG COVID IS LESS LIKELY. IS MOST IMPORTANT ISSUE FOR PROLONGED TIME OFF AND ALSO PLANNING FOR RETURN TO WORK IS TO SEE IF LIVER FXN IS NORMALIZING OR STILL ELEVATED. CHECK TODAY, DEP ON RESULTS ALSO MAY CONSIDER HIDA SCAN FOR POSSIBLE CHR CHOLECYSTITIS, VS OTHER HEPATITIS CAUSE PERHAPS NEEDING GI CONSULTATION. HE'LL WORK ON CUTTING DOWN ALC INTAKE AND REC FOR MAX 1-3 ON WEEKENDS ONLY. EXTENDED AMT OF TIME ALSO SPENT IN FILLING OUT SHORT TERM DISABILITY FORMS  -     Comprehensive Metabolic Panel; Future  -     CBC with Auto Differential; Future  -     C-Reactive Protein; Future    Primary insomnia- TO TRY LIMITED AMOUNT PRN IN PREPARATION FOR TENTATIVE RETURN TO WORK IN TWO WEEKS, PENDING LAB RESULTS FROM TODAY  -     zolpidem (AMBIEN) 5 MG tablet; Take 1 tablet by mouth nightly as needed for Sleep for up to 15 days. Chronic fatigue- IS CLEARLY DECONDITIONED BUT ALSO DOES NOT HAVE A PHYSICALLY DEMANDING JOB. WE;LL SCREEN LAB, HE'LL WORK ON INCR EXERCISE AND MORE REGULAR SLEEP, ALSO AS NOTED PLANNING TO RETURN TO WORK IN 2 WEEKS. -     C-Reactive Protein; Future  -     TSH; Future  -     Vitamin B12; Future    Acute hepatitis- ALSO W PERIODIC NAUSEA. AGAIN COULD BE FR GB DZ, WE[LL CONT PRN PHENERGAN  -     promethazine (PHENERGAN) 25 MG tablet; Take 1 tablet by mouth every 6 hours as needed for Nausea    Anxiety- MOOD SEEMS TO BE STABILIZING AS WELL, ADVISED OK FOR LIMITED RF PRN XANAX WHICH HE REQUESTED BUT ALSO NOT TO MIX W ALCOHOL OR THE AMBIEN.    -     ALPRAZolam (XANAX) 0.25 MG tablet; Take 1 tablet by mouth daily as needed for Anxiety for up to 30 days.

## 2022-04-06 DIAGNOSIS — K52.9 ACUTE GASTROENTERITIS: ICD-10-CM

## 2022-04-06 DIAGNOSIS — K82.9 GALLBLADDER DISEASE: ICD-10-CM

## 2022-04-06 DIAGNOSIS — B17.9 ACUTE HEPATITIS: ICD-10-CM

## 2022-04-06 DIAGNOSIS — R79.89 ELEVATED LFTS: Primary | ICD-10-CM

## 2022-04-13 ENCOUNTER — HOSPITAL ENCOUNTER (OUTPATIENT)
Dept: NUCLEAR MEDICINE | Age: 42
Discharge: HOME | End: 2022-04-13
Payer: COMMERCIAL

## 2022-04-13 ENCOUNTER — ANCILLARY ORDERS (OUTPATIENT)
Dept: INTERNAL MEDICINE CLINIC | Age: 42
End: 2022-04-13

## 2022-04-13 VITALS — WEIGHT: 240 LBS | BODY MASS INDEX: 29.22 KG/M2 | HEIGHT: 76 IN

## 2022-04-13 DIAGNOSIS — B17.9 ACUTE HEPATITIS: ICD-10-CM

## 2022-04-13 DIAGNOSIS — K52.9 ACUTE GASTROENTERITIS: ICD-10-CM

## 2022-04-13 DIAGNOSIS — K82.9 GALLBLADDER DISEASE: ICD-10-CM

## 2022-04-13 DIAGNOSIS — R79.89 ELEVATED LFTS: ICD-10-CM

## 2022-04-13 DIAGNOSIS — K52.9 ACUTE GASTROENTERITIS: Primary | ICD-10-CM

## 2022-04-13 PROCEDURE — A9537 TC99M MEBROFENIN: HCPCS | Performed by: INTERNAL MEDICINE

## 2022-04-13 PROCEDURE — 78226 HEPATOBILIARY SYSTEM IMAGING: CPT

## 2022-04-13 PROCEDURE — 3430000000 HC RX DIAGNOSTIC RADIOPHARMACEUTICAL: Performed by: INTERNAL MEDICINE

## 2022-04-13 RX ADMIN — Medication 5 MILLICURIE: at 10:50

## 2022-04-18 ENCOUNTER — APPOINTMENT (OUTPATIENT)
Dept: CT IMAGING | Age: 42
DRG: 871 | End: 2022-04-18
Payer: COMMERCIAL

## 2022-04-18 ENCOUNTER — APPOINTMENT (OUTPATIENT)
Dept: GENERAL RADIOLOGY | Age: 42
DRG: 871 | End: 2022-04-18
Payer: COMMERCIAL

## 2022-04-18 ENCOUNTER — HOSPITAL ENCOUNTER (INPATIENT)
Age: 42
LOS: 14 days | Discharge: NURSING, REHAB OR LONG-TERM CARE | DRG: 871 | End: 2022-05-02
Attending: EMERGENCY MEDICINE | Admitting: INTERNAL MEDICINE
Payer: COMMERCIAL

## 2022-04-18 ENCOUNTER — APPOINTMENT (OUTPATIENT)
Dept: ULTRASOUND IMAGING | Age: 42
DRG: 871 | End: 2022-04-18
Payer: COMMERCIAL

## 2022-04-18 ENCOUNTER — APPOINTMENT (OUTPATIENT)
Dept: MRI IMAGING | Age: 42
DRG: 871 | End: 2022-04-18
Payer: COMMERCIAL

## 2022-04-18 DIAGNOSIS — M79.604 RIGHT LEG PAIN: Primary | ICD-10-CM

## 2022-04-18 DIAGNOSIS — R93.89 ABNORMAL CT SCAN: ICD-10-CM

## 2022-04-18 DIAGNOSIS — R79.89 ELEVATED LACTIC ACID LEVEL: ICD-10-CM

## 2022-04-18 DIAGNOSIS — R17 ELEVATED BILIRUBIN: ICD-10-CM

## 2022-04-18 PROBLEM — T14.8XXA INTRAMUSCULAR HEMATOMA: Status: ACTIVE | Noted: 2022-04-18

## 2022-04-18 LAB
ALBUMIN SERPL-MCNC: 2.6 GM/DL (ref 3.4–5)
ALP BLD-CCNC: 279 IU/L (ref 40–129)
ALT SERPL-CCNC: 43 U/L (ref 10–40)
AMMONIA: 83 UMOL/L (ref 16–60)
ANION GAP SERPL CALCULATED.3IONS-SCNC: 20 MMOL/L (ref 4–16)
ANISOCYTOSIS: ABNORMAL
APTT: 64.3 SECONDS (ref 25.1–37.1)
AST SERPL-CCNC: 145 IU/L (ref 15–37)
ATYPICAL LYMPHOCYTE ABSOLUTE COUNT: ABNORMAL
BANDED NEUTROPHILS ABSOLUTE COUNT: 1.91 K/CU MM
BANDED NEUTROPHILS RELATIVE PERCENT: 8 % (ref 5–11)
BASOPHILS ABSOLUTE: 0.1 K/CU MM
BASOPHILS RELATIVE PERCENT: 0.5 % (ref 0–1)
BILIRUB SERPL-MCNC: 18.8 MG/DL (ref 0–1)
BILIRUB SERPL-MCNC: 19 MG/DL (ref 0–1)
BILIRUBIN DIRECT: 15.6 MG/DL (ref 0–0.3)
BILIRUBIN, INDIRECT: 3.2 MG/DL (ref 0–0.7)
BUN BLDV-MCNC: 9 MG/DL (ref 6–23)
CALCIUM SERPL-MCNC: 8 MG/DL (ref 8.3–10.6)
CHLORIDE BLD-SCNC: 93 MMOL/L (ref 99–110)
CO2: 18 MMOL/L (ref 21–32)
CREAT SERPL-MCNC: 0.5 MG/DL (ref 0.9–1.3)
DIFFERENTIAL TYPE: ABNORMAL
DIFFERENTIAL TYPE: ABNORMAL
EOSINOPHILS ABSOLUTE: 0 K/CU MM
EOSINOPHILS RELATIVE PERCENT: 0.2 % (ref 0–3)
FERRITIN: 1266 NG/ML (ref 30–400)
FIBRINOGEN LEVEL: 217 MG/DL (ref 196.9–442.1)
GFR AFRICAN AMERICAN: >60 ML/MIN/1.73M2
GFR NON-AFRICAN AMERICAN: >60 ML/MIN/1.73M2
GLUCOSE BLD-MCNC: 189 MG/DL (ref 70–99)
HAV IGM SER IA-ACNC: NON REACTIVE
HBV SURFACE AB TITR SER: 46.62 {TITER}
HCT VFR BLD CALC: 21 % (ref 42–52)
HCT VFR BLD CALC: 31 % (ref 42–52)
HEMOGLOBIN: 10.1 GM/DL (ref 13.5–18)
HEMOGLOBIN: 7 GM/DL (ref 13.5–18)
HEPATITIS B CORE IGM ANTIBODY: NON REACTIVE
HEPATITIS B SURFACE ANTIGEN: NON REACTIVE
HEPATITIS C ANTIBODY: NON REACTIVE
IMMATURE NEUTROPHIL %: 2.2 % (ref 0–0.43)
INR BLD: 1.21 INDEX
IRON: 178 UG/DL (ref 59–158)
LACTATE DEHYDROGENASE: 327 IU/L (ref 120–246)
LACTATE: 2.6 MMOL/L (ref 0.4–2)
LACTATE: 2.7 MMOL/L (ref 0.4–2)
LACTATE: 9.1 MMOL/L (ref 0.4–2)
LIPASE: 45 IU/L (ref 13–60)
LYMPHOCYTES ABSOLUTE: 1.9 K/CU MM
LYMPHOCYTES ABSOLUTE: 2.5 K/CU MM
LYMPHOCYTES RELATIVE PERCENT: 12.7 % (ref 24–44)
LYMPHOCYTES RELATIVE PERCENT: 8 % (ref 24–44)
MACROCYTES: ABNORMAL
MCH RBC QN AUTO: 34.2 PG (ref 27–31)
MCH RBC QN AUTO: 34.8 PG (ref 27–31)
MCHC RBC AUTO-ENTMCNC: 32.6 % (ref 32–36)
MCHC RBC AUTO-ENTMCNC: 33.3 % (ref 32–36)
MCV RBC AUTO: 104.5 FL (ref 78–100)
MCV RBC AUTO: 105.1 FL (ref 78–100)
MONOCYTES ABSOLUTE: 1.6 K/CU MM
MONOCYTES ABSOLUTE: 2.2 K/CU MM
MONOCYTES RELATIVE PERCENT: 7.8 % (ref 0–4)
MONOCYTES RELATIVE PERCENT: 9 % (ref 0–4)
NUCLEATED RBC %: 0.3 %
NUCLEATED RED BLOOD CELLS: 4
PCT TRANSFERRIN: <91 % (ref 10–44)
PDW BLD-RTO: 17.5 % (ref 11.7–14.9)
PDW BLD-RTO: 17.9 % (ref 11.7–14.9)
PLATELET # BLD: 331 K/CU MM (ref 140–440)
PLATELET # BLD: 391 K/CU MM (ref 140–440)
PMV BLD AUTO: 11.5 FL (ref 7.5–11.1)
PMV BLD AUTO: 11.7 FL (ref 7.5–11.1)
POLYCHROMASIA: ABNORMAL
POTASSIUM SERPL-SCNC: 4 MMOL/L (ref 3.5–5.1)
PROTHROMBIN TIME: 15.6 SECONDS (ref 11.7–14.5)
RBC # BLD: 2.01 M/CU MM (ref 4.6–6.2)
RBC # BLD: 2.95 M/CU MM (ref 4.6–6.2)
SEGMENTED NEUTROPHILS ABSOLUTE COUNT: 15.2 K/CU MM
SEGMENTED NEUTROPHILS ABSOLUTE COUNT: 17.9 K/CU MM
SEGMENTED NEUTROPHILS RELATIVE PERCENT: 75 % (ref 36–66)
SEGMENTED NEUTROPHILS RELATIVE PERCENT: 76.6 % (ref 36–66)
SODIUM BLD-SCNC: 131 MMOL/L (ref 135–145)
TOTAL CK: 37 IU/L (ref 38–174)
TOTAL IMMATURE NEUTOROPHIL: 0.43 K/CU MM
TOTAL IRON BINDING CAPACITY: <195 UG/DL (ref 250–450)
TOTAL NUCLEATED RBC: 0.1 K/CU MM
TOTAL PROTEIN: 4.4 GM/DL (ref 6.4–8.2)
UNSATURATED IRON BINDING CAPACITY: <17 UG/DL (ref 110–370)
WBC # BLD: 19.8 K/CU MM (ref 4–10.5)
WBC # BLD: 23.9 K/CU MM (ref 4–10.5)

## 2022-04-18 PROCEDURE — 2580000003 HC RX 258: Performed by: PHYSICIAN ASSISTANT

## 2022-04-18 PROCEDURE — 80074 ACUTE HEPATITIS PANEL: CPT

## 2022-04-18 PROCEDURE — 74177 CT ABD & PELVIS W/CONTRAST: CPT

## 2022-04-18 PROCEDURE — 85730 THROMBOPLASTIN TIME PARTIAL: CPT

## 2022-04-18 PROCEDURE — 6360000004 HC RX CONTRAST MEDICATION: Performed by: SURGERY

## 2022-04-18 PROCEDURE — 2709999900 HC NON-CHARGEABLE SUPPLY

## 2022-04-18 PROCEDURE — 80053 COMPREHEN METABOLIC PANEL: CPT

## 2022-04-18 PROCEDURE — 86850 RBC ANTIBODY SCREEN: CPT

## 2022-04-18 PROCEDURE — 96367 TX/PROPH/DG ADDL SEQ IV INF: CPT

## 2022-04-18 PROCEDURE — 82103 ALPHA-1-ANTITRYPSIN TOTAL: CPT

## 2022-04-18 PROCEDURE — 82550 ASSAY OF CK (CPK): CPT

## 2022-04-18 PROCEDURE — 82728 ASSAY OF FERRITIN: CPT

## 2022-04-18 PROCEDURE — 84155 ASSAY OF PROTEIN SERUM: CPT

## 2022-04-18 PROCEDURE — 2580000003 HC RX 258: Performed by: INTERNAL MEDICINE

## 2022-04-18 PROCEDURE — 87040 BLOOD CULTURE FOR BACTERIA: CPT

## 2022-04-18 PROCEDURE — 73552 X-RAY EXAM OF FEMUR 2/>: CPT

## 2022-04-18 PROCEDURE — 76937 US GUIDE VASCULAR ACCESS: CPT

## 2022-04-18 PROCEDURE — 83550 IRON BINDING TEST: CPT

## 2022-04-18 PROCEDURE — 99222 1ST HOSP IP/OBS MODERATE 55: CPT | Performed by: SPECIALIST

## 2022-04-18 PROCEDURE — 6370000000 HC RX 637 (ALT 250 FOR IP): Performed by: PHYSICIAN ASSISTANT

## 2022-04-18 PROCEDURE — 73701 CT LOWER EXTREMITY W/DYE: CPT

## 2022-04-18 PROCEDURE — 86706 HEP B SURFACE ANTIBODY: CPT

## 2022-04-18 PROCEDURE — 86708 HEPATITIS A ANTIBODY: CPT

## 2022-04-18 PROCEDURE — 85384 FIBRINOGEN ACTIVITY: CPT

## 2022-04-18 PROCEDURE — 82247 BILIRUBIN TOTAL: CPT

## 2022-04-18 PROCEDURE — 96375 TX/PRO/DX INJ NEW DRUG ADDON: CPT

## 2022-04-18 PROCEDURE — 86038 ANTINUCLEAR ANTIBODIES: CPT

## 2022-04-18 PROCEDURE — 83010 ASSAY OF HAPTOGLOBIN QUANT: CPT

## 2022-04-18 PROCEDURE — 6360000004 HC RX CONTRAST MEDICATION: Performed by: PHYSICIAN ASSISTANT

## 2022-04-18 PROCEDURE — 05HD33Z INSERTION OF INFUSION DEVICE INTO RIGHT CEPHALIC VEIN, PERCUTANEOUS APPROACH: ICD-10-PCS | Performed by: INTERNAL MEDICINE

## 2022-04-18 PROCEDURE — 82390 ASSAY OF CERULOPLASMIN: CPT

## 2022-04-18 PROCEDURE — 96376 TX/PRO/DX INJ SAME DRUG ADON: CPT

## 2022-04-18 PROCEDURE — 85007 BL SMEAR W/DIFF WBC COUNT: CPT

## 2022-04-18 PROCEDURE — 85025 COMPLETE CBC W/AUTO DIFF WBC: CPT

## 2022-04-18 PROCEDURE — APPNB60 APP NON BILLABLE TIME 46-60 MINS: Performed by: NURSE PRACTITIONER

## 2022-04-18 PROCEDURE — 99285 EMERGENCY DEPT VISIT HI MDM: CPT

## 2022-04-18 PROCEDURE — 86922 COMPATIBILITY TEST ANTIGLOB: CPT

## 2022-04-18 PROCEDURE — 6360000002 HC RX W HCPCS: Performed by: PHYSICIAN ASSISTANT

## 2022-04-18 PROCEDURE — 36410 VNPNXR 3YR/> PHY/QHP DX/THER: CPT

## 2022-04-18 PROCEDURE — 83690 ASSAY OF LIPASE: CPT

## 2022-04-18 PROCEDURE — 74181 MRI ABDOMEN W/O CONTRAST: CPT

## 2022-04-18 PROCEDURE — 83540 ASSAY OF IRON: CPT

## 2022-04-18 PROCEDURE — 83605 ASSAY OF LACTIC ACID: CPT

## 2022-04-18 PROCEDURE — 93971 EXTREMITY STUDY: CPT

## 2022-04-18 PROCEDURE — 99221 1ST HOSP IP/OBS SF/LOW 40: CPT | Performed by: NURSE PRACTITIONER

## 2022-04-18 PROCEDURE — 2500000003 HC RX 250 WO HCPCS: Performed by: INTERNAL MEDICINE

## 2022-04-18 PROCEDURE — 2000000000 HC ICU R&B

## 2022-04-18 PROCEDURE — 2500000003 HC RX 250 WO HCPCS: Performed by: PHYSICIAN ASSISTANT

## 2022-04-18 PROCEDURE — 83516 IMMUNOASSAY NONANTIBODY: CPT

## 2022-04-18 PROCEDURE — 85027 COMPLETE CBC AUTOMATED: CPT

## 2022-04-18 PROCEDURE — 36415 COLL VENOUS BLD VENIPUNCTURE: CPT

## 2022-04-18 PROCEDURE — 6360000002 HC RX W HCPCS: Performed by: INTERNAL MEDICINE

## 2022-04-18 PROCEDURE — 6360000002 HC RX W HCPCS: Performed by: SPECIALIST

## 2022-04-18 PROCEDURE — 84165 PROTEIN E-PHORESIS SERUM: CPT

## 2022-04-18 PROCEDURE — 82248 BILIRUBIN DIRECT: CPT

## 2022-04-18 PROCEDURE — 96366 THER/PROPH/DIAG IV INF ADDON: CPT

## 2022-04-18 PROCEDURE — 85610 PROTHROMBIN TIME: CPT

## 2022-04-18 PROCEDURE — 82140 ASSAY OF AMMONIA: CPT

## 2022-04-18 PROCEDURE — 86256 FLUORESCENT ANTIBODY TITER: CPT

## 2022-04-18 PROCEDURE — 83615 LACTATE (LD) (LDH) ENZYME: CPT

## 2022-04-18 PROCEDURE — 86901 BLOOD TYPING SEROLOGIC RH(D): CPT

## 2022-04-18 PROCEDURE — 86900 BLOOD TYPING SEROLOGIC ABO: CPT

## 2022-04-18 PROCEDURE — 96365 THER/PROPH/DIAG IV INF INIT: CPT

## 2022-04-18 RX ORDER — MORPHINE SULFATE 4 MG/ML
4 INJECTION, SOLUTION INTRAMUSCULAR; INTRAVENOUS ONCE
Status: COMPLETED | OUTPATIENT
Start: 2022-04-18 | End: 2022-04-18

## 2022-04-18 RX ORDER — 0.9 % SODIUM CHLORIDE 0.9 %
1000 INTRAVENOUS SOLUTION INTRAVENOUS ONCE
Status: COMPLETED | OUTPATIENT
Start: 2022-04-18 | End: 2022-04-18

## 2022-04-18 RX ORDER — SODIUM CHLORIDE 0.9 % (FLUSH) 0.9 %
5-40 SYRINGE (ML) INJECTION PRN
Status: DISCONTINUED | OUTPATIENT
Start: 2022-04-18 | End: 2022-05-02 | Stop reason: HOSPADM

## 2022-04-18 RX ORDER — LORAZEPAM 2 MG/ML
2 INJECTION INTRAMUSCULAR
Status: DISCONTINUED | OUTPATIENT
Start: 2022-04-18 | End: 2022-04-29

## 2022-04-18 RX ORDER — SODIUM CHLORIDE 0.9 % (FLUSH) 0.9 %
5-40 SYRINGE (ML) INJECTION EVERY 12 HOURS SCHEDULED
Status: DISCONTINUED | OUTPATIENT
Start: 2022-04-18 | End: 2022-05-02 | Stop reason: HOSPADM

## 2022-04-18 RX ORDER — LORAZEPAM 1 MG/1
3 TABLET ORAL
Status: DISCONTINUED | OUTPATIENT
Start: 2022-04-18 | End: 2022-04-29

## 2022-04-18 RX ORDER — SODIUM CHLORIDE 9 MG/ML
INJECTION, SOLUTION INTRAVENOUS PRN
Status: DISCONTINUED | OUTPATIENT
Start: 2022-04-18 | End: 2022-05-02 | Stop reason: HOSPADM

## 2022-04-18 RX ORDER — ONDANSETRON 2 MG/ML
4 INJECTION INTRAMUSCULAR; INTRAVENOUS ONCE
Status: COMPLETED | OUTPATIENT
Start: 2022-04-18 | End: 2022-04-18

## 2022-04-18 RX ORDER — LORAZEPAM 1 MG/1
1 TABLET ORAL
Status: DISCONTINUED | OUTPATIENT
Start: 2022-04-18 | End: 2022-04-29

## 2022-04-18 RX ORDER — 0.9 % SODIUM CHLORIDE 0.9 %
1000 INTRAVENOUS SOLUTION INTRAVENOUS ONCE
Status: COMPLETED | OUTPATIENT
Start: 2022-04-18 | End: 2022-04-19

## 2022-04-18 RX ORDER — METHYLPREDNISOLONE SODIUM SUCCINATE 40 MG/ML
20 INJECTION, POWDER, LYOPHILIZED, FOR SOLUTION INTRAMUSCULAR; INTRAVENOUS EVERY 12 HOURS
Status: DISCONTINUED | OUTPATIENT
Start: 2022-04-18 | End: 2022-04-26

## 2022-04-18 RX ORDER — LORAZEPAM 2 MG/ML
3 INJECTION INTRAMUSCULAR
Status: DISCONTINUED | OUTPATIENT
Start: 2022-04-18 | End: 2022-04-29

## 2022-04-18 RX ORDER — LIDOCAINE HYDROCHLORIDE 10 MG/ML
5 INJECTION, SOLUTION EPIDURAL; INFILTRATION; INTRACAUDAL; PERINEURAL ONCE
Status: DISCONTINUED | OUTPATIENT
Start: 2022-04-18 | End: 2022-05-02 | Stop reason: HOSPADM

## 2022-04-18 RX ORDER — NALOXONE HYDROCHLORIDE 0.4 MG/ML
0.4 INJECTION, SOLUTION INTRAMUSCULAR; INTRAVENOUS; SUBCUTANEOUS PRN
Status: DISCONTINUED | OUTPATIENT
Start: 2022-04-18 | End: 2022-05-02 | Stop reason: HOSPADM

## 2022-04-18 RX ORDER — SODIUM CHLORIDE 0.9 % (FLUSH) 0.9 %
5-40 SYRINGE (ML) INJECTION EVERY 12 HOURS SCHEDULED
Status: DISCONTINUED | OUTPATIENT
Start: 2022-04-18 | End: 2022-04-19

## 2022-04-18 RX ORDER — SODIUM CHLORIDE 9 MG/ML
INJECTION, SOLUTION INTRAVENOUS CONTINUOUS
Status: DISCONTINUED | OUTPATIENT
Start: 2022-04-18 | End: 2022-04-25

## 2022-04-18 RX ORDER — POLYETHYLENE GLYCOL 3350 17 G/17G
17 POWDER, FOR SOLUTION ORAL DAILY PRN
Status: DISCONTINUED | OUTPATIENT
Start: 2022-04-18 | End: 2022-05-02 | Stop reason: HOSPADM

## 2022-04-18 RX ORDER — DEXMEDETOMIDINE HYDROCHLORIDE 4 UG/ML
.1-1.5 INJECTION, SOLUTION INTRAVENOUS CONTINUOUS
Status: DISCONTINUED | OUTPATIENT
Start: 2022-04-18 | End: 2022-04-29

## 2022-04-18 RX ORDER — ACETAMINOPHEN 325 MG/1
650 TABLET ORAL EVERY 6 HOURS PRN
Status: DISCONTINUED | OUTPATIENT
Start: 2022-04-18 | End: 2022-05-02 | Stop reason: HOSPADM

## 2022-04-18 RX ORDER — SODIUM CHLORIDE 0.9 % (FLUSH) 0.9 %
5-40 SYRINGE (ML) INJECTION 2 TIMES DAILY
Status: DISCONTINUED | OUTPATIENT
Start: 2022-04-18 | End: 2022-04-19

## 2022-04-18 RX ORDER — SODIUM CHLORIDE 9 MG/ML
INJECTION, SOLUTION INTRAVENOUS PRN
Status: DISCONTINUED | OUTPATIENT
Start: 2022-04-18 | End: 2022-04-25

## 2022-04-18 RX ORDER — ONDANSETRON 2 MG/ML
4 INJECTION INTRAMUSCULAR; INTRAVENOUS EVERY 6 HOURS PRN
Status: DISCONTINUED | OUTPATIENT
Start: 2022-04-18 | End: 2022-04-27

## 2022-04-18 RX ORDER — ONDANSETRON 4 MG/1
4 TABLET, ORALLY DISINTEGRATING ORAL EVERY 8 HOURS PRN
Status: DISCONTINUED | OUTPATIENT
Start: 2022-04-18 | End: 2022-04-27

## 2022-04-18 RX ORDER — LORAZEPAM 1 MG/1
4 TABLET ORAL
Status: DISCONTINUED | OUTPATIENT
Start: 2022-04-18 | End: 2022-04-29

## 2022-04-18 RX ORDER — LORAZEPAM 2 MG/ML
1 INJECTION INTRAMUSCULAR
Status: DISCONTINUED | OUTPATIENT
Start: 2022-04-18 | End: 2022-04-29

## 2022-04-18 RX ORDER — ACETAMINOPHEN 650 MG/1
650 SUPPOSITORY RECTAL EVERY 6 HOURS PRN
Status: DISCONTINUED | OUTPATIENT
Start: 2022-04-18 | End: 2022-05-02 | Stop reason: HOSPADM

## 2022-04-18 RX ORDER — LORAZEPAM 2 MG/ML
4 INJECTION INTRAMUSCULAR
Status: DISCONTINUED | OUTPATIENT
Start: 2022-04-18 | End: 2022-04-29

## 2022-04-18 RX ORDER — THIAMINE HYDROCHLORIDE 100 MG/ML
100 INJECTION, SOLUTION INTRAMUSCULAR; INTRAVENOUS DAILY
Status: DISCONTINUED | OUTPATIENT
Start: 2022-04-18 | End: 2022-05-02 | Stop reason: HOSPADM

## 2022-04-18 RX ORDER — LORAZEPAM 1 MG/1
2 TABLET ORAL
Status: DISCONTINUED | OUTPATIENT
Start: 2022-04-18 | End: 2022-04-29

## 2022-04-18 RX ADMIN — VANCOMYCIN HYDROCHLORIDE 2000 MG: 1 INJECTION, POWDER, LYOPHILIZED, FOR SOLUTION INTRAVENOUS at 02:54

## 2022-04-18 RX ADMIN — IOPAMIDOL 75 ML: 755 INJECTION, SOLUTION INTRAVENOUS at 23:42

## 2022-04-18 RX ADMIN — SODIUM CHLORIDE, PRESERVATIVE FREE 10 ML: 5 INJECTION INTRAVENOUS at 07:44

## 2022-04-18 RX ADMIN — METHYLPREDNISOLONE SODIUM SUCCINATE 20 MG: 40 INJECTION, POWDER, LYOPHILIZED, FOR SOLUTION INTRAMUSCULAR; INTRAVENOUS at 17:42

## 2022-04-18 RX ADMIN — SODIUM CHLORIDE, PRESERVATIVE FREE 10 ML: 5 INJECTION INTRAVENOUS at 20:08

## 2022-04-18 RX ADMIN — MORPHINE SULFATE 4 MG: 4 INJECTION, SOLUTION INTRAMUSCULAR; INTRAVENOUS at 00:41

## 2022-04-18 RX ADMIN — SODIUM CHLORIDE 1000 ML: 9 INJECTION, SOLUTION INTRAVENOUS at 02:24

## 2022-04-18 RX ADMIN — LACTULOSE: 10 SOLUTION ORAL at 17:10

## 2022-04-18 RX ADMIN — SODIUM CHLORIDE, PRESERVATIVE FREE 10 ML: 5 INJECTION INTRAVENOUS at 20:09

## 2022-04-18 RX ADMIN — HYDROMORPHONE HYDROCHLORIDE 1 MG: 1 INJECTION, SOLUTION INTRAMUSCULAR; INTRAVENOUS; SUBCUTANEOUS at 07:42

## 2022-04-18 RX ADMIN — HYDROMORPHONE HYDROCHLORIDE 1 MG: 1 INJECTION, SOLUTION INTRAMUSCULAR; INTRAVENOUS; SUBCUTANEOUS at 05:15

## 2022-04-18 RX ADMIN — VANCOMYCIN HYDROCHLORIDE 1250 MG: 10 INJECTION, POWDER, LYOPHILIZED, FOR SOLUTION INTRAVENOUS at 15:40

## 2022-04-18 RX ADMIN — LORAZEPAM 3 MG: 2 INJECTION INTRAMUSCULAR; INTRAVENOUS at 15:29

## 2022-04-18 RX ADMIN — HYDROMORPHONE HYDROCHLORIDE 1 MG: 1 INJECTION, SOLUTION INTRAMUSCULAR; INTRAVENOUS; SUBCUTANEOUS at 19:58

## 2022-04-18 RX ADMIN — HYDROMORPHONE HYDROCHLORIDE 1 MG: 1 INJECTION, SOLUTION INTRAMUSCULAR; INTRAVENOUS; SUBCUTANEOUS at 11:50

## 2022-04-18 RX ADMIN — SODIUM CHLORIDE: 9 INJECTION, SOLUTION INTRAVENOUS at 07:48

## 2022-04-18 RX ADMIN — ONDANSETRON 4 MG: 2 INJECTION INTRAMUSCULAR; INTRAVENOUS at 07:45

## 2022-04-18 RX ADMIN — MORPHINE SULFATE 4 MG: 4 INJECTION, SOLUTION INTRAMUSCULAR; INTRAVENOUS at 01:29

## 2022-04-18 RX ADMIN — SODIUM CHLORIDE, PRESERVATIVE FREE 10 ML: 5 INJECTION INTRAVENOUS at 10:01

## 2022-04-18 RX ADMIN — METRONIDAZOLE 500 MG: 500 INJECTION, SOLUTION INTRAVENOUS at 05:20

## 2022-04-18 RX ADMIN — THIAMINE HYDROCHLORIDE 100 MG: 100 INJECTION, SOLUTION INTRAMUSCULAR; INTRAVENOUS at 07:42

## 2022-04-18 RX ADMIN — DEXMEDETOMIDINE HYDROCHLORIDE 0.2 MCG/KG/HR: 4 INJECTION, SOLUTION INTRAVENOUS at 15:38

## 2022-04-18 RX ADMIN — IOPAMIDOL 75 ML: 755 INJECTION, SOLUTION INTRAVENOUS at 02:27

## 2022-04-18 RX ADMIN — ONDANSETRON 4 MG: 2 INJECTION INTRAMUSCULAR; INTRAVENOUS at 00:40

## 2022-04-18 RX ADMIN — LORAZEPAM 3 MG: 2 INJECTION INTRAMUSCULAR; INTRAVENOUS at 11:59

## 2022-04-18 RX ADMIN — METRONIDAZOLE 500 MG: 500 INJECTION, SOLUTION INTRAVENOUS at 20:16

## 2022-04-18 RX ADMIN — SODIUM CHLORIDE 1000 ML: 9 INJECTION, SOLUTION INTRAVENOUS at 21:33

## 2022-04-18 RX ADMIN — SODIUM CHLORIDE, PRESERVATIVE FREE 10 ML: 5 INJECTION INTRAVENOUS at 20:10

## 2022-04-18 RX ADMIN — LORAZEPAM 2 MG: 2 INJECTION INTRAMUSCULAR; INTRAVENOUS at 13:03

## 2022-04-18 RX ADMIN — HYDROMORPHONE HYDROCHLORIDE 1 MG: 1 INJECTION, SOLUTION INTRAMUSCULAR; INTRAVENOUS; SUBCUTANEOUS at 04:13

## 2022-04-18 RX ADMIN — METRONIDAZOLE 500 MG: 500 INJECTION, SOLUTION INTRAVENOUS at 10:04

## 2022-04-18 RX ADMIN — SODIUM CHLORIDE, PRESERVATIVE FREE 10 ML: 5 INJECTION INTRAVENOUS at 17:42

## 2022-04-18 RX ADMIN — CEFEPIME 2000 MG: 2 INJECTION, POWDER, FOR SOLUTION INTRAVENOUS at 13:52

## 2022-04-18 RX ADMIN — SODIUM CHLORIDE 1000 ML: 9 INJECTION, SOLUTION INTRAVENOUS at 01:38

## 2022-04-18 RX ADMIN — CEFEPIME HYDROCHLORIDE 2000 MG: 2 INJECTION, POWDER, FOR SOLUTION INTRAVENOUS at 02:00

## 2022-04-18 RX ADMIN — LORAZEPAM 2 MG: 2 INJECTION INTRAMUSCULAR; INTRAVENOUS at 10:01

## 2022-04-18 RX ADMIN — HYDROMORPHONE HYDROCHLORIDE 1 MG: 1 INJECTION, SOLUTION INTRAMUSCULAR; INTRAVENOUS; SUBCUTANEOUS at 02:24

## 2022-04-18 RX ADMIN — SODIUM CHLORIDE, PRESERVATIVE FREE 10 ML: 5 INJECTION INTRAVENOUS at 07:45

## 2022-04-18 ASSESSMENT — PAIN SCALES - GENERAL
PAINLEVEL_OUTOF10: 8
PAINLEVEL_OUTOF10: 7
PAINLEVEL_OUTOF10: 8
PAINLEVEL_OUTOF10: 6
PAINLEVEL_OUTOF10: 5
PAINLEVEL_OUTOF10: 8
PAINLEVEL_OUTOF10: 0
PAINLEVEL_OUTOF10: 8
PAINLEVEL_OUTOF10: 8
PAINLEVEL_OUTOF10: 0
PAINLEVEL_OUTOF10: 8
PAINLEVEL_OUTOF10: 7
PAINLEVEL_OUTOF10: 8

## 2022-04-18 ASSESSMENT — PAIN DESCRIPTION - FREQUENCY
FREQUENCY: CONTINUOUS

## 2022-04-18 ASSESSMENT — PAIN DESCRIPTION - ORIENTATION
ORIENTATION: RIGHT;POSTERIOR;UPPER
ORIENTATION: RIGHT
ORIENTATION: RIGHT;POSTERIOR;UPPER
ORIENTATION: RIGHT

## 2022-04-18 ASSESSMENT — PAIN DESCRIPTION - DESCRIPTORS
DESCRIPTORS: ACHING
DESCRIPTORS: SHARP;RADIATING
DESCRIPTORS: ACHING
DESCRIPTORS: ACHING
DESCRIPTORS: SHARP;RADIATING

## 2022-04-18 ASSESSMENT — PAIN DESCRIPTION - PAIN TYPE
TYPE: ACUTE PAIN

## 2022-04-18 ASSESSMENT — PAIN DESCRIPTION - LOCATION
LOCATION: LEG

## 2022-04-18 NOTE — CONSULTS
Department of Internal Medicine  Gastroenterology Consult Note  Pina Ibrahim MD      Reason for Consult:  jaundice    Primary Care Physician:  Jennifer Orantes MD    History Obtained From:  patient    CC: jaundice , nausea, vomiting    HISTORY OF PRESENT ILLNESS:              The patient is a 39 y.o.  male admitte with deep jaundice and elevated LFT's. He does admit to alcohol abuse ad in mid-Feb he had an alcohol level of 0.53. since then he has cut back but still admits to 2-3 shots/day. He has had no alcohol since 4 days ago and is currently tremulous. He had Covid at the end of last year but recovered. He has essentially been ill x 2 months with anorexia, nausea, vomiting, low grade fever. He has not had much abd pain. In February his amlodipine was stopped and he was begun on thiazide, atenolol, losartan and prn phenergan- none of which are highly suspicious for DILI. CT showed a large gluteal hematoma, fatty liver and thickened GB wall. HIDA scan 2 weeks ago showed uptake of nucleotide in the liver but no excretion out of the liver at all. His bili is 19 whereas it was normal 6 weeks ago. , ALT 43. His alk phosph is 279 and was normal 6 weeks ago.  His INR is 1.21 and platelets 459,159- though were 148,000 2 weeks ago       Past Medical History:        Diagnosis Date    Acute hepatitis 01/2022    severe nausea w weakness, elevated LFTs noted in ED 2/2022- worsened also fr nsaids and alcohol consumption    Allergic rhinitis     Ankle pain     INACTIVE PROBLEM    Anxiety     COVID-19 02/06/2022    Epigastric abdominal pain     INACTIVE PROBLEM    Essential hypertension 01/08/2018    Family history of coronary artery disease     Herniated lumbar intervertebral disc     Insomnia     LBP (low back pain)     Marfan's syndrome with ocular manifestation     DX'D BY OPTOM DR CANTU AT 6Y0    Oral herpes        Past Surgical History:        Procedure Laterality Date    BACK SURGERY herniated ruptured disc 2008    TONSILLECTOMY      UMBILICAL HERNIA REPAIR N/A 7/2/2020    OPEN HERNIA UMBILICAL REPAIR performed by Marcelo Spivey MD at [facility] OR       Medications Prior to Admission:    Prior to Admission medications    Medication Sig Start Date End Date Taking? Authorizing Provider   promethazine (PHENERGAN) 25 MG tablet Take 25 mg by mouth every 6 hours as needed for Nausea    Historical Provider, MD   ALPRAZolam (XANAX) 0.25 MG tablet Take 0.25 mg by mouth nightly as needed for Sleep. Historical Provider, MD   zolpidem (AMBIEN) 5 MG tablet Take 1 tablet by mouth nightly as needed for Sleep for up to 15 days. 4/5/22 4/20/22  Chapin Grande MD   promethazine (PHENERGAN) 25 MG tablet Take 1 tablet by mouth every 6 hours as needed for Nausea 4/5/22 5/5/22  Chapin Grande MD   ALPRAZolam [facility]) 0.25 MG tablet Take 1 tablet by mouth daily as needed for Anxiety for up to 30 days. 4/5/22 5/5/22  Chapin Grande MD   dicyclomine (BENTYL) 10 MG capsule Take 1 capsule by mouth 3 times daily 4/1/22   Chapin Grande MD   losartan-hydroCHLOROthiazide [facility]) 50-12.5 MG per tablet Take 0.5 tablets by mouth daily 3/11/22   Chapin Grande MD   atenolol (TENORMIN) 50 MG tablet Take 1 tablet by mouth daily 3/11/22   Chapin Grande MD   cetirizine (ZYRTEC) 10 MG tablet Take 10 mg by mouth daily  Patient not taking: Reported on 4/5/2022    Historical Provider, MD       Allergies: Allergies   Allergen Reactions    No Known Allergies    . Social History:    TOBACCO:  No  ETOH:  Yes    Family History: reviewed and positives included in HPI- all other pertinent GI family history negative      REVIEW OF SYSTEMS: see HPI for positives and pertinent negatives.  All other systems reviewed and are negative    PHYSICAL EXAM:    Vitals:  BP 96/69   Pulse 103   Temp 97.7 °F (36.5 °C) (Oral)   Resp 17   Ht 6' 4\" (1.93 m)   Wt 257 lb 4.4 oz (116.7 kg)   SpO2 98%   BMI 31.32 kg/m² CONSTITUTIONAL: alert, cooperative, no apparent distress,   EYES:  pupils equal, round and reactive to light and sclera clear  ENT:  normocepalic, without obvious abnormality  NECK:  supple, symmetrical, trachea midline  HEMATOLOGIC/LYMPHATICS:  no cervical lymphadenopathy and no supraclavicular lymphadenopathy  LUNGS:  clear to auscultation  CARDIOVASCULAR:  regular rate and rhythm and no murmur noted  ABDOMEN:  Soft, non-tender with normal bowel sounds. Spleen seems enlarged to percussion  NEUROLOGIC: tremulous but without asterixis-no focal deficit detected. He copies a star diagram poorly  SKIN:  no lesions  EXTREMITIES: no clubbing, cyanosis, or edema    IMPRESSION:  1) alcohol abuse with impending withdrawal- his alcohol level of 0.53 on 2/23/22 confirms his degree of abuse  2) markedly elevated LFT's- suspect mainly alcoholic hepatitis-- rule out drug-induced cholestasis, viral hepatitis (not A,B or C though), autoimmune hepatitis, iron overload,etc.--- I do not think this is GB disease but agree with MRCP  3) Maddrey score 35.4  4) stage 1-2 PSE  5) if MRCP negative would encourage nutrition and add supplements  6) D/C antibiotics in a day or two if no diagnosis of infection is forthcoming      RECOMMENDATIONS:  1) if MRCP negative would start steroids as Maddrey Score > 32--- dose is Solumedrol 20 mg BID  2) CIWA protocol  3) serologic liver workup ordered including assay for CMV,EBV, HSV (though unlikely)          Reddy So M.D

## 2022-04-18 NOTE — PROGRESS NOTES
[id number] [facility]  consulted by  Axel Harris PA-C for dosing in ED. Indication for treatment: Sepsis     Pertinent Laboratory Values:   Recent Labs     04/18/22  0027   BUN 9   CREATININE 0.5*     Estimated Creatinine Clearance: 268 mL/min (A) (based on SCr of 0.5 mg/dL (L)). Vancomycin 2000mg x 1 to be given in ED. Pharmacy will follow if patient is admitted and an additional Vanc admit consult is received. Thank you for consulting Pharmacy. ELISA Martinez. Ph.

## 2022-04-18 NOTE — CONSULTS
Consult Note  [facility]) Physicians - General Surgery    Patient ID:  Name:Ronan John Luis Enrique  Date: 4/18/2022 7:47 PM   MRN#: [id number] YMN:[id number]   Admission Date:4/18/2022 Age/Sex:41 y.o. male     Date: 4/18/22    Reason for Evaluation:  Hematoma right gluteus      Chief Complaint   Patient presents with    Leg Pain       Requesting Physician: Cathy Sanchez PA-C    History Obtained From:  spouse, electronic medical record    HISTORY OF PRESENT ILLNESS:    Tamir Ch is a 39 y.o. male presenting with right hip/thigh pain. History obtained from spouse, patient recently received ativan per CIWA scale and unable to give history. He has a PMH of HTN, acute hepatitis, anxiety and alcohol use. Spouse states unsure if fall/injury to right leg but does sleep walk. Onset of pain was two days ago but progressively got worse so spouse called the 911. He was seen in the ED a couple months ago for fatigue, poor appetite, malaise and frequent falls. He was found to have alcohol level of 0.53 and Tbili 1.6 at that time. He presents this admission with jaundice and elevated LFTs. He does admit to drinking alcohol but states has decreased the amount since his last admission. CT showed a large gluteal hematoma, fatty liver and thickened GB wall. Tbili was 19 with elevated LFTs. Lactic is 2.7 today; was 9.1 on admission.     Past Medical History:    Past Medical History:   Diagnosis Date    Acute hepatitis 01/2022    severe nausea w weakness, elevated LFTs noted in ED 2/2022- worsened also fr nsaids and alcohol consumption    Allergic rhinitis     Ankle pain     INACTIVE PROBLEM    Anxiety     COVID-19 02/06/2022    Epigastric abdominal pain     INACTIVE PROBLEM    Essential hypertension 01/08/2018    Family history of coronary artery disease     Herniated lumbar intervertebral disc     Insomnia     LBP (low back pain)     Marfan's syndrome with ocular manifestation     DX'D BY OPTOM  PEPE AT 6Y0    Oral herpes        Past Surgical History:    Past Surgical History:   Procedure Laterality Date    BACK SURGERY      herniated ruptured disc 2008    TONSILLECTOMY      UMBILICAL HERNIA REPAIR N/A 7/2/2020    OPEN HERNIA UMBILICAL REPAIR performed by Nevaeh Mccarty MD at [facility] OR       Current Medications:   Current Facility-Administered Medications   Medication Dose Route Frequency Provider Last Rate Last Admin    sodium chloride flush 0.9 % injection 5-40 mL  5-40 mL IntraVENous BID Jas Jacobs PA-C   10 mL at 04/18/22 0745    sodium chloride flush 0.9 % injection 5-40 mL  5-40 mL IntraVENous 2 times per day Sarah Patrick MD   10 mL at 04/18/22 0744    sodium chloride flush 0.9 % injection 5-40 mL  5-40 mL IntraVENous PRN Sarah Patrick MD        0.9 % sodium chloride infusion   IntraVENous PRN Sarah Patrick MD        ondansetron (ZOFRAN-ODT) disintegrating tablet 4 mg  4 mg Oral Q8H PRN Sarah Patrick MD        Or    ondansetron (ZOFRAN) injection 4 mg  4 mg IntraVENous Q6H PRN Sarah Patrick MD   4 mg at 04/18/22 0745    polyethylene glycol (GLYCOLAX) packet 17 g  17 g Oral Daily PRN Sarah Patrick MD        acetaminophen (TYLENOL) tablet 650 mg  650 mg Oral Q6H PRN Sarah Patrick MD        Or    acetaminophen (TYLENOL) suppository 650 mg  650 mg Rectal Q6H PRN Sarah Patrick MD        0.9 % sodium chloride infusion   IntraVENous Continuous Sarah Patrick  mL/hr at 04/18/22 1823 Rate Verify at 04/18/22 1823    cefepime (MAXIPIME) 2000 mg IVPB minibag  2,000 mg IntraVENous Q12H Sarah Patrick MD   Stopped at 04/18/22 1422    metronidazole (FLAGYL) 500 mg in NaCl 100 mL IVPB premix  500 mg IntraVENous Sharon Porter MD   Stopped at 04/18/22 1104    HYDROmorphone (DILAUDID) injection 1 mg  1 mg IntraVENous Q3H PRN Sarah Patrick MD   1 mg at 04/18/22 1150    naloxone (NARCAN) injection 0.4 mg  0.4 mg IntraVENous PRN Carrie Hammonds MD        thiamine (B-1) injection 100 mg  100 mg IntraVENous Daily Carrie Hammonds MD   100 mg at 04/18/22 0742    lidocaine PF 1 % injection 5 mL  5 mL IntraDERmal Once Kimberleyrenetta Kendall, PA-C        sodium chloride flush 0.9 % injection 5-40 mL  5-40 mL IntraVENous 2 times per day Kimberley GISSELLE Kendall, PA-C        sodium chloride flush 0.9 % injection 5-40 mL  5-40 mL IntraVENous PRN Kimberley S Faiza, PA-C        0.9 % sodium chloride infusion   IntraVENous PRN Kimberleyrenetta Kendall, PA-C        vancomycin (VANCOCIN) 1250 mg in dextrose 5 % 250 mL IVPB  1,250 mg IntraVENous Q12H Carrie Hammonds MD   Stopped at 04/18/22 1710    sodium chloride flush 0.9 % injection 5-40 mL  5-40 mL IntraVENous 2 times per day Kimberleyyanet Kendall, PA-C   10 mL at 04/18/22 1001    sodium chloride flush 0.9 % injection 5-40 mL  5-40 mL IntraVENous PRN Kimberley S Faiza, PA-C   10 mL at 04/18/22 1742    0.9 % sodium chloride infusion   IntraVENous PRN Kimberley Kendall, PA-C        LORazepam (ATIVAN) tablet 1 mg  1 mg Oral Q1H PRN Kimberley GISSELLE Kendall, PA-C        Or    LORazepam (ATIVAN) injection 1 mg  1 mg IntraVENous Q1H PRN Kimberley Kendall PA-C        Or    LORazepam (ATIVAN) tablet 2 mg  2 mg Oral Q1H PRN Kimberley S Faiza, PA-C        Or    LORazepam (ATIVAN) injection 2 mg  2 mg IntraVENous Q1H PRN Oleasia Garcia, PA-C   2 mg at 04/18/22 1303    Or    LORazepam (ATIVAN) tablet 3 mg  3 mg Oral Q1H PRN Kimberley S Faiza, PA-C        Or    LORazepam (ATIVAN) injection 3 mg  3 mg IntraVENous Q1H PRN Tasia Kendall, PA-C   3 mg at 04/18/22 1529    Or    LORazepam (ATIVAN) tablet 4 mg  4 mg Oral Q1H PRN Kimberley S Devara, PA-C        Or    LORazepam (ATIVAN) injection 4 mg  4 mg IntraVENous Q1H PRN Kimberley Kendall PA-C        dexmedetomidine (PRECEDEX) 400 mcg in sodium chloride 0.9 % 100 mL infusion  0.1-1 mcg/kg/hr IntraVENous Continuous Kimberley PITTS ANAT Kendall 11.7 mL/hr at 04/18/22 1920 0.4 mcg/kg/hr at 04/18/22 1920    lactulose enema   Rectal TID Ole Garcia PA-C   Given at 04/18/22 1710    methylPREDNISolone sodium (SOLU-MEDROL) injection 20 mg  20 mg IntraVENous Q12H Meagan Li MD   20 mg at 04/18/22 1742       Allergies:  No known allergies    Social History:   Social History     Socioeconomic History    Marital status:      Spouse name: None    Number of children: None    Years of education: None    Highest education level: None   Occupational History    Occupation:      Comment: [employer]   Tobacco Use    Smoking status: Never Smoker    Smokeless tobacco: Never Used   Vaping Use    Vaping Use: None   Substance and Sexual Activity    Alcohol use: Yes     Comment: 2-3/day since 2020, trying to cut down    Drug use: Never    Sexual activity: None   Other Topics Concern    None   Social History Narrative    None     Social Determinants of Health     Financial Resource Strain: Low Risk     Difficulty of Paying Living Expenses: Not hard at all   Food Insecurity: No Food Insecurity    Worried About Running Out of Food in the Last Year: Never true    Yeimy of Food in the Last Year: Never true   Transportation Needs:     Lack of Transportation (Medical): Not on file    Lack of Transportation (Non-Medical):  Not on file   Physical Activity:     Days of Exercise per Week: Not on file    Minutes of Exercise per Session: Not on file   Stress:     Feeling of Stress : Not on file   Social Connections:     Frequency of Communication with Friends and Family: Not on file    Frequency of Social Gatherings with Friends and Family: Not on file    Attends [employer] Services: Not on file    Active Member of Clubs or Organizations: Not on file    Attends Club or Organization Meetings: Not on file    Marital Status: Not on file   Intimate Partner Violence:     Fear of Current or Ex-Partner: Not on file    Emotionally Abused: Not on file    Physically Abused: Not on file    Sexually Abused: Not on file   Housing Stability:     Unable to Pay for Housing in the Last Year: Not on file    Number of Places Lived in the Last Year: Not on file    Unstable Housing in the Last Year: Not on file       Family History:   History reviewed. No pertinent family history. REVIEW OF SYSTEMS:    Pertinent items noted in HPI    PHYSICAL EXAM:    Physical Exam  Constitutional:       General: He is awake. Appearance: He is ill-appearing. Eyes:      General: Scleral icterus present. Cardiovascular:      Rate and Rhythm: Normal rate and regular rhythm. Heart sounds: Normal heart sounds. No murmur heard. Pulmonary:      Effort: Pulmonary effort is normal. No respiratory distress. Breath sounds: Normal breath sounds. No wheezing. Skin:     Coloration: Skin is jaundiced. Findings: Abrasion and bruising present. DATA:    Lab Results   Component Value Date    WBC 19.8 (H) 04/18/2022    HGB 10.1 (L) 04/18/2022    HCT 31.0 (L) 04/18/2022     04/18/2022     (L) 04/18/2022    K 4.0 04/18/2022    CL 93 (L) 04/18/2022    CO2 18 (L) 04/18/2022    BUN 9 04/18/2022    CREATININE 0.5 (L) 04/18/2022    GLUCOSE 189 (H) 04/18/2022    CALCIUM 8.0 (L) 04/18/2022    PROT 4.4 (L) 04/18/2022    BILITOT 18.8 (H) 04/18/2022     (H) 04/18/2022    ALT 43 (H) 04/18/2022    ALKPHOS 279 (H) 04/18/2022    LIPASE 45 04/18/2022    INR 1.21 04/18/2022       Imaging:   Pertinent laboratory and imaging studies were personally reviewed if available. IMPRESSION:    Tamir Ch is a 39 y.o. male with acute alcoholic hepatitis with a Tbili of 23. CT scan showed intramuscular hematoma.     Patient Active Problem List    Diagnosis Date Noted    Intramuscular hematoma 04/18/2022    Right leg pain     Umbilical hernia without obstruction and without gangrene 07/02/2020    Essential hypertension 01/08/2018    Marfan's syndrome with ocular manifestation     Low back pain     Herniated lumbar intervertebral disc      Visit Diagnoses:  1. Right leg pain    2. Abnormal CT scan    3. Elevated bilirubin    4. Elevated lactic acid level      PLAN:  · Discussed options and plan with Eyal French and spouse. · Patient with alcohol abuse, withdrawal, elevated lactic acid and bilirubin of 19; likely alcoholic hepatitis. Will order MRCP but less likely gallbladder. · Right leg hematoma - will have IR attempt to drain.   · Thank you for the consultation and the opportunity to care for Eyal French    Patient and plan of care discussed with Dr. Israel Loomis MD.    Electronically signed by [place]OR[place] Armando CNP, 4/18/2022, 7:47 PM

## 2022-04-18 NOTE — ED PROVIDER NOTES
[facility]      PCP: Agus Morales MD    CHIEF COMPLAINT    Chief Complaint   Patient presents with    Leg Pain     Patient staffed with supervising physician Dr. Miguel Angel Robins    hospitals    Arely Scales is a 39 y.o. male who presents with right hip and thigh pain. Onset 2 days ago. Patient denies any injury. Patient states pain started in right posterior thigh region and is extended into right posterior thigh. Pain worsens with direct palpation and movement. No known alleviating factors. Patient denies chest pain or shortness of breath. Patient denies history of blood clots, recent travel or surgery. Patient denies fever. Patient has noticed yellow discoloration of the skin over the past week. Patient states he is scheduled to see general surgeon Dr. Anais Sewell tomorrow for his gallbladder. Patient denies abdominal pain, vomiting or diarrhea at this time. Patient has associated nausea.       REVIEW OF SYSTEMS    Constitutional:  Denies fever  HENT:  Denies sore throat or ear pain   Cardiovascular:  Denies chest pain  Respiratory:  Denies cough or shortness of breath    GI:  See HPI Denies abdominal pain, vomiting, or diarrhea  :  Denies any urinary symptoms  Musculoskeletal:  See HPI  Skin:  Denies rash  Neurologic:  Denies headache, focal weakness or sensory changes    Lymphatic:  Denies swollen glands     All other review of systems are negative  See HPI and nursing notes for additional information     PAST MEDICAL AND SURGICAL HISTORY    Past Medical History:   Diagnosis Date    Acute hepatitis 01/2022    severe nausea w weakness, elevated LFTs noted in ED 2/2022- worsened also fr nsaids and alcohol consumption    Acute hepatitis     Allergic rhinitis     Ankle pain     INACTIVE PROBLEM    Anxiety     Epigastric abdominal pain     INACTIVE PROBLEM    Essential hypertension 01/08/2018    Family history of coronary artery disease     Herniated lumbar intervertebral disc  LBP (low back pain)     Marfan's syndrome with ocular manifestation     DX'D BY OPTOM DR CANTU AT 6Y0    Oral herpes      Past Surgical History:   Procedure Laterality Date    BACK SURGERY      herniated ruptured disc 2008    TONSILLECTOMY      UMBILICAL HERNIA REPAIR N/A 7/2/2020    OPEN HERNIA UMBILICAL REPAIR performed by Harrison Bryant MD at [facility]    Current Outpatient Rx   Medication Sig Dispense Refill    promethazine (PHENERGAN) 25 MG tablet Take 25 mg by mouth every 6 hours as needed for Nausea      ALPRAZolam (XANAX) 0.25 MG tablet Take 0.25 mg by mouth nightly as needed for Sleep.  zolpidem (AMBIEN) 5 MG tablet Take 1 tablet by mouth nightly as needed for Sleep for up to 15 days. 15 tablet 0    promethazine (PHENERGAN) 25 MG tablet Take 1 tablet by mouth every 6 hours as needed for Nausea 40 tablet 1    ALPRAZolam (XANAX) 0.25 MG tablet Take 1 tablet by mouth daily as needed for Anxiety for up to 30 days. 10 tablet 0    dicyclomine (BENTYL) 10 MG capsule Take 1 capsule by mouth 3 times daily 21 capsule 0    losartan-hydroCHLOROthiazide (HYZAAR) 50-12.5 MG per tablet Take 0.5 tablets by mouth daily 90 tablet 1    atenolol (TENORMIN) 50 MG tablet Take 1 tablet by mouth daily 90 tablet 1    cetirizine (ZYRTEC) 10 MG tablet Take 10 mg by mouth daily (Patient not taking: Reported on 4/5/2022)         ALLERGIES    Allergies   Allergen Reactions    No Known Allergies        SOCIAL AND FAMILY HISTORY    Social History     Socioeconomic History    Marital status:      Spouse name: None    Number of children: None    Years of education: None    Highest education level: None   Occupational History    Occupation:      Comment: [employer]   Tobacco Use    Smoking status: Never Smoker    Smokeless tobacco: Never Used   Vaping Use    Vaping Use: None   Substance and Sexual Activity    Alcohol use:  Yes    Drug use: Never    Sexual activity: None   Other Topics Concern    None   Social History Narrative    None     Social Determinants of Health     Financial Resource Strain: Low Risk     Difficulty of Paying Living Expenses: Not hard at all   Food Insecurity: No Food Insecurity    Worried About Running Out of Food in the Last Year: Never true    Yeimy of Food in the Last Year: Never true   Transportation Needs:     Lack of Transportation (Medical): Not on file    Lack of Transportation (Non-Medical): Not on file   Physical Activity:     Days of Exercise per Week: Not on file    Minutes of Exercise per Session: Not on file   Stress:     Feeling of Stress : Not on file   Social Connections:     Frequency of Communication with Friends and Family: Not on file    Frequency of Social Gatherings with Friends and Family: Not on file    Attends [employer] Services: Not on file    Active Member of [address] [place] [address] [employer] or Organizations: Not on file    Attends Club or Organization Meetings: Not on file    Marital Status: Not on file   Intimate Partner Violence:     Fear of Current or Ex-Partner: Not on file    Emotionally Abused: Not on file    Physically Abused: Not on file    Sexually Abused: Not on file   Housing Stability:     Unable to Pay for Housing in the Last Year: Not on file    Number of [place] in the Last Year: Not on file    Unstable Housing in the Last Year: Not on file     History reviewed. No pertinent family history. PHYSICAL EXAM    VITAL SIGNS: /85   Pulse 93   Temp 97.8 °F (36.6 °C)   Resp 16   Ht 6' 4\" (1.93 m)   Wt 250 lb (113.4 kg)   SpO2 93%   BMI 30.43 kg/m²    Constitutional: Jaundiced male, appears uncomfortable  HENT:  Normocephalic, Atraumatic, PERRL. Scleral icterus  Neck/Lymphatics: supple, no JVD, no swollen nodes  Cardiovascular:  Normal heart rate, Normal rhythm  Respiratory:  Nonlabored breathing. Normal breath sounds, No wheezing  Abdomen:   Bowel sounds normal, Soft, No tenderness, no masses. Musculoskeletal: Right lower extremity with no overlying erythema, ecchymosis, swelling. Moderate tenderness to right posterior thigh with firmness to palpation. Distal sensation and pulses are intact. Integument: Jaundiced  Neurologic:  Alert & oriented , No focal deficits noted. Sensation intact.   Psychiatric:  Affect normal, Mood normal.       Labs:  Results for orders placed or performed during the hospital encounter of 04/18/22   CBC with Auto Differential   Result Value Ref Range    WBC 19.8 (H) 4.0 - 10.5 K/CU MM    RBC 2.95 (L) 4.6 - 6.2 M/CU MM    Hemoglobin 10.1 (L) 13.5 - 18.0 GM/DL    Hematocrit 31.0 (L) 42 - 52 %    .1 (H) 78 - 100 FL    MCH 34.2 (H) 27 - 31 PG    MCHC 32.6 32.0 - 36.0 %    RDW 17.5 (H) 11.7 - 14.9 %    Platelets 370 263 - 809 K/CU MM    MPV 11.7 (H) 7.5 - 11.1 FL    Differential Type AUTOMATED DIFFERENTIAL     Segs Relative 76.6 (H) 36 - 66 %    Lymphocytes % 12.7 (L) 24 - 44 %    Monocytes % 7.8 (H) 0 - 4 %    Eosinophils % 0.2 0 - 3 %    Basophils % 0.5 0 - 1 %    Segs Absolute 15.2 K/CU MM    Lymphocytes Absolute 2.5 K/CU MM    Monocytes Absolute 1.6 K/CU MM    Eosinophils Absolute 0.0 K/CU MM    Basophils Absolute 0.1 K/CU MM    Nucleated RBC % 0.3 %    Total Nucleated RBC 0.1 K/CU MM    Total Immature Neutrophil 0.43 K/CU MM    Immature Neutrophil % 2.2 (H) 0 - 0.43 %   Comprehensive Metabolic Panel   Result Value Ref Range    Sodium 131 (L) 135 - 145 MMOL/L    Potassium 4.0 3.5 - 5.1 MMOL/L    Chloride 93 (L) 99 - 110 mMol/L    CO2 18 (L) 21 - 32 MMOL/L    BUN 9 6 - 23 MG/DL    CREATININE 0.5 (L) 0.9 - 1.3 MG/DL    Glucose 189 (H) 70 - 99 MG/DL    Calcium 8.0 (L) 8.3 - 10.6 MG/DL    Albumin 2.6 (L) 3.4 - 5.0 GM/DL    Total Protein 4.4 (L) 6.4 - 8.2 GM/DL    Total Bilirubin 19.0 (HH) 0.0 - 1.0 MG/DL    ALT 43 (H) 10 - 40 U/L     (H) 15 - 37 IU/L    Alkaline Phosphatase 279 (H) 40 - 129 IU/L    GFR Non- >60 >60 mL/min/1.73m2    GFR African American >60 >60 mL/min/1.73m2    Anion Gap 20 (H) 4 - 16   Lipase   Result Value Ref Range    Lipase 45 13 - 60 IU/L   CK   Result Value Ref Range    Total CK 37 (L) 38 - 174 IU/L   Lactic Acid   Result Value Ref Range    Lactate 9.1 (HH) 0.4 - 2.0 mMOL/L   Lactic Acid   Result Value Ref Range    Lactate 2.6 (HH) 0.4 - 2.0 mMOL/L   Bilirubin Total Direct & Indirect   Result Value Ref Range    Total Bilirubin 18.8 (H) 0.0 - 1.0 MG/DL    Bilirubin, Direct 15.6 (H) 0.0 - 0.3 MG/DL    Bilirubin, Indirect 3.2 (H) 0 - 0.7 MG/DL   Hepatitis Panel, Acute   Result Value Ref Range    Hepatitis B Surface Ag NON REACTIVE NON REACTIVE    Hep A IgM NON REACTIVE NON REACTIVE    Hep B Core Ab, IgM NON REACTIVE NON REACTIVE    Hepatitis C Ab NON REACTIVE NON REACTIVE   Protime/INR & PTT   Result Value Ref Range    Protime 15.6 (H) 11.7 - 14.5 SECONDS    INR 1.21 INDEX    aPTT 64.3 (H) 25.1 - 37.1 SECONDS         RADIOLOGY    CT FEMUR RIGHT W CONTRAST   Final Result   Ill-defined hypodensity in the right gluteus medius muscle and ill-defined   hyperdensity in the right gluteus naomi muscle. These may represent   hematomas in different phases of evolution. However simple and complex   abscesses cannot be excluded. Clinical correlation and further evaluation by   MRI with and without IV contrast recommended. CT ABDOMEN PELVIS W IV CONTRAST Additional Contrast? None   Preliminary Result   1. There is a hematoma measuring 11.1 x 4.2 x 16.7 cm between the right   gluteus medius and naomi muscles. There is diffuse enlargement of the   adductor muscles of the right lower extremity, likely related to edema. 2. Severe diffuse fatty infiltration of the liver. Gallbladder wall   thickening is seen. This could be related to chronic liver disease. If   there is concern for acute cholecystitis, a right upper quadrant ultrasound   can be obtained.          VL DUP LOWER EXTREMITY VENOUS RIGHT   Final Result   No evidence of DVT in the right lower extremity. XR FEMUR RIGHT (MIN 2 VIEWS)   Final Result   Moderate osteoarthritic changes of the right hip with no acute bony   abnormality. Vague mild sclerosis scattered along the distal femur centrally which could   represent an old infarct bone infarct or small enchondroma. Recommend   orthopedic follow-up and suggest bone scan correlation, if indicated         MRI ABDOMEN WO CONTRAST MRCP    (Results Pending)             ED [facility]      Patient presents as above. Patient is neurovascular intact. No external signs of infection to right lower extremity, perineal region. Patient provide IV fluids, Zofran and morphine. Lactic acid is 9.1 and CBC shows white blood cell count of 19.8, hemoglobin 10.1 and hematocrit of 31. Blood cultures ordered, IV vancomycin and cefepime as ordered. CMP shows sodium of 131, chloride 93, CO2 of 18, total bili of 19, ALT of 43, AST of 145, anion gap of 20. Liver enzymes have improved since earlier this month, total bili went from 17.1-19 since 4/5. Lipase 45. CK is 37. With normal CK and patient being neurovascular intact I do not believe he has compartment syndrome at this time. Right lower extremity venous ultrasound is negative for DVT. Right femur x-ray shows osteoarthritic changes, vague mild sclerosis scattered along distal femur could represent old infarct of bone, small enchondroma. CT abdomen pelvis and right thigh with IV contrast shows hematoma measuring 11.1 x 4.2 x 16.7 cm to the right gluteus medius and naomi muscles, severe diffuse fatty infiltration of the liver gallbladder wall thickening is seen. Patient does not have abdominal tenderness at this time, due to Reynoso's sign.   Consult to general surgeon Dr. Margarita West who is covering for Dr. Doreen Miranda requests that MRCP be added, direct and indirect bilirubin, hepatitis panel and gastroenterology be consulted, she will evaluate patient for abnormal lab work and right thigh pain. Consult to gastroenterologist Dr. Malena Jordan who agrees with current plan. Consult hospitalist who accepts admission. Clinical  IMPRESSION    1. Right leg pain    2. Abnormal CT scan    3. Elevated bilirubin    4. Elevated lactic acid level        Patient admitted    Comment: Please note this report has been produced using speech recognition software and may contain errors related to that system including errors in grammar, punctuation, and spelling, as well as words and phrases that may be inappropriate. If there are any questions or concerns please feel free to contact the dictating provider for clarification.             Monica Perez PA-C  04/18/22 9084

## 2022-04-18 NOTE — ED TRIAGE NOTES
Per pt Milly Banda been having pain to my right quad since Friday but its getting worse.  I have long covid and Sravanthi been yellow for a month and I am suppose to see a surgeon to get my gallbladder out tomorrow at 11am.\"

## 2022-04-18 NOTE — FLOWSHEET NOTE
This RN got patient up tot he side of the bed to attempt to use the urinal. When standing, patient slid along the side of the bed to the ground, falling on his bottom. Patient did not hit his head or otherwise appear to have injury. LIfted back to bed with gait belt by nursing x4. Hospitalist notified.

## 2022-04-18 NOTE — CONSULTS
Midline meets therapeutic needs at this time. [facility]  Extended Dwell Midlineinserted in RUE Cephalic vessel x 1 attempt using sterile ultrasound guided technique. Brisk blood return, flushes without resistance. OK to draw blood from midline.

## 2022-04-18 NOTE — ED NOTES
pts muscle to the back of thigh is really tight and is where pt is having pain. Bilateral feet are cold to touch but cap refill is less than 3 seconds. PA at bedside.       Rm Elder  04/18/22 0036

## 2022-04-18 NOTE — ED PROVIDER NOTES
I independently examined and evaluated Forrest Caballero. In brief, 55-year-old male presents for jaundice as well as right thigh pain. Has been having pain in his posterior tired for 3 days. He is being worked up outpatient for jaundice. He has had prior admissions and will be following up with a general surgeon soon. His prior work-up has not shown any particular obstructive etiology at this time. He presents with his partner who provides Information. She reports that he does \"sleepwalk. \"  She reports that he does sometimes wind up with bruises. Is unclear whether he had any injury to his right upper leg. He is ambulatory. He does endorse some worsening jaundice. Denies any remarkable infectious symptoms at this time. .    Focused exam revealed indurated and tense posterior right thigh. Right lower extremity neurovascular intact with 2+ DP, PT and femoral pulses. No overlying skin changes. No crepitance noted. No bruising or ecchymosis noted. Abdomen somewhat distended but soft. No signs of respiratory distress. .    ED course: Labs and imaging obtained. He does have significant high white count around 19. Does have a bandemia. He is a very high bilirubin of around 19. This is somewhat increased what is been before. Does have some elevated LFTs. We did obtain an abdominal CT scan as well as a CT scan of the right thigh and it does show a process in the posterior right thigh that is concerning for abscess versus hematoma. His right lower extremity is neurovascular intact with good pulses. We also obtained an ultrasound DVT scan that was negative. His presentation does not seem significantly consistent with compartment syndrome. We did consult general surgery. He will be admitted for further work-up of his jaundice as well as for further work-up and evaluation and possible intervention on the right thigh process. He started on broad-spectrum antibiotics.     All diagnostic, treatment, and disposition decisions were made by myself in conjunction with the advanced practice provider. I personally saw the patient and performed a substantive portion of the visit including all aspects of the medical decision making. I personally saw the patient and independently provided 15 minutes of non-concurrent critical care of the total shared critical care time provided. For all further details of the patient's emergency department visit, please see the advanced practice provider's documentation. Comment: Please note this report has been produced using speech recognition software and may contain errors related to that system including errors in grammar, punctuation, and spelling, as well as words and phrases that may be inappropriate. If there are any questions or concerns please feel free to contact the dictating provider for clarification.       Rishi Conklin MD  04/19/22 [id number]

## 2022-04-18 NOTE — PROGRESS NOTES
V2.0  [facility] [place]      Name:  Tia Riojas /Age/Sex: 1980  (39 y.o. male)   MRN & CSN:  [id number] & [id number] Encounter Date/Time: 2022 12:38 PM EDT    Location:  -A PCP: Oneil Garcia MD       Hospital Day: 1    Assessment and Plan:   Tia Riojas is a 39 y.o. male with acute right lower extremity pain    Plan:  #  intramuscular hematoma vs abscesses in the right gluteus medius muscle            presented with acute right lower extremity pain x 3 days duration            CT study (femur/and abdomen and pelvis) showed hypodensity in the right gluteal muscle-possible            hematoma versus abscess with diffuse edema             lower extremity DVT negative for thrombus             monitor for signs of compartment syndrome--pulses intact, no motor or sensory changes.              evaluated by surgeon-consulted IR for possible abscess drainage    #Acute alcoholic hepatitis vs less likely drug-induced hepatitis              child Friedman score-10              severe elevated bilirubin level 19, along with abnormal LFT, NH3--> 83              HIDA scan on -showed hepatocellular dysfunction/biliary obstruction-no signs of cholecystitis               CT abdomen and pelvis with IV contrast on -showed diffuse fatty infiltration of the liver and              gallbladder wall thickening                  F/u serologic liver work-up                 MRCP-pending                  has been evaluated by GI-appreciate recommendation    High anion gap metabolic acidosis secondary to lactic acidosis                     lactic acid 9.1 on arrival--- improved to 2.7, continue IV fluids    # alcohol withdrawal              diaphoresis, palpitations, tremors, confusion                CIWA score-14 this am-CIWA protocol ordered                Iv thiamine, Precedex drip    #Leukocytosis-possibly sepsis             blood cultures-pending, UA-pending             continue cefepime and Flagyl until culture results are back    #Acute normocytic anemia             Possibly secondary to hemolysis              Elevated LDH, f/u haptoglobin    Chronic conditions:  Chronic alcohol abuse  Hypertension  Marfan syndrome with ocular manifestation  Anxiety disorder                 Diet Diet NPO   DVT Prophylaxis [] Lovenox, []  Heparin, [x] SCDs, [] Ambulation,  [] Eliquis, [] Xarelto  [] Coumadin   Code Status Full Code   Disposition From: hospital  Expected Disposition: home  Estimated Date of Discharge: in 4 days  Patient requires continued admission due to require closer monitoring for hematoma, DT with confusion   Surrogate Decision Maker/ POA unknown     Subjective:     Chief Complaint: Leg Pain  Right lower extremity pain    Avel Vivas is a 39 y.o. male who presents with  pmh of alcohol dependence, HTN, anxiety, who presents to ER on with 4/18 with acute Rt lower extremity pain of 3 days duration. Patient states that he fell off steps 2 weeks ago, no LOC or motor or sensory changes at the time. He does not remember if he had any further falls in between. Reports progressively worsening pain since Friday. On Sunday he was unable to move his right leg. No tingling or numbness, no  bowel or bladder incontinence/retention. Admits low-grade temperatures on and off for the last 2 months associated with nausea and vomiting and loss of appetite. He has been seen by his PCP and work-up was significant for cholestasis with no signs of cholecystitis. Lab work-up in the ER significant for lactic acidosis 9.1, mild hyponatremia, high anion gap metabolic acidosis, leukocytosis 19.8K, anemia- Hb dropped from 15.7 to 10.1 in 2 weeks, platelet count within normal limit, abnormal hepatic panel with elevated bili 19, was within normal limits 6 weeks. Ammonia level 83. Patient was transferred to ICU for close monitoring.     4/18) evaluated by GI, MRCP was ordered, unable to complete secondary to pt's agitation. Surgery was consulted to evaluate intramuscular hematoma-surgeon consulted IR for further intervention. Patient is severely icteric, pruritic skin, mildly confused-disoriented to place, diaphoretic and tremulous-CIWA protocol/IV thiamine, Precedex gtt. to control agitation. Review of Systems:    Review of Systems        Objective: Intake/Output Summary (Last 24 hours) at 4/18/2022 1238  Last data filed at 4/18/2022 0744  Gross per 24 hour   Intake 2148.41 ml   Output --   Net 2148.41 ml        Vitals:   Vitals:    04/18/22 1100   BP: (!) 131/113   Pulse: 104   Resp: 9   Temp:    SpO2: 100%       Physical Exam:     General: Patient is a 77-year-old  male , appears stated age ,NAD  Eyes: EOMI, endpoint nystagmus+, scleral icterus+  ENT: neck supple  Cardiovascular: Regular rate. Respiratory: Clear to auscultation  Gastrointestinal: Soft, non tender, splenomegaly +  Genitourinary: no suprapubic tenderness  Musculoskeletal: No edema, able to lift right lower extremity against gravity, tender to touch from hip to knee joint, visible hematoma posterior aspect of RLE extending from gluteus to popliteal space  Skin: warm, dry, yellowish discoloration, pruritic skin with scratch marks  Neuro: Lethargic, oriented to self, not to place, no motor or sensory changes  Psych: Mood appropriate.      Medications:   Medications:    sodium chloride flush  5-40 mL IntraVENous BID    sodium chloride flush  5-40 mL IntraVENous 2 times per day    cefepime  2,000 mg IntraVENous Q12H    metroNIDAZOLE  500 mg IntraVENous Q8H    thiamine  100 mg IntraVENous Daily    lidocaine PF  5 mL IntraDERmal Once    sodium chloride flush  5-40 mL IntraVENous 2 times per day    vancomycin  1,250 mg IntraVENous Q12H    sodium chloride flush  5-40 mL IntraVENous 2 times per day      Infusions:    sodium chloride      sodium chloride 125 mL/hr at 04/18/22 0748    sodium chloride      sodium chloride       PRN Meds: sodium chloride flush, 5-40 mL, PRN  sodium chloride, , PRN  ondansetron, 4 mg, Q8H PRN   Or  ondansetron, 4 mg, Q6H PRN  polyethylene glycol, 17 g, Daily PRN  acetaminophen, 650 mg, Q6H PRN   Or  acetaminophen, 650 mg, Q6H PRN  HYDROmorphone, 1 mg, Q3H PRN  naloxone, 0.4 mg, PRN  sodium chloride flush, 5-40 mL, PRN  sodium chloride, , PRN  sodium chloride flush, 5-40 mL, PRN  sodium chloride, , PRN  LORazepam, 1 mg, Q1H PRN   Or  LORazepam, 1 mg, Q1H PRN   Or  LORazepam, 2 mg, Q1H PRN   Or  LORazepam, 2 mg, Q1H PRN   Or  LORazepam, 3 mg, Q1H PRN   Or  LORazepam, 3 mg, Q1H PRN   Or  LORazepam, 4 mg, Q1H PRN   Or  LORazepam, 4 mg, Q1H PRN        Labs      Recent Results (from the past 24 hour(s))   CBC with Auto Differential    Collection Time: 04/18/22 12:27 AM   Result Value Ref Range    WBC 19.8 (H) 4.0 - 10.5 K/CU MM    RBC 2.95 (L) 4.6 - 6.2 M/CU MM    Hemoglobin 10.1 (L) 13.5 - 18.0 GM/DL    Hematocrit 31.0 (L) 42 - 52 %    .1 (H) 78 - 100 FL    MCH 34.2 (H) 27 - 31 PG    MCHC 32.6 32.0 - 36.0 %    RDW 17.5 (H) 11.7 - 14.9 %    Platelets 433 602 - 230 K/CU MM    MPV 11.7 (H) 7.5 - 11.1 FL    Differential Type AUTOMATED DIFFERENTIAL     Segs Relative 76.6 (H) 36 - 66 %    Lymphocytes % 12.7 (L) 24 - 44 %    Monocytes % 7.8 (H) 0 - 4 %    Eosinophils % 0.2 0 - 3 %    Basophils % 0.5 0 - 1 %    Segs Absolute 15.2 K/CU MM    Lymphocytes Absolute 2.5 K/CU MM    Monocytes Absolute 1.6 K/CU MM    Eosinophils Absolute 0.0 K/CU MM    Basophils Absolute 0.1 K/CU MM    Nucleated RBC % 0.3 %    Total Nucleated RBC 0.1 K/CU MM    Total Immature Neutrophil 0.43 K/CU MM    Immature Neutrophil % 2.2 (H) 0 - 0.43 %   Comprehensive Metabolic Panel    Collection Time: 04/18/22 12:27 AM   Result Value Ref Range    Sodium 131 (L) 135 - 145 MMOL/L    Potassium 4.0 3.5 - 5.1 MMOL/L    Chloride 93 (L) 99 - 110 mMol/L    CO2 18 (L) 21 - 32 MMOL/L    BUN 9 6 - 23 MG/DL    CREATININE 0.5 (L) 0.9 - 1.3 MG/DL    Glucose 189 (H) 70 - 99 MG/DL    Calcium 8.0 (L) 8.3 - 10.6 MG/DL    Albumin 2.6 (L) 3.4 - 5.0 GM/DL    Total Protein 4.4 (L) 6.4 - 8.2 GM/DL    Total Bilirubin 19.0 (HH) 0.0 - 1.0 MG/DL    ALT 43 (H) 10 - 40 U/L     (H) 15 - 37 IU/L    Alkaline Phosphatase 279 (H) 40 - 129 IU/L    GFR Non-African American >60 >60 mL/min/1.73m2    GFR African American >60 >60 mL/min/1.73m2    Anion Gap 20 (H) 4 - 16   Lipase    Collection Time: 04/18/22 12:27 AM   Result Value Ref Range    Lipase 45 13 - 60 IU/L   CK    Collection Time: 04/18/22 12:27 AM   Result Value Ref Range    Total CK 37 (L) 38 - 174 IU/L   Lactic Acid    Collection Time: 04/18/22 12:27 AM   Result Value Ref Range    Lactate 9.1 (HH) 0.4 - 2.0 mMOL/L   Lactic Acid    Collection Time: 04/18/22  3:30 AM   Result Value Ref Range    Lactate 2.6 (HH) 0.4 - 2.0 mMOL/L   Bilirubin Total Direct & Indirect    Collection Time: 04/18/22  3:30 AM   Result Value Ref Range    Total Bilirubin 18.8 (H) 0.0 - 1.0 MG/DL    Bilirubin, Direct 15.6 (H) 0.0 - 0.3 MG/DL    Bilirubin, Indirect 3.2 (H) 0 - 0.7 MG/DL   Hepatitis Panel, Acute    Collection Time: 04/18/22  3:30 AM   Result Value Ref Range    Hepatitis B Surface Ag NON REACTIVE NON REACTIVE    Hep A IgM NON REACTIVE NON REACTIVE    Hep B Core Ab, IgM NON REACTIVE NON REACTIVE    Hepatitis C Ab NON REACTIVE NON REACTIVE   Protime/INR & PTT    Collection Time: 04/18/22  3:30 AM   Result Value Ref Range    Protime 15.6 (H) 11.7 - 14.5 SECONDS    INR 1.21 INDEX    aPTT 64.3 (H) 25.1 - 37.1 SECONDS   Lactic Acid    Collection Time: 04/18/22  9:25 AM   Result Value Ref Range    Lactate 2.7 (HH) 0.4 - 2.0 mMOL/L   Iron and TIBC    Collection Time: 04/18/22  9:25 AM   Result Value Ref Range    Iron 178 (H) 59 - 158 ug/dL    UIBC <17 (LL) 110 - 370 ug/dL    TIBC <195 (L) 250 - 450 ug/dL    Transferrin % <91 (H) 10 - 44 %   Ferritin    Collection Time: 04/18/22  9:25 AM   Result Value Ref Range    Ferritin 1,266 (H) 30 - 400 NG/ML   Fibrinogen    Collection Time: 04/18/22  9:25 AM   Result Value Ref Range    Fibrinogen 217 196.9 - 442.1 MG/DL   Lactate Dehydrogenase    Collection Time: 04/18/22  9:25 AM   Result Value Ref Range     (H) 120 - 246 IU/L   Ammonia    Collection Time: 04/18/22  9:25 AM   Result Value Ref Range    Ammonia 83 (H) 16 - 60 UMOL/L        Imaging/Diagnostics Last 24 Hours   XR FEMUR RIGHT (MIN 2 VIEWS)    Result Date: 4/18/2022  EXAMINATION: 4 XRAY VIEWS OF THE RIGHT FEMUR 4/18/2022 1:16 am COMPARISON: None. HISTORY: ORDERING SYSTEM PROVIDED HISTORY: pain TECHNOLOGIST PROVIDED HISTORY: Reason for exam:->pain Reason for Exam: right leg pain Additional signs and symptoms: patient complains of right thigh pain, nki FINDINGS: There is mild joint space narrowing in the right hip. No fracture or dislocation is seen. The joint spaces are intact. There is some mild sclerosis scattered along the distal femur which is ill-defined. There is no periosteal reaction. Moderate osteoarthritic changes of the right hip with no acute bony abnormality. Vague mild sclerosis scattered along the distal femur centrally which could represent an old infarct bone infarct or small enchondroma. Recommend orthopedic follow-up and suggest bone scan correlation, if indicated     CT ABDOMEN PELVIS W IV CONTRAST Additional Contrast? None    Result Date: 4/18/2022  EXAMINATION: CT OF THE ABDOMEN AND PELVIS WITH CONTRAST, 4/18/2022 2:01 am TECHNIQUE: CT of the abdomen and pelvis was performed with the administration of intravenous contrast. Multiplanar reformatted images are provided for review. Dose modulation, iterative reconstruction, and/or weight based adjustment of the mA/kV was utilized to reduce the radiation dose to as low as reasonably achievable. COMPARISON: None HISTORY: ORDERING SYSTEM PROVIDED HISTORY: Jaundiced, right hip pain and thigh pain, elevated lactic acid, elevated bili.  TECHNOLOGIST PROVIDED HISTORY: Reason for exam:->Jaundiced, right hip pain and thigh pain, elevated lactic acid, elevated bili. Additional Contrast?->None Decision Support Exception - unselect if not a suspected or confirmed emergency medical condition->Emergency Medical Condition (MA) Reason for Exam: Jaundiced, right hip pain and thigh pain, elevated lactic acid, elevated bili. FINDINGS: Lower Chest: The visualized lungs are clear. Organs: Severe diffuse fatty infiltration of the liver is noted. The spleen, adrenal glands, pancreas, and kidneys are unremarkable. Gallbladder wall thickening is seen GI/Bowel: There is no evidence of bowel obstruction. The appendix is normal. Pelvis: The bladder is unremarkable. There is no free fluid. Peritoneum/Retroperitoneum: There is no free air or lymphadenopathy. Bones/Soft Tissues: No destructive osseous lesions are identified. There is a fluid collection between the right gluteus medius and gluteus naomi muscles with a hematocrit level, concerning for a hematoma. This measures 11.1 x 4.2 x 16.7 cm. There is diffuse enlargement of the adductor muscles of the right lower extremity. 1. There is a hematoma measuring 11.1 x 4.2 x 16.7 cm between the right gluteus medius and naomi muscles. There is diffuse enlargement of the adductor muscles of the right lower extremity, likely related to edema. 2. Severe diffuse fatty infiltration of the liver. Gallbladder wall thickening is seen. This could be related to chronic liver disease. If there is concern for acute cholecystitis, a right upper quadrant ultrasound can be obtained. CT FEMUR RIGHT W CONTRAST    Result Date: 4/18/2022  EXAMINATION: CT OF THE RIGHT FEMUR WITH CONTRAST 4/18/2022 2:01 am TECHNIQUE: CT of the right femur was performed with the administration of intravenous contrast.  Multiplanar reformatted images are provided for review.  Dose modulation, iterative reconstruction, and/or weight based adjustment of the mA/kV was utilized to reduce the radiation dose to as low as reasonably achievable. COMPARISON: None. HISTORY ORDERING SYSTEM PROVIDED HISTORY: pain TECHNOLOGIST PROVIDED HISTORY: Reason for exam:->pain Decision Support Exception - unselect if not a suspected or confirmed emergency medical condition->Emergency Medical Condition (MA) Reason for Exam: Jaundiced, right hip pain and thigh pain, elevated lactic acid, elevated bili FINDINGS: THE STUDY IS LIMITED DUE TO LACK OF INTRAVENOUS CONTRAST. Bones: No acute fracture or dislocation. 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 No suspicious sclerotic or lytic lesion. .  No CT evidence of osteomyelitis. Fusion of the proximal tibiofibular joint, likely congenital. Soft Tissue: Ill-defined hypodensity in the right gluteus medius muscle, image 1. Also, a smaller ill-defined hyperdensity in the right gluteus naomi muscle, image number 34 no soft tissue gas. No radiopaque foreign body. Joint: No significant hip or knee joint effusion. Mild degenerative changes of the right hip joint are seen. Ill-defined hypodensity in the right gluteus medius muscle and ill-defined hyperdensity in the right gluteus naomi muscle. These may represent hematomas in different phases of evolution. However simple and complex abscesses cannot be excluded. Clinical correlation and further evaluation by MRI with and without IV contrast recommended. VL DUP LOWER EXTREMITY VENOUS RIGHT    Result Date: 4/18/2022  EXAMINATION: DUPLEX VENOUS ULTRASOUND OF THE RIGHT LOWER EXTREMITY 4/18/2022 1:10 am TECHNIQUE: Duplex ultrasound using B-mode/gray scaled imaging and Doppler spectral analysis and color flow was obtained of the deep venous structures of the right extremity. COMPARISON: None.  HISTORY: ORDERING SYSTEM PROVIDED HISTORY: pain nki TECHNOLOGIST PROVIDED HISTORY: Reason for exam:->pain nki Reason for Exam: Rt leg pain FINDINGS: The visualized veins of the right lower extremity are patent and free of echogenic thrombus. The veins demonstrate good compressibility with normal color flow study and spectral analysis. No evidence of DVT in the right lower extremity.        Electronically signed by Hemant Ng PA-C on 4/18/2022 at 12:38 PM

## 2022-04-18 NOTE — FLOWSHEET NOTE
MRI/MRCP only able to be partially completed. Patient medicated for agitation while on table, but woke up ~15 minutes through procedure and attempted to climb off table, unable to redirect. Returned to ICU room and placed back on monitor.

## 2022-04-18 NOTE — PROGRESS NOTES
[id number]  consulted by Dr. Regla Price for monitoring and adjustment. Indication for treatment: Sepsis  Goal trough: [] 10-15 mcg/mL or [x] 15-20 mcg/ml  AUC/KADIE: [] <500 or [x] 400-600    Pertinent Laboratory Values:   Temp Readings from Last 3 Encounters:   04/18/22 97.7 °F (36.5 °C) (Oral)   02/23/22 97.8 °F (36.6 °C)   11/08/21 97.9 °F (36.6 °C)     Recent Labs     04/18/22  0027 04/18/22  0330   WBC 19.8*  --    LACTATE 9.1* 2.6*     Recent Labs     04/18/22  0027   BUN 9   CREATININE 0.5*     Estimated Creatinine Clearance: 272 mL/min (A) (based on SCr of 0.5 mg/dL (L)). Intake/Output Summary (Last 24 hours) at 4/18/2022 0936  Last data filed at 4/18/2022 0744  Gross per 24 hour   Intake 2148.41 ml   Output --   Net 2148.41 ml       Pertinent Cultures:  Date    Source    Results  4/18   Blood    Ordered    Vancomycin level:   TROUGH:  No results for input(s): VANCOTROUGH in the last 72 hours. RANDOM:  No results for input(s): VANCORANDOM in the last 72 hours.     Assessment:  · SCr, BUN, and urine output:  · Scr WNL, at baseline  · Day(s) of therapy: 1  · Vancomycin concentration: to be collected    Plan:  · Vancomycin 1250 mg IVPB q12h  · Predicted trough: 14.6,   · Plan to collect a level in 48h  · Pharmacy will continue to monitor patient and adjust therapy as indicated    Joshua 3 4/20 @ 2926    Thank you for the consult,  Mone Soler, [facility], PharmD  4/18/2022 9:36 AM

## 2022-04-18 NOTE — H&P
History and Physical      Name:  Feliciano Mckay /Age/Sex: 1980  (37 y.o. male)   MRN & CSN:  [id number] & [id number] Encounter Date/Time: 2022 4:43 AM EDT   Location:  [facility]14 PCP: Shira Shook MD       Hospital Day: 1    Assessment and Plan:     #. Intramuscular hematoma  -CT [id number] hypodensity in the right gluteus medius muscle, ill-defined hypodensity in the right gluteus naomi muscle. This may represent hematomas in different phases of evaluation however simple and complex abscesses cannot be excluded. -Venous Doppler negative for DVT. -Continue broad-spectrum antibiotics. -CK-37.  -Monitor closely. -General surgery consulted from ED. #.  Evaluating for sepsis  -Leukocytosis, lactic acidosis. -Blood cultures done in ED.  -UA-pending. #.  Abnormal LFTs  -Hepatitis panel negative.  -Severe diffuse fatty infiltration of the liver, Gallbladder wall thickening.  -Continue broad-spectrum antibiotics. -GI consulted. -Monitor with repeat CMP. #.  Anion gap metabolic acidosis secondary to lactic acidosis. #.  Lactic acidosis  -Improving with IV fluids. #.  Hypertension  -Patient is on atenolol, Hyzaar. Hyzaar was discontinued recently    #. Anxiety disorder    #. Chronic alcohol dependence  -Patient reports last drink was 3 days ago  -[facility] protocol  -IV thiamine    #. History of COVID-19  -As per the patient-2021. No documented evidence.  -Respiratory viral panel-2022-negative. Disposition:   Current Living situation: home    Diet  NPO   DVT Prophylaxis  no chemical prophylaxis due to hematoma. Code Status  FULL. Surrogate Decision Maker/ POA      History from:   EMR, patient. History of Present Illness:     Chief Complaint: Intramuscular hematoma  Feliciano Mckay is a 39 y.o. male with hypertension, anxiety disorder, chronic alcohol use presented to ED with complaints of pain in his right buttock.   Patient reported having pain since Friday which is progressively getting worse. Patient also reported noticing swelling and tightness in his entire right lower extremity. Reported tingling in his toes. Yesterday patient was unable to walk up the stairs which prompted him to come to the ER. Patient also reports having fever at home. Measured temp at 100.3. Has intermittent right upper quadrant pain. Has poor appetite, nausea. Denied any chest pain, shortness of breath, denied any urinary complaints, denied any constipation or diarrhea. Reports his urine is very dark, pale-colored stool. Patient has been following with his PCP for abnormal LFTs. Patient had HIDA scan and has an appointment with general surgeon tomorrow. At presentation patient was noted to have /81, HR 84, RR 20, temp 97.8, saturating 98% on room air. Lab work significant for sodium 131, CO2 18, anion gap 20, lactic acid 9.1, random glucose 189, albumin 2.6, , ALT 43, , total bilirubin 19, lipase 45, CK 37. PT 15.6, INR 1.21. Hepatitis panel negative. CT abdomen/pelvis-hematoma measuring 11.1 x 4.2 x 16.7 cm between the right gluteus medius and naomi muscle. Diffuse enlargement of the adductor muscles of the right lower extremity related to edema, severe diffuse fatty infiltration of the liver, gallbladder wall thickening. Patient received NS bolus x2, vancomycin, cefepime, Flagyl. Patient received morphine 4 mg x 2, Dilaudid 1 mg x 2 in ED. Patient still complaining of pain in his right lower extremity, could not get comfortable. Review of Systems: Need 10 Elements   10 point review of systems conducted and pertinent positives and negatives as per HPI. Objective:        Intake/Output Summary (Last 24 hours) at 4/18/2022 0443  Last data filed at 4/18/2022 0325  Gross per 24 hour   Intake 1616.67 ml   Output --   Net 1616.67 ml      Vitals:   Vitals:    04/18/22 0300 04/18/22 0330 04/18/22 0400 04/18/22 0430   BP: 114/84 108/82 100/72 116/85 Pulse: 77 80 85 93   Resp: 13 17 12 16   Temp:       SpO2: 95% 97% 98% 93%   Weight:       Height:           Medications Prior to Admission   Reviewed medications with patient    Prior to Admission medications    Medication Sig Start Date End Date Taking? Authorizing Provider   promethazine (PHENERGAN) 25 MG tablet Take 25 mg by mouth every 6 hours as needed for Nausea    Historical Provider, MD   ALPRAZolam (XANAX) 0.25 MG tablet Take 0.25 mg by mouth nightly as needed for Sleep. Historical Provider, MD   zolpidem (AMBIEN) 5 MG tablet Take 1 tablet by mouth nightly as needed for Sleep for up to 15 days. 4/5/22 4/20/22  Mar Walters MD   promethazine (PHENERGAN) 25 MG tablet Take 1 tablet by mouth every 6 hours as needed for Nausea 4/5/22 5/5/22  Mar Walters MD   ALPRAZolam Aldon Nones) 0.25 MG tablet Take 1 tablet by mouth daily as needed for Anxiety for up to 30 days. 4/5/22 5/5/22  Mar Walters MD   dicyclomine (BENTYL) 10 MG capsule Take 1 capsule by mouth 3 times daily 4/1/22   Mar Walters MD   losartan-hydroCHLOROthiazide [facility]) 50-12.5 MG per tablet Take 0.5 tablets by mouth daily 3/11/22   Mar Walters MD   atenolol (TENORMIN) 50 MG tablet Take 1 tablet by mouth daily 3/11/22   Mar Walters MD   cetirizine (ZYRTEC) 10 MG tablet Take 10 mg by mouth daily  Patient not taking: Reported on 4/5/2022    Historical Provider, MD       Physical Exam: Need 8 Elements   Physical Exam     GEN  -Awake, alert, NAD.   EYES   -PERRL. HENT  -MM are moist.   RESP  -LS CTA equal bilat, no wheezes, rales or rhonchi. Symmetric chest movement. No respiratory distress noted. C/V  -S1/S2 auscultated. RRR without appreciable M/R/G. No JVD or carotid bruits. Peripheral pulses equal bilaterally and palpable. No peripheral edema. No reproducible chest wall tenderness. GI  -Abdomen is soft, non-distended, no significant tenderness. No masses or guarding. + BS in all quadrants.  Rectal exam deferred.   -No CVA tenderness. López catheter is not present. MS  -RLE swelling more than left, hard on palpation, DP pulses felt, sensation intact. SKIN  -Normal coloration, warm, dry. NEURO  - Awake, alert, oriented x 3, no focal deficits. PSYC  - Appropriate affect. Past Medical History:   Reviewed patient's past medical, surgical, social, family history and allergies. PMHx   Past Medical History:   Diagnosis Date    Acute hepatitis 01/2022    severe nausea w weakness, elevated LFTs noted in ED 2/2022- worsened also fr nsaids and alcohol consumption    Acute hepatitis     Allergic rhinitis     Ankle pain     INACTIVE PROBLEM    Anxiety     Epigastric abdominal pain     INACTIVE PROBLEM    Essential hypertension 01/08/2018    Family history of coronary artery disease     Herniated lumbar intervertebral disc     LBP (low back pain)     Marfan's syndrome with ocular manifestation     DX'D BY OPTOM DR CANTU AT 6Y0    Oral herpes      PSHX:  has a past surgical history that includes Tonsillectomy; back surgery; and Umbilical hernia repair (N/A, 7/2/2020). Allergies: Allergies   Allergen Reactions    No Known Allergies      Fam HX: family history is not on file. Soc HX:   Social History     Socioeconomic History    Marital status:      Spouse name: None    Number of children: None    Years of education: None    Highest education level: None   Occupational History    Occupation:      Comment: [employer]   Tobacco Use    Smoking status: Never Smoker    Smokeless tobacco: Never Used   Vaping Use    Vaping Use: None   Substance and Sexual Activity    Alcohol use:  Yes    Drug use: Never    Sexual activity: None   Other Topics Concern    None   Social History Narrative    None     Social Determinants of Health     Financial Resource Strain: Low Risk     Difficulty of Paying Living Expenses: Not hard at all   Food Insecurity: No Food Insecurity    Worried About Running Out of Food in the Last Year: Never true    Yeimy of Food in the Last Year: Never true   Transportation Needs:     Lack of Transportation (Medical): Not on file    Lack of Transportation (Non-Medical):  Not on file   Physical Activity:     Days of Exercise per Week: Not on file    Minutes of Exercise per Session: Not on file   Stress:     Feeling of Stress : Not on file   Social Connections:     Frequency of Communication with Friends and Family: Not on file    Frequency of Social Gatherings with Friends and Family: Not on file    Attends [employer] Services: Not on file    Active Member of Clubs or Organizations: Not on file    Attends Club or Organization Meetings: Not on file    Marital Status: Not on file   Intimate Partner Violence:     Fear of Current or Ex-Partner: Not on file    Emotionally Abused: Not on file    Physically Abused: Not on file    Sexually Abused: Not on file   Housing Stability:     Unable to Pay for Housing in the Last Year: Not on file    Number of Places Lived in the Last Year: Not on file    Unstable Housing in the Last Year: Not on file       Medications:   Medications:    sodium chloride flush  5-40 mL IntraVENous BID    vancomycin  2,000 mg IntraVENous Once    metroNIDAZOLE  500 mg IntraVENous Once      Infusions:   PRN Meds:      Labs      CBC:   Recent Labs     04/18/22 0027   WBC 19.8*   HGB 10.1*        BMP:    Recent Labs     04/18/22 0027   *   K 4.0   CL 93*   CO2 18*   BUN 9   CREATININE 0.5*   GLUCOSE 189*     Hepatic:   Recent Labs     04/18/22  0027 04/18/22  0330   *  --    ALT 43*  --    BILITOT 19.0* 18.8*   ALKPHOS 279*  --      Lipids:   Lab Results   Component Value Date    CHOL 204 02/13/2017    HDL 67 02/13/2017    TRIG 157 02/13/2017     Hemoglobin A1C: No results found for: LABA1C  TSH:   Lab Results   Component Value Date    TSH 2.78 04/05/2022     Troponin:   Lab Results Component Value Date    TROPONINT <0.010 02/23/2022     Lactic Acid: No results for input(s): LACTA in the last 72 hours. BNP: No results for input(s): PROBNP in the last 72 hours. UA:  Lab Results   Component Value Date    NITRU NEGATIVE 02/23/2022    COLORU JOSUE 02/23/2022    WBCUA 2 02/23/2022    RBCUA 1 02/23/2022    MUCUS FEW 02/23/2022    TRICHOMONAS NONE SEEN 02/23/2022    BACTERIA NEGATIVE 02/23/2022    CLARITYU CLEAR 02/23/2022    SPECGRAV 1.025 02/23/2022    LEUKOCYTESUR NEGATIVE 02/23/2022    UROBILINOGEN 1.0 02/23/2022    BILIRUBINUR SMALL 02/23/2022    BLOODU TRACE 02/23/2022    KETUA TRACE 02/23/2022     Urine Cultures: No results found for: Ovidio Moron  Blood Cultures: No results found for: BC  No results found for: BLOODCULT2  Organism: No results found for: ORG    Imaging/Diagnostics Last 24 Hours   XR FEMUR RIGHT (MIN 2 VIEWS)    Result Date: 4/18/2022  EXAMINATION: 4 XRAY VIEWS OF THE RIGHT FEMUR 4/18/2022 1:16 am COMPARISON: None. HISTORY: ORDERING SYSTEM PROVIDED HISTORY: pain TECHNOLOGIST PROVIDED HISTORY: Reason for exam:->pain Reason for Exam: right leg pain Additional signs and symptoms: patient complains of right thigh pain, nki FINDINGS: There is mild joint space narrowing in the right hip. No fracture or dislocation is seen. The joint spaces are intact. There is some mild sclerosis scattered along the distal femur which is ill-defined. There is no periosteal reaction. Moderate osteoarthritic changes of the right hip with no acute bony abnormality. Vague mild sclerosis scattered along the distal femur centrally which could represent an old infarct bone infarct or small enchondroma. Recommend orthopedic follow-up and suggest bone scan correlation, if indicated     NM HEPATOBILIARY    Result Date: 4/13/2022  EXAMINATION: NUCLEAR MEDICINE HEPATOBILIARY SCINTIGRAPHY (HIDA SCAN). 4/13/2022 10:54 am TECHNIQUE: Approximately 4.8 mCi Tc-99m Mebrofenin (Choletec) was administered IV. Then, dynamic images of the abdomen were obtained in the anterior projection for 90 minutes. COMPARISON: No prior for comparison. HISTORY: ORDERING SYSTEM PROVIDED HISTORY: Elevated LFTs TECHNOLOGIST PROVIDED HISTORY: Reason for exam:->elevated LFTs, gallbladder disease, gastritis Reason for Exam: Elevated LFT's; gallbladder disease FINDINGS: Upon injection of radiopharmaceutical, there is prompt visualization of the liver. There is persistent blood pool activity throughout the examination and activity is seen within the kidneys. There is no significant hepatic washout. Gallbladder is not visualized. Impaired washout of hepatic activity is seen through 90 minutes, which can reflect hepatocellular dysfunction or biliary obstruction. Correlate with LFT. Evaluation for cholecystitis in the setting is limited. RECOMMENDATIONS: Unavailable     CT ABDOMEN PELVIS W IV CONTRAST Additional Contrast? None    1. There is a hematoma measuring 11.1 x 4.2 x 16.7 cm between the right gluteus medius and naomi muscles. There is diffuse enlargement of the adductor muscles of the right lower extremity, likely related to edema. 2. Severe diffuse fatty infiltration of the liver. Gallbladder wall thickening is seen. This could be related to chronic liver disease. If there is concern for acute cholecystitis, a right upper quadrant ultrasound can be obtained. CT FEMUR RIGHT W CONTRAST    Result Date: 4/18/2022  EXAMINATION: CT OF THE RIGHT FEMUR WITH CONTRAST 4/18/2022 2:01 am TECHNIQUE: CT of the right femur was performed with the administration of intravenous contrast.  Multiplanar reformatted images are provided for review. Dose modulation, iterative reconstruction, and/or weight based adjustment of the mA/kV was utilized to reduce the radiation dose to as low as reasonably achievable. COMPARISON: None.  HISTORY ORDERING SYSTEM PROVIDED HISTORY: pain TECHNOLOGIST PROVIDED HISTORY: Reason for exam:->pain Decision Support Exception - unselect if not a suspected or confirmed emergency medical condition->Emergency Medical Condition (MA) Reason for Exam: Jaundiced, right hip pain and thigh pain, elevated lactic acid, elevated bili FINDINGS: THE STUDY IS LIMITED DUE TO LACK OF INTRAVENOUS CONTRAST. Bones: No acute fracture or dislocation. 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 No suspicious sclerotic or lytic lesion. .  No CT evidence of osteomyelitis. Fusion of the proximal tibiofibular joint, likely congenital. Soft Tissue: Ill-defined hypodensity in the right gluteus medius muscle, image 1. Also, a smaller ill-defined hyperdensity in the right gluteus naomi muscle, image number 34 no soft tissue gas. No radiopaque foreign body. Joint: No significant hip or knee joint effusion. Mild degenerative changes of the right hip joint are seen. Ill-defined hypodensity in the right gluteus medius muscle and ill-defined hyperdensity in the right gluteus naomi muscle. These may represent hematomas in different phases of evolution. However simple and complex abscesses cannot be excluded. Clinical correlation and further evaluation by MRI with and without IV contrast recommended. VL DUP LOWER EXTREMITY VENOUS RIGHT    Result Date: 4/18/2022  EXAMINATION: DUPLEX VENOUS ULTRASOUND OF THE RIGHT LOWER EXTREMITY 4/18/2022 1:10 am TECHNIQUE: Duplex ultrasound using B-mode/gray scaled imaging and Doppler spectral analysis and color flow was obtained of the deep venous structures of the right extremity. COMPARISON: None. HISTORY: ORDERING SYSTEM PROVIDED HISTORY: pain nki TECHNOLOGIST PROVIDED HISTORY: Reason for exam:->pain nki Reason for Exam: Rt leg pain FINDINGS: The visualized veins of the right lower extremity are patent and free of echogenic thrombus. The veins demonstrate good compressibility with normal color flow study and spectral analysis. No evidence of DVT in the right lower extremity. Personally reviewed Lab Studies, Imaging, and discussed case with ED provider.     Electronically signed by Tammie Kumar MD on 4/18/2022 at 4:43 AM

## 2022-04-18 NOTE — FLOWSHEET NOTE
2mg Ativan given for CIWA score 14; patient soon after became even more anxious, restless, diaphoretic, wanting to get out of bed.  Will monitor

## 2022-04-18 NOTE — CONSULTS
Patient finally resting, has patent PIV x1. PICC/Midline insertion on hold for now to promote patient resting. Edna RN will call PICC wireless if increased IV access becomes urgent.

## 2022-04-18 NOTE — FLOWSHEET NOTE
Dr Doreen Miranda paged & notified pf patient admission per hospitalist request. Heating pad placed on patient's R thigh per verbal order

## 2022-04-19 ENCOUNTER — APPOINTMENT (OUTPATIENT)
Dept: INTERVENTIONAL RADIOLOGY/VASCULAR | Age: 42
DRG: 871 | End: 2022-04-19
Payer: COMMERCIAL

## 2022-04-19 LAB
ALBUMIN SERPL-MCNC: 2.2 GM/DL (ref 3.4–5)
ALP BLD-CCNC: 216 IU/L (ref 40–128)
ALT SERPL-CCNC: 41 U/L (ref 10–40)
AMMONIA: 63 UMOL/L (ref 16–60)
ANION GAP SERPL CALCULATED.3IONS-SCNC: 14 MMOL/L (ref 4–16)
ANISOCYTOSIS: ABNORMAL
AST SERPL-CCNC: 154 IU/L (ref 15–37)
BANDED NEUTROPHILS ABSOLUTE COUNT: 1.85 K/CU MM
BANDED NEUTROPHILS RELATIVE PERCENT: 11 % (ref 5–11)
BILIRUB SERPL-MCNC: 20.4 MG/DL (ref 0–1)
BUN BLDV-MCNC: 27 MG/DL (ref 6–23)
CALCIUM SERPL-MCNC: 6.4 MG/DL (ref 8.3–10.6)
CHLORIDE BLD-SCNC: 103 MMOL/L (ref 99–110)
CO2: 14 MMOL/L (ref 21–32)
CREAT SERPL-MCNC: 1.6 MG/DL (ref 0.9–1.3)
DIFFERENTIAL TYPE: ABNORMAL
EOSINOPHILS ABSOLUTE: 0.2 K/CU MM
EOSINOPHILS RELATIVE PERCENT: 1 % (ref 0–3)
GFR AFRICAN AMERICAN: 58 ML/MIN/1.73M2
GFR NON-AFRICAN AMERICAN: 48 ML/MIN/1.73M2
GLUCOSE BLD-MCNC: 132 MG/DL (ref 70–99)
HAV AB SERPL IA-ACNC: POSITIVE
HCT VFR BLD CALC: 22.8 % (ref 42–52)
HCT VFR BLD CALC: 23.7 % (ref 42–52)
HCT VFR BLD CALC: 25.8 % (ref 42–52)
HEMOGLOBIN: 7.5 GM/DL (ref 13.5–18)
HEMOGLOBIN: 7.8 GM/DL (ref 13.5–18)
HEMOGLOBIN: 8.4 GM/DL (ref 13.5–18)
INR BLD: 1.36 INDEX
LACTATE: 2.1 MMOL/L (ref 0.4–2)
LYMPHOCYTES ABSOLUTE: 2 K/CU MM
LYMPHOCYTES RELATIVE PERCENT: 12 % (ref 24–44)
MCH RBC QN AUTO: 31.7 PG (ref 27–31)
MCHC RBC AUTO-ENTMCNC: 32.6 % (ref 32–36)
MCV RBC AUTO: 97.4 FL (ref 78–100)
MONOCYTES ABSOLUTE: 1.2 K/CU MM
MONOCYTES RELATIVE PERCENT: 7 % (ref 0–4)
NUCLEATED RED BLOOD CELLS: 2
PDW BLD-RTO: 21.5 % (ref 11.7–14.9)
PLATELET # BLD: 243 K/CU MM (ref 140–440)
PMV BLD AUTO: 10.9 FL (ref 7.5–11.1)
POTASSIUM SERPL-SCNC: 5.1 MMOL/L (ref 3.5–5.1)
PROTHROMBIN TIME: 17.6 SECONDS (ref 11.7–14.5)
RBC # BLD: 2.65 M/CU MM (ref 4.6–6.2)
RBC # BLD: ABNORMAL 10*6/UL
SEGMENTED NEUTROPHILS ABSOLUTE COUNT: 11.5 K/CU MM
SEGMENTED NEUTROPHILS RELATIVE PERCENT: 69 % (ref 36–66)
SODIUM BLD-SCNC: 131 MMOL/L (ref 135–145)
TOTAL PROTEIN: 3.3 GM/DL (ref 6.4–8.2)
WBC # BLD: 16.8 K/CU MM (ref 4–10.5)

## 2022-04-19 PROCEDURE — 85014 HEMATOCRIT: CPT

## 2022-04-19 PROCEDURE — 2000000000 HC ICU R&B

## 2022-04-19 PROCEDURE — 2580000003 HC RX 258: Performed by: INTERNAL MEDICINE

## 2022-04-19 PROCEDURE — 83605 ASSAY OF LACTIC ACID: CPT

## 2022-04-19 PROCEDURE — 86664 EPSTEIN-BARR NUCLEAR ANTIGEN: CPT

## 2022-04-19 PROCEDURE — 2500000003 HC RX 250 WO HCPCS: Performed by: PHYSICIAN ASSISTANT

## 2022-04-19 PROCEDURE — 51702 INSERT TEMP BLADDER CATH: CPT

## 2022-04-19 PROCEDURE — 6360000002 HC RX W HCPCS: Performed by: SPECIALIST

## 2022-04-19 PROCEDURE — 85018 HEMOGLOBIN: CPT

## 2022-04-19 PROCEDURE — 85027 COMPLETE CBC AUTOMATED: CPT

## 2022-04-19 PROCEDURE — 36415 COLL VENOUS BLD VENIPUNCTURE: CPT

## 2022-04-19 PROCEDURE — 82140 ASSAY OF AMMONIA: CPT

## 2022-04-19 PROCEDURE — 6370000000 HC RX 637 (ALT 250 FOR IP): Performed by: HOSPITALIST

## 2022-04-19 PROCEDURE — 99231 SBSQ HOSP IP/OBS SF/LOW 25: CPT | Performed by: SPECIALIST

## 2022-04-19 PROCEDURE — 94761 N-INVAS EAR/PLS OXIMETRY MLT: CPT

## 2022-04-19 PROCEDURE — 86694 HERPES SIMPLEX NES ANTBDY: CPT

## 2022-04-19 PROCEDURE — 99232 SBSQ HOSP IP/OBS MODERATE 35: CPT | Performed by: SURGERY

## 2022-04-19 PROCEDURE — 86645 CMV ANTIBODY IGM: CPT

## 2022-04-19 PROCEDURE — 2700000000 HC OXYGEN THERAPY PER DAY

## 2022-04-19 PROCEDURE — 2500000003 HC RX 250 WO HCPCS: Performed by: INTERNAL MEDICINE

## 2022-04-19 PROCEDURE — 2580000003 HC RX 258: Performed by: PHYSICIAN ASSISTANT

## 2022-04-19 PROCEDURE — 86665 EPSTEIN-BARR CAPSID VCA: CPT

## 2022-04-19 PROCEDURE — 80053 COMPREHEN METABOLIC PANEL: CPT

## 2022-04-19 PROCEDURE — 85610 PROTHROMBIN TIME: CPT

## 2022-04-19 PROCEDURE — P9016 RBC LEUKOCYTES REDUCED: HCPCS

## 2022-04-19 PROCEDURE — 6360000002 HC RX W HCPCS: Performed by: INTERNAL MEDICINE

## 2022-04-19 PROCEDURE — 86663 EPSTEIN-BARR ANTIBODY: CPT

## 2022-04-19 PROCEDURE — 85007 BL SMEAR W/DIFF WBC COUNT: CPT

## 2022-04-19 PROCEDURE — 6370000000 HC RX 637 (ALT 250 FOR IP): Performed by: PHYSICIAN ASSISTANT

## 2022-04-19 PROCEDURE — 36430 TRANSFUSION BLD/BLD COMPNT: CPT

## 2022-04-19 PROCEDURE — 6370000000 HC RX 637 (ALT 250 FOR IP): Performed by: SPECIALIST

## 2022-04-19 RX ORDER — OXYCODONE HYDROCHLORIDE 5 MG/1
5 TABLET ORAL EVERY 6 HOURS PRN
Status: DISCONTINUED | OUTPATIENT
Start: 2022-04-19 | End: 2022-05-02 | Stop reason: HOSPADM

## 2022-04-19 RX ORDER — LACTULOSE 10 G/15ML
20 SOLUTION ORAL 3 TIMES DAILY
Status: DISCONTINUED | OUTPATIENT
Start: 2022-04-19 | End: 2022-04-27

## 2022-04-19 RX ADMIN — METRONIDAZOLE 500 MG: 500 INJECTION, SOLUTION INTRAVENOUS at 04:19

## 2022-04-19 RX ADMIN — RIFAXIMIN 550 MG: 550 TABLET ORAL at 21:15

## 2022-04-19 RX ADMIN — METRONIDAZOLE 500 MG: 500 INJECTION, SOLUTION INTRAVENOUS at 20:01

## 2022-04-19 RX ADMIN — DEXMEDETOMIDINE HYDROCHLORIDE 0.6 MCG/KG/HR: 4 INJECTION, SOLUTION INTRAVENOUS at 22:27

## 2022-04-19 RX ADMIN — SODIUM CHLORIDE 1000 ML: 9 INJECTION, SOLUTION INTRAVENOUS at 00:13

## 2022-04-19 RX ADMIN — THIAMINE HYDROCHLORIDE 100 MG: 100 INJECTION, SOLUTION INTRAMUSCULAR; INTRAVENOUS at 11:34

## 2022-04-19 RX ADMIN — HYDROMORPHONE HYDROCHLORIDE 1 MG: 1 INJECTION, SOLUTION INTRAMUSCULAR; INTRAVENOUS; SUBCUTANEOUS at 23:48

## 2022-04-19 RX ADMIN — CEFEPIME 2000 MG: 2 INJECTION, POWDER, FOR SOLUTION INTRAVENOUS at 14:54

## 2022-04-19 RX ADMIN — ONDANSETRON 4 MG: 2 INJECTION INTRAMUSCULAR; INTRAVENOUS at 03:41

## 2022-04-19 RX ADMIN — RIFAXIMIN 550 MG: 550 TABLET ORAL at 11:33

## 2022-04-19 RX ADMIN — HYDROMORPHONE HYDROCHLORIDE 1 MG: 1 INJECTION, SOLUTION INTRAMUSCULAR; INTRAVENOUS; SUBCUTANEOUS at 20:01

## 2022-04-19 RX ADMIN — VANCOMYCIN HYDROCHLORIDE 1250 MG: 10 INJECTION, POWDER, LYOPHILIZED, FOR SOLUTION INTRAVENOUS at 04:19

## 2022-04-19 RX ADMIN — DEXMEDETOMIDINE HYDROCHLORIDE 0.6 MCG/KG/HR: 4 INJECTION, SOLUTION INTRAVENOUS at 10:22

## 2022-04-19 RX ADMIN — HYDROMORPHONE HYDROCHLORIDE 1 MG: 1 INJECTION, SOLUTION INTRAMUSCULAR; INTRAVENOUS; SUBCUTANEOUS at 03:42

## 2022-04-19 RX ADMIN — DEXMEDETOMIDINE HYDROCHLORIDE 0.6 MCG/KG/HR: 4 INJECTION, SOLUTION INTRAVENOUS at 16:32

## 2022-04-19 RX ADMIN — CEFEPIME 2000 MG: 2 INJECTION, POWDER, FOR SOLUTION INTRAVENOUS at 03:21

## 2022-04-19 RX ADMIN — HYDROMORPHONE HYDROCHLORIDE 1 MG: 1 INJECTION, SOLUTION INTRAMUSCULAR; INTRAVENOUS; SUBCUTANEOUS at 16:14

## 2022-04-19 RX ADMIN — LORAZEPAM 1 MG: 1 TABLET ORAL at 16:14

## 2022-04-19 RX ADMIN — OXYCODONE HYDROCHLORIDE 5 MG: 5 TABLET ORAL at 22:24

## 2022-04-19 RX ADMIN — METHYLPREDNISOLONE SODIUM SUCCINATE 20 MG: 40 INJECTION, POWDER, LYOPHILIZED, FOR SOLUTION INTRAMUSCULAR; INTRAVENOUS at 16:26

## 2022-04-19 RX ADMIN — SODIUM CHLORIDE, PRESERVATIVE FREE 10 ML: 5 INJECTION INTRAVENOUS at 21:16

## 2022-04-19 RX ADMIN — METHYLPREDNISOLONE SODIUM SUCCINATE 20 MG: 40 INJECTION, POWDER, LYOPHILIZED, FOR SOLUTION INTRAMUSCULAR; INTRAVENOUS at 03:41

## 2022-04-19 RX ADMIN — LACTULOSE: 10 SOLUTION ORAL at 00:14

## 2022-04-19 RX ADMIN — METRONIDAZOLE 500 MG: 500 INJECTION, SOLUTION INTRAVENOUS at 11:33

## 2022-04-19 RX ADMIN — LACTULOSE 20 G: 10 SOLUTION ORAL at 21:15

## 2022-04-19 RX ADMIN — OXYCODONE HYDROCHLORIDE 5 MG: 5 TABLET ORAL at 14:55

## 2022-04-19 RX ADMIN — VANCOMYCIN HYDROCHLORIDE 1250 MG: 10 INJECTION, POWDER, LYOPHILIZED, FOR SOLUTION INTRAVENOUS at 16:25

## 2022-04-19 RX ADMIN — HYDROMORPHONE HYDROCHLORIDE 1 MG: 1 INJECTION, SOLUTION INTRAMUSCULAR; INTRAVENOUS; SUBCUTANEOUS at 10:56

## 2022-04-19 RX ADMIN — LACTULOSE 20 G: 10 SOLUTION ORAL at 16:15

## 2022-04-19 ASSESSMENT — PAIN SCALES - GENERAL
PAINLEVEL_OUTOF10: 0
PAINLEVEL_OUTOF10: 8
PAINLEVEL_OUTOF10: 10
PAINLEVEL_OUTOF10: 8
PAINLEVEL_OUTOF10: 8
PAINLEVEL_OUTOF10: 0
PAINLEVEL_OUTOF10: 7
PAINLEVEL_OUTOF10: 7
PAINLEVEL_OUTOF10: 4
PAINLEVEL_OUTOF10: 8
PAINLEVEL_OUTOF10: 8
PAINLEVEL_OUTOF10: 0
PAINLEVEL_OUTOF10: 0
PAINLEVEL_OUTOF10: 10
PAINLEVEL_OUTOF10: 7

## 2022-04-19 ASSESSMENT — PAIN DESCRIPTION - ONSET
ONSET: ON-GOING

## 2022-04-19 ASSESSMENT — PAIN DESCRIPTION - FREQUENCY
FREQUENCY: CONTINUOUS

## 2022-04-19 ASSESSMENT — PAIN DESCRIPTION - PAIN TYPE
TYPE: ACUTE PAIN

## 2022-04-19 ASSESSMENT — PAIN - FUNCTIONAL ASSESSMENT: PAIN_FUNCTIONAL_ASSESSMENT: PREVENTS OR INTERFERES SOME ACTIVE ACTIVITIES AND ADLS

## 2022-04-19 ASSESSMENT — PAIN DESCRIPTION - DESCRIPTORS
DESCRIPTORS: ACHING;CRUSHING;SHARP
DESCRIPTORS: ACHING;CONSTANT;SHARP;SHOOTING
DESCRIPTORS: ACHING;CONSTANT;SHARP;THROBBING

## 2022-04-19 ASSESSMENT — PAIN DESCRIPTION - LOCATION
LOCATION: BUTTOCKS;LEG
LOCATION: BUTTOCKS;LEG
LOCATION: LEG;BUTTOCKS

## 2022-04-19 ASSESSMENT — PAIN DESCRIPTION - PROGRESSION
CLINICAL_PROGRESSION: GRADUALLY WORSENING

## 2022-04-19 ASSESSMENT — PAIN DESCRIPTION - ORIENTATION
ORIENTATION: RIGHT
ORIENTATION: RIGHT;UPPER
ORIENTATION: RIGHT;UPPER

## 2022-04-19 NOTE — PROGRESS NOTES
Patient admitted with gluteus hematoma. Continues to have hypotension, despite receiving IV normal saline bolus. Repeat hemoglobin is 7 (was 10 earlier today). Suspect ongoing bleeding into hematoma. Will transfuse 2 units of PRBC stat. Will give additional 1 L normal saline bolus. Will obtain repeat CT-abdomen/pelvis with IV contrast to evaluate hematoma, evaluate possible active bleed. If there is active bleed, will consult IR for possible embolization. Discussed with nursing staff.

## 2022-04-19 NOTE — PROGRESS NOTES
V2.0  [facility]      Name:  Avel Vivas /Age/Sex: 1980  (39 y.o. male)   MRN & CSN:  [id number] & [id number] Encounter Date/Time: 2022 12:38 PM EDT    Location:  -A PCP: Mary Omalley MD       Hospital Day: 2    Assessment and Plan:   Avel Vivas is a 39 y.o. male with acute right lower extremity pain    Plan:  #  intramuscular hematoma vs abscesses in the right gluteus medius muscle            presented with acute right lower extremity pain x 3 days duration            CT study (femur/and abdomen and pelvis) showed hypodensity in the right gluteal muscle-possible hematoma versus abscess with diffuse edema             lower extremity DVT negative for thrombus             monitor for signs of compartment syndrome--pulses intact, no motor or sensory changes. -General surgery feels this is likely hematoma. Oxycodone ordered for pain. #Acute alcoholic hepatitis vs less likely drug-induced hepatitis              child Friedman score-10              severe elevated bilirubin level 19, along with abnormal LFT, NH3--> 83              HIDA scan on -showed hepatocellular dysfunction/biliary obstruction-no signs of cholecystitis               CT abdomen and pelvis with IV contrast on -showed diffuse fatty infiltration of the liver and gallbladder wall thickening                  F/u serologic liver work-up                  has been evaluated by GI-appreciate recommendation   -MRCP: Mild gallbladder wall thickening, no biliary duct dilation. Started oral lactulose. Xifaxan added. LFTs rising. Ferritin 1.2K.     High anion gap metabolic acidosis secondary to lactic acidosis                     lactic acid 9.1 on arrival--- improved to 2.7, continue IV fluids    # alcohol withdrawal              diaphoresis, palpitations, tremors, confusion                CIWA score-14 this am-CIWA protocol ordered                Iv thiamine, Precedex drip    #Leukocytosis-possibly sepsis             blood cultures-pending, UA-pending             continue cefepime and Flagyl until culture results are back  - We will stop antibiotics if blood cultures are negative at 48 hours. #Acute normocytic anemia             Possibly secondary to hemolysis              Elevated LDH, f/u haptoglobin  - Hemoglobin 7. Transfused 2 units PRBC. Chronic conditions:  Chronic alcohol abuse  Hypertension  Marfan syndrome with ocular manifestation  Anxiety disorder                 Diet ADULT DIET; Clear Liquid   DVT Prophylaxis [] Lovenox, []  Heparin, [x] SCDs, [] Ambulation,  [] Eliquis, [] Xarelto  [] Coumadin   Code Status Full Code   Disposition From: hospital  Expected Disposition: home  Estimated Date of Discharge: in 4 days  Patient requires continued admission due to require closer monitoring for hematoma, DT with confusion   Surrogate Decision Maker/ POA unknown     Subjective:     Chief Complaint: Leg Pain  Right lower extremity pain    Brinda Schwarz is a 39 y.o. male who presents with  pmh of alcohol dependence, HTN, anxiety, who presents to ER on with 4/18 with acute Rt lower extremity pain of 3 days duration. Patient states that he fell off steps 2 weeks ago, no LOC or motor or sensory changes at the time. He does not remember if he had any further falls in between. Reports progressively worsening pain since Friday. On Sunday he was unable to move his right leg. No tingling or numbness, no  bowel or bladder incontinence/retention. Admits low-grade temperatures on and off for the last 2 months associated with nausea and vomiting and loss of appetite. He has been seen by his PCP and work-up was significant for cholestasis with no signs of cholecystitis.   Lab work-up in the ER significant for lactic acidosis 9.1, mild hyponatremia, high anion gap metabolic acidosis, leukocytosis 19.8K, anemia- Hb dropped from 15.7 to 10.1 in 2 weeks, platelet count within normal limit, abnormal hepatic panel with elevated bili 19, was within normal limits 6 weeks. Ammonia level 83. Patient was transferred to ICU for close monitoring. 4/18) evaluated by GI, MRCP was ordered, unable to complete secondary to pt's agitation. Surgery was consulted to evaluate intramuscular hematoma-surgeon consulted IR for further intervention. Patient is severely icteric, pruritic skin, mildly confused-disoriented to place, diaphoretic and tremulous-CIWA protocol/IV thiamine, Precedex gtt. to control agitation. 4/19/22 BP stable. No gross bleeding. Has been having some confusion. Making urine. Tranfused 2UPRBC last night. Review of Systems:      ROS negative except for as above. Objective: Intake/Output Summary (Last 24 hours) at 4/19/2022 1056  Last data filed at 4/19/2022 0300  Gross per 24 hour   Intake 1506.89 ml   Output 700 ml   Net 806.89 ml        Vitals:   Vitals:    04/19/22 0755   BP:    Pulse: 64   Resp:    Temp:    SpO2:        Physical Exam:     General: Patient is a 51-year-old  male , appears stated age ,NAD  Eyes: scleral icterus+  ENT: neck supple  Cardiovascular: Regular rate and rhythm. Respiratory: Clear to auscultation, on nasal canula  Gastrointestinal: Soft, non tender, nondistended  Genitourinary: no suprapubic tenderness, has pierce  Musculoskeletal:  visible hematoma posterior aspect of RLE extending from gluteus to popliteal space, tender to palpation  Skin: warm, dry, jaundiced  Neuro: Lethargic, oriented x3  Psych: Mood appropriate.      Medications:   Medications:    rifaximin  550 mg Oral BID    cefepime  2,000 mg IntraVENous Q12H    metroNIDAZOLE  500 mg IntraVENous Q8H    thiamine  100 mg IntraVENous Daily    lidocaine PF  5 mL IntraDERmal Once    sodium chloride flush  5-40 mL IntraVENous 2 times per day    vancomycin  1,250 mg IntraVENous Q12H    sodium chloride flush  5-40 mL IntraVENous 2 times per day    methylPREDNISolone  20 mg IntraVENous Q12H      Infusions:    sodium chloride      sodium chloride 150 mL/hr at 04/18/22 2133    sodium chloride      sodium chloride      dexmedetomidine HCl in NaCl 0.6 mcg/kg/hr (04/19/22 1022)    sodium chloride       PRN Meds: sodium chloride flush, 5-40 mL, PRN  sodium chloride, , PRN  ondansetron, 4 mg, Q8H PRN   Or  ondansetron, 4 mg, Q6H PRN  polyethylene glycol, 17 g, Daily PRN  acetaminophen, 650 mg, Q6H PRN   Or  acetaminophen, 650 mg, Q6H PRN  HYDROmorphone, 1 mg, Q3H PRN  naloxone, 0.4 mg, PRN  sodium chloride flush, 5-40 mL, PRN  sodium chloride, , PRN  sodium chloride flush, 5-40 mL, PRN  sodium chloride, , PRN  LORazepam, 1 mg, Q1H PRN   Or  LORazepam, 1 mg, Q1H PRN   Or  LORazepam, 2 mg, Q1H PRN   Or  LORazepam, 2 mg, Q1H PRN   Or  LORazepam, 3 mg, Q1H PRN   Or  LORazepam, 3 mg, Q1H PRN   Or  LORazepam, 4 mg, Q1H PRN   Or  LORazepam, 4 mg, Q1H PRN  sodium chloride, , PRN        Labs      Recent Results (from the past 24 hour(s))   CBC with Auto Differential    Collection Time: 04/18/22  9:47 PM   Result Value Ref Range    WBC 23.9 (H) 4.0 - 10.5 K/CU MM    RBC 2.01 (L) 4.6 - 6.2 M/CU MM    Hemoglobin 7.0 (L) 13.5 - 18.0 GM/DL    Hematocrit 21.0 (L) 42 - 52 %    .5 (H) 78 - 100 FL    MCH 34.8 (H) 27 - 31 PG    MCHC 33.3 32.0 - 36.0 %    RDW 17.9 (H) 11.7 - 14.9 %    Platelets 679 780 - 513 K/CU MM    MPV 11.5 (H) 7.5 - 11.1 FL    Bands Relative 8 5 - 11 %    Segs Relative 75.0 (H) 36 - 66 %    Lymphocytes % 8.0 (L) 24 - 44 %    Monocytes % 9.0 (H) 0 - 4 %    nRBC 4     Bands Absolute 1.91 K/CU MM    Segs Absolute 17.9 K/CU MM    Lymphocytes Absolute 1.9 K/CU MM    Monocytes Absolute 2.2 K/CU MM    Differential Type MANUAL DIFFERENTIAL     Anisocytosis 1+     Macrocytes 1+     Polychromasia 1+     Atypical Lymphocytes Absolute 1+    TYPE AND SCREEN    Collection Time: 04/18/22 10:42 PM   Result Value Ref Range    ABO/Rh O POSITIVE     Antibody Screen NEGATIVE     Unit Number [id number]     Component LEUKO-POOR RED CELLS     Unit Divison 00     Status ISSUED     Transfusion Status OK TO TRANSFUSE     Crossmatch Result COMPATIBLE     Unit Number [id number]     Component LEUKO-POOR RED CELLS     Unit Divison 00     Status ISSUED     Transfusion Status OK TO TRANSFUSE     Crossmatch Result COMPATIBLE    Comprehensive Metabolic Panel w/ Reflex to MG    Collection Time: 04/19/22  8:00 AM   Result Value Ref Range    Sodium 131 (L) 135 - 145 MMOL/L    Potassium 5.1 3.5 - 5.1 MMOL/L    Chloride 103 99 - 110 mMol/L    CO2 14 (L) 21 - 32 MMOL/L    BUN 27 (H) 6 - 23 MG/DL    CREATININE 1.6 (H) 0.9 - 1.3 MG/DL    Glucose 132 (H) 70 - 99 MG/DL    Calcium 6.4 (LL) 8.3 - 10.6 MG/DL    Albumin 2.2 (L) 3.4 - 5.0 GM/DL    Total Protein 3.3 (L) 6.4 - 8.2 GM/DL    Total Bilirubin 20.4 (HH) 0.0 - 1.0 MG/DL    ALT 41 (H) 10 - 40 U/L     (H) 15 - 37 IU/L    Alkaline Phosphatase 216 (H) 40 - 128 IU/L    GFR Non- 48 (L) >60 mL/min/1.73m2    GFR  58 (L) >60 mL/min/1.73m2    Anion Gap 14 4 - 16   CBC with Auto Differential    Collection Time: 04/19/22  8:00 AM   Result Value Ref Range    WBC 16.8 (H) 4.0 - 10.5 K/CU MM    RBC 2.65 (L) 4.6 - 6.2 M/CU MM    Hemoglobin 8.4 (L) 13.5 - 18.0 GM/DL    Hematocrit 25.8 (L) 42 - 52 %    MCV 97.4 78 - 100 FL    MCH 31.7 (H) 27 - 31 PG    MCHC 32.6 32.0 - 36.0 %    RDW 21.5 (H) 11.7 - 14.9 %    Platelets 399 353 - 125 K/CU MM    MPV 10.9 7.5 - 11.1 FL   Protime-INR    Collection Time: 04/19/22  8:00 AM   Result Value Ref Range    Protime 17.6 (H) 11.7 - 14.5 SECONDS    INR 1.36 INDEX   Ammonia    Collection Time: 04/19/22  8:00 AM   Result Value Ref Range    Ammonia 63 (H) 16 - 60 UMOL/L   Lactic Acid    Collection Time: 04/19/22  8:00 AM   Result Value Ref Range    Lactate 2.1 (HH) 0.4 - 2.0 mMOL/L        Imaging/Diagnostics Last 24 Hours   XR FEMUR RIGHT (MIN 2 VIEWS)    Result Date: 4/18/2022  EXAMINATION: 4 XRAY VIEWS OF THE RIGHT FEMUR 4/18/2022 1:16 am COMPARISON: None. HISTORY: ORDERING SYSTEM PROVIDED HISTORY: pain TECHNOLOGIST PROVIDED HISTORY: Reason for exam:->pain Reason for Exam: right leg pain Additional signs and symptoms: patient complains of right thigh pain, nki FINDINGS: There is mild joint space narrowing in the right hip. No fracture or dislocation is seen. The joint spaces are intact. There is some mild sclerosis scattered along the distal femur which is ill-defined. There is no periosteal reaction. Moderate osteoarthritic changes of the right hip with no acute bony abnormality. Vague mild sclerosis scattered along the distal femur centrally which could represent an old infarct bone infarct or small enchondroma. Recommend orthopedic follow-up and suggest bone scan correlation, if indicated     CT ABDOMEN PELVIS W IV CONTRAST Additional Contrast? None    Result Date: 4/18/2022  EXAMINATION: CT OF THE ABDOMEN AND PELVIS WITH CONTRAST, 4/18/2022 2:01 am TECHNIQUE: CT of the abdomen and pelvis was performed with the administration of intravenous contrast. Multiplanar reformatted images are provided for review. Dose modulation, iterative reconstruction, and/or weight based adjustment of the mA/kV was utilized to reduce the radiation dose to as low as reasonably achievable. COMPARISON: None HISTORY: ORDERING SYSTEM PROVIDED HISTORY: Jaundiced, right hip pain and thigh pain, elevated lactic acid, elevated bili. TECHNOLOGIST PROVIDED HISTORY: Reason for exam:->Jaundiced, right hip pain and thigh pain, elevated lactic acid, elevated bili. Additional Contrast?->None Decision Support Exception - unselect if not a suspected or confirmed emergency medical condition->Emergency Medical Condition (MA) Reason for Exam: Jaundiced, right hip pain and thigh pain, elevated lactic acid, elevated bili. FINDINGS: Lower Chest: The visualized lungs are clear.  Organs: Severe diffuse fatty infiltration of the liver is noted. The spleen, adrenal glands, pancreas, and kidneys are unremarkable. Gallbladder wall thickening is seen GI/Bowel: There is no evidence of bowel obstruction. The appendix is normal. Pelvis: The bladder is unremarkable. There is no free fluid. Peritoneum/Retroperitoneum: There is no free air or lymphadenopathy. Bones/Soft Tissues: No destructive osseous lesions are identified. There is a fluid collection between the right gluteus medius and gluteus naomi muscles with a hematocrit level, concerning for a hematoma. This measures 11.1 x 4.2 x 16.7 cm. There is diffuse enlargement of the adductor muscles of the right lower extremity. 1. There is a hematoma measuring 11.1 x 4.2 x 16.7 cm between the right gluteus medius and naomi muscles. There is diffuse enlargement of the adductor muscles of the right lower extremity, likely related to edema. 2. Severe diffuse fatty infiltration of the liver. Gallbladder wall thickening is seen. This could be related to chronic liver disease. If there is concern for acute cholecystitis, a right upper quadrant ultrasound can be obtained. CT FEMUR RIGHT W CONTRAST    Result Date: 4/18/2022  EXAMINATION: CT OF THE RIGHT FEMUR WITH CONTRAST 4/18/2022 2:01 am TECHNIQUE: CT of the right femur was performed with the administration of intravenous contrast.  Multiplanar reformatted images are provided for review. Dose modulation, iterative reconstruction, and/or weight based adjustment of the mA/kV was utilized to reduce the radiation dose to as low as reasonably achievable. COMPARISON: None.  HISTORY ORDERING SYSTEM PROVIDED HISTORY: pain TECHNOLOGIST PROVIDED HISTORY: Reason for exam:->pain Decision Support Exception - unselect if not a suspected or confirmed emergency medical condition->Emergency Medical Condition (MA) Reason for Exam: Jaundiced, right hip pain and thigh pain, elevated lactic acid, elevated bili FINDINGS: THE STUDY IS LIMITED DUE TO LACK OF INTRAVENOUS CONTRAST. Bones: No acute fracture or dislocation. 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 No suspicious sclerotic or lytic lesion. .  No CT evidence of osteomyelitis. Fusion of the proximal tibiofibular joint, likely congenital. Soft Tissue: Ill-defined hypodensity in the right gluteus medius muscle, image 1. Also, a smaller ill-defined hyperdensity in the right gluteus naomi muscle, image number 34 no soft tissue gas. No radiopaque foreign body. Joint: No significant hip or knee joint effusion. Mild degenerative changes of the right hip joint are seen. Ill-defined hypodensity in the right gluteus medius muscle and ill-defined hyperdensity in the right gluteus naomi muscle. These may represent hematomas in different phases of evolution. However simple and complex abscesses cannot be excluded. Clinical correlation and further evaluation by MRI with and without IV contrast recommended. VL DUP LOWER EXTREMITY VENOUS RIGHT    Result Date: 4/18/2022  EXAMINATION: DUPLEX VENOUS ULTRASOUND OF THE RIGHT LOWER EXTREMITY 4/18/2022 1:10 am TECHNIQUE: Duplex ultrasound using B-mode/gray scaled imaging and Doppler spectral analysis and color flow was obtained of the deep venous structures of the right extremity. COMPARISON: None. HISTORY: ORDERING SYSTEM PROVIDED HISTORY: pain nki TECHNOLOGIST PROVIDED HISTORY: Reason for exam:->pain nki Reason for Exam: Rt leg pain FINDINGS: The visualized veins of the right lower extremity are patent and free of echogenic thrombus. The veins demonstrate good compressibility with normal color flow study and spectral analysis. No evidence of DVT in the right lower extremity.        Electronically signed by Jose Kruger MD on 4/19/2022 at 10:56 AM

## 2022-04-19 NOTE — PROGRESS NOTES
Sleeping currently but nurse says he awakens appropriately  Stool is light brown- no overt GI bleed  Abdomen soft, non-tender, non-distended  Hb decreased to 7-- suspect continued bleeding into hematoma  IMPRESSION:  1) alcohol abuse with withdrawal  2) severe alcoholic hepatitis- MRCP showed no biliary obstruction  3) stage 1-2 PSE- should be able to treat with oral lactulose rather than by enema  4) acute blood loss- as no overt GI bleed, suspect bleeding into gluteal or other hematoma  5) D/C antibiotics if/when no evidence of infection        RECOMMENDATIONS:  1) try clear liquids and advance as tolerated--- he needs nutrition for the alcoholic hepatitis  2) change lactulose to po if able to tolerate- if not taking po then resume lactulose enemas  3) add Xifaxan 550 mg BID  4) serologic liver workup pending

## 2022-04-19 NOTE — PROGRESS NOTES
GENERAL SURGERY INPATIENT PROGRESS NOTE  [facility]    PATIENT: Kirt Schilling, 39 y.o., male, MRN: [id number]    Hospital Day:  LOS: 1 day     Kirt Schilling is a 39 y.o. male with acute alcoholic hepatitis with a Tbili of 23. CT scan showed intramuscular hematoma of the right gluteus muscles             Subjective:  Chief Complaint: (sedated on Precedex)  Pain: controlled  BM:    Diet: ADULT DIET; Clear Liquid  Activity: as tolerated    Had a fall yesterday where he slid to the floor and landed on his bottom. Hb 7 yesterday, transfused and has increased to 8.4.      Objective:    Vitals: /72   Pulse 66   Temp 99.4 °F (37.4 °C) (Axillary)   Resp 14   Ht 6' 4\" (1.93 m)   Wt 257 lb 4.4 oz (116.7 kg)   SpO2 96%   BMI 31.32 kg/m²   Vital Signs (Last 24 Hours)  Temp  Av.7 °F (37.1 °C)  Min: 97.5 °F (36.4 °C)  Max: 99.4 °F (37.4 °C)  Pulse  Av.9  Min: 65  Max: 109  BP  Min: 58/47  Max: 131/113  Resp  Av.8  Min: 9  Max: 20  SpO2  Av.3 %  Min: 91 %  Max: 100 %  Wt Readings from Last 3 Encounters:   22 257 lb 4.4 oz (116.7 kg)   22 240 lb (108.9 kg)   22 254 lb (115.2 kg)       I/O:  1901 -  0700  In: 3655.3 [I.V.:903.7]  Out: 700 [Urine:700]    IV Fluids: sodium chloride    sodium chloride Last Rate: 150 mL/hr at 223    sodium chloride    sodium chloride    dexmedetomidine HCl in NaCl Last Rate: 0.2 mcg/kg/hr (22 0015)    sodium chloride    Scheduled Meds:   rifaximin, 550 mg, Oral, BID    cefepime, 2,000 mg, IntraVENous, Q12H    metroNIDAZOLE, 500 mg, IntraVENous, Q8H    thiamine, 100 mg, IntraVENous, Daily    lidocaine PF, 5 mL, IntraDERmal, Once    sodium chloride flush, 5-40 mL, IntraVENous, 2 times per day    vancomycin, 1,250 mg, IntraVENous, Q12H    sodium chloride flush, 5-40 mL, IntraVENous, 2 times per day    methylPREDNISolone, 20 mg, IntraVENous, Q12H    Physical Exam:  General Appearance:   Calm, no distress    Head:   Normocephalic, atraumatic    Lungs:    Equal chest rise, respirations unlabored   Heart:   Regular rate and rhythm    Abdomen:    Soft, non-tender, no rebound or guarding    Extremities:  No cyanosis. Right lower extremity edema, compartments soft, scattered ecchymoses of the bilateral lower extremities. Pulses intact.     Neurologic:  Nonfocal        Labs/Imaging Results:   Recent Results (from the past 24 hour(s))   Lactic Acid    Collection Time: 04/18/22  9:25 AM   Result Value Ref Range    Lactate 2.7 (HH) 0.4 - 2.0 mMOL/L   Hepatitis B Surface Antibody    Collection Time: 04/18/22  9:25 AM   Result Value Ref Range    Hep B S Ab 46.62    Iron and TIBC    Collection Time: 04/18/22  9:25 AM   Result Value Ref Range    Iron 178 (H) 59 - 158 ug/dL    UIBC <17 (LL) 110 - 370 ug/dL    TIBC <195 (L) 250 - 450 ug/dL    Transferrin % <91 (H) 10 - 44 %   Ferritin    Collection Time: 04/18/22  9:25 AM   Result Value Ref Range    Ferritin 1,266 (H) 30 - 400 NG/ML   Fibrinogen    Collection Time: 04/18/22  9:25 AM   Result Value Ref Range    Fibrinogen 217 196.9 - 442.1 MG/DL   Lactate Dehydrogenase    Collection Time: 04/18/22  9:25 AM   Result Value Ref Range     (H) 120 - 246 IU/L   Ammonia    Collection Time: 04/18/22  9:25 AM   Result Value Ref Range    Ammonia 83 (H) 16 - 60 UMOL/L   CBC with Auto Differential    Collection Time: 04/18/22  9:47 PM   Result Value Ref Range    WBC 23.9 (H) 4.0 - 10.5 K/CU MM    RBC 2.01 (L) 4.6 - 6.2 M/CU MM    Hemoglobin 7.0 (L) 13.5 - 18.0 GM/DL    Hematocrit 21.0 (L) 42 - 52 %    .5 (H) 78 - 100 FL    MCH 34.8 (H) 27 - 31 PG    MCHC 33.3 32.0 - 36.0 %    RDW 17.9 (H) 11.7 - 14.9 %    Platelets 309 234 - 685 K/CU MM    MPV 11.5 (H) 7.5 - 11.1 FL    Bands Relative 8 5 - 11 %    Segs Relative 75.0 (H) 36 - 66 %    Lymphocytes % 8.0 (L) 24 - 44 %    Monocytes % 9.0 (H) 0 - 4 %    nRBC 4     Bands Absolute 1.91 K/CU MM    Segs Absolute 17.9 K/CU MM Lymphocytes Absolute 1.9 K/CU MM    Monocytes Absolute 2.2 K/CU MM    Differential Type MANUAL DIFFERENTIAL     Anisocytosis 1+     Macrocytes 1+     Polychromasia 1+     Atypical Lymphocytes Absolute 1+    TYPE AND SCREEN    Collection Time: 04/18/22 10:42 PM   Result Value Ref Range    ABO/Rh O POSITIVE     Antibody Screen NEGATIVE     Unit Number [id number]     Component LEUKO-POOR RED CELLS     Unit Divison 00     Status ISSUED     Transfusion Status OK TO TRANSFUSE     Crossmatch Result COMPATIBLE     Unit Number [id number]     Component LEUKO-POOR RED CELLS     Unit Divison 00     Status ISSUED     Transfusion Status OK TO TRANSFUSE     Crossmatch Result COMPATIBLE    CBC with Auto Differential    Collection Time: 04/19/22  8:00 AM   Result Value Ref Range    WBC 16.8 (H) 4.0 - 10.5 K/CU MM    RBC 2.65 (L) 4.6 - 6.2 M/CU MM    Hemoglobin 8.4 (L) 13.5 - 18.0 GM/DL    Hematocrit 25.8 (L) 42 - 52 %    MCV 97.4 78 - 100 FL    MCH 31.7 (H) 27 - 31 PG    MCHC 32.6 32.0 - 36.0 %    RDW 21.5 (H) 11.7 - 14.9 %    Platelets 626 296 - 127 K/CU MM    MPV 10.9 7.5 - 11.1 FL   Protime-INR    Collection Time: 04/19/22  8:00 AM   Result Value Ref Range    Protime 17.6 (H) 11.7 - 14.5 SECONDS    INR 1.36 INDEX   Ammonia    Collection Time: 04/19/22  8:00 AM   Result Value Ref Range    Ammonia 63 (H) 16 - 60 UMOL/L         Assessment:  Amarilys Pugh is a 39 y.o. male with  acute alcoholic hepatitis with a Tbili of 19. CT scan showed intramuscular hematoma of the right gluteus muscles     Principal Problem:    Intramuscular hematoma  Active Problems:    Right leg pain  Resolved Problems:    * No resolved hospital problems. *      Plan:  · Hb decreased last night but imaging showed hematoma was similar in size. Monitor for now and transfuse for Hb <7  · Compartments are soft, neurovascularly intact. Continue to check with vital signs. · Appreciate Dr. Stacia Peterson recommendations. · Alcohol abuse.  Concern for active withdrawal. Continue CIWA protocols, on Precedex currently. · Gallbladder wall thickening - likely secondary to hepatic dysfunction given his LFTs and current imaging, also with recent HIDA scan showing cholestasis in the liver.  No surgical plans at this time  · Will continue to follow  · Thank you for the consultation and the opportunity to care for Brinda Schwarz     Electronically signed: Rupa Ernandez MD 4/19/2022 8:47 AM

## 2022-04-20 LAB
ALBUMIN SERPL-MCNC: 2.3 GM/DL (ref 3.4–5)
ALP BLD-CCNC: 224 IU/L (ref 40–128)
ALPHA-1 ANTITRYPSIN: 193 MG/DL (ref 90–200)
ALT SERPL-CCNC: 59 U/L (ref 10–40)
AMMONIA: 37 UMOL/L (ref 16–60)
ANION GAP SERPL CALCULATED.3IONS-SCNC: 10 MMOL/L (ref 4–16)
ANTI-MITOCHON TITER: 2.1 UNITS (ref 0–24.9)
ANTI-NUCLEAR ANTIBODY (ANA): NORMAL
AST SERPL-CCNC: 320 IU/L (ref 15–37)
BILIRUB SERPL-MCNC: 21.3 MG/DL (ref 0–1)
BUN BLDV-MCNC: 29 MG/DL (ref 6–23)
CALCIUM IONIZED: 4.16 MG/DL (ref 4.48–5.28)
CALCIUM SERPL-MCNC: 6.6 MG/DL (ref 8.3–10.6)
CERULOPLASMIN: 33 MG/DL (ref 15–30)
CHLORIDE BLD-SCNC: 100 MMOL/L (ref 99–110)
CO2: 20 MMOL/L (ref 21–32)
CREAT SERPL-MCNC: 0.7 MG/DL (ref 0.9–1.3)
DAT IGG: NEGATIVE
DOSE AMOUNT: NORMAL
DOSE TIME: NORMAL
F-ACTIN AB, IGG: 6 UNITS (ref 0–19)
GFR AFRICAN AMERICAN: >60 ML/MIN/1.73M2
GFR NON-AFRICAN AMERICAN: >60 ML/MIN/1.73M2
GLUCOSE BLD-MCNC: 141 MG/DL (ref 70–99)
HAPTOGLOBIN: <10 MG/DL (ref 30–200)
HCT VFR BLD CALC: 21.9 % (ref 42–52)
HCT VFR BLD CALC: 24.2 % (ref 42–52)
HCT VFR BLD CALC: 25.2 % (ref 42–52)
HEMOGLOBIN: 7.1 GM/DL (ref 13.5–18)
HEMOGLOBIN: 8 GM/DL (ref 13.5–18)
HEMOGLOBIN: 8.3 GM/DL (ref 13.5–18)
INR BLD: 1.16 INDEX
IONIZED CA: 1.04 MMOL/L (ref 1.12–1.32)
LACTATE: 1.3 MMOL/L (ref 0.4–2)
POTASSIUM SERPL-SCNC: 4.9 MMOL/L (ref 3.5–5.1)
PROTHROMBIN TIME: 15 SECONDS (ref 11.7–14.5)
SODIUM BLD-SCNC: 130 MMOL/L (ref 135–145)
TOTAL PROTEIN: 3.2 GM/DL (ref 6.4–8.2)
VANCOMYCIN TROUGH: 14.3 UG/ML (ref 10–20)

## 2022-04-20 PROCEDURE — 83605 ASSAY OF LACTIC ACID: CPT

## 2022-04-20 PROCEDURE — 2500000003 HC RX 250 WO HCPCS: Performed by: PHYSICIAN ASSISTANT

## 2022-04-20 PROCEDURE — 2500000003 HC RX 250 WO HCPCS: Performed by: INTERNAL MEDICINE

## 2022-04-20 PROCEDURE — 6370000000 HC RX 637 (ALT 250 FOR IP): Performed by: SPECIALIST

## 2022-04-20 PROCEDURE — 6360000002 HC RX W HCPCS: Performed by: PHYSICIAN ASSISTANT

## 2022-04-20 PROCEDURE — 85018 HEMOGLOBIN: CPT

## 2022-04-20 PROCEDURE — 82330 ASSAY OF CALCIUM: CPT

## 2022-04-20 PROCEDURE — 99232 SBSQ HOSP IP/OBS MODERATE 35: CPT | Performed by: SURGERY

## 2022-04-20 PROCEDURE — 85610 PROTHROMBIN TIME: CPT

## 2022-04-20 PROCEDURE — 6360000002 HC RX W HCPCS: Performed by: INTERNAL MEDICINE

## 2022-04-20 PROCEDURE — 2000000000 HC ICU R&B

## 2022-04-20 PROCEDURE — 6370000000 HC RX 637 (ALT 250 FOR IP): Performed by: HOSPITALIST

## 2022-04-20 PROCEDURE — 2580000003 HC RX 258: Performed by: INTERNAL MEDICINE

## 2022-04-20 PROCEDURE — 2500000003 HC RX 250 WO HCPCS: Performed by: NURSE PRACTITIONER

## 2022-04-20 PROCEDURE — 80202 ASSAY OF VANCOMYCIN: CPT

## 2022-04-20 PROCEDURE — 86901 BLOOD TYPING SEROLOGIC RH(D): CPT

## 2022-04-20 PROCEDURE — 36430 TRANSFUSION BLD/BLD COMPNT: CPT

## 2022-04-20 PROCEDURE — 86880 COOMBS TEST DIRECT: CPT

## 2022-04-20 PROCEDURE — 85014 HEMATOCRIT: CPT

## 2022-04-20 PROCEDURE — 80053 COMPREHEN METABOLIC PANEL: CPT

## 2022-04-20 PROCEDURE — 86850 RBC ANTIBODY SCREEN: CPT

## 2022-04-20 PROCEDURE — 82140 ASSAY OF AMMONIA: CPT

## 2022-04-20 PROCEDURE — 2580000003 HC RX 258: Performed by: PHYSICIAN ASSISTANT

## 2022-04-20 PROCEDURE — 6370000000 HC RX 637 (ALT 250 FOR IP): Performed by: PHYSICIAN ASSISTANT

## 2022-04-20 PROCEDURE — P9016 RBC LEUKOCYTES REDUCED: HCPCS

## 2022-04-20 PROCEDURE — 6360000002 HC RX W HCPCS: Performed by: SPECIALIST

## 2022-04-20 PROCEDURE — 6360000002 HC RX W HCPCS: Performed by: HOSPITALIST

## 2022-04-20 PROCEDURE — 86900 BLOOD TYPING SEROLOGIC ABO: CPT

## 2022-04-20 RX ORDER — CALCIUM GLUCONATE 20 MG/ML
2000 INJECTION, SOLUTION INTRAVENOUS ONCE
Status: COMPLETED | OUTPATIENT
Start: 2022-04-20 | End: 2022-04-20

## 2022-04-20 RX ORDER — SODIUM CHLORIDE 9 MG/ML
INJECTION, SOLUTION INTRAVENOUS PRN
Status: DISCONTINUED | OUTPATIENT
Start: 2022-04-20 | End: 2022-04-25

## 2022-04-20 RX ADMIN — HYDROMORPHONE HYDROCHLORIDE 1 MG: 1 INJECTION, SOLUTION INTRAMUSCULAR; INTRAVENOUS; SUBCUTANEOUS at 13:06

## 2022-04-20 RX ADMIN — VANCOMYCIN HYDROCHLORIDE 1250 MG: 10 INJECTION, POWDER, LYOPHILIZED, FOR SOLUTION INTRAVENOUS at 04:42

## 2022-04-20 RX ADMIN — HYDROMORPHONE HYDROCHLORIDE 1 MG: 1 INJECTION, SOLUTION INTRAMUSCULAR; INTRAVENOUS; SUBCUTANEOUS at 17:31

## 2022-04-20 RX ADMIN — DEXMEDETOMIDINE HYDROCHLORIDE 1 MCG/KG/HR: 4 INJECTION, SOLUTION INTRAVENOUS at 20:12

## 2022-04-20 RX ADMIN — HYDROMORPHONE HYDROCHLORIDE 1 MG: 1 INJECTION, SOLUTION INTRAMUSCULAR; INTRAVENOUS; SUBCUTANEOUS at 04:41

## 2022-04-20 RX ADMIN — DEXMEDETOMIDINE HYDROCHLORIDE 1 MCG/KG/HR: 4 INJECTION, SOLUTION INTRAVENOUS at 13:08

## 2022-04-20 RX ADMIN — METHYLPREDNISOLONE SODIUM SUCCINATE 20 MG: 40 INJECTION, POWDER, LYOPHILIZED, FOR SOLUTION INTRAMUSCULAR; INTRAVENOUS at 04:41

## 2022-04-20 RX ADMIN — LACTULOSE 20 G: 10 SOLUTION ORAL at 15:34

## 2022-04-20 RX ADMIN — METHYLPREDNISOLONE SODIUM SUCCINATE 20 MG: 40 INJECTION, POWDER, LYOPHILIZED, FOR SOLUTION INTRAMUSCULAR; INTRAVENOUS at 15:33

## 2022-04-20 RX ADMIN — CALCIUM GLUCONATE 2000 MG: 20 INJECTION, SOLUTION INTRAVENOUS at 08:25

## 2022-04-20 RX ADMIN — HYDROMORPHONE HYDROCHLORIDE 1 MG: 1 INJECTION, SOLUTION INTRAMUSCULAR; INTRAVENOUS; SUBCUTANEOUS at 08:22

## 2022-04-20 RX ADMIN — SODIUM CHLORIDE, PRESERVATIVE FREE 10 ML: 5 INJECTION INTRAVENOUS at 20:48

## 2022-04-20 RX ADMIN — HYDROMORPHONE HYDROCHLORIDE 1 MG: 1 INJECTION, SOLUTION INTRAMUSCULAR; INTRAVENOUS; SUBCUTANEOUS at 21:55

## 2022-04-20 RX ADMIN — DEXMEDETOMIDINE HYDROCHLORIDE 0.69 MCG/KG/HR: 4 INJECTION, SOLUTION INTRAVENOUS at 17:38

## 2022-04-20 RX ADMIN — SODIUM CHLORIDE, PRESERVATIVE FREE 10 ML: 5 INJECTION INTRAVENOUS at 08:22

## 2022-04-20 RX ADMIN — CEFEPIME 2000 MG: 2 INJECTION, POWDER, FOR SOLUTION INTRAVENOUS at 02:17

## 2022-04-20 RX ADMIN — DEXMEDETOMIDINE HYDROCHLORIDE 0.69 MCG/KG/HR: 4 INJECTION, SOLUTION INTRAVENOUS at 09:46

## 2022-04-20 RX ADMIN — OXYCODONE HYDROCHLORIDE 5 MG: 5 TABLET ORAL at 21:02

## 2022-04-20 RX ADMIN — LORAZEPAM 2 MG: 2 INJECTION INTRAMUSCULAR; INTRAVENOUS at 22:50

## 2022-04-20 RX ADMIN — THIAMINE HYDROCHLORIDE 100 MG: 100 INJECTION, SOLUTION INTRAMUSCULAR; INTRAVENOUS at 08:22

## 2022-04-20 RX ADMIN — OXYCODONE HYDROCHLORIDE 5 MG: 5 TABLET ORAL at 11:16

## 2022-04-20 RX ADMIN — RIFAXIMIN 550 MG: 550 TABLET ORAL at 08:21

## 2022-04-20 RX ADMIN — METRONIDAZOLE 500 MG: 500 INJECTION, SOLUTION INTRAVENOUS at 04:35

## 2022-04-20 RX ADMIN — LACTULOSE 20 G: 10 SOLUTION ORAL at 08:22

## 2022-04-20 RX ADMIN — DEXMEDETOMIDINE HYDROCHLORIDE 1.4 MCG/KG/HR: 4 INJECTION, SOLUTION INTRAVENOUS at 23:18

## 2022-04-20 RX ADMIN — METRONIDAZOLE 500 MG: 500 INJECTION, SOLUTION INTRAVENOUS at 19:05

## 2022-04-20 RX ADMIN — METRONIDAZOLE 500 MG: 500 INJECTION, SOLUTION INTRAVENOUS at 11:24

## 2022-04-20 RX ADMIN — SODIUM CHLORIDE, PRESERVATIVE FREE 10 ML: 5 INJECTION INTRAVENOUS at 20:49

## 2022-04-20 RX ADMIN — RIFAXIMIN 550 MG: 550 TABLET ORAL at 20:34

## 2022-04-20 RX ADMIN — CEFEPIME 2000 MG: 2 INJECTION, POWDER, FOR SOLUTION INTRAVENOUS at 13:15

## 2022-04-20 RX ADMIN — LORAZEPAM 3 MG: 1 TABLET ORAL at 00:46

## 2022-04-20 RX ADMIN — VANCOMYCIN HYDROCHLORIDE 1250 MG: 10 INJECTION, POWDER, LYOPHILIZED, FOR SOLUTION INTRAVENOUS at 18:02

## 2022-04-20 RX ADMIN — LACTULOSE 20 G: 10 SOLUTION ORAL at 20:34

## 2022-04-20 ASSESSMENT — PAIN - FUNCTIONAL ASSESSMENT
PAIN_FUNCTIONAL_ASSESSMENT: PREVENTS OR INTERFERES SOME ACTIVE ACTIVITIES AND ADLS
PAIN_FUNCTIONAL_ASSESSMENT: ACTIVITIES ARE NOT PREVENTED
PAIN_FUNCTIONAL_ASSESSMENT: PREVENTS OR INTERFERES SOME ACTIVE ACTIVITIES AND ADLS
PAIN_FUNCTIONAL_ASSESSMENT: PREVENTS OR INTERFERES SOME ACTIVE ACTIVITIES AND ADLS

## 2022-04-20 ASSESSMENT — PAIN DESCRIPTION - PAIN TYPE
TYPE: ACUTE PAIN

## 2022-04-20 ASSESSMENT — PAIN DESCRIPTION - ORIENTATION
ORIENTATION: RIGHT;UPPER

## 2022-04-20 ASSESSMENT — PAIN DESCRIPTION - DESCRIPTORS
DESCRIPTORS: ACHING
DESCRIPTORS: ACHING;DULL;DISCOMFORT
DESCRIPTORS: ACHING;DISCOMFORT;DULL
DESCRIPTORS: ACHING;DULL;DISCOMFORT
DESCRIPTORS: ACHING;DISCOMFORT;DULL

## 2022-04-20 ASSESSMENT — PAIN SCALES - GENERAL
PAINLEVEL_OUTOF10: 8
PAINLEVEL_OUTOF10: 10
PAINLEVEL_OUTOF10: 7
PAINLEVEL_OUTOF10: 5
PAINLEVEL_OUTOF10: 2
PAINLEVEL_OUTOF10: 7
PAINLEVEL_OUTOF10: 7
PAINLEVEL_OUTOF10: 8
PAINLEVEL_OUTOF10: 2
PAINLEVEL_OUTOF10: 0
PAINLEVEL_OUTOF10: 0
PAINLEVEL_OUTOF10: 8
PAINLEVEL_OUTOF10: 8
PAINLEVEL_OUTOF10: 0
PAINLEVEL_OUTOF10: 2
PAINLEVEL_OUTOF10: 8

## 2022-04-20 ASSESSMENT — PAIN DESCRIPTION - ONSET
ONSET: ON-GOING
ONSET: ON-GOING

## 2022-04-20 ASSESSMENT — PAIN DESCRIPTION - LOCATION
LOCATION: BUTTOCKS;LEG

## 2022-04-20 ASSESSMENT — PAIN DESCRIPTION - FREQUENCY
FREQUENCY: CONTINUOUS

## 2022-04-20 ASSESSMENT — PAIN DESCRIPTION - PROGRESSION
CLINICAL_PROGRESSION: GRADUALLY WORSENING

## 2022-04-20 NOTE — CARE COORDINATION
Chart reviewed. Pt on CIWA Scale and Precedex gtt. Pt Lives with spouse. Pt has PCP and insurance to assist with cost of Rxs. Cm to meet with pt once able to fully participate with CM to discuss substance use and possible treatment options if desired.

## 2022-04-20 NOTE — PROGRESS NOTES
[address] [facility]  consulted by Dr. Blake Barahona for monitoring and adjustment. Indication for treatment: Sepsis  Goal trough: [] 10-15 mcg/mL or [x] 15-20 mcg/ml  AUC/KADIE: [] <500 or [x] 400-600    Pertinent Laboratory Values:   Temp Readings from Last 3 Encounters:   04/20/22 98.6 °F (37 °C) (Oral)   02/23/22 97.8 °F (36.6 °C)   11/08/21 97.9 °F (36.6 °C)     Recent Labs     04/18/22  0027 04/18/22  0330 04/18/22  0925 04/18/22  2147 04/19/22  0800 04/20/22  0600   WBC 19.8*  --   --  23.9* 16.8*  --    LACTATE 9.1*   < > 2.7*  --  2.1* 1.3    < > = values in this interval not displayed. Recent Labs     04/18/22  0027 04/19/22  0800 04/20/22  0500   BUN 9 27* 29*   CREATININE 0.5* 1.6* 0.7*     Estimated Creatinine Clearance: 194 mL/min (A) (based on SCr of 0.7 mg/dL (L)). Intake/Output Summary (Last 24 hours) at 4/20/2022 1227  Last data filed at 4/20/2022 1120  Gross per 24 hour   Intake 322.5 ml   Output 1900 ml   Net -1577.5 ml       Pertinent Cultures:  Date    Source    Results  4/18   Blood    NGTD    Vancomycin level:   TROUGH:    Recent Labs     04/20/22  0255   VANCOTROUGH 14.3     RANDOM:  No results for input(s): VANCORANDOM in the last 72 hours.     Assessment:  · SCr, BUN, and urine output:  · Scr WNL, at baseline; good UOP  · Day(s) of therapy: 3  · Vancomycin concentration:   · 4/20:  14.3, 10 hours post-dose    Plan:  · Vancomycin 1250 mg IVPB q12h  · Random level therapeutic @ 14.3  · Predicted AUC = 403 at steady state  · Repeat level in 2 days  · Pharmacy will continue to monitor patient and adjust therapy as indicated    Joshua 3 4/22 @ 0200    Thank you for the consult,  Julieta Beck, [facility]  4/20/2022 12:27 PM

## 2022-04-20 NOTE — PROGRESS NOTES
V2.0  [facility] [place]      Name:  Silvia Spencer /Age/Sex: 1980  (39 y.o. male)   MRN & CSN:  [id number] & [id number] Encounter Date/Time: 2022 12:38 PM EDT    Location:  -A PCP: Brandi Tirado MD       Hospital Day: 3    Assessment and Plan:   Silvia Spencer is a 39 y.o. male with acute right lower extremity pain    Plan:  #  intramuscular hematoma vs abscesses in the right gluteus medius muscle            presented with acute right lower extremity pain x 3 days duration            CT study (femur/and abdomen and pelvis) showed hypodensity in the right gluteal muscle-possible hematoma versus abscess with diffuse edema             lower extremity DVT negative for thrombus             monitor for signs of compartment syndrome--pulses intact, no motor or sensory changes. -General surgery feels this is likely hematoma. Oxycodone ordered for pain. - Monitor left lower extremity if bruising becomes more evident and will scan for any signs of hematoma. #Acute alcoholic hepatitis vs less likely drug-induced hepatitis              child Friedman score-10              severe elevated bilirubin level 19, along with abnormal LFT, NH3--> 83              HIDA scan on -showed hepatocellular dysfunction/biliary obstruction-no signs of cholecystitis               CT abdomen and pelvis with IV contrast on -showed diffuse fatty infiltration of the liver and gallbladder wall thickening                  F/u serologic liver work-up                  has been evaluated by GI-appreciate recommendation   -MRCP: Mild gallbladder wall thickening, no biliary duct dilation. Started oral lactulose. Xifaxan added. Ferritin 1.2K.    - MRCP limited by motion, GI ordering ultrasound. NH3: 37. LFTs rising. High anion gap metabolic acidosis secondary to lactic acidosis                     lactic acid 9.1 on arrival---continue IV fluids  - Improved lactate 1.3.     # alcohol withdrawal              diaphoresis, palpitations, tremors, confusion                CIWA score-14 this am-CIWA protocol ordered                Iv thiamine, Precedex drip    #Leukocytosis-possibly sepsis             blood cultures-pending, UA-pending             continue cefepime and Flagyl until culture results are back  - We will stop antibiotics if blood cultures are negative at 48 hours. #Acute normocytic anemia             Possibly secondary to hemolysis              Elevated LDH  - Hemoglobin 7. Transfused 2 units PRBC. - Low haptoglobin. D/W heme-onc who states that this could be due to liver disease instead of hemolysis. Transfuse 1 unit PRBC today. Hypocalcemia  -replace. Recheck. Chronic conditions:  Chronic alcohol abuse  Hypertension  Marfan syndrome with ocular manifestation  Anxiety disorder                 Diet ADULT DIET; Clear Liquid   DVT Prophylaxis [] Lovenox, []  Heparin, [x] SCDs, [] Ambulation,  [] Eliquis, [] Xarelto  [] Coumadin   Code Status Full Code   Disposition From: hospital  Expected Disposition: home  Estimated Date of Discharge: in 4 days  Patient requires continued admission due to require closer monitoring for hematoma, DT with confusion   Surrogate Decision Maker/ POA unknown     Subjective:     Chief Complaint: Leg Pain  Right lower extremity pain    Nighat Florez is a 39 y.o. male as per nurse he has had some confusion and is on precedex drip. Staff and wife have also noticed bruising behind left knee but mild. Transfuse 1 unit. BC today. Review of Systems:      ROS negative except for as above. Objective:        Intake/Output Summary (Last 24 hours) at 4/20/2022 0957  Last data filed at 4/20/2022 0600  Gross per 24 hour   Intake --   Output 1900 ml   Net -1900 ml        Vitals:   Vitals:    04/20/22 0915   BP: 104/63   Pulse: 58   Resp: 12   Temp:    SpO2: 93%       Physical Exam:     General: Patient is a 70-year-old  male , appears stated age ,NAD, laying in be  Eyes: scleral icterus+  ENT: neck supple  Cardiovascular: Regular rate and rhythm. Respiratory: Clear to auscultation, on nasal canula  Gastroin. testinal: Soft, non tender, nondistended  Genitourinary: no suprapubic tenderness, has pierce  Musculoskeletal:  visible hematoma posterior aspect of RLE extending from gluteus to popliteal space, tender to palpation, may have mild bruising left lower extremity  Skin: warm, dry, jaundiced appears slightly improved  Neuro: Lethargic, occasionally wakes up during exam and goes back to sleep  Psych: Mood appropriate.      Medications:   Medications:    rifAXIMin  550 mg Oral BID    lactulose  20 g Oral TID    cefepime  2,000 mg IntraVENous Q12H    metroNIDAZOLE  500 mg IntraVENous Q8H    thiamine  100 mg IntraVENous Daily    lidocaine PF  5 mL IntraDERmal Once    sodium chloride flush  5-40 mL IntraVENous 2 times per day    vancomycin  1,250 mg IntraVENous Q12H    sodium chloride flush  5-40 mL IntraVENous 2 times per day    methylPREDNISolone  20 mg IntraVENous Q12H      Infusions:    sodium chloride      sodium chloride      sodium chloride 150 mL/hr at 04/18/22 2133    sodium chloride      sodium chloride      dexmedetomidine HCl in NaCl 0.686 mcg/kg/hr (04/20/22 0946)    sodium chloride       PRN Meds: sodium chloride, , PRN  oxyCODONE, 5 mg, Q6H PRN  sodium chloride flush, 5-40 mL, PRN  sodium chloride, , PRN  ondansetron, 4 mg, Q8H PRN   Or  ondansetron, 4 mg, Q6H PRN  polyethylene glycol, 17 g, Daily PRN  acetaminophen, 650 mg, Q6H PRN   Or  acetaminophen, 650 mg, Q6H PRN  HYDROmorphone, 1 mg, Q3H PRN  naloxone, 0.4 mg, PRN  sodium chloride flush, 5-40 mL, PRN  sodium chloride, , PRN  sodium chloride flush, 5-40 mL, PRN  sodium chloride, , PRN  LORazepam, 1 mg, Q1H PRN   Or  LORazepam, 1 mg, Q1H PRN   Or  LORazepam, 2 mg, Q1H PRN   Or  LORazepam, 2 mg, Q1H PRN   Or  LORazepam, 3 mg, Q1H PRN   Or  LORazepam, 3 mg, Q1H PRN Or  LORazepam, 4 mg, Q1H PRN   Or  LORazepam, 4 mg, Q1H PRN  sodium chloride, , PRN        Labs      Recent Results (from the past 24 hour(s))   Hemoglobin and Hematocrit    Collection Time: 04/19/22 11:38 AM   Result Value Ref Range    Hemoglobin 7.8 (L) 13.5 - 18.0 GM/DL    Hematocrit 23.7 (L) 42 - 52 %   Hemoglobin and Hematocrit    Collection Time: 04/19/22  7:40 PM   Result Value Ref Range    Hemoglobin 7.5 (L) 13.5 - 18.0 GM/DL    Hematocrit 22.8 (L) 42 - 52 %   Vancomycin Level, Trough    Collection Time: 04/20/22  2:55 AM   Result Value Ref Range    Vancomycin Tr 14.3 10 - 20 UG/ML    DOSE AMOUNT DOSE AMT.  GIVEN - 1250     DOSE TIME DOSE TIME GIVEN - 0300    Hemoglobin and Hematocrit    Collection Time: 04/20/22  2:56 AM   Result Value Ref Range    Hemoglobin 7.1 (L) 13.5 - 18.0 GM/DL    Hematocrit 21.9 (L) 42 - 52 %   Calcium, Ionized    Collection Time: 04/20/22  5:00 AM   Result Value Ref Range    Ionized Ca 1.04 (L) 1.12 - 1.32 mMOL/L    Calcium, Ion 4.16 (L) 4.48 - 5.28 MG/DL   Protime-INR    Collection Time: 04/20/22  5:00 AM   Result Value Ref Range    Protime 15.0 (H) 11.7 - 14.5 SECONDS    INR 1.16 INDEX   Comprehensive Metabolic Panel    Collection Time: 04/20/22  5:00 AM   Result Value Ref Range    Sodium 130 (L) 135 - 145 MMOL/L    Potassium 4.9 3.5 - 5.1 MMOL/L    Chloride 100 99 - 110 mMol/L    CO2 20 (L) 21 - 32 MMOL/L    BUN 29 (H) 6 - 23 MG/DL    CREATININE 0.7 (L) 0.9 - 1.3 MG/DL    Glucose 141 (H) 70 - 99 MG/DL    Calcium 6.6 (LL) 8.3 - 10.6 MG/DL    Albumin 2.3 (L) 3.4 - 5.0 GM/DL    Total Protein 3.2 (L) 6.4 - 8.2 GM/DL    Total Bilirubin 21.3 (HH) 0.0 - 1.0 MG/DL    ALT 59 (H) 10 - 40 U/L     (H) 15 - 37 IU/L    Alkaline Phosphatase 224 (H) 40 - 128 IU/L    GFR Non-African American >60 >60 mL/min/1.73m2    GFR African American >60 >60 mL/min/1.73m2    Anion Gap 10 4 - 16   Ammonia    Collection Time: 04/20/22  5:00 AM   Result Value Ref Range    Ammonia 37 16 - 60 UMOL/L   Lactic Acid    Collection Time: 04/20/22  6:00 AM   Result Value Ref Range    Lactate 1.3 0.4 - 2.0 mMOL/L        Imaging/Diagnostics Last 24 Hours   XR FEMUR RIGHT (MIN 2 VIEWS)    Result Date: 4/18/2022  EXAMINATION: 4 XRAY VIEWS OF THE RIGHT FEMUR 4/18/2022 1:16 am COMPARISON: None. HISTORY: ORDERING SYSTEM PROVIDED HISTORY: pain TECHNOLOGIST PROVIDED HISTORY: Reason for exam:->pain Reason for Exam: right leg pain Additional signs and symptoms: patient complains of right thigh pain, nki FINDINGS: There is mild joint space narrowing in the right hip. No fracture or dislocation is seen. The joint spaces are intact. There is some mild sclerosis scattered along the distal femur which is ill-defined. There is no periosteal reaction. Moderate osteoarthritic changes of the right hip with no acute bony abnormality. Vague mild sclerosis scattered along the distal femur centrally which could represent an old infarct bone infarct or small enchondroma. Recommend orthopedic follow-up and suggest bone scan correlation, if indicated     CT ABDOMEN PELVIS W IV CONTRAST Additional Contrast? None    Result Date: 4/18/2022  EXAMINATION: CT OF THE ABDOMEN AND PELVIS WITH CONTRAST, 4/18/2022 2:01 am TECHNIQUE: CT of the abdomen and pelvis was performed with the administration of intravenous contrast. Multiplanar reformatted images are provided for review. Dose modulation, iterative reconstruction, and/or weight based adjustment of the mA/kV was utilized to reduce the radiation dose to as low as reasonably achievable. COMPARISON: None HISTORY: ORDERING SYSTEM PROVIDED HISTORY: Jaundiced, right hip pain and thigh pain, elevated lactic acid, elevated bili. TECHNOLOGIST PROVIDED HISTORY: Reason for exam:->Jaundiced, right hip pain and thigh pain, elevated lactic acid, elevated bili.  Additional Contrast?->None Decision Support Exception - unselect if not a suspected or confirmed emergency medical condition->Emergency Medical Condition (MA) Reason for Exam: Jaundiced, right hip pain and thigh pain, elevated lactic acid, elevated bili. FINDINGS: Lower Chest: The visualized lungs are clear. Organs: Severe diffuse fatty infiltration of the liver is noted. The spleen, adrenal glands, pancreas, and kidneys are unremarkable. Gallbladder wall thickening is seen GI/Bowel: There is no evidence of bowel obstruction. The appendix is normal. Pelvis: The bladder is unremarkable. There is no free fluid. Peritoneum/Retroperitoneum: There is no free air or lymphadenopathy. Bones/Soft Tissues: No destructive osseous lesions are identified. There is a fluid collection between the right gluteus medius and gluteus naomi muscles with a hematocrit level, concerning for a hematoma. This measures 11.1 x 4.2 x 16.7 cm. There is diffuse enlargement of the adductor muscles of the right lower extremity. 1. There is a hematoma measuring 11.1 x 4.2 x 16.7 cm between the right gluteus medius and naomi muscles. There is diffuse enlargement of the adductor muscles of the right lower extremity, likely related to edema. 2. Severe diffuse fatty infiltration of the liver. Gallbladder wall thickening is seen. This could be related to chronic liver disease. If there is concern for acute cholecystitis, a right upper quadrant ultrasound can be obtained. CT FEMUR RIGHT W CONTRAST    Result Date: 4/18/2022  EXAMINATION: CT OF THE RIGHT FEMUR WITH CONTRAST 4/18/2022 2:01 am TECHNIQUE: CT of the right femur was performed with the administration of intravenous contrast.  Multiplanar reformatted images are provided for review. Dose modulation, iterative reconstruction, and/or weight based adjustment of the mA/kV was utilized to reduce the radiation dose to as low as reasonably achievable. COMPARISON: None.  HISTORY ORDERING SYSTEM PROVIDED HISTORY: pain TECHNOLOGIST PROVIDED HISTORY: Reason for exam:->pain Decision Support Exception - unselect if not a suspected or confirmed emergency medical condition->Emergency Medical Condition (MA) Reason for Exam: Jaundiced, right hip pain and thigh pain, elevated lactic acid, elevated bili FINDINGS: THE STUDY IS LIMITED DUE TO LACK OF INTRAVENOUS CONTRAST. Bones: No acute fracture or dislocation. 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 No suspicious sclerotic or lytic lesion. .  No CT evidence of osteomyelitis. Fusion of the proximal tibiofibular joint, likely congenital. Soft Tissue: Ill-defined hypodensity in the right gluteus medius muscle, image 1. Also, a smaller ill-defined hyperdensity in the right gluteus naomi muscle, image number 34 no soft tissue gas. No radiopaque foreign body. Joint: No significant hip or knee joint effusion. Mild degenerative changes of the right hip joint are seen. Ill-defined hypodensity in the right gluteus medius muscle and ill-defined hyperdensity in the right gluteus naomi muscle. These may represent hematomas in different phases of evolution. However simple and complex abscesses cannot be excluded. Clinical correlation and further evaluation by MRI with and without IV contrast recommended. VL DUP LOWER EXTREMITY VENOUS RIGHT    Result Date: 4/18/2022  EXAMINATION: DUPLEX VENOUS ULTRASOUND OF THE RIGHT LOWER EXTREMITY 4/18/2022 1:10 am TECHNIQUE: Duplex ultrasound using B-mode/gray scaled imaging and Doppler spectral analysis and color flow was obtained of the deep venous structures of the right extremity. COMPARISON: None. HISTORY: ORDERING SYSTEM PROVIDED HISTORY: pain nki TECHNOLOGIST PROVIDED HISTORY: Reason for exam:->pain nki Reason for Exam: Rt leg pain FINDINGS: The visualized veins of the right lower extremity are patent and free of echogenic thrombus. The veins demonstrate good compressibility with normal color flow study and spectral analysis. No evidence of DVT in the right lower extremity.        Electronically signed by Sadaf Hicks MD on 4/20/2022 at 9: 57 AM

## 2022-04-20 NOTE — PROGRESS NOTES
GENERAL SURGERY INPATIENT PROGRESS NOTE  [facility]    PATIENT: Isha Toledo, 39 y.o., male, MRN: [id number]    Hospital Day:  LOS: 2 days     Isha Toledo is a 39 y.o. male with acute alcoholic hepatitis with a Tbili of 23. CT scan showed intramuscular hematoma of the right gluteus muscles        Subjective:  Chief Complaint: (calm on Precedex)  Pain: controlled  BM:    Diet: ADULT DIET; Clear Liquid  Activity: as tolerated    Had a fall 2 nights ago where he slid to the floor and landed on his bottom. Getting a transfusion this AM, Hb ~7 on last few checks, 8.4 post transfusion yesterday.       Objective:    Vitals: /63   Pulse 58   Temp 98.6 °F (37 °C)   Resp 12   Ht 6' 4\" (1.93 m)   Wt 257 lb 4.4 oz (116.7 kg)   SpO2 93%   BMI 31.32 kg/m²   Vital Signs (Last 24 Hours)  Temp  Av.5 °F (36.9 °C)  Min: 97.9 °F (36.6 °C)  Max: 98.8 °F (37.1 °C)  Pulse  Av.6  Min: 55  Max: 67  BP  Min: 83/59  Max: 130/86  Resp  Av.6  Min: 9  Max: 21  SpO2  Av %  Min: 90 %  Max: 98 %  Wt Readings from Last 3 Encounters:   22 257 lb 4.4 oz (116.7 kg)   22 240 lb (108.9 kg)   22 254 lb (115.2 kg)       I/O:  1901 -  0700  In: 282.5 [I.V.:30]  Out: 1900 [Urine:1700]    IV Fluids:   sodium chloride    sodium chloride    sodium chloride Last Rate: 150 mL/hr at 22 2133    sodium chloride    sodium chloride    dexmedetomidine HCl in NaCl Last Rate: 0.686 mcg/kg/hr (22 0946)    sodium chloride    Scheduled Meds: rifAXIMin, 550 mg, Oral, BID    lactulose, 20 g, Oral, TID    cefepime, 2,000 mg, IntraVENous, Q12H    metroNIDAZOLE, 500 mg, IntraVENous, Q8H    thiamine, 100 mg, IntraVENous, Daily    lidocaine PF, 5 mL, IntraDERmal, Once    sodium chloride flush, 5-40 mL, IntraVENous, 2 times per day    vancomycin, 1,250 mg, IntraVENous, Q12H    sodium chloride flush, 5-40 mL, IntraVENous, 2 times per day    methylPREDNISolone, 20 mg, IntraVENous, Q12H    Physical Exam:  General Appearance:   Calm, no distress    Head:   Normocephalic, atraumatic    Lungs:    Equal chest rise, respirations unlabored   Heart:   Regular rate and rhythm    Abdomen:    Soft, non-tender, no rebound or guarding    Extremities:  No cyanosis. Right lower extremity edema, compartments soft, scattered ecchymoses of the bilateral lower extremities. Pulses intact. Neurologic:  Nonfocal        Labs/Imaging Results:   Recent Results (from the past 24 hour(s))   Hemoglobin and Hematocrit    Collection Time: 04/19/22 11:38 AM   Result Value Ref Range    Hemoglobin 7.8 (L) 13.5 - 18.0 GM/DL    Hematocrit 23.7 (L) 42 - 52 %   Hemoglobin and Hematocrit    Collection Time: 04/19/22  7:40 PM   Result Value Ref Range    Hemoglobin 7.5 (L) 13.5 - 18.0 GM/DL    Hematocrit 22.8 (L) 42 - 52 %   Vancomycin Level, Trough    Collection Time: 04/20/22  2:55 AM   Result Value Ref Range    Vancomycin Tr 14.3 10 - 20 UG/ML    DOSE AMOUNT DOSE AMT.  GIVEN - 1250     DOSE TIME DOSE TIME GIVEN - 0300    Hemoglobin and Hematocrit    Collection Time: 04/20/22  2:56 AM   Result Value Ref Range    Hemoglobin 7.1 (L) 13.5 - 18.0 GM/DL    Hematocrit 21.9 (L) 42 - 52 %   Calcium, Ionized    Collection Time: 04/20/22  5:00 AM   Result Value Ref Range    Ionized Ca 1.04 (L) 1.12 - 1.32 mMOL/L    Calcium, Ion 4.16 (L) 4.48 - 5.28 MG/DL   Protime-INR    Collection Time: 04/20/22  5:00 AM   Result Value Ref Range    Protime 15.0 (H) 11.7 - 14.5 SECONDS    INR 1.16 INDEX   Comprehensive Metabolic Panel    Collection Time: 04/20/22  5:00 AM   Result Value Ref Range    Sodium 130 (L) 135 - 145 MMOL/L    Potassium 4.9 3.5 - 5.1 MMOL/L    Chloride 100 99 - 110 mMol/L    CO2 20 (L) 21 - 32 MMOL/L    BUN 29 (H) 6 - 23 MG/DL    CREATININE 0.7 (L) 0.9 - 1.3 MG/DL    Glucose 141 (H) 70 - 99 MG/DL    Calcium 6.6 (LL) 8.3 - 10.6 MG/DL    Albumin 2.3 (L) 3.4 - 5.0 GM/DL    Total Protein 3.2 (L) 6.4 - 8.2 GM/DL    Total Bilirubin 21.3 (HH) 0.0 - 1.0 MG/DL    ALT 59 (H) 10 - 40 U/L     (H) 15 - 37 IU/L    Alkaline Phosphatase 224 (H) 40 - 128 IU/L    GFR Non-African American >60 >60 mL/min/1.73m2    GFR African American >60 >60 mL/min/1.73m2    Anion Gap 10 4 - 16   Ammonia    Collection Time: 04/20/22  5:00 AM   Result Value Ref Range    Ammonia 37 16 - 60 UMOL/L   Lactic Acid    Collection Time: 04/20/22  6:00 AM   Result Value Ref Range    Lactate 1.3 0.4 - 2.0 mMOL/L         Assessment:  Freddy Sim is a 39 y.o. male with  acute alcoholic hepatitis with a Tbili of 19. CT scan showed intramuscular hematoma of the right gluteus muscles     Principal Problem:    Intramuscular hematoma  Active Problems:    Right leg pain  Resolved Problems:    * No resolved hospital problems. *      Plan:  · Imaging showed hematoma was similar in size on re-evaluation. Monitor for now and transfuse for Hb <7  · Compartments are soft, neurovascularly intact. Continue to check with vital signs. · Appreciate Dr. Kate Martinez recommendations, in alcohol withdrawal.   · Alcohol abuse. Continue CIWA protocols, on Precedex currently. · Gallbladder wall thickening - likely secondary to hepatic dysfunction given his LFTs and current imaging, also with recent HIDA scan showing cholestasis in the liver.  No surgical plans at this time  · Will continue to follow  · Thank you for the consultation and the opportunity to care for Freddy Sim     Electronically signed: Jake Santillan MD 4/20/2022 9:49 AM

## 2022-04-21 ENCOUNTER — APPOINTMENT (OUTPATIENT)
Dept: ULTRASOUND IMAGING | Age: 42
DRG: 871 | End: 2022-04-21
Payer: COMMERCIAL

## 2022-04-21 LAB
ALBUMIN SERPL-MCNC: 2.2 GM/DL (ref 3.4–5)
ALBUMIN SERPL-MCNC: 2.58 G/DL (ref 3.75–5.01)
ALP BLD-CCNC: 238 IU/L (ref 40–128)
ALPHA-1-GLOBULIN: 0.26 G/DL (ref 0.19–0.46)
ALPHA-2-GLOBULIN: 0.41 G/DL (ref 0.48–1.05)
ALT SERPL-CCNC: 87 U/L (ref 10–40)
AMMONIA: 24 UMOL/L (ref 16–60)
ANION GAP SERPL CALCULATED.3IONS-SCNC: 7 MMOL/L (ref 4–16)
ANISOCYTOSIS: ABNORMAL
AST SERPL-CCNC: 519 IU/L (ref 15–37)
BANDED NEUTROPHILS ABSOLUTE COUNT: 0.25 K/CU MM
BANDED NEUTROPHILS RELATIVE PERCENT: 2 % (ref 5–11)
BETA GLOBULIN: 0.36 G/DL (ref 0.48–1.1)
BILIRUB SERPL-MCNC: 22.4 MG/DL (ref 0–1)
BUN BLDV-MCNC: 25 MG/DL (ref 6–23)
CALCIUM IONIZED: 4.28 MG/DL (ref 4.48–5.28)
CALCIUM SERPL-MCNC: 6.9 MG/DL (ref 8.3–10.6)
CHLORIDE BLD-SCNC: 102 MMOL/L (ref 99–110)
CMV IGM: <8 AU/ML
CO2: 22 MMOL/L (ref 21–32)
CREAT SERPL-MCNC: 0.4 MG/DL (ref 0.9–1.3)
DIFFERENTIAL TYPE: ABNORMAL
EBV EARLY ANTIGEN AB, IGG: <5 U/ML (ref 0–10.9)
EBV NUCLEAR AG AB: 65.2 U/ML (ref 0–21.9)
EPSTEIN-BARR VCA IGG: 46.7 U/ML (ref 0–21.9)
EPSTEIN-BARR VCA IGM: <10 U/ML (ref 0–43.9)
GAMMA: 0.38 G/DL (ref 0.62–1.51)
GFR AFRICAN AMERICAN: >60 ML/MIN/1.73M2
GFR NON-AFRICAN AMERICAN: >60 ML/MIN/1.73M2
GLUCOSE BLD-MCNC: 121 MG/DL (ref 70–99)
HCT VFR BLD CALC: 20.3 % (ref 42–52)
HCT VFR BLD CALC: 22.1 % (ref 42–52)
HCT VFR BLD CALC: 22.8 % (ref 42–52)
HCT VFR BLD CALC: 23.8 % (ref 42–52)
HEMOGLOBIN: 6.6 GM/DL (ref 13.5–18)
HEMOGLOBIN: 7.2 GM/DL (ref 13.5–18)
HEMOGLOBIN: 7.4 GM/DL (ref 13.5–18)
HEMOGLOBIN: 7.7 GM/DL (ref 13.5–18)
HSVI/II COMB AB IGM: 0.54 IV
IMMUNOFIXATION REFLEX: ABNORMAL
INR BLD: 1.08 INDEX
IONIZED CA: 1.07 MMOL/L (ref 1.12–1.32)
LACTATE: 1.2 MMOL/L (ref 0.4–2)
LYMPHOCYTES ABSOLUTE: 1.6 K/CU MM
LYMPHOCYTES RELATIVE PERCENT: 13 % (ref 24–44)
MACROCYTES: ABNORMAL
MCH RBC QN AUTO: 31.6 PG (ref 27–31)
MCHC RBC AUTO-ENTMCNC: 32.6 % (ref 32–36)
MCV RBC AUTO: 96.9 FL (ref 78–100)
METAMYELOCYTES ABSOLUTE COUNT: 0.25 K/CU MM
METAMYELOCYTES PERCENT: 2 %
MONOCYTES ABSOLUTE: 1.2 K/CU MM
MONOCYTES RELATIVE PERCENT: 10 % (ref 0–4)
MYELOCYTE PERCENT: 1 %
MYELOCYTES ABSOLUTE COUNT: 0.12 K/CU MM
NUCLEATED RED BLOOD CELLS: 12
PDW BLD-RTO: 22.8 % (ref 11.7–14.9)
PLATELET # BLD: 266 K/CU MM (ref 140–440)
PMV BLD AUTO: 10.4 FL (ref 7.5–11.1)
POLYCHROMASIA: ABNORMAL
POTASSIUM SERPL-SCNC: 4.6 MMOL/L (ref 3.5–5.1)
PROTEIN ELECTROPHORESIS, SERUM: ABNORMAL
PROTHROMBIN TIME: 14 SECONDS (ref 11.7–14.5)
RBC # BLD: 2.28 M/CU MM (ref 4.6–6.2)
RBC # BLD: ABNORMAL 10*6/UL
SEGMENTED NEUTROPHILS ABSOLUTE COUNT: 8.9 K/CU MM
SEGMENTED NEUTROPHILS RELATIVE PERCENT: 72 % (ref 36–66)
SODIUM BLD-SCNC: 131 MMOL/L (ref 135–145)
SPE/IFE INTERPRETATION: ABNORMAL
TOTAL PROTEIN: 3.2 GM/DL (ref 6.4–8.2)
TOTAL PROTEIN: 4 G/DL (ref 6.3–8.2)
WBC # BLD: 12.3 K/CU MM (ref 4–10.5)

## 2022-04-21 PROCEDURE — 83605 ASSAY OF LACTIC ACID: CPT

## 2022-04-21 PROCEDURE — 36430 TRANSFUSION BLD/BLD COMPNT: CPT

## 2022-04-21 PROCEDURE — 6360000002 HC RX W HCPCS: Performed by: PHYSICIAN ASSISTANT

## 2022-04-21 PROCEDURE — 6360000002 HC RX W HCPCS: Performed by: SPECIALIST

## 2022-04-21 PROCEDURE — 2580000003 HC RX 258: Performed by: PHYSICIAN ASSISTANT

## 2022-04-21 PROCEDURE — 82140 ASSAY OF AMMONIA: CPT

## 2022-04-21 PROCEDURE — 2500000003 HC RX 250 WO HCPCS: Performed by: NURSE PRACTITIONER

## 2022-04-21 PROCEDURE — 6370000000 HC RX 637 (ALT 250 FOR IP): Performed by: HOSPITALIST

## 2022-04-21 PROCEDURE — 85027 COMPLETE CBC AUTOMATED: CPT

## 2022-04-21 PROCEDURE — 6360000002 HC RX W HCPCS: Performed by: HOSPITALIST

## 2022-04-21 PROCEDURE — 6360000002 HC RX W HCPCS: Performed by: INTERNAL MEDICINE

## 2022-04-21 PROCEDURE — 2500000003 HC RX 250 WO HCPCS: Performed by: INTERNAL MEDICINE

## 2022-04-21 PROCEDURE — 2000000000 HC ICU R&B

## 2022-04-21 PROCEDURE — 85018 HEMOGLOBIN: CPT

## 2022-04-21 PROCEDURE — 99232 SBSQ HOSP IP/OBS MODERATE 35: CPT | Performed by: SPECIALIST

## 2022-04-21 PROCEDURE — 80053 COMPREHEN METABOLIC PANEL: CPT

## 2022-04-21 PROCEDURE — 85610 PROTHROMBIN TIME: CPT

## 2022-04-21 PROCEDURE — 76705 ECHO EXAM OF ABDOMEN: CPT

## 2022-04-21 PROCEDURE — 85014 HEMATOCRIT: CPT

## 2022-04-21 PROCEDURE — 99223 1ST HOSP IP/OBS HIGH 75: CPT | Performed by: INTERNAL MEDICINE

## 2022-04-21 PROCEDURE — 99232 SBSQ HOSP IP/OBS MODERATE 35: CPT | Performed by: SURGERY

## 2022-04-21 PROCEDURE — 6370000000 HC RX 637 (ALT 250 FOR IP): Performed by: SPECIALIST

## 2022-04-21 PROCEDURE — 6360000002 HC RX W HCPCS: Performed by: NURSE PRACTITIONER

## 2022-04-21 PROCEDURE — 85007 BL SMEAR W/DIFF WBC COUNT: CPT

## 2022-04-21 PROCEDURE — P9016 RBC LEUKOCYTES REDUCED: HCPCS

## 2022-04-21 PROCEDURE — 82330 ASSAY OF CALCIUM: CPT

## 2022-04-21 PROCEDURE — 2580000003 HC RX 258: Performed by: INTERNAL MEDICINE

## 2022-04-21 RX ORDER — CALCIUM GLUCONATE 20 MG/ML
2000 INJECTION, SOLUTION INTRAVENOUS ONCE
Status: COMPLETED | OUTPATIENT
Start: 2022-04-21 | End: 2022-04-21

## 2022-04-21 RX ORDER — FUROSEMIDE 10 MG/ML
20 INJECTION INTRAMUSCULAR; INTRAVENOUS SEE ADMIN INSTRUCTIONS
Status: COMPLETED | OUTPATIENT
Start: 2022-04-21 | End: 2022-04-21

## 2022-04-21 RX ORDER — SODIUM CHLORIDE 9 MG/ML
INJECTION, SOLUTION INTRAVENOUS PRN
Status: DISCONTINUED | OUTPATIENT
Start: 2022-04-21 | End: 2022-04-25

## 2022-04-21 RX ORDER — ALBUTEROL SULFATE 90 UG/1
2 AEROSOL, METERED RESPIRATORY (INHALATION) EVERY 6 HOURS PRN
Status: DISCONTINUED | OUTPATIENT
Start: 2022-04-21 | End: 2022-05-02 | Stop reason: HOSPADM

## 2022-04-21 RX ADMIN — HYDROMORPHONE HYDROCHLORIDE 1 MG: 1 INJECTION, SOLUTION INTRAMUSCULAR; INTRAVENOUS; SUBCUTANEOUS at 18:56

## 2022-04-21 RX ADMIN — DEXMEDETOMIDINE HYDROCHLORIDE 1.5 MCG/KG/HR: 4 INJECTION, SOLUTION INTRAVENOUS at 20:47

## 2022-04-21 RX ADMIN — METHYLPREDNISOLONE SODIUM SUCCINATE 20 MG: 40 INJECTION, POWDER, LYOPHILIZED, FOR SOLUTION INTRAMUSCULAR; INTRAVENOUS at 15:29

## 2022-04-21 RX ADMIN — CEFEPIME 2000 MG: 2 INJECTION, POWDER, FOR SOLUTION INTRAVENOUS at 00:25

## 2022-04-21 RX ADMIN — SODIUM CHLORIDE, PRESERVATIVE FREE 10 ML: 5 INJECTION INTRAVENOUS at 09:01

## 2022-04-21 RX ADMIN — SODIUM CHLORIDE, PRESERVATIVE FREE 10 ML: 5 INJECTION INTRAVENOUS at 09:13

## 2022-04-21 RX ADMIN — LACTULOSE 20 G: 10 SOLUTION ORAL at 12:58

## 2022-04-21 RX ADMIN — OXYCODONE HYDROCHLORIDE 5 MG: 5 TABLET ORAL at 10:11

## 2022-04-21 RX ADMIN — DEXMEDETOMIDINE HYDROCHLORIDE 1.5 MCG/KG/HR: 4 INJECTION, SOLUTION INTRAVENOUS at 04:01

## 2022-04-21 RX ADMIN — SODIUM CHLORIDE: 9 INJECTION, SOLUTION INTRAVENOUS at 23:56

## 2022-04-21 RX ADMIN — HYDROMORPHONE HYDROCHLORIDE 1 MG: 1 INJECTION, SOLUTION INTRAMUSCULAR; INTRAVENOUS; SUBCUTANEOUS at 12:58

## 2022-04-21 RX ADMIN — LORAZEPAM 4 MG: 2 INJECTION INTRAMUSCULAR; INTRAVENOUS at 20:13

## 2022-04-21 RX ADMIN — SODIUM CHLORIDE: 9 INJECTION, SOLUTION INTRAVENOUS at 16:13

## 2022-04-21 RX ADMIN — RIFAXIMIN 550 MG: 550 TABLET ORAL at 09:00

## 2022-04-21 RX ADMIN — LORAZEPAM 2 MG: 2 INJECTION INTRAMUSCULAR; INTRAVENOUS at 02:26

## 2022-04-21 RX ADMIN — LORAZEPAM 4 MG: 2 INJECTION INTRAMUSCULAR; INTRAVENOUS at 22:16

## 2022-04-21 RX ADMIN — DEXMEDETOMIDINE HYDROCHLORIDE 1.5 MCG/KG/HR: 4 INJECTION, SOLUTION INTRAVENOUS at 11:08

## 2022-04-21 RX ADMIN — DEXMEDETOMIDINE HYDROCHLORIDE 1.5 MCG/KG/HR: 4 INJECTION, SOLUTION INTRAVENOUS at 18:14

## 2022-04-21 RX ADMIN — METHYLPREDNISOLONE SODIUM SUCCINATE 20 MG: 40 INJECTION, POWDER, LYOPHILIZED, FOR SOLUTION INTRAMUSCULAR; INTRAVENOUS at 03:24

## 2022-04-21 RX ADMIN — THIAMINE HYDROCHLORIDE 100 MG: 100 INJECTION, SOLUTION INTRAMUSCULAR; INTRAVENOUS at 09:01

## 2022-04-21 RX ADMIN — LORAZEPAM 2 MG: 2 INJECTION INTRAMUSCULAR; INTRAVENOUS at 06:26

## 2022-04-21 RX ADMIN — CEFEPIME 2000 MG: 2 INJECTION, POWDER, FOR SOLUTION INTRAVENOUS at 13:07

## 2022-04-21 RX ADMIN — LACTULOSE 20 G: 10 SOLUTION ORAL at 09:00

## 2022-04-21 RX ADMIN — METRONIDAZOLE 500 MG: 500 INJECTION, SOLUTION INTRAVENOUS at 02:29

## 2022-04-21 RX ADMIN — VANCOMYCIN HYDROCHLORIDE 1250 MG: 10 INJECTION, POWDER, LYOPHILIZED, FOR SOLUTION INTRAVENOUS at 15:29

## 2022-04-21 RX ADMIN — DEXMEDETOMIDINE HYDROCHLORIDE 1.5 MCG/KG/HR: 4 INJECTION, SOLUTION INTRAVENOUS at 06:25

## 2022-04-21 RX ADMIN — HYDROMORPHONE HYDROCHLORIDE 1 MG: 1 INJECTION, SOLUTION INTRAMUSCULAR; INTRAVENOUS; SUBCUTANEOUS at 01:01

## 2022-04-21 RX ADMIN — LORAZEPAM 2 MG: 2 INJECTION INTRAMUSCULAR; INTRAVENOUS at 00:12

## 2022-04-21 RX ADMIN — LORAZEPAM 4 MG: 2 INJECTION INTRAMUSCULAR; INTRAVENOUS at 21:26

## 2022-04-21 RX ADMIN — SODIUM CHLORIDE: 9 INJECTION, SOLUTION INTRAVENOUS at 03:20

## 2022-04-21 RX ADMIN — DEXMEDETOMIDINE HYDROCHLORIDE 1.5 MCG/KG/HR: 4 INJECTION, SOLUTION INTRAVENOUS at 09:00

## 2022-04-21 RX ADMIN — FUROSEMIDE 20 MG: 10 INJECTION, SOLUTION INTRAVENOUS at 20:59

## 2022-04-21 RX ADMIN — LORAZEPAM 2 MG: 2 INJECTION INTRAMUSCULAR; INTRAVENOUS at 18:56

## 2022-04-21 RX ADMIN — SODIUM CHLORIDE, PRESERVATIVE FREE 10 ML: 5 INJECTION INTRAVENOUS at 20:19

## 2022-04-21 RX ADMIN — DEXMEDETOMIDINE HYDROCHLORIDE 1.5 MCG/KG/HR: 4 INJECTION, SOLUTION INTRAVENOUS at 01:35

## 2022-04-21 RX ADMIN — LORAZEPAM 2 MG: 2 INJECTION INTRAMUSCULAR; INTRAVENOUS at 09:01

## 2022-04-21 RX ADMIN — RIFAXIMIN 550 MG: 550 TABLET ORAL at 20:19

## 2022-04-21 RX ADMIN — DEXMEDETOMIDINE HYDROCHLORIDE 1.5 MCG/KG/HR: 4 INJECTION, SOLUTION INTRAVENOUS at 22:43

## 2022-04-21 RX ADMIN — LORAZEPAM 4 MG: 2 INJECTION INTRAMUSCULAR; INTRAVENOUS at 17:10

## 2022-04-21 RX ADMIN — LORAZEPAM 2 MG: 2 INJECTION INTRAMUSCULAR; INTRAVENOUS at 11:08

## 2022-04-21 RX ADMIN — HYDROMORPHONE HYDROCHLORIDE 1 MG: 1 INJECTION, SOLUTION INTRAMUSCULAR; INTRAVENOUS; SUBCUTANEOUS at 04:47

## 2022-04-21 RX ADMIN — DEXMEDETOMIDINE HYDROCHLORIDE 0.69 MCG/KG/HR: 4 INJECTION, SOLUTION INTRAVENOUS at 13:31

## 2022-04-21 RX ADMIN — DEXMEDETOMIDINE HYDROCHLORIDE 1.5 MCG/KG/HR: 4 INJECTION, SOLUTION INTRAVENOUS at 15:35

## 2022-04-21 RX ADMIN — METRONIDAZOLE 500 MG: 500 INJECTION, SOLUTION INTRAVENOUS at 10:58

## 2022-04-21 RX ADMIN — LORAZEPAM 2 MG: 2 INJECTION INTRAMUSCULAR; INTRAVENOUS at 14:38

## 2022-04-21 RX ADMIN — CALCIUM GLUCONATE 2000 MG: 20 INJECTION, SOLUTION INTRAVENOUS at 09:13

## 2022-04-21 RX ADMIN — VANCOMYCIN HYDROCHLORIDE 1250 MG: 10 INJECTION, POWDER, LYOPHILIZED, FOR SOLUTION INTRAVENOUS at 03:23

## 2022-04-21 ASSESSMENT — PAIN DESCRIPTION - PROGRESSION
CLINICAL_PROGRESSION: GRADUALLY WORSENING

## 2022-04-21 ASSESSMENT — PAIN SCALES - GENERAL
PAINLEVEL_OUTOF10: 6
PAINLEVEL_OUTOF10: 0
PAINLEVEL_OUTOF10: 8
PAINLEVEL_OUTOF10: 8
PAINLEVEL_OUTOF10: 0
PAINLEVEL_OUTOF10: 7
PAINLEVEL_OUTOF10: 0

## 2022-04-21 ASSESSMENT — PAIN DESCRIPTION - LOCATION
LOCATION: BUTTOCKS;BACK;LEG
LOCATION: GENERALIZED

## 2022-04-21 ASSESSMENT — PAIN DESCRIPTION - ORIENTATION
ORIENTATION: RIGHT
ORIENTATION: RIGHT

## 2022-04-21 ASSESSMENT — PAIN - FUNCTIONAL ASSESSMENT: PAIN_FUNCTIONAL_ASSESSMENT: PREVENTS OR INTERFERES SOME ACTIVE ACTIVITIES AND ADLS

## 2022-04-21 NOTE — PROGRESS NOTES
-CMV and EBV serologies not suggestive of acute hepatiis from those.  -increasing LFT's not typical for alcoholic hepatitis--  -abd US not suggestive that biliary tract is responsible    IMPRESSION:   1) I think there is underlying alcoholic hepatitis but concerned about a second process   2) abd US not suggestive that biliary tract is responsible   3) -??  DILI    Suggest:   1) unless documented infection would D/C Maxipime, Flagyl and Vanco ( or get ID consult for their opinion about need for the antibiotics)

## 2022-04-21 NOTE — PROGRESS NOTES
Currently sleeping after Dilaudid and Ativan earlier  nurse reports that he is still agitated when awake  Stool brown- no evidence of bleed  Afebrile, VSS  WBC down to 12.3  Hb 7.2    EXAM:  Vital signs: /87   Pulse 57   Temp 97 °F (36.1 °C)   Resp 11   Ht 6' 4\" (1.93 m)   Wt 257 lb 4.4 oz (116.7 kg)   SpO2 90%   BMI 31.32 kg/m²   Lungs clear to auscultation  Cor: regular rhythm w/o murmer  Abd: soft, non-tender, non distended  Extrem: trace edema    IMPRESSION:  1) alcohol abuse with withdrawal  2) severe alcoholic hepatitis- worsened LFT's yesterday   3) stage 1-2 PSE  4) anemia- no evidence of GI bleed at present        RECOMMENDATIONS:  1) US liver, repeat LFT's pending  2) continue Rx for alcohol withdrawal  3) continue steroids

## 2022-04-21 NOTE — PROGRESS NOTES
[facility]  consulted by Dr. Lam Laureano for monitoring and adjustment. Indication for treatment: Sepsis  Goal trough: [] 10-15 mcg/mL or [x] 15-20 mcg/ml  AUC/KADIE: [] <500 or [x] 400-600    Pertinent Laboratory Values:   Temp Readings from Last 3 Encounters:   04/21/22 98 °F (36.7 °C) (Oral)   02/23/22 97.8 °F (36.6 °C)   11/08/21 97.9 °F (36.6 °C)     Recent Labs     04/18/22  2147 04/19/22  0800 04/20/22  0600 04/21/22  0600   WBC 23.9* 16.8*  --  12.3*   LACTATE  --  2.1* 1.3 1.2     Recent Labs     04/19/22  0800 04/20/22  0500 04/21/22  0600   BUN 27* 29* 25*   CREATININE 1.6* 0.7* 0.4*     Estimated Creatinine Clearance: 340 mL/min (A) (based on SCr of 0.4 mg/dL (L)). Intake/Output Summary (Last 24 hours) at 4/21/2022 1543  Last data filed at 4/21/2022 1519  Gross per 24 hour   Intake 7542.41 ml   Output 5475 ml   Net 2067.41 ml       Pertinent Cultures:  Date    Source    Results  4/18   Blood    1/4 GPC    Vancomycin level:   TROUGH:    Recent Labs     04/20/22  0255   VANCOTROUGH 14.3     RANDOM:  No results for input(s): VANCORANDOM in the last 72 hours.     Assessment:  · SCr, BUN, and urine output:  · Scr was elevated earlier in admission, now at baseline  · Day(s) of therapy: 4  · Vancomycin concentration:   · 4/20: 14.3, 10 hours post-dose, , therapeutic (1250 q12h)    Plan:  · Continue vancomycin 1250 mg IVPB q12h  · Predicted  at steady state  · Repeat next level tomorrow AM  · Pharmacy will continue to monitor patient and adjust therapy as indicated    Joshua 3 4/22 @ 0200    Thank you for the consult,  Linda Garcia [facility], PharmD  4/21/2022 3:43 PM

## 2022-04-21 NOTE — CONSULTS
Hematology/Oncology  Consult Note      Reason for Consult:  ? Hemolytic anemia  Requesting Physician:  Hospitalist    CHIEF COMPLAINT:  Pain to right buttock X 3 d    History Obtained From:  electronic medical record, reason patient could not give history:  altered mental status due to EtOH withdrawal    HISTORY OF PRESENT ILLNESS:      The patient is a 39 y.o. male with significant past medical history of EtOH abuse who presents with above on 4/18/2022. He was jaundiced and found to have intramuscular hematoma of the right gluteus muscles. CT abdomen and pelvis 4/18/2022:   1. There is a hematoma measuring 11.1 x 4.2 x 16.7 cm between the right gluteus medius and naomi muscles.  There is diffuse enlargement of the adductor muscles of the right lower extremity, likely related to edema. 2. Severe diffuse fatty infiltration of the liver.  Gallbladder wall thickening is seen.  This could be related to chronic liver disease.  If there is concern for acute cholecystitis, a right upper quadrant ultrasound  can be obtained. CT right femur 4/18/2022:  Ill-defined hypodensity in the right gluteus medius muscle and ill-defined  hyperdensity in the right gluteus naomi muscle.  These may represent hematomas in different phases of evolution.  However simple and complex abscesses cannot be excluded.  Clinical correlation and further evaluation by MRI with and without IV contrast recommended. MRI abdomen 4/18/2022:  Markedly limited exam due to patient condition.   Mild gallbladder wall thickening, nonspecific, in the setting of underlying  liver disease.  No biliary duct dilation. Repeat CT abdomen 4/18/2022:  1. Severe diffuse fatty infiltration of the liver.   2. Hematoma involving the right gluteus medius and naomi musculature without significant change.  There is also a partially visualized hematoma surrounding the hamstring musculature on the right.  Continued follow-up is recommended until resolution. US abdomen 4/21/2022:  1. Limited exam.  2. Severe diffuse fatty infiltration of the liver. 3. Sludge is seen within the gallbladder lumen.  The gallbladder wall is  thickened at 6 mm however there is no evidence of a positive sonographic Reynoso sign.  Findings could be related to chronic liver disease. 4. No common bile duct dilatation. GI and surgeon were consulted. CBC on 4/5/2022 was grossly unremarkable with Hg 15.7 and .1. B-12 >2000. ALk phos 539, , , bilirubin 17.1. On admission 4/18/2022 WBC was 19.8, Hg 10.1, .1 and plt 391. ANC was 15.2. Ferritin ws 1266, TIBC <195, saturation <91. Iron 178. Hapto <10. TB was 18.8, direct 15.6 and indirect 3.2. LDH was 327. SHAUN was negative. He received PRBC on 4/202/2022. He is on currently on CIWA protocol. He is on steroid.     Past Medical History:        Diagnosis Date    Acute hepatitis 01/2022    severe nausea w weakness, elevated LFTs noted in ED 2/2022- worsened also fr nsaids and alcohol consumption    Allergic rhinitis     Ankle pain     INACTIVE PROBLEM    Anxiety     COVID-19 02/06/2022    Epigastric abdominal pain     INACTIVE PROBLEM    Essential hypertension 01/08/2018    Family history of coronary artery disease     Herniated lumbar intervertebral disc     Insomnia     LBP (low back pain)     Marfan's syndrome with ocular manifestation     DX'D BY OPTOM DR CANTU AT 6Y0    Oral herpes      Past Surgical History:        Procedure Laterality Date    BACK SURGERY      herniated ruptured disc 2008    TONSILLECTOMY      UMBILICAL HERNIA REPAIR N/A 7/2/2020    OPEN HERNIA UMBILICAL REPAIR performed by Harrison Bryant MD at [facility] OR       Current Medications:    Current Facility-Administered Medications: calcium gluconate 2000 mg in sodium chloride 100 mL, 2,000 mg, IntraVENous, Once  0.9 % sodium chloride infusion, , IntraVENous, PRN  rifaximin (XIFAXAN) tablet 550 mg, 550 mg, Oral, BID  oxyCODONE (ROXICODONE) immediate release tablet 5 mg, 5 mg, Oral, Q6H PRN  lactulose (CHRONULAC) 10 GM/15ML solution 20 g, 20 g, Oral, TID  sodium chloride flush 0.9 % injection 5-40 mL, 5-40 mL, IntraVENous, PRN  0.9 % sodium chloride infusion, , IntraVENous, PRN  ondansetron (ZOFRAN-ODT) disintegrating tablet 4 mg, 4 mg, Oral, Q8H PRN **OR** ondansetron (ZOFRAN) injection 4 mg, 4 mg, IntraVENous, Q6H PRN  polyethylene glycol (GLYCOLAX) packet 17 g, 17 g, Oral, Daily PRN  acetaminophen (TYLENOL) tablet 650 mg, 650 mg, Oral, Q6H PRN **OR** acetaminophen (TYLENOL) suppository 650 mg, 650 mg, Rectal, Q6H PRN  0.9 % sodium chloride infusion, , IntraVENous, Continuous  cefepime (MAXIPIME) 2000 mg IVPB minibag, 2,000 mg, IntraVENous, Q12H  metronidazole (FLAGYL) 500 mg in NaCl 100 mL IVPB premix, 500 mg, IntraVENous, Q8H  HYDROmorphone (DILAUDID) injection 1 mg, 1 mg, IntraVENous, Q3H PRN  naloxone (NARCAN) injection 0.4 mg, 0.4 mg, IntraVENous, PRN  thiamine (B-1) injection 100 mg, 100 mg, IntraVENous, Daily  lidocaine PF 1 % injection 5 mL, 5 mL, IntraDERmal, Once  sodium chloride flush 0.9 % injection 5-40 mL, 5-40 mL, IntraVENous, 2 times per day  sodium chloride flush 0.9 % injection 5-40 mL, 5-40 mL, IntraVENous, PRN  0.9 % sodium chloride infusion, , IntraVENous, PRN  vancomycin (VANCOCIN) 1250 mg in dextrose 5 % 250 mL IVPB, 1,250 mg, IntraVENous, Q12H  sodium chloride flush 0.9 % injection 5-40 mL, 5-40 mL, IntraVENous, 2 times per day  sodium chloride flush 0.9 % injection 5-40 mL, 5-40 mL, IntraVENous, PRN  0.9 % sodium chloride infusion, , IntraVENous, PRN  LORazepam (ATIVAN) tablet 1 mg, 1 mg, Oral, Q1H PRN **OR** LORazepam (ATIVAN) injection 1 mg, 1 mg, IntraVENous, Q1H PRN **OR** LORazepam (ATIVAN) tablet 2 mg, 2 mg, Oral, Q1H PRN **OR** LORazepam (ATIVAN) injection 2 mg, 2 mg, IntraVENous, Q1H PRN **OR** LORazepam (ATIVAN) tablet 3 mg, 3 mg, Oral, Q1H PRN **OR** LORazepam (ATIVAN) injection 3 mg, 3 mg, IntraVENous, Q1H PRN **OR** LORazepam (ATIVAN) tablet 4 mg, 4 mg, Oral, Q1H PRN **OR** LORazepam (ATIVAN) injection 4 mg, 4 mg, IntraVENous, Q1H PRN  dexmedetomidine (PRECEDEX) 400 mcg in sodium chloride 0.9 % 100 mL infusion, 0.1-1.5 mcg/kg/hr, IntraVENous, Continuous  methylPREDNISolone sodium (SOLU-MEDROL) injection 20 mg, 20 mg, IntraVENous, Q12H  0.9 % sodium chloride infusion, , IntraVENous, PRN    Allergies:  No known allergies    Social History:    TOBACCO:   reports that he has never smoked. He has never used smokeless tobacco.  ETOH:   reports current alcohol use. DRUGS:   reports no history of drug use. Family History:   History reviewed. No pertinent family history. REVIEW OF SYSTEMS:    Confused. No distress. PHYSICAL EXAM:      Vitals:    /74   Pulse 60   Temp 97 °F (36.1 °C)   Resp 13   Ht 6' 4\" (1.93 m)   Wt 257 lb 4.4 oz (116.7 kg)   SpO2 90%   BMI 31.32 kg/m²     CONSTITUTIONAL:  Awake and confused.  No distress  EYES:  pale, extra-ocular muscles intact and icteric sclerae  NECK:  supple, symmetrical, trachea midline and no lymphadenopathy  LUNGS:  clear to auscultation, no crackles or wheezing  CARDIOVASCULAR:  normal S1 and S2 and no murmur noted  ABDOMEN:  normal bowel sounds, soft, non-distended, non-tender and rectal tube and pierce cath noted  MUSCULOSKELETAL:  there is no redness, warmth, or swelling of the joints  full range of motion noted  NEUROLOGIC: cranial nerves II-XII are grossly intact  SKIN:  no bleeding and jaundice noted  DATA:    Labs:  General Labs:    CBC with Differential:    Lab Results   Component Value Date    WBC 12.3 04/21/2022    RBC 2.28 04/21/2022    HGB 7.2 04/21/2022    HCT 22.1 04/21/2022     04/21/2022    MCV 96.9 04/21/2022    MCH 31.6 04/21/2022    MCHC 32.6 04/21/2022    RDW 22.8 04/21/2022    NRBC 2 04/19/2022    SEGSPCT 69.0 04/19/2022    BANDSPCT 11 04/19/2022    LYMPHOPCT 12.0 04/19/2022    MONOPCT 7.0 04/19/2022    BASOPCT 0.5 04/18/2022    MONOSABS 1.2 04/19/2022    LYMPHSABS 2.0 04/19/2022    EOSABS 0.2 04/19/2022    BASOSABS 0.1 04/18/2022    DIFFTYPE MANUAL DIFFERENTIAL 04/19/2022     CMP:    Lab Results   Component Value Date     04/21/2022    K 4.6 04/21/2022     04/21/2022    CO2 22 04/21/2022    BUN 25 04/21/2022    CREATININE 0.4 04/21/2022    GFRAA >60 04/21/2022    AGRATIO 1.9 04/05/2022    LABGLOM >60 04/21/2022    GLUCOSE 121 04/21/2022    PROT 3.2 04/21/2022    LABALBU 2.2 04/21/2022    CALCIUM 6.9 04/21/2022    BILITOT 22.4 04/21/2022    ALKPHOS 238 04/21/2022     04/21/2022    ALT 87 04/21/2022       IMPRESSION/RECOMMENDATIONS:    1. Anemia is likely related to acute blood loss. He has intramuscular hematoma. Less likely hemolytic anemia. Will monitor CBC and transfuse PRN. 2. He is in alcohol withdrawal. GI is on board. Thanks for allowing us to participate in his care. Will follow up.

## 2022-04-21 NOTE — PROGRESS NOTES
V2.0  [facility] [place] [facility] Hospitalist Progress Note      Name:  Cuate Astudillo /Age/Sex: 1980  (39 y.o. male)   MRN & CSN:  [id number] & [id number] Encounter Date/Time: 2022 12:38 PM EDT    Location:  -A PCP: Janiya Addison MD       Hospital Day: 4    Assessment and Plan:   Cuate Astudillo is a 39 y.o. male with acute right lower extremity pain    Plan:  #  intramuscular hematoma vs abscesses in the right gluteus medius muscle            presented with acute right lower extremity pain x 3 days duration            CT study (femur/and abdomen and pelvis) showed hypodensity in the right gluteal muscle-possible hematoma versus abscess with diffuse edema             lower extremity DVT negative for thrombus             monitor for signs of compartment syndrome--pulses intact, no motor or sensory changes. -General surgery feels this is likely hematoma. Oxycodone ordered for pain. - Monitor left lower extremity if bruising becomes more evident and will scan for any signs of hematoma. #Acute alcoholic hepatitis vs less likely drug-induced hepatitis              child Friedman score-10              severe elevated bilirubin level 19, along with abnormal LFT, NH3-->83              HIDA scan on -showed hepatocellular dysfunction/biliary obstruction-no signs of cholecystitis               CT abdomen and pelvis with IV contrast on -showed diffuse fatty infiltration of the liver and gallbladder wall thickening                  F/u serologic liver work-up                  has been evaluated by GI-appreciate recommendation   -MRCP: Mild gallbladder wall thickening, no biliary duct dilation. Started oral lactulose. Xifaxan added. Ferritin 1.2K.    - MRCP limited by motion. NH3: 37. LFTs rising. -GI concern for drug-induced liver injury. Stop cefepime and Flagyl. RUQ : Severe diffuse fatty infiltration of liver; sludge within gallbladder and 6 mm thickened gallbladder wall.   GI feels not consistent with biliary etiology. High anion gap metabolic acidosis secondary to lactic acidosis                     lactic acid 9.1 on arrival---continue IV fluids  - Improved lactate 1.3. # alcohol withdrawal              diaphoresis, palpitations, tremors, confusion                CIWA score--[facility] protocol ordered                Iv thiamine, Precedex drip  -continues to have withdrawal symptoms. #Leukocytosis-possibly sepsis    UA-pending  -BCx: GPC 1 of 4 bottles. Await identification, may be contamination. Stop cefepime and Flagyl. Continue vancomycin. #Acute normocytic anemia              Elevated LDH  - Low haptoglobin. D/W heme-onc who states that this could be due to liver disease instead of hemolysis. - Transfused with 3 units PRBC total.  Hemoglobin 7.4. Hypocalcemia  -replace. Recheck. Scrotal bruising  - check scrotal ultrasound    Chronic conditions:  Chronic alcohol abuse  Hypertension  Marfan syndrome with ocular manifestation  Anxiety disorder                 Diet ADULT DIET; Clear Liquid   DVT Prophylaxis [] Lovenox, []  Heparin, [x] SCDs, [] Ambulation,  [] Eliquis, [] Xarelto  [] Coumadin   Code Status Full Code   Disposition From: hospital  Expected Disposition: home  Estimated Date of Discharge: 4/25/2022  Patient requires continued admission due to require closer monitoring for hematoma, DT with confusion   Surrogate Decision Maker/ POA unknown     Subjective:     Chief Complaint: Leg Pain  Right lower extremity pain    Calvin Suarez is a 39 y.o. male as per nurse has been more agitated. Wife feels patients last alcohol drink may be Saturday. Review of Systems:      ROS negative except for as above. Objective:        Intake/Output Summary (Last 24 hours) at 4/21/2022 1320  Last data filed at 4/21/2022 0641  Gross per 24 hour   Intake 7542.41 ml   Output 3975 ml   Net 3567.41 ml        Vitals:   Vitals:    04/21/22 1000   BP: (!) 100/52   Pulse: 58   Resp: 19   Temp:    SpO2:        Physical Exam:     General: no acute distress, confused  Eyes: scleral icterus+  HENT: NCAT  Cardiovascular: Regular rate and rhythm. Respiratory: Clear to auscultation, on nasal canula  Gastroin. testinal: Soft, non tender, nondistended  Genitourinary: no suprapubic tenderness, has pierce, bruising of scrotum  Musculoskeletal:  visible hematoma posterior aspect of RLE extending from gluteus to popliteal space appears less swollen, tender to palpation, may have mild bruising left lower extremity but not very apparent  Skin: warm, dry, jaundiced appears slightly improved  Neuro: awake and can answer questions      Medications:   Medications:    rifAXIMin  550 mg Oral BID    lactulose  20 g Oral TID    thiamine  100 mg IntraVENous Daily    lidocaine PF  5 mL IntraDERmal Once    sodium chloride flush  5-40 mL IntraVENous 2 times per day    vancomycin  1,250 mg IntraVENous Q12H    sodium chloride flush  5-40 mL IntraVENous 2 times per day    methylPREDNISolone  20 mg IntraVENous Q12H      Infusions:    sodium chloride      sodium chloride      sodium chloride 150 mL/hr at 04/21/22 0639    sodium chloride      sodium chloride      dexmedetomidine HCl in NaCl 1.5 mcg/kg/hr (04/21/22 1108)    sodium chloride       PRN Meds: sodium chloride, , PRN  oxyCODONE, 5 mg, Q6H PRN  sodium chloride flush, 5-40 mL, PRN  sodium chloride, , PRN  ondansetron, 4 mg, Q8H PRN   Or  ondansetron, 4 mg, Q6H PRN  polyethylene glycol, 17 g, Daily PRN  acetaminophen, 650 mg, Q6H PRN   Or  acetaminophen, 650 mg, Q6H PRN  HYDROmorphone, 1 mg, Q3H PRN  naloxone, 0.4 mg, PRN  sodium chloride flush, 5-40 mL, PRN  sodium chloride, , PRN  sodium chloride flush, 5-40 mL, PRN  sodium chloride, , PRN  LORazepam, 1 mg, Q1H PRN   Or  LORazepam, 1 mg, Q1H PRN   Or  LORazepam, 2 mg, Q1H PRN   Or  LORazepam, 2 mg, Q1H PRN   Or  LORazepam, 3 mg, Q1H PRN   Or  LORazepam, 3 mg, Q1H PRN   Or  LORazepam, 4 mg, Q1H PRN Or  LORazepam, 4 mg, Q1H PRN  sodium chloride, , PRN        Labs      Recent Results (from the past 24 hour(s))   Hemoglobin and Hematocrit    Collection Time: 04/20/22  7:08 PM   Result Value Ref Range    Hemoglobin 8.0 (L) 13.5 - 18.0 GM/DL    Hematocrit 24.2 (L) 42 - 52 %   Hemoglobin and Hematocrit    Collection Time: 04/21/22 12:20 AM   Result Value Ref Range    Hemoglobin 7.4 (L) 13.5 - 18.0 GM/DL    Hematocrit 22.8 (L) 42 - 52 %   Protime-INR    Collection Time: 04/21/22  6:00 AM   Result Value Ref Range    Protime 14.0 11.7 - 14.5 SECONDS    INR 1.08 INDEX   Comprehensive Metabolic Panel    Collection Time: 04/21/22  6:00 AM   Result Value Ref Range    Sodium 131 (L) 135 - 145 MMOL/L    Potassium 4.6 3.5 - 5.1 MMOL/L    Chloride 102 99 - 110 mMol/L    CO2 22 21 - 32 MMOL/L    BUN 25 (H) 6 - 23 MG/DL    CREATININE 0.4 (L) 0.9 - 1.3 MG/DL    Glucose 121 (H) 70 - 99 MG/DL    Calcium 6.9 (LL) 8.3 - 10.6 MG/DL    Albumin 2.2 (L) 3.4 - 5.0 GM/DL    Total Protein 3.2 (L) 6.4 - 8.2 GM/DL    Total Bilirubin 22.4 (HH) 0.0 - 1.0 MG/DL    ALT 87 (H) 10 - 40 U/L     (H) 15 - 37 IU/L    Alkaline Phosphatase 238 (H) 40 - 128 IU/L    GFR Non-African American >60 >60 mL/min/1.73m2    GFR African American >60 >60 mL/min/1.73m2    Anion Gap 7 4 - 16   Ammonia    Collection Time: 04/21/22  6:00 AM   Result Value Ref Range    Ammonia 24 16 - 60 UMOL/L   CBC with Auto Differential    Collection Time: 04/21/22  6:00 AM   Result Value Ref Range    WBC 12.3 (H) 4.0 - 10.5 K/CU MM    RBC 2.28 (L) 4.6 - 6.2 M/CU MM    Hemoglobin 7.2 (L) 13.5 - 18.0 GM/DL    Hematocrit 22.1 (L) 42 - 52 %    MCV 96.9 78 - 100 FL    MCH 31.6 (H) 27 - 31 PG    MCHC 32.6 32.0 - 36.0 %    RDW 22.8 (H) 11.7 - 14.9 %    Platelets 597 225 - 962 K/CU MM    MPV 10.4 7.5 - 11.1 FL    Myelocyte Percent 1 (H) 0.0 %    Metamyelocytes Relative 2 (H) 0.0 %    Bands Relative 2 (L) 5 - 11 %    Segs Relative 72.0 (H) 36 - 66 %    Lymphocytes % 13.0 (L) 24 - 44 % Monocytes % 10.0 (H) 0 - 4 %    nRBC 12     Myelocytes Absolute 0.12 K/CU MM    Metamyelocytes Absolute 0.25 K/CU MM    Bands Absolute 0.25 K/CU MM    Segs Absolute 8.9 K/CU MM    Lymphocytes Absolute 1.6 K/CU MM    Monocytes Absolute 1.2 K/CU MM    Differential Type MANUAL DIFFERENTIAL     RBC Morphology OCCASIONAL ECHINOCYTE AND STOMATOCYTE NOTED      Anisocytosis 1+     Macrocytes 1+     Polychromasia 2+    Lactic Acid    Collection Time: 04/21/22  6:00 AM   Result Value Ref Range    Lactate 1.2 0.4 - 2.0 mMOL/L   Calcium, Ionized    Collection Time: 04/21/22  6:10 AM   Result Value Ref Range    Ionized Ca 1.07 (L) 1.12 - 1.32 mMOL/L    Calcium, Ion 4.28 (L) 4.48 - 5.28 MG/DL        Imaging/Diagnostics Last 24 Hours   XR FEMUR RIGHT (MIN 2 VIEWS)    Result Date: 4/18/2022  EXAMINATION: 4 XRAY VIEWS OF THE RIGHT FEMUR 4/18/2022 1:16 am COMPARISON: None. HISTORY: ORDERING SYSTEM PROVIDED HISTORY: pain TECHNOLOGIST PROVIDED HISTORY: Reason for exam:->pain Reason for Exam: right leg pain Additional signs and symptoms: patient complains of right thigh pain, nki FINDINGS: There is mild joint space narrowing in the right hip. No fracture or dislocation is seen. The joint spaces are intact. There is some mild sclerosis scattered along the distal femur which is ill-defined. There is no periosteal reaction. Moderate osteoarthritic changes of the right hip with no acute bony abnormality. Vague mild sclerosis scattered along the distal femur centrally which could represent an old infarct bone infarct or small enchondroma.   Recommend orthopedic follow-up and suggest bone scan correlation, if indicated     CT ABDOMEN PELVIS W IV CONTRAST Additional Contrast? None    Result Date: 4/18/2022  EXAMINATION: CT OF THE ABDOMEN AND PELVIS WITH CONTRAST, 4/18/2022 2:01 am TECHNIQUE: CT of the abdomen and pelvis was performed with the administration of intravenous contrast. Multiplanar reformatted images are provided for review. Dose modulation, iterative reconstruction, and/or weight based adjustment of the mA/kV was utilized to reduce the radiation dose to as low as reasonably achievable. COMPARISON: None HISTORY: ORDERING SYSTEM PROVIDED HISTORY: Jaundiced, right hip pain and thigh pain, elevated lactic acid, elevated bili. TECHNOLOGIST PROVIDED HISTORY: Reason for exam:->Jaundiced, right hip pain and thigh pain, elevated lactic acid, elevated bili. Additional Contrast?->None Decision Support Exception - unselect if not a suspected or confirmed emergency medical condition->Emergency Medical Condition (MA) Reason for Exam: Jaundiced, right hip pain and thigh pain, elevated lactic acid, elevated bili. FINDINGS: Lower Chest: The visualized lungs are clear. Organs: Severe diffuse fatty infiltration of the liver is noted. The spleen, adrenal glands, pancreas, and kidneys are unremarkable. Gallbladder wall thickening is seen GI/Bowel: There is no evidence of bowel obstruction. The appendix is normal. Pelvis: The bladder is unremarkable. There is no free fluid. Peritoneum/Retroperitoneum: There is no free air or lymphadenopathy. Bones/Soft Tissues: No destructive osseous lesions are identified. There is a fluid collection between the right gluteus medius and gluteus naomi muscles with a hematocrit level, concerning for a hematoma. This measures 11.1 x 4.2 x 16.7 cm. There is diffuse enlargement of the adductor muscles of the right lower extremity. 1. There is a hematoma measuring 11.1 x 4.2 x 16.7 cm between the right gluteus medius and naomi muscles. There is diffuse enlargement of the adductor muscles of the right lower extremity, likely related to edema. 2. Severe diffuse fatty infiltration of the liver. Gallbladder wall thickening is seen. This could be related to chronic liver disease. If there is concern for acute cholecystitis, a right upper quadrant ultrasound can be obtained.      CT FEMUR RIGHT W CONTRAST    Result Date: 4/18/2022  EXAMINATION: CT OF THE RIGHT FEMUR WITH CONTRAST 4/18/2022 2:01 am TECHNIQUE: CT of the right femur was performed with the administration of intravenous contrast.  Multiplanar reformatted images are provided for review. Dose modulation, iterative reconstruction, and/or weight based adjustment of the mA/kV was utilized to reduce the radiation dose to as low as reasonably achievable. COMPARISON: None. HISTORY ORDERING SYSTEM PROVIDED HISTORY: pain TECHNOLOGIST PROVIDED HISTORY: Reason for exam:->pain Decision Support Exception - unselect if not a suspected or confirmed emergency medical condition->Emergency Medical Condition (MA) Reason for Exam: Jaundiced, right hip pain and thigh pain, elevated lactic acid, elevated bili FINDINGS: THE STUDY IS LIMITED DUE TO LACK OF INTRAVENOUS CONTRAST. Bones: No acute fracture or dislocation. 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 No suspicious sclerotic or lytic lesion. .  No CT evidence of osteomyelitis. Fusion of the proximal tibiofibular joint, likely congenital. Soft Tissue: Ill-defined hypodensity in the right gluteus medius muscle, image 1. Also, a smaller ill-defined hyperdensity in the right gluteus naomi muscle, image number 34 no soft tissue gas. No radiopaque foreign body. Joint: No significant hip or knee joint effusion. Mild degenerative changes of the right hip joint are seen. Ill-defined hypodensity in the right gluteus medius muscle and ill-defined hyperdensity in the right gluteus naomi muscle. These may represent hematomas in different phases of evolution. However simple and complex abscesses cannot be excluded. Clinical correlation and further evaluation by MRI with and without IV contrast recommended.      VL DUP LOWER EXTREMITY VENOUS RIGHT    Result Date: 4/18/2022  EXAMINATION: DUPLEX VENOUS ULTRASOUND OF THE RIGHT LOWER EXTREMITY 4/18/2022 1:10 am TECHNIQUE: Duplex ultrasound using B-mode/gray scaled imaging and Doppler spectral analysis and color flow was obtained of the deep venous structures of the right extremity. COMPARISON: None. HISTORY: ORDERING SYSTEM PROVIDED HISTORY: pain nki TECHNOLOGIST PROVIDED HISTORY: Reason for exam:->pain nki Reason for Exam: Rt leg pain FINDINGS: The visualized veins of the right lower extremity are patent and free of echogenic thrombus. The veins demonstrate good compressibility with normal color flow study and spectral analysis. No evidence of DVT in the right lower extremity.        Electronically signed by Ashvin Wright MD on 4/21/2022 at 1:20 PM

## 2022-04-21 NOTE — PROGRESS NOTES
Physical Therapy  Attempt/Hold     PT eval and tx attempt this AM deferred per triage w/ RN Harini Tirado. He is more agitated, remains on precedex gtt for CIWA, ativan for agitation, and diludid for pain mgmt; will cont to follow and if improved in pm will attempt. Otherwise, will reattempt when more appropriate.      Celena Peres PT, DPT

## 2022-04-21 NOTE — CARE COORDINATION
Per RN, pt has had increased agitation today. Pt is on CIWA protocol with Precedex gtt. Room is darkened, pt sleeping with no family at bedside. Cm contacted pt's wife to offer any needed assistance and initiate discharge planning. Pt, spouse and 2 sons ages 6 and 13 reside together. Pt works outside the home. Wife states that pt and spouse have been struggling since they both had covid in [place] [address]. Wife states that she lost her job related to covid. Wife states that pt has regularly consumed alcohol but feels that it got worse or increased since they both had covid. Wife would not characterize pt's drinking as a problem Prior to Dec or Jan and being ill. Cm advised wife that once pt is medically stable and off precedex gtt that CM will meet with pt and offer possible assistance with alcohol abuse issues/treatment options if pt is interested. Cm to follow for needs.

## 2022-04-21 NOTE — PROGRESS NOTES
GENERAL SURGERY INPATIENT PROGRESS NOTE  [facility]) Physicians    PATIENT: Elena Smith, 39 y.o., male, MRN: [id number]    Hospital Day:  LOS: 3 days     Elena Smith is a 39 y.o. male with acute alcoholic hepatitis with a Tbili of 23. CT scan showed intramuscular hematoma of the right gluteus muscles        Subjective:  Chief Complaint: (calm on Precedex)  Pain: controlled  BM:    Diet: ADULT DIET; Clear Liquid  Activity: as tolerated    Hb has been up and down, staying ~7. LFTs some worse today. Leg swelling stable.       Objective:    Vitals: BP 97/63   Pulse 58   Temp 98 °F (36.7 °C) (Oral)   Resp 20   Ht 6' 4\" (1.93 m)   Wt 257 lb 4.4 oz (116.7 kg)   SpO2 92%   BMI 31.32 kg/m²   Vital Signs (Last 24 Hours)  Temp  Av.8 °F (36.6 °C)  Min: 97 °F (36.1 °C)  Max: 98.6 °F (37 °C)  Pulse  Av  Min: 54  Max: 63  BP  Min: 79/50  Max: 136/87  Resp  Av.8  Min: 7  Max: 23  SpO2  Av.7 %  Min: 90 %  Max: 100 %  Wt Readings from Last 3 Encounters:   22 257 lb 4.4 oz (116.7 kg)   22 240 lb (108.9 kg)   22 254 lb (115.2 kg)       I/O:  1901 -  0700  In: 7864.9 [I.V.:5446]  Out: 5125 [Urine:4200]    IV Fluids: sodium chloride    sodium chloride    sodium chloride    sodium chloride Last Rate: 150 mL/hr at 22 1613    sodium chloride    sodium chloride    dexmedetomidine HCl in NaCl Last Rate: 1.5 mcg/kg/hr (22 1535)    sodium chloride    Scheduled Meds: furosemide, 20 mg, IntraVENous, See Admin Instructions    rifAXIMin, 550 mg, Oral, BID    lactulose, 20 g, Oral, TID    thiamine, 100 mg, IntraVENous, Daily    lidocaine PF, 5 mL, IntraDERmal, Once    sodium chloride flush, 5-40 mL, IntraVENous, 2 times per day    vancomycin, 1,250 mg, IntraVENous, Q12H    sodium chloride flush, 5-40 mL, IntraVENous, 2 times per day    methylPREDNISolone, 20 mg, IntraVENous, Q12H    Physical Exam:  General Appearance:   Calm, no distress    Head: Normocephalic, atraumatic    Lungs:    Equal chest rise, respirations unlabored   Heart:   Regular rate and rhythm    Abdomen:    Soft, non-tender, no rebound or guarding    Extremities:  No cyanosis. Right lower extremity edema, compartments soft, scattered ecchymoses of the bilateral lower extremities. Pulses intact.      Neurologic:  Nonfocal        Labs/Imaging Results:   Recent Results (from the past 24 hour(s))   Hemoglobin and Hematocrit    Collection Time: 04/20/22  7:08 PM   Result Value Ref Range    Hemoglobin 8.0 (L) 13.5 - 18.0 GM/DL    Hematocrit 24.2 (L) 42 - 52 %   Hemoglobin and Hematocrit    Collection Time: 04/21/22 12:20 AM   Result Value Ref Range    Hemoglobin 7.4 (L) 13.5 - 18.0 GM/DL    Hematocrit 22.8 (L) 42 - 52 %   Protime-INR    Collection Time: 04/21/22  6:00 AM   Result Value Ref Range    Protime 14.0 11.7 - 14.5 SECONDS    INR 1.08 INDEX   Comprehensive Metabolic Panel    Collection Time: 04/21/22  6:00 AM   Result Value Ref Range    Sodium 131 (L) 135 - 145 MMOL/L    Potassium 4.6 3.5 - 5.1 MMOL/L    Chloride 102 99 - 110 mMol/L    CO2 22 21 - 32 MMOL/L    BUN 25 (H) 6 - 23 MG/DL    CREATININE 0.4 (L) 0.9 - 1.3 MG/DL    Glucose 121 (H) 70 - 99 MG/DL    Calcium 6.9 (LL) 8.3 - 10.6 MG/DL    Albumin 2.2 (L) 3.4 - 5.0 GM/DL    Total Protein 3.2 (L) 6.4 - 8.2 GM/DL    Total Bilirubin 22.4 (HH) 0.0 - 1.0 MG/DL    ALT 87 (H) 10 - 40 U/L     (H) 15 - 37 IU/L    Alkaline Phosphatase 238 (H) 40 - 128 IU/L    GFR Non-African American >60 >60 mL/min/1.73m2    GFR African American >60 >60 mL/min/1.73m2    Anion Gap 7 4 - 16   Ammonia    Collection Time: 04/21/22  6:00 AM   Result Value Ref Range    Ammonia 24 16 - 60 UMOL/L   CBC with Auto Differential    Collection Time: 04/21/22  6:00 AM   Result Value Ref Range    WBC 12.3 (H) 4.0 - 10.5 K/CU MM    RBC 2.28 (L) 4.6 - 6.2 M/CU MM    Hemoglobin 7.2 (L) 13.5 - 18.0 GM/DL    Hematocrit 22.1 (L) 42 - 52 %    MCV 96.9 78 - 100 FL    MCH 31.6 (H) 27 - 31 PG    MCHC 32.6 32.0 - 36.0 %    RDW 22.8 (H) 11.7 - 14.9 %    Platelets 295 807 - 610 K/CU MM    MPV 10.4 7.5 - 11.1 FL    Myelocyte Percent 1 (H) 0.0 %    Metamyelocytes Relative 2 (H) 0.0 %    Bands Relative 2 (L) 5 - 11 %    Segs Relative 72.0 (H) 36 - 66 %    Lymphocytes % 13.0 (L) 24 - 44 %    Monocytes % 10.0 (H) 0 - 4 %    nRBC 12     Myelocytes Absolute 0.12 K/CU MM    Metamyelocytes Absolute 0.25 K/CU MM    Bands Absolute 0.25 K/CU MM    Segs Absolute 8.9 K/CU MM    Lymphocytes Absolute 1.6 K/CU MM    Monocytes Absolute 1.2 K/CU MM    Differential Type MANUAL DIFFERENTIAL     RBC Morphology OCCASIONAL ECHINOCYTE AND STOMATOCYTE NOTED      Anisocytosis 1+     Macrocytes 1+     Polychromasia 2+    Lactic Acid    Collection Time: 04/21/22  6:00 AM   Result Value Ref Range    Lactate 1.2 0.4 - 2.0 mMOL/L   Calcium, Ionized    Collection Time: 04/21/22  6:10 AM   Result Value Ref Range    Ionized Ca 1.07 (L) 1.12 - 1.32 mMOL/L    Calcium, Ion 4.28 (L) 4.48 - 5.28 MG/DL   Hemoglobin and Hematocrit    Collection Time: 04/21/22  1:44 PM   Result Value Ref Range    Hemoglobin 6.6 (LL) 13.5 - 18.0 GM/DL    Hematocrit 20.3 (L) 42 - 52 %         Assessment:  Criss Oliveros is a 39 y.o. male with  acute alcoholic hepatitis with a Tbili of 19. CT scan showed intramuscular hematoma of the right gluteus muscles     Principal Problem:    Intramuscular hematoma  Active Problems:    Right leg pain  Resolved Problems:    * No resolved hospital problems. *      Plan:  · Imaging showed hematoma was similar in size on re-evaluation. Monitor for now and transfuse for Hb <7  · Compartments are soft, neurovascularly intact. Continue to check with vital signs. · Appreciate Dr. Imani Healy recommendations, in alcohol withdrawal. LFTs a little worse today but HIDA doesn't appear consistent with a biliary etiology  · Alcohol abuse. Continue CIWA protocols, on Precedex currently.   · Gallbladder wall thickening - likely secondary to hepatic dysfunction given his LFTs and current imaging, also with recent HIDA scan showing cholestasis in the liver.  No surgical plans at this time  · Will continue to follow  · Thank you for the consultation and the opportunity to care for Tia Riojas     Electronically signed: Phoenix Rivas MD 4/21/2022 4:47 PM

## 2022-04-22 ENCOUNTER — APPOINTMENT (OUTPATIENT)
Dept: ULTRASOUND IMAGING | Age: 42
DRG: 871 | End: 2022-04-22
Payer: COMMERCIAL

## 2022-04-22 LAB
ABO/RH: NORMAL
ALBUMIN SERPL-MCNC: 2.1 GM/DL (ref 3.4–5)
ALP BLD-CCNC: 241 IU/L (ref 40–128)
ALT SERPL-CCNC: 98 U/L (ref 10–40)
AMMONIA: 26 UMOL/L (ref 16–60)
AMPHETAMINES: NEGATIVE
ANION GAP SERPL CALCULATED.3IONS-SCNC: 8 MMOL/L (ref 4–16)
ANTIBODY SCREEN: NEGATIVE
AST SERPL-CCNC: 509 IU/L (ref 15–37)
BARBITURATE SCREEN URINE: NEGATIVE
BENZODIAZEPINE SCREEN, URINE: NEGATIVE
BILIRUB SERPL-MCNC: 22.4 MG/DL (ref 0–1)
BUN BLDV-MCNC: 22 MG/DL (ref 6–23)
CALCIUM IONIZED: 4.4 MG/DL (ref 4.48–5.28)
CALCIUM SERPL-MCNC: 7 MG/DL (ref 8.3–10.6)
CANNABINOID SCREEN URINE: NEGATIVE
CHLORIDE BLD-SCNC: 104 MMOL/L (ref 99–110)
CO2: 22 MMOL/L (ref 21–32)
COCAINE METABOLITE: NEGATIVE
COMPONENT: NORMAL
CREAT SERPL-MCNC: 0.3 MG/DL (ref 0.9–1.3)
CROSSMATCH RESULT: NORMAL
DOSE AMOUNT: NORMAL
DOSE TIME: NORMAL
GFR AFRICAN AMERICAN: >60 ML/MIN/1.73M2
GFR NON-AFRICAN AMERICAN: >60 ML/MIN/1.73M2
GLUCOSE BLD-MCNC: 162 MG/DL (ref 70–99)
HCT VFR BLD CALC: 23.3 % (ref 42–52)
HCT VFR BLD CALC: 23.3 % (ref 42–52)
HCT VFR BLD CALC: 23.7 % (ref 42–52)
HEMOGLOBIN: 7.3 GM/DL (ref 13.5–18)
HEMOGLOBIN: 7.6 GM/DL (ref 13.5–18)
HEMOGLOBIN: 7.6 GM/DL (ref 13.5–18)
INR BLD: 1.1 INDEX
IONIZED CA: 1.1 MMOL/L (ref 1.12–1.32)
OPIATES, URINE: NEGATIVE
OXYCODONE: ABNORMAL
PHENCYCLIDINE, URINE: NEGATIVE
POTASSIUM SERPL-SCNC: 4.4 MMOL/L (ref 3.5–5.1)
PROCALCITONIN: 0.83
PROTHROMBIN TIME: 14.2 SECONDS (ref 11.7–14.5)
SODIUM BLD-SCNC: 134 MMOL/L (ref 135–145)
STATUS: NORMAL
TOTAL PROTEIN: 3.2 GM/DL (ref 6.4–8.2)
TRANSFUSION STATUS: NORMAL
UNIT DIVISION: 0
UNIT NUMBER: NORMAL
VANCOMYCIN RANDOM: 18.5 UG/ML

## 2022-04-22 PROCEDURE — 85610 PROTHROMBIN TIME: CPT

## 2022-04-22 PROCEDURE — 99232 SBSQ HOSP IP/OBS MODERATE 35: CPT | Performed by: SURGERY

## 2022-04-22 PROCEDURE — 84145 PROCALCITONIN (PCT): CPT

## 2022-04-22 PROCEDURE — 80202 ASSAY OF VANCOMYCIN: CPT

## 2022-04-22 PROCEDURE — 99231 SBSQ HOSP IP/OBS SF/LOW 25: CPT | Performed by: SPECIALIST

## 2022-04-22 PROCEDURE — 80307 DRUG TEST PRSMV CHEM ANLYZR: CPT

## 2022-04-22 PROCEDURE — 85018 HEMOGLOBIN: CPT

## 2022-04-22 PROCEDURE — 2580000003 HC RX 258: Performed by: INTERNAL MEDICINE

## 2022-04-22 PROCEDURE — 85007 BL SMEAR W/DIFF WBC COUNT: CPT

## 2022-04-22 PROCEDURE — 82607 VITAMIN B-12: CPT

## 2022-04-22 PROCEDURE — 93975 VASCULAR STUDY: CPT

## 2022-04-22 PROCEDURE — 85025 COMPLETE CBC W/AUTO DIFF WBC: CPT

## 2022-04-22 PROCEDURE — 85027 COMPLETE CBC AUTOMATED: CPT

## 2022-04-22 PROCEDURE — 2000000000 HC ICU R&B

## 2022-04-22 PROCEDURE — 85014 HEMATOCRIT: CPT

## 2022-04-22 PROCEDURE — 82330 ASSAY OF CALCIUM: CPT

## 2022-04-22 PROCEDURE — 83540 ASSAY OF IRON: CPT

## 2022-04-22 PROCEDURE — 05HF33Z INSERTION OF INFUSION DEVICE INTO LEFT CEPHALIC VEIN, PERCUTANEOUS APPROACH: ICD-10-PCS | Performed by: INTERNAL MEDICINE

## 2022-04-22 PROCEDURE — 76937 US GUIDE VASCULAR ACCESS: CPT

## 2022-04-22 PROCEDURE — 6370000000 HC RX 637 (ALT 250 FOR IP): Performed by: HOSPITALIST

## 2022-04-22 PROCEDURE — 76870 US EXAM SCROTUM: CPT

## 2022-04-22 PROCEDURE — 6370000000 HC RX 637 (ALT 250 FOR IP): Performed by: SPECIALIST

## 2022-04-22 PROCEDURE — 2500000003 HC RX 250 WO HCPCS: Performed by: NURSE PRACTITIONER

## 2022-04-22 PROCEDURE — 82728 ASSAY OF FERRITIN: CPT

## 2022-04-22 PROCEDURE — 6360000002 HC RX W HCPCS: Performed by: PHYSICIAN ASSISTANT

## 2022-04-22 PROCEDURE — 2709999900 HC NON-CHARGEABLE SUPPLY

## 2022-04-22 PROCEDURE — 80053 COMPREHEN METABOLIC PANEL: CPT

## 2022-04-22 PROCEDURE — 83550 IRON BINDING TEST: CPT

## 2022-04-22 PROCEDURE — 2580000003 HC RX 258: Performed by: PHYSICIAN ASSISTANT

## 2022-04-22 PROCEDURE — 82746 ASSAY OF FOLIC ACID SERUM: CPT

## 2022-04-22 PROCEDURE — 6360000002 HC RX W HCPCS: Performed by: INTERNAL MEDICINE

## 2022-04-22 PROCEDURE — 36410 VNPNXR 3YR/> PHY/QHP DX/THER: CPT

## 2022-04-22 PROCEDURE — 85045 AUTOMATED RETICULOCYTE COUNT: CPT

## 2022-04-22 PROCEDURE — 2500000003 HC RX 250 WO HCPCS: Performed by: HOSPITALIST

## 2022-04-22 PROCEDURE — 6360000002 HC RX W HCPCS: Performed by: SPECIALIST

## 2022-04-22 PROCEDURE — 82140 ASSAY OF AMMONIA: CPT

## 2022-04-22 RX ORDER — CALCIUM GLUCONATE 20 MG/ML
1000 INJECTION, SOLUTION INTRAVENOUS ONCE
Status: COMPLETED | OUTPATIENT
Start: 2022-04-22 | End: 2022-04-23

## 2022-04-22 RX ADMIN — VANCOMYCIN HYDROCHLORIDE 1250 MG: 10 INJECTION, POWDER, LYOPHILIZED, FOR SOLUTION INTRAVENOUS at 02:54

## 2022-04-22 RX ADMIN — LACTULOSE 20 G: 10 SOLUTION ORAL at 09:05

## 2022-04-22 RX ADMIN — DEXMEDETOMIDINE HYDROCHLORIDE 1.5 MCG/KG/HR: 4 INJECTION, SOLUTION INTRAVENOUS at 11:05

## 2022-04-22 RX ADMIN — LORAZEPAM 4 MG: 2 INJECTION INTRAMUSCULAR; INTRAVENOUS at 13:53

## 2022-04-22 RX ADMIN — OXYCODONE HYDROCHLORIDE 5 MG: 5 TABLET ORAL at 09:21

## 2022-04-22 RX ADMIN — RIFAXIMIN 550 MG: 550 TABLET ORAL at 09:05

## 2022-04-22 RX ADMIN — LORAZEPAM 2 MG: 2 INJECTION INTRAMUSCULAR; INTRAVENOUS at 20:58

## 2022-04-22 RX ADMIN — LACTULOSE 20 G: 10 SOLUTION ORAL at 14:18

## 2022-04-22 RX ADMIN — SODIUM CHLORIDE, PRESERVATIVE FREE 10 ML: 5 INJECTION INTRAVENOUS at 20:29

## 2022-04-22 RX ADMIN — LORAZEPAM 3 MG: 2 INJECTION INTRAMUSCULAR; INTRAVENOUS at 11:22

## 2022-04-22 RX ADMIN — LORAZEPAM 2 MG: 2 INJECTION INTRAMUSCULAR; INTRAVENOUS at 16:04

## 2022-04-22 RX ADMIN — LORAZEPAM 4 MG: 2 INJECTION INTRAMUSCULAR; INTRAVENOUS at 12:27

## 2022-04-22 RX ADMIN — LORAZEPAM 4 MG: 2 INJECTION INTRAMUSCULAR; INTRAVENOUS at 02:49

## 2022-04-22 RX ADMIN — DEXMEDETOMIDINE HYDROCHLORIDE 1.5 MCG/KG/HR: 4 INJECTION, SOLUTION INTRAVENOUS at 09:04

## 2022-04-22 RX ADMIN — SODIUM CHLORIDE, PRESERVATIVE FREE 10 ML: 5 INJECTION INTRAVENOUS at 20:28

## 2022-04-22 RX ADMIN — SODIUM CHLORIDE: 9 INJECTION, SOLUTION INTRAVENOUS at 09:04

## 2022-04-22 RX ADMIN — RIFAXIMIN 550 MG: 550 TABLET ORAL at 20:28

## 2022-04-22 RX ADMIN — METHYLPREDNISOLONE SODIUM SUCCINATE 20 MG: 40 INJECTION, POWDER, LYOPHILIZED, FOR SOLUTION INTRAMUSCULAR; INTRAVENOUS at 03:03

## 2022-04-22 RX ADMIN — DEXMEDETOMIDINE HYDROCHLORIDE 1.5 MCG/KG/HR: 4 INJECTION, SOLUTION INTRAVENOUS at 13:37

## 2022-04-22 RX ADMIN — SODIUM CHLORIDE: 9 INJECTION, SOLUTION INTRAVENOUS at 23:14

## 2022-04-22 RX ADMIN — DEXMEDETOMIDINE HYDROCHLORIDE 1.5 MCG/KG/HR: 4 INJECTION, SOLUTION INTRAVENOUS at 20:28

## 2022-04-22 RX ADMIN — LORAZEPAM 2 MG: 2 INJECTION INTRAMUSCULAR; INTRAVENOUS at 23:31

## 2022-04-22 RX ADMIN — SODIUM CHLORIDE, PRESERVATIVE FREE 10 ML: 5 INJECTION INTRAVENOUS at 09:06

## 2022-04-22 RX ADMIN — METHYLPREDNISOLONE SODIUM SUCCINATE 20 MG: 40 INJECTION, POWDER, LYOPHILIZED, FOR SOLUTION INTRAMUSCULAR; INTRAVENOUS at 15:44

## 2022-04-22 RX ADMIN — THIAMINE HYDROCHLORIDE 100 MG: 100 INJECTION, SOLUTION INTRAMUSCULAR; INTRAVENOUS at 09:05

## 2022-04-22 RX ADMIN — DEXMEDETOMIDINE HYDROCHLORIDE 1.5 MCG/KG/HR: 4 INJECTION, SOLUTION INTRAVENOUS at 06:06

## 2022-04-22 RX ADMIN — DEXMEDETOMIDINE HYDROCHLORIDE 1.5 MCG/KG/HR: 4 INJECTION, SOLUTION INTRAVENOUS at 22:54

## 2022-04-22 RX ADMIN — DEXMEDETOMIDINE HYDROCHLORIDE 1.5 MCG/KG/HR: 4 INJECTION, SOLUTION INTRAVENOUS at 01:09

## 2022-04-22 RX ADMIN — CALCIUM GLUCONATE 1000 MG: 20 INJECTION, SOLUTION INTRAVENOUS at 23:16

## 2022-04-22 RX ADMIN — SODIUM CHLORIDE, PRESERVATIVE FREE 10 ML: 5 INJECTION INTRAVENOUS at 09:05

## 2022-04-22 RX ADMIN — LACTULOSE 20 G: 10 SOLUTION ORAL at 20:28

## 2022-04-22 RX ADMIN — DEXMEDETOMIDINE HYDROCHLORIDE 1.5 MCG/KG/HR: 4 INJECTION, SOLUTION INTRAVENOUS at 15:59

## 2022-04-22 RX ADMIN — DEXMEDETOMIDINE HYDROCHLORIDE 1.5 MCG/KG/HR: 4 INJECTION, SOLUTION INTRAVENOUS at 18:28

## 2022-04-22 RX ADMIN — DEXMEDETOMIDINE HYDROCHLORIDE 1.5 MCG/KG/HR: 4 INJECTION, SOLUTION INTRAVENOUS at 03:30

## 2022-04-22 RX ADMIN — VANCOMYCIN HYDROCHLORIDE 1250 MG: 10 INJECTION, POWDER, LYOPHILIZED, FOR SOLUTION INTRAVENOUS at 16:00

## 2022-04-22 ASSESSMENT — PAIN DESCRIPTION - PROGRESSION
CLINICAL_PROGRESSION: GRADUALLY WORSENING
CLINICAL_PROGRESSION: GRADUALLY WORSENING
CLINICAL_PROGRESSION: NOT CHANGED
CLINICAL_PROGRESSION: NOT CHANGED
CLINICAL_PROGRESSION: GRADUALLY WORSENING
CLINICAL_PROGRESSION: NOT CHANGED
CLINICAL_PROGRESSION: GRADUALLY WORSENING
CLINICAL_PROGRESSION: NOT CHANGED

## 2022-04-22 ASSESSMENT — PAIN SCALES - GENERAL
PAINLEVEL_OUTOF10: 6
PAINLEVEL_OUTOF10: 8
PAINLEVEL_OUTOF10: 6

## 2022-04-22 ASSESSMENT — PAIN DESCRIPTION - DESCRIPTORS
DESCRIPTORS: ACHING;DISCOMFORT;DULL
DESCRIPTORS: ACHING;DULL;DISCOMFORT

## 2022-04-22 ASSESSMENT — PAIN DESCRIPTION - FREQUENCY
FREQUENCY: CONTINUOUS
FREQUENCY: CONTINUOUS

## 2022-04-22 ASSESSMENT — PAIN - FUNCTIONAL ASSESSMENT
PAIN_FUNCTIONAL_ASSESSMENT: PREVENTS OR INTERFERES SOME ACTIVE ACTIVITIES AND ADLS
PAIN_FUNCTIONAL_ASSESSMENT: PREVENTS OR INTERFERES SOME ACTIVE ACTIVITIES AND ADLS

## 2022-04-22 ASSESSMENT — PAIN DESCRIPTION - LOCATION
LOCATION: BUTTOCKS;LEG
LOCATION: BUTTOCKS;LEG

## 2022-04-22 ASSESSMENT — PAIN DESCRIPTION - ORIENTATION
ORIENTATION: RIGHT;UPPER
ORIENTATION: RIGHT;UPPER

## 2022-04-22 ASSESSMENT — PAIN DESCRIPTION - PAIN TYPE: TYPE: ACUTE PAIN

## 2022-04-22 ASSESSMENT — PAIN DESCRIPTION - ONSET
ONSET: ON-GOING
ONSET: ON-GOING

## 2022-04-22 NOTE — PROGRESS NOTES
Comprehensive Nutrition Assessment    Type and Reason for Visit:  Initial    Nutrition Recommendations/Plan:   1. Add clear liquid oral nutrition supplement  2. Advance diet to at least full liquid diet in the next 48 hr  3. Will closely monitor po intake, ability to advance diet, poc     Malnutrition Assessment:  Malnutrition Status:  Insufficient data (04/22/22 1332)    Context:  Acute Illness       Nutrition Assessment:    Pt admitted for abnormal CT scan, right leg pain, elevated lactic acid level, elevated bilirubuin, intramuscular hematoma, PMH: Morfan's syndrome, HTN, Covid-19, pt currently on clear liquid diet, NPO/clear liquid diet x 4 days, +precedex ggt, pt unavailable at time of visit, will follow at high nutrition risk    Nutrition Related Findings:    Na 134, Glucose 162, H/H: 7.6/22.9, elevated LFTs Wound Type: None       Current Nutrition Intake & Therapies:    Average Meal Intake: Unable to assess  Average Supplements Intake: None Ordered  ADULT DIET; Clear Liquid    Anthropometric Measures:  Height: 6' 3.98\" (193 cm)  Ideal Body Weight (IBW): 202 lbs (92 kg)    Admission Body Weight:  (n/a)  Current Body Weight: 257 lb 4.4 oz (116.7 kg), 127.4 % IBW. Weight Source: Bed Scale  Current BMI (kg/m2): 31.3   Weight Adjustment For: No Adjustment  BMI Categories: Obese Class 1 (BMI 30.0-34. 9)    Estimated Daily Nutrient Needs:  Energy Requirements Based On: Formula  Weight Used for Energy Requirements: Current  Energy (kcal/day): 0035-8320 ([place] w/ stress factor 1.0-1.2)  Weight Used for Protein Requirements: Ideal  Protein (g/day):  (1.0-1.2 g/kg)  Method Used for Fluid Requirements: 1 ml/kcal    Nutrition Diagnosis:   · Inadequate oral intake related to acute injury/trauma as evidenced by NPO or clear liquid status due to medical condition  Nutrition Interventions:   Food and/or Nutrient Delivery: Modify Current Diet,Start Oral Nutrition Supplement (advance diet as soon as medically appropriate)  Nutrition Education/Counseling: No recommendation at this time  Coordination of Nutrition Care: Continue to monitor while inpatient     Goals:     Goals: within 2 days (Pt will advance to at least full liquid diet)     Nutrition Monitoring and Evaluation:   Behavioral-Environmental Outcomes: None Identified  Food/Nutrient Intake Outcomes: Diet Advancement/Tolerance,Supplement Intake,Food and Nutrient Intake  Physical Signs/Symptoms Outcomes: Biochemical Data,GI Status,Hemodynamic Status,Fluid Status or Edema,Weight    Discharge Planning:     Too soon to determine     [address], [address] [place] [address],   Contact: [phone]

## 2022-04-22 NOTE — CONSULTS
[facility] Kalyn CarusoetsuyckersInova Alexandria Hospitalat [facility], [address]   Consult Note  [facility] 1 2 3 4 5    Date: 2022   Patient: Sundeep Contreras   : 1980   DOA: 2022   MRN: [id number]   ROOM#: 7/7-A     Reason for Consult: Scrotal bruising  Requesting Physician: Dr. Suzette Wick  Collaborating Urologist on Call at time of admission: Dr. Suleman Long:  Right leg pain    History Obtained From:  family member - wife, EMR    HISTORY OF PRESENT ILLNESS:                The patient is a 39 y.o. male with significant past medical history of Marfan's Syndrome who presented with right leg pain. Work-up in the ED revealed a gluteal hematoma measuring 11.1x4.2x16.7cm, severe diffuse fatty liver infiltrate, and gallbladder wall thickening. He was noted to have scrotal bruising yesterday for which we were consulted. Scrotal US today was negative for scrotal mass/abscess. He is resting comfortably and reportedly agitated when awakened. ED Provider's HPI 22: Sundeep Contreras is a 39 y.o. male who presents with right hip and thigh pain. Onset 2 days ago. Patient denies any injury. Patient states pain started in right posterior thigh region and is extended into right posterior thigh. Pain worsens with direct palpation and movement. No known alleviating factors. Patient denies chest pain or shortness of breath. Patient denies history of blood clots, recent travel or surgery. Patient denies fever. Patient has noticed yellow discoloration of the skin over the past week. Patient states he is scheduled to see general surgeon Dr. Maco Rodriguez tomorrow for his gallbladder. Patient denies abdominal pain, vomiting or diarrhea at this time. Patient has associated nausea.     Past Medical History:        Diagnosis Date    Acute hepatitis 2022    severe nausea w weakness, elevated LFTs noted in ED 2022- worsened also fr nsaids and alcohol consumption    Allergic rhinitis     Ankle pain     INACTIVE PROBLEM    Anxiety     COVID-19 02/06/2022    Epigastric abdominal pain     INACTIVE PROBLEM    Essential hypertension 01/08/2018    Family history of coronary artery disease     Herniated lumbar intervertebral disc     Insomnia     LBP (low back pain)     Marfan's syndrome with ocular manifestation     DX'D BY OPT[facility] DR CANTU AT 6Y0    Oral herpes      Past Surgical History:        Procedure Laterality Date    BACK SURGERY      herniated ruptured disc 2008    TONSILLECTOMY      UMBILICAL HERNIA REPAIR N/A 7/2/2020    OPEN HERNIA UMBILICAL REPAIR performed by Tremayne Ireland MD at [address] [facility] OR     Current Medications:   Current Facility-Administered Medications: albuterol sulfate  (90 Base) MCG/ACT inhaler 2 puff, 2 puff, Inhalation, Q6H PRN  0.9 % sodium chloride infusion, , IntraVENous, PRN  0.9 % sodium chloride infusion, , IntraVENous, PRN  rifaximin (XIFAXAN) tablet 550 mg, 550 mg, Oral, BID  oxyCODONE (ROXICODONE) immediate release tablet 5 mg, 5 mg, Oral, Q6H PRN  lactulose (CHRONULAC) 10 GM/15ML solution 20 g, 20 g, Oral, TID  sodium chloride flush 0.9 % injection 5-40 mL, 5-40 mL, IntraVENous, PRN  0.9 % sodium chloride infusion, , IntraVENous, PRN  ondansetron (ZOFRAN-ODT) disintegrating tablet 4 mg, 4 mg, Oral, Q8H PRN **OR** ondansetron (ZOFRAN) injection 4 mg, 4 mg, IntraVENous, Q6H PRN  polyethylene glycol (GLYCOLAX) packet 17 g, 17 g, Oral, Daily PRN  acetaminophen (TYLENOL) tablet 650 mg, 650 mg, Oral, Q6H PRN **OR** acetaminophen (TYLENOL) suppository 650 mg, 650 mg, Rectal, Q6H PRN  0.9 % sodium chloride infusion, , IntraVENous, Continuous  HYDROmorphone (DILAUDID) injection 1 mg, 1 mg, IntraVENous, Q3H PRN  naloxone (NARCAN) injection 0.4 mg, 0.4 mg, IntraVENous, PRN  thiamine (B-1) injection 100 mg, 100 mg, IntraVENous, Daily  lidocaine PF 1 % injection 5 mL, 5 mL, IntraDERmal, Once  sodium chloride flush 0.9 % injection 5-40 mL, 5-40 mL, IntraVENous, 2 times per day  sodium chloride flush 0.9 % injection 5-40 mL, 5-40 mL, IntraVENous, PRN  0.9 % sodium chloride infusion, , IntraVENous, PRN  vancomycin (VANCOCIN) 1250 mg in dextrose 5 % 250 mL IVPB, 1,250 mg, IntraVENous, Q12H  sodium chloride flush 0.9 % injection 5-40 mL, 5-40 mL, IntraVENous, 2 times per day  sodium chloride flush 0.9 % injection 5-40 mL, 5-40 mL, IntraVENous, PRN  0.9 % sodium chloride infusion, , IntraVENous, PRN  LORazepam (ATIVAN) tablet 1 mg, 1 mg, Oral, Q1H PRN **OR** LORazepam (ATIVAN) injection 1 mg, 1 mg, IntraVENous, Q1H PRN **OR** LORazepam (ATIVAN) tablet 2 mg, 2 mg, Oral, Q1H PRN **OR** LORazepam (ATIVAN) injection 2 mg, 2 mg, IntraVENous, Q1H PRN **OR** LORazepam (ATIVAN) tablet 3 mg, 3 mg, Oral, Q1H PRN **OR** LORazepam (ATIVAN) injection 3 mg, 3 mg, IntraVENous, Q1H PRN **OR** LORazepam (ATIVAN) tablet 4 mg, 4 mg, Oral, Q1H PRN **OR** LORazepam (ATIVAN) injection 4 mg, 4 mg, IntraVENous, Q1H PRN  dexmedetomidine (PRECEDEX) 400 mcg in sodium chloride 0.9 % 100 mL infusion, 0.1-1.5 mcg/kg/hr, IntraVENous, Continuous  methylPREDNISolone sodium (SOLU-MEDROL) injection 20 mg, 20 mg, IntraVENous, Q12H  0.9 % sodium chloride infusion, , IntraVENous, PRN    Allergies:  No known allergies    Social History:   TOBACCO:   reports that he has never smoked. He has never used smokeless tobacco.  ETOH:   reports current alcohol use. DRUGS:   reports no history of drug use. Family History:   History reviewed. No pertinent family history. REVIEW OF SYSTEMS:     Review of systems not obtained due to patient factors - mental status    PHYSICAL EXAM:      VITALS:  /78   Pulse (!) 46   Temp 98.2 °F (36.8 °C)   Resp 9   Ht 6' 4\" (1.93 m)   Wt 257 lb 4.4 oz (116.7 kg)   SpO2 100%   BMI 31.32 kg/m²      TEMPERATURE:  Current - Temp: 98.2 °F (36.8 °C);  Max - Temp  Av.1 °F (36.7 °C)  Min: 98 °F (36.7 °C)  Max: 98.6 °F (37 °C)  24HR BLOOD PRESSURE RANGE:  Systolic (09AEC), VME:260 , Min:91 , XHQ:088   ; Diastolic (06MTM), Av, Min:39, Max:78    Physical Exam:  General appearance: appears stated age and no distress  Head: Normocephalic, without obvious abnormality, atraumatic  Abdomen: Soft, non-distended   : Indwelling catheter in place, urine dark yellow/orange with no clots noted. Diffuse penile and scrotal swelling noted with diffuse scrotal ecchymosis. No masses/abscess/drainage/induration/crepitus noted on exam.    DATA:    WBC:    Lab Results   Component Value Date    WBC 10.5 2022     Hemoglobin/Hematocrit:    Lab Results   Component Value Date    HGB 7.6 2022    HCT 22.9 2022     BMP:    Lab Results   Component Value Date     2022    K 4.4 2022     2022    CO2 22 2022    BUN 22 2022    LABALBU 2.1 2022    CREATININE 0.3 2022    CALCIUM 7.0 2022    GFRAA >60 2022    LABGLOM >60 2022     PT/INR:    Lab Results   Component Value Date    PROTIME 14.2 2022    INR 1.10 2022     Blood Culture: 1 OUT OF 4 BOTTLES POSITIVE, GPC     Imaging:  XR FEMUR RIGHT (MIN 2 VIEWS)    Result Date: 2022  EXAMINATION: 4 XRAY VIEWS OF THE RIGHT FEMUR 2022 1:16 am COMPARISON: None. HISTORY: ORDERING SYSTEM PROVIDED HISTORY: pain TECHNOLOGIST PROVIDED HISTORY: Reason for exam:->pain Reason for Exam: right leg pain Additional signs and symptoms: patient complains of right thigh pain, nki FINDINGS: There is mild joint space narrowing in the right hip. No fracture or dislocation is seen. The joint spaces are intact. There is some mild sclerosis scattered along the distal femur which is ill-defined. There is no periosteal reaction. Moderate osteoarthritic changes of the right hip with no acute bony abnormality. Vague mild sclerosis scattered along the distal femur centrally which could represent an old infarct bone infarct or small enchondroma.   Recommend orthopedic follow-up and suggest bone scan correlation, if indicated     NM HEPATOBILIARY    Result Date: 4/13/2022  EXAMINATION: NUCLEAR MEDICINE HEPATOBILIARY SCINTIGRAPHY (HIDA SCAN). 4/13/2022 10:54 am TECHNIQUE: Approximately 4.8 mCi Tc-99m Mebrofenin (Choletec) was administered IV. Then, dynamic images of the abdomen were obtained in the anterior projection for 90 minutes. COMPARISON: No prior for comparison. HISTORY: ORDERING SYSTEM PROVIDED HISTORY: Elevated LFTs TECHNOLOGIST PROVIDED HISTORY: Reason for exam:->elevated LFTs, gallbladder disease, gastritis Reason for Exam: Elevated LFT's; gallbladder disease FINDINGS: Upon injection of radiopharmaceutical, there is prompt visualization of the liver. There is persistent blood pool activity throughout the examination and activity is seen within the kidneys. There is no significant hepatic washout. Gallbladder is not visualized. Impaired washout of hepatic activity is seen through 90 minutes, which can reflect hepatocellular dysfunction or biliary obstruction. Correlate with LFT. Evaluation for cholecystitis in the setting is limited. RECOMMENDATIONS: Unavailable     US SCROTUM AND TESTICLES    Result Date: 4/22/2022  EXAMINATION: ULTRASOUND OF THE SCROTUM/TESTICLES WITH COLOR DOPPLER FLOW EVALUATION; DOPPLER EVALUATION OF THE PELVIS 4/22/2022 TECHNIQUE: Duplex ultrasound using B-mode/gray scaled imaging, Doppler spectral analysis and color flow Doppler was obtained of the testicles.; Duplex ultrasound using B-mode/gray scaled imaging and Doppler spectral analysis and color flow was obtained of the pelvis. COMPARISON: None. HISTORY: ORDERING SYSTEM PROVIDED HISTORY: bruising of scrotum TECHNOLOGIST PROVIDED HISTORY: Reason for exam:->bruising of scrotum Reason for Exam: Bruising of scrotum FINDINGS: Measurements: Right Testicle: 4.2 x 2.7 x 2.7 cm Left Testicle: 3.9 x 2.7 x 2.6 cm Right: Grey Scale: The right testicle demonstrates normal homogeneous echotexture without focal lesion. No evidence of testicular microlithiasis. Doppler Evaluation: There is normal arterial and venous Doppler flow within the testicle. Scrotal Sac: No evidence of hydrocele. Extensive scrotal wall thickening. Epididymis: No acute abnormality. Left: Grey Scale: The left testicle demonstrates normal homogeneous echotexture without focal lesion. No evidence of testicular microlithiasis. Doppler Evaluation: There is normal arterial and venous Doppler flow within the testicle. Scrotal Sac: No evidence of hydrocele. Extensive scrotal wall thickening. Epididymis: There is a cyst versus spermatocele within the left epididymis which measures 1.3 x 1.3 x 1.4 cm. 1. No evidence of testicular torsion or mass. 2. 1.4 cm left epididymal cyst versus spermatocele. 3. Diffuse scrotal wall thickening. CT ABDOMEN PELVIS W IV CONTRAST Additional Contrast? None    Result Date: 4/19/2022  EXAMINATION: CT OF THE ABDOMEN AND PELVIS WITH CONTRAST 4/18/2022 11:41 pm TECHNIQUE: CT of the abdomen and pelvis was performed with the administration of intravenous contrast. Multiplanar reformatted images are provided for review. Dose modulation, iterative reconstruction, and/or weight based adjustment of the mA/kV was utilized to reduce the radiation dose to as low as reasonably achievable. COMPARISON: CT abdomen and pelvis dated April 18, 2022 HISTORY: ORDERING SYSTEM PROVIDED HISTORY: Gluteus hematoma TECHNOLOGIST PROVIDED HISTORY: Reason for exam:->Gluteus hematoma Additional Contrast?->None Reason for Exam: Gluteus hematoma FINDINGS: Lower Chest: Minimal bibasilar atelectasis is seen. Organs: Severe diffuse fatty infiltration of the liver is noted. There is vicarious excretion of contrast into the gallbladder. There is no evidence of pericholecystic fluid. The spleen, adrenal glands, pancreas, and kidneys are unremarkable. GI/Bowel: There is no evidence of bowel obstruction. The appendix is normal. A rectal tube is seen. Pelvis: A López catheter is seen within the bladder. There is no free fluid. Peritoneum/Retroperitoneum: There is no free air or lymphadenopathy. Bones/Soft Tissues: There is no acute fracture. There is redemonstration of enlargement of the adductor musculature. There is redemonstration of a fluid collection involving the right gluteus medius and naomi muscles. This contains layering hyperdensity and likely represents a hematoma. This has not significantly changed since the prior examination. This measures 11.2 x 4.3 cm in transverse and AP dimensions and 17.7 cm in craniocaudal dimension. Partially visualized hematoma is seen surrounding the hamstring musculature on the right. 1. Severe diffuse fatty infiltration of the liver. 2. Hematoma involving the right gluteus medius and naomi musculature without significant change. There is also a partially visualized hematoma surrounding the hamstring musculature on the right. Continued follow-up is recommended until resolution. CT ABDOMEN PELVIS W IV CONTRAST Additional Contrast? None    Result Date: 4/18/2022  EXAMINATION: CT OF THE ABDOMEN AND PELVIS WITH CONTRAST, 4/18/2022 2:01 am TECHNIQUE: CT of the abdomen and pelvis was performed with the administration of intravenous contrast. Multiplanar reformatted images are provided for review. Dose modulation, iterative reconstruction, and/or weight based adjustment of the mA/kV was utilized to reduce the radiation dose to as low as reasonably achievable. COMPARISON: None HISTORY: ORDERING SYSTEM PROVIDED HISTORY: Jaundiced, right hip pain and thigh pain, elevated lactic acid, elevated bili. TECHNOLOGIST PROVIDED HISTORY: Reason for exam:->Jaundiced, right hip pain and thigh pain, elevated lactic acid, elevated bili.  Additional Contrast?->None Decision Support Exception - unselect if not a suspected or confirmed emergency medical condition->Emergency Medical Condition (MA) Reason for Exam: Jaundiced, right hip pain and thigh pain, elevated lactic acid, elevated bili. FINDINGS: Lower Chest: The visualized lungs are clear. Organs: Severe diffuse fatty infiltration of the liver is noted. The spleen, adrenal glands, pancreas, and kidneys are unremarkable. Gallbladder wall thickening is seen GI/Bowel: There is no evidence of bowel obstruction. The appendix is normal. Pelvis: The bladder is unremarkable. There is no free fluid. Peritoneum/Retroperitoneum: There is no free air or lymphadenopathy. Bones/Soft Tissues: No destructive osseous lesions are identified. There is a fluid collection between the right gluteus medius and gluteus naomi muscles with a hematocrit level, concerning for a hematoma. This measures 11.1 x 4.2 x 16.7 cm. There is diffuse enlargement of the adductor muscles of the right lower extremity. 1. There is a hematoma measuring 11.1 x 4.2 x 16.7 cm between the right gluteus medius and naomi muscles. There is diffuse enlargement of the adductor muscles of the right lower extremity, likely related to edema. 2. Severe diffuse fatty infiltration of the liver. Gallbladder wall thickening is seen. This could be related to chronic liver disease. If there is concern for acute cholecystitis, a right upper quadrant ultrasound can be obtained. CT FEMUR RIGHT W CONTRAST    Result Date: 4/18/2022  EXAMINATION: CT OF THE RIGHT FEMUR WITH CONTRAST 4/18/2022 2:01 am TECHNIQUE: CT of the right femur was performed with the administration of intravenous contrast.  Multiplanar reformatted images are provided for review. Dose modulation, iterative reconstruction, and/or weight based adjustment of the mA/kV was utilized to reduce the radiation dose to as low as reasonably achievable. COMPARISON: None.  HISTORY ORDERING SYSTEM PROVIDED HISTORY: pain TECHNOLOGIST PROVIDED HISTORY: Reason for exam:->pain Decision Support Exception - unselect if not a suspected or confirmed emergency medical condition->Emergency Medical Condition (MA) Reason for Exam: Jaundiced, right hip pain and thigh pain, elevated lactic acid, elevated bili FINDINGS: THE STUDY IS LIMITED DUE TO LACK OF INTRAVENOUS CONTRAST. Bones: No acute fracture or dislocation. 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 No suspicious sclerotic or lytic lesion. .  No CT evidence of osteomyelitis. Fusion of the proximal tibiofibular joint, likely congenital. Soft Tissue: Ill-defined hypodensity in the right gluteus medius muscle, image 1. Also, a smaller ill-defined hyperdensity in the right gluteus naomi muscle, image number 34 no soft tissue gas. No radiopaque foreign body. Joint: No significant hip or knee joint effusion. Mild degenerative changes of the right hip joint are seen. Ill-defined hypodensity in the right gluteus medius muscle and ill-defined hyperdensity in the right gluteus naomi muscle. These may represent hematomas in different phases of evolution. However simple and complex abscesses cannot be excluded. Clinical correlation and further evaluation by MRI with and without IV contrast recommended. MRI ABDOMEN WO CONTRAST MRCP    Result Date: 4/20/2022  EXAMINATION: MRI OF THE ABDOMEN WITHOUT CONTRAST AND MRCP 4/18/2022 1:26 pm TECHNIQUE: Multiplanar multisequence MRI of the abdomen was performed without the administration of intravenous contrast.  However due to patient cooperation and medication, only coronal haste and axial T2 sequences could be obtained. A single 2D MRCP image was also obtained.  COMPARISON: CT 04/18/2022 HISTORY: ORDERING SYSTEM PROVIDED HISTORY: elevated bilirubin TECHNOLOGIST PROVIDED HISTORY: Reason for exam:->elevated bilirubin Decision Support Exception - unselect if not a suspected or confirmed emergency medical condition->Emergency Medical Condition (MA) Reason for Exam: limited mrcp on ICU pt in withdraw- pt was heavily medicated and began rolling on table - attemped several times with additional meds - pt will not be able to complete any further scanning to complete per ICU nurse- limited FINDINGS: There is mild gallbladder wall thickening. No definite cholelithiasis or pericholecystic fluid. No intra or extrahepatic biliary duct dilation. The unenhanced liver, gallbladder, pancreas and adrenal glands are grossly without focal abnormality. No hydronephrosis. There is a 1.2 cm left renal cyst.  The aorta is normal in caliber. The stomach remains persistently distended from earlier exam.  The visualized bowel is otherwise unremarkable. No ascites. Lung bases are grossly clear. No acute findings in the bones. Markedly limited exam due to patient condition. Mild gallbladder wall thickening, nonspecific, in the setting of underlying liver disease. No biliary duct dilation. US ABDOMEN LIMITED    Result Date: 4/21/2022  EXAMINATION: RIGHT UPPER QUADRANT ULTRASOUND 4/21/2022 5:43 am COMPARISON: CT abdomen and pelvis dated April 18, 2022. MRI of the abdomen April 18, 2022 HISTORY: ORDERING SYSTEM PROVIDED HISTORY: worsening jaundice, alcoholic liver disease- ? biliary obstruction TECHNOLOGIST PROVIDED HISTORY: Reason for exam:->worsening jaundice, alcoholic liver disease- ? biliary obstruction Reason for Exam: Worsening jaundice Additional signs and symptoms: CT also Relevant Medical/Surgical History: Recent US this month FINDINGS: LIVER:  There is decreased penetration of the liver with increased echogenicity which could be related to severe diffuse fatty infiltration. BILIARY SYSTEM:  Gallbladder wall is thickened and measures 6 mm. This could be related to chronic liver disease. Sludge is seen within the gallbladder lumen. There is a negative sonographic Reynoso sign. Common bile duct is within normal limits measuring 5.6 mm. RIGHT KIDNEY: The right kidney is grossly unremarkable without evidence of hydronephrosis. PANCREAS:  The pancreas is not visualized. OTHER: No evidence of right upper quadrant ascites.      1. Limited exam. 2. Severe diffuse fatty infiltration of the liver. 3. Sludge is seen within the gallbladder lumen. The gallbladder wall is thickened at 6 mm however there is no evidence of a positive sonographic Reynoso sign. Findings could be related to chronic liver disease. 4. No common bile duct dilatation. US DUP ABD PEL RETRO SCROT COMPLETE    Result Date: 4/22/2022  EXAMINATION: ULTRASOUND OF THE SCROTUM/TESTICLES WITH COLOR DOPPLER FLOW EVALUATION; DOPPLER EVALUATION OF THE PELVIS 4/22/2022 TECHNIQUE: Duplex ultrasound using B-mode/gray scaled imaging, Doppler spectral analysis and color flow Doppler was obtained of the testicles.; Duplex ultrasound using B-mode/gray scaled imaging and Doppler spectral analysis and color flow was obtained of the pelvis. COMPARISON: None. HISTORY: ORDERING SYSTEM PROVIDED HISTORY: bruising of scrotum TECHNOLOGIST PROVIDED HISTORY: Reason for exam:->bruising of scrotum Reason for Exam: Bruising of scrotum FINDINGS: Measurements: Right Testicle: 4.2 x 2.7 x 2.7 cm Left Testicle: 3.9 x 2.7 x 2.6 cm Right: Grey Scale: The right testicle demonstrates normal homogeneous echotexture without focal lesion. No evidence of testicular microlithiasis. Doppler Evaluation: There is normal arterial and venous Doppler flow within the testicle. Scrotal Sac: No evidence of hydrocele. Extensive scrotal wall thickening. Epididymis: No acute abnormality. Left: Grey Scale: The left testicle demonstrates normal homogeneous echotexture without focal lesion. No evidence of testicular microlithiasis. Doppler Evaluation: There is normal arterial and venous Doppler flow within the testicle. Scrotal Sac: No evidence of hydrocele. Extensive scrotal wall thickening. Epididymis: There is a cyst versus spermatocele within the left epididymis which measures 1.3 x 1.3 x 1.4 cm. 1. No evidence of testicular torsion or mass. 2. 1.4 cm left epididymal cyst versus spermatocele. 3. Diffuse scrotal wall thickening.      VL DUP LOWER EXTREMITY VENOUS RIGHT    Result Date: 4/18/2022  EXAMINATION: DUPLEX VENOUS ULTRASOUND OF THE RIGHT LOWER EXTREMITY 4/18/2022 1:10 am TECHNIQUE: Duplex ultrasound using B-mode/gray scaled imaging and Doppler spectral analysis and color flow was obtained of the deep venous structures of the right extremity. COMPARISON: None. HISTORY: ORDERING SYSTEM PROVIDED HISTORY: pain nki TECHNOLOGIST PROVIDED HISTORY: Reason for exam:->pain nki Reason for Exam: Rt leg pain FINDINGS: The visualized veins of the right lower extremity are patent and free of echogenic thrombus. The veins demonstrate good compressibility with normal color flow study and spectral analysis. No evidence of DVT in the right lower extremity. Assessment & Plan:      Velma Muñoz is a 39y.o. year old male admitted 4/18/2022 for intramuscular hematoma. 1) Scrotal Swelling with Ecchymosis: likely secondary to gluteal hematoma extension and liver injury. No evidence of scrotal injury or Joe's noted. Scrotal US 4/22 shows  no evidence of testicular torsion or mass. A 1.4 cm left epididymal cyst versus spermatocele is noted with diffuse scrotal wall thickening. Recommend scrotal elevation and ice to alleviate swelling. No further  intervention indicated. Pt stable from a  standpoint. Will sign off, please call with any questions. Pt to follow up with our office as needed. Patient seen and examined, chart reviewed.      Electronically signed by Eric Dutton PA-C on 4/22/2022 at 8:25 AM

## 2022-04-22 NOTE — CARE COORDINATION
Follow up visit. Wife at bedside. Pt remains on precedex gtt. CM was able to meet pt's wife in person. Pt's wife is struggling with how pt's health has come to this point. Cm discussed that regardless of the cause of pt's liver dysfunction it seems that it would important for pt to abstain from alcohol after discharge and wife is in agreement. Cm explained that if pt remains in ICU CM will follow up with pt on Monday. A list of substance abuse treatment resources/options placed on pt's discharge instructions should they be needed.

## 2022-04-22 NOTE — PLAN OF CARE
Nutrition Problem #1: Inadequate oral intake  Intervention: Food and/or Nutrient Delivery: Modify Current Diet,Start Oral Nutrition Supplement (advance diet as soon as medically appropriate)

## 2022-04-22 NOTE — PROGRESS NOTES
GENERAL SURGERY INPATIENT PROGRESS NOTE  [facility]) Physicians    PATIENT: Elena Smith, 39 y.o., male, MRN: [id number]    Hospital Day:  LOS: 4 days     Elena Smith is a 39 y.o. male with acute alcoholic hepatitis with a Tbili of 23. CT scan showed intramuscular hematoma of the right gluteus muscles        Subjective:  Chief Complaint: (sedated on Precedex)  Pain: controlled  BM:    Diet: ADULT DIET; Clear Liquid  ADULT ORAL NUTRITION SUPPLEMENT; Breakfast, Lunch, Dinner; Clear Liquid Oral Supplement  Activity: as tolerated    Hb has been up and down, staying ~7. LFTs about the same today. Leg swelling stable to improving.       Objective:    Vitals: BP (!) 95/52   Pulse 51   Temp 97.3 °F (36.3 °C) (Oral)   Resp 10   Ht 6' 3.98\" (1.93 m)   Wt 257 lb 4.4 oz (116.7 kg)   SpO2 96%   BMI 31.33 kg/m²   Vital Signs (Last 24 Hours)  Temp  Av.9 °F (36.6 °C)  Min: 97.3 °F (36.3 °C)  Max: 98.2 °F (36.8 °C)  Pulse  Av.2  Min: 46  Max: 62  BP  Min: 91/56  Max: 121/73  Resp  Av  Min: 8  Max: 23  SpO2  Av.9 %  Min: 85 %  Max: 100 %  Wt Readings from Last 3 Encounters:   22 257 lb 4.4 oz (116.7 kg)   22 240 lb (108.9 kg)   22 254 lb (115.2 kg)       I/O:  1901 -  0700  In: 6100.6 [I.V.:4369.2]  Out: 8050 [Urine:7450]    IV Fluids: sodium chloride    sodium chloride    sodium chloride    sodium chloride Last Rate: 150 mL/hr at 22 0904    sodium chloride    sodium chloride    dexmedetomidine HCl in NaCl Last Rate: 1.5 mcg/kg/hr (22 1337)    sodium chloride    Scheduled Meds: rifAXIMin, 550 mg, Oral, BID    lactulose, 20 g, Oral, TID    thiamine, 100 mg, IntraVENous, Daily    lidocaine PF, 5 mL, IntraDERmal, Once    sodium chloride flush, 5-40 mL, IntraVENous, 2 times per day    vancomycin, 1,250 mg, IntraVENous, Q12H    sodium chloride flush, 5-40 mL, IntraVENous, 2 times per day    methylPREDNISolone, 20 mg, IntraVENous, Q12H    Physical Exam:  General Appearance:   Calm, no distress    Head:   Normocephalic, atraumatic    Lungs:    Equal chest rise, respirations unlabored   Heart:   Regular rate and rhythm    Abdomen:    Soft, non-tender, no rebound or guarding    Extremities:  No cyanosis. Right lower extremity edema, compartments soft, scattered ecchymoses of the bilateral lower extremities. Pulses intact. Neurologic:  Nonfocal        Labs/Imaging Results:   Recent Results (from the past 24 hour(s))   Hemoglobin and Hematocrit    Collection Time: 04/21/22  8:48 PM   Result Value Ref Range    Hemoglobin 7.7 (L) 13.5 - 18.0 GM/DL    Hematocrit 23.8 (L) 42 - 52 %   Hemoglobin and Hematocrit    Collection Time: 04/22/22 12:35 AM   Result Value Ref Range    Hemoglobin 7.6 (L) 13.5 - 18.0 GM/DL    Hematocrit 23.3 (L) 42 - 52 %   Vancomycin Level, Random    Collection Time: 04/22/22 12:35 AM   Result Value Ref Range    Vancomycin Rm 18.5 UG/ML    DOSE AMOUNT DOSE AMT.  GIVEN - 1250mg     DOSE TIME DOSE TIME GIVEN - 0300, 1500    Ammonia    Collection Time: 04/22/22  4:30 AM   Result Value Ref Range    Ammonia 26 16 - 60 UMOL/L   Protime-INR    Collection Time: 04/22/22  4:35 AM   Result Value Ref Range    Protime 14.2 11.7 - 14.5 SECONDS    INR 1.10 INDEX   Comprehensive Metabolic Panel    Collection Time: 04/22/22  4:35 AM   Result Value Ref Range    Sodium 134 (L) 135 - 145 MMOL/L    Potassium 4.4 3.5 - 5.1 MMOL/L    Chloride 104 99 - 110 mMol/L    CO2 22 21 - 32 MMOL/L    BUN 22 6 - 23 MG/DL    CREATININE 0.3 (L) 0.9 - 1.3 MG/DL    Glucose 162 (H) 70 - 99 MG/DL    Calcium 7.0 (L) 8.3 - 10.6 MG/DL    Albumin 2.1 (L) 3.4 - 5.0 GM/DL    Total Protein 3.2 (L) 6.4 - 8.2 GM/DL    Total Bilirubin 22.4 (HH) 0.0 - 1.0 MG/DL    ALT 98 (H) 10 - 40 U/L     (H) 15 - 37 IU/L    Alkaline Phosphatase 241 (H) 40 - 128 IU/L    GFR Non-African American >60 >60 mL/min/1.73m2    GFR African American >60 >60 mL/min/1.73m2    Anion Gap 8 4 - 16   Calcium, Ionized Collection Time: 04/22/22  4:35 AM   Result Value Ref Range    Ionized Ca 1.10 (L) 1.12 - 1.32 mMOL/L    Calcium, Ion 4.40 (L) 4.48 - 5.28 MG/DL   Procalcitonin    Collection Time: 04/22/22  4:35 AM   Result Value Ref Range    Procalcitonin 0.833    ANEMIA PANEL    Collection Time: 04/22/22  4:35 AM   Result Value Ref Range    WBC 10.5 4.0 - 10.5 K/CU MM    RBC 2.38 (L) 4.6 - 6.2 M/CU MM    Hemoglobin 7.6 (L) 13.5 - 18.0 GM/DL    Hematocrit 22.9 (L) 42 - 52 %    MCV 96.2 78 - 100 FL    MCH 31.9 (H) 27 - 31 PG    MCHC 33.2 32.0 - 36.0 %    RDW 23.9 (H) 11.7 - 14.9 %    Platelets 186 921 - 198 K/CU MM    MPV 10.7 7.5 - 11.1 FL    Myelocyte Percent 2 (H) 0.0 %    Bands Relative 6 5 - 11 %    Segs Relative 71.0 (H) 36 - 66 %    Lymphocytes % 15.0 (L) 24 - 44 %    Monocytes % 6.0 (H) 0 - 4 %    nRBC 5     Myelocytes Absolute 0.21 K/CU MM    Bands Absolute 0.63 K/CU MM    Segs Absolute 7.5 K/CU MM    Lymphocytes Absolute 1.6 K/CU MM    Monocytes Absolute 0.6 K/CU MM    Differential Type MANUAL DIFFERENTIAL     Macrocytes 2+     Polychromasia 1+     Folate 4.3 3.1 - 17.5 NG/ML    Iron 61 59 - 158 ug/dL    UIBC 75 (L) 110 - 370 ug/dL    TIBC 136 (L) 250 - 450 ug/dL    Transferrin % 45 (H) 10 - 44 %    Retic Ct Pct 7.8 (H) 0.2 - 2.20 %    Ferritin 987 (H) 30 - 400 NG/ML     Assessment:  uCate Astudillo is a 39 y.o. male with  acute alcoholic hepatitis with a Tbili of 19. CT scan showed intramuscular hematoma of the right gluteus muscles     Principal Problem:    Intramuscular hematoma  Active Problems:    Right leg pain  Resolved Problems:    * No resolved hospital problems. *      Plan:  · Imaging showed hematoma was similar in size on re-evaluation. Monitor for now and transfuse for Hb <7  · Compartments are soft, neurovascularly intact. Continue to check with vital signs.  Appears to be improving slowly  · Appreciate Dr. Sami Thompson recommendations, in alcohol withdrawal. LFTs stable today but HIDA doesn't appear consistent with a biliary etiology  · Alcohol abuse. Continue CIWA protocols, on Precedex currently. · Gallbladder wall thickening - likely secondary to hepatic dysfunction given his LFTs and current imaging, also with recent HIDA scan showing cholestasis in the liver. No surgical plans at this time  · Will continue to follow loosely.  Please call this weekend  · Thank you for the consultation and the opportunity to care for Nighat Florez     Electronically signed: Glynn Merrill MD 4/22/2022 2:07 PM

## 2022-04-22 NOTE — PROGRESS NOTES
[id number] [facility]  consulted by Dr. Patrice Calloway for monitoring and adjustment. Indication for treatment: Sepsis  Goal trough: [] 10-15 mcg/mL or [x] 15-20 mcg/ml  AUC/KADIE: [] <500 or [x] 400-600    Pertinent Laboratory Values:   Temp Readings from Last 3 Encounters:   04/22/22 97.3 °F (36.3 °C) (Oral)   02/23/22 97.8 °F (36.6 °C)   11/08/21 97.9 °F (36.6 °C)     Recent Labs     04/20/22  0600 04/21/22  0600 04/22/22  0435   WBC  --  12.3* 10.5   LACTATE 1.3 1.2  --      Recent Labs     04/20/22  0500 04/21/22  0600 04/22/22  0435   BUN 29* 25* 22   CREATININE 0.7* 0.4* 0.3*     Estimated Creatinine Clearance: 453 mL/min (A) (based on SCr of 0.3 mg/dL (L)).     Intake/Output Summary (Last 24 hours) at 4/22/2022 1336  Last data filed at 4/22/2022 4952  Gross per 24 hour   Intake 4311.66 ml   Output 6150 ml   Net -1838.34 ml       Pertinent Cultures:  Date    Source    Results  4/18   Blood    1/4 GPC    Vancomycin level:   TROUGH:    Recent Labs     04/20/22  0255   VANCOTROUGH 14.3     RANDOM:    Recent Labs     04/22/22  0035   VANCORANDOM 18.5       Assessment:  · SCr, BUN, and urine output:  · Scr was elevated earlier in admission, now at baseline  · Day(s) of therapy: 5  · Vancomycin concentration:   · 4/20: 14.3, 10 hours post-dose, , therapeutic (1250 q12h)  · 4/20:  18.5, 9 hours post-dose    Plan:  · Continue Vancomycin 1250 mg IV every 12 hours  · Predicted  at steady state  · Repeat next level in 3 days  · Pharmacy will continue to monitor patient and adjust therapy as indicated    Joshua 3 4/25 @ 0206    Thank you for the consult,  Ashley Jeffery [facility]  4/22/2022 1:36 PM

## 2022-04-22 NOTE — CONSULTS
Midline meets therapeutic needs at this time for second access d/t frequntly removing PIVs in forearms. Arrow Endurance Extended dwell midline inserted in LUE Cephalic vessel x 1 attempt using sterile ultrasound guided technique. Brisk blood return, flushes without resistance. OK to draw blood from midline.

## 2022-04-22 NOTE — PROGRESS NOTES
V2.0  [facility] Hospitalist Progress Note      Name:  Tia Riojas /Age/Sex: 1980  (39 y.o. male)   MRN & CSN:  [id number] & [id number] Encounter Date/Time: 2022 12:38 PM EDT    Location:  -A PCP: Oneil Garcia MD       Hospital Day: 5    Assessment and Plan:   Tia Riojas is a 39 y.o. male with acute right lower extremity pain    Plan:  #  intramuscular hematoma in the right gluteus medius muscle            CT study (femur/and abdomen and pelvis) showed hypodensity in the right gluteal muscle-possible hematoma versus abscess with diffuse edema             lower extremity DVT negative for thrombus             monitor for signs of compartment syndrome--pulses intact, no motor or sensory changes. -General surgery feels this is likely hematoma. Oxycodone ordered for pain. - Appears to be improving. #Acute alcoholic hepatitis vs  drug-induced hepatitis              child Friedman score-10              severe elevated bilirubin level along with abnormal LFT, NH3-->83              HIDA scan on -showed hepatocellular dysfunction/biliary obstruction-no signs of cholecystitis               CT abdomen and pelvis with IV contrast on -showed diffuse fatty infiltration of the liver and gallbladder wall thickening  -MRCP: Mild gallbladder wall thickening, no biliary duct dilation. Started oral lactulose. Xifaxan added. Ferritin 1.2K.    - MRCP limited by motion. -GI concern for drug-induced liver injury. Stopped cefepime and Flagyl. RUQ : Severe diffuse fatty infiltration of liver; sludge within gallbladder and 6 mm thickened gallbladder wall. GI feels not consistent with biliary etiology.  - LFTs appear to be stabilizing. NH3 26. High anion gap metabolic acidosis secondary to lactic acidosis                     lactic acid 9.1 on arrival---continue IV fluids  - Improved lactate 1.3.     # alcohol withdrawal              diaphoresis, palpitations, tremors, confusion                CIWA score--CIWA protocol ordered                Iv thiamine, Precedex drip  -continues to have withdrawal symptoms. #Leukocytosis-possibly sepsis   -BCx: GPC 1 of 4 bottles. Await identification, may be contamination. Stop cefepime and Flagyl. Continue vancomycin.  - Procalcitonin 0.83. #Acute normocytic anemia              Elevated LDH  - Low haptoglobin. D/W heme-onc who states that this likely due to liver disease instead of hemolysis. - Transfused with 3 units PRBC total.    - Transfused an additional unit of PRBC yesterday. Improved hemoglobin 7.6. Hypocalcemia  -ionized calcium 1.10. Replace     Scrotal bruising  - scrotal ultrasound: Diffuse scrotal wall thickening; left epididymal cyst versus spermatocele. Cyst likely due to gluteal hematoma extension. Urology recommends scrotal elevation and ice to alleviate swelling. No further intervention needed. Chronic conditions:  Chronic alcohol abuse  Hypertension  Marfan syndrome with ocular manifestation  Anxiety disorder                 Diet ADULT DIET; Clear Liquid  ADULT ORAL NUTRITION SUPPLEMENT; Breakfast, Lunch, Dinner; Clear Liquid Oral Supplement   DVT Prophylaxis [] Lovenox, []  Heparin, [x] SCDs, [] Ambulation,  [] Eliquis, [] Xarelto  [] Coumadin   Code Status Full Code   Disposition From: hospital  Expected Disposition: home  Estimated Date of Discharge: 4/25/2022  Patient requires continued admission due to need for LFTs to improve and for confusion to improve. Surrogate Decision Maker/ POA unknown     Subjective:     Chief Complaint: Leg Pain  Right lower extremity pain    Dareen Her is a 39 y.o. male   As per nurse he continues to be agitated and confused. Pulled out rectal pierce. Still has pulses on doppler of right foot. Patient was transfused 1 unit PRBC yesterday. Review of Systems:      ROS negative except for as above. Objective:        Intake/Output Summary (Last 24 hours) at 4/22/2022 1528  Last data filed at 4/22/2022 1431  Gross per 24 hour   Intake 4311.66 ml   Output 4650 ml   Net -338.34 ml        Vitals:   Vitals:    04/22/22 1200   BP: (!) 95/52   Pulse: 51   Resp: 10   Temp: 97.3 °F (36.3 °C)   SpO2: 96%       Physical Exam:     General: no acute distress, confused, sleeping  Eyes: scleral icterus+  HENT: NCAT  Cardiovascular: bradycardia  Respiratory: Clear to auscultation, on room air  Gastroin. testinal: Soft, non tender, nondistended  Genitourinary: no suprapubic tenderness, has pierce, bruising and swelling of scrotum  Musculoskeletal:  visible hematoma posterior aspect of RLE extending from gluteus to popliteal space appears less swollen, tender to palpation,  mild bruising left lower extremity posterior aspect of left kknee  Skin: warm, dry, jaundiced   Neuro: awake and can answer questions      Medications:   Medications:    rifAXIMin  550 mg Oral BID    lactulose  20 g Oral TID    thiamine  100 mg IntraVENous Daily    lidocaine PF  5 mL IntraDERmal Once    sodium chloride flush  5-40 mL IntraVENous 2 times per day    vancomycin  1,250 mg IntraVENous Q12H    sodium chloride flush  5-40 mL IntraVENous 2 times per day    methylPREDNISolone  20 mg IntraVENous Q12H      Infusions:    sodium chloride      sodium chloride      sodium chloride      sodium chloride 150 mL/hr at 04/22/22 0904    sodium chloride      sodium chloride      dexmedetomidine HCl in NaCl 1.5 mcg/kg/hr (04/22/22 1337)    sodium chloride       PRN Meds: albuterol sulfate HFA, 2 puff, Q6H PRN  sodium chloride, , PRN  sodium chloride, , PRN  oxyCODONE, 5 mg, Q6H PRN  sodium chloride flush, 5-40 mL, PRN  sodium chloride, , PRN  ondansetron, 4 mg, Q8H PRN   Or  ondansetron, 4 mg, Q6H PRN  polyethylene glycol, 17 g, Daily PRN  acetaminophen, 650 mg, Q6H PRN   Or  acetaminophen, 650 mg, Q6H PRN  HYDROmorphone, 1 mg, Q3H PRN  naloxone, 0.4 mg, PRN  sodium chloride flush, 5-40 mL, PRN  sodium chloride, , PRN  sodium chloride flush, 5-40 mL, PRN  sodium chloride, , PRN  LORazepam, 1 mg, Q1H PRN   Or  LORazepam, 1 mg, Q1H PRN   Or  LORazepam, 2 mg, Q1H PRN   Or  LORazepam, 2 mg, Q1H PRN   Or  LORazepam, 3 mg, Q1H PRN   Or  LORazepam, 3 mg, Q1H PRN   Or  LORazepam, 4 mg, Q1H PRN   Or  LORazepam, 4 mg, Q1H PRN  sodium chloride, , PRN        Labs      Recent Results (from the past 24 hour(s))   Hemoglobin and Hematocrit    Collection Time: 04/21/22  8:48 PM   Result Value Ref Range    Hemoglobin 7.7 (L) 13.5 - 18.0 GM/DL    Hematocrit 23.8 (L) 42 - 52 %   Hemoglobin and Hematocrit    Collection Time: 04/22/22 12:35 AM   Result Value Ref Range    Hemoglobin 7.6 (L) 13.5 - 18.0 GM/DL    Hematocrit 23.3 (L) 42 - 52 %   Vancomycin Level, Random    Collection Time: 04/22/22 12:35 AM   Result Value Ref Range    Vancomycin Rm 18.5 UG/ML    DOSE AMOUNT DOSE AMT.  GIVEN - 1250mg     DOSE TIME DOSE TIME GIVEN - 0300, 1500    Ammonia    Collection Time: 04/22/22  4:30 AM   Result Value Ref Range    Ammonia 26 16 - 60 UMOL/L   Protime-INR    Collection Time: 04/22/22  4:35 AM   Result Value Ref Range    Protime 14.2 11.7 - 14.5 SECONDS    INR 1.10 INDEX   Comprehensive Metabolic Panel    Collection Time: 04/22/22  4:35 AM   Result Value Ref Range    Sodium 134 (L) 135 - 145 MMOL/L    Potassium 4.4 3.5 - 5.1 MMOL/L    Chloride 104 99 - 110 mMol/L    CO2 22 21 - 32 MMOL/L    BUN 22 6 - 23 MG/DL    CREATININE 0.3 (L) 0.9 - 1.3 MG/DL    Glucose 162 (H) 70 - 99 MG/DL    Calcium 7.0 (L) 8.3 - 10.6 MG/DL    Albumin 2.1 (L) 3.4 - 5.0 GM/DL    Total Protein 3.2 (L) 6.4 - 8.2 GM/DL    Total Bilirubin 22.4 (HH) 0.0 - 1.0 MG/DL    ALT 98 (H) 10 - 40 U/L     (H) 15 - 37 IU/L    Alkaline Phosphatase 241 (H) 40 - 128 IU/L    GFR Non-African American >60 >60 mL/min/1.73m2    GFR African American >60 >60 mL/min/1.73m2    Anion Gap 8 4 - 16   Calcium, Ionized    Collection Time: 04/22/22  4:35 AM   Result Value Ref Range    Ionized Ca 1.10 (L) 1.12 - 1.32 mMOL/L    Calcium, Ion 4.40 (L) 4.48 - 5.28 MG/DL   Procalcitonin    Collection Time: 04/22/22  4:35 AM   Result Value Ref Range    Procalcitonin 0.833    ANEMIA PANEL    Collection Time: 04/22/22  4:35 AM   Result Value Ref Range    WBC 10.5 4.0 - 10.5 K/CU MM    RBC 2.38 (L) 4.6 - 6.2 M/CU MM    Hemoglobin 7.6 (L) 13.5 - 18.0 GM/DL    Hematocrit 22.9 (L) 42 - 52 %    MCV 96.2 78 - 100 FL    MCH 31.9 (H) 27 - 31 PG    MCHC 33.2 32.0 - 36.0 %    RDW 23.9 (H) 11.7 - 14.9 %    Platelets 934 876 - 864 K/CU MM    MPV 10.7 7.5 - 11.1 FL    Myelocyte Percent 2 (H) 0.0 %    Bands Relative 6 5 - 11 %    Segs Relative 71.0 (H) 36 - 66 %    Lymphocytes % 15.0 (L) 24 - 44 %    Monocytes % 6.0 (H) 0 - 4 %    nRBC 5     Myelocytes Absolute 0.21 K/CU MM    Bands Absolute 0.63 K/CU MM    Segs Absolute 7.5 K/CU MM    Lymphocytes Absolute 1.6 K/CU MM    Monocytes Absolute 0.6 K/CU MM    Differential Type MANUAL DIFFERENTIAL     Macrocytes 2+     Polychromasia 1+     Folate 4.3 3.1 - 17.5 NG/ML    Iron 61 59 - 158 ug/dL    UIBC 75 (L) 110 - 370 ug/dL    TIBC 136 (L) 250 - 450 ug/dL    Transferrin % 45 (H) 10 - 44 %    Retic Ct Pct 7.8 (H) 0.2 - 2.20 %    Ferritin 987 (H) 30 - 400 NG/ML   Hemoglobin and Hematocrit    Collection Time: 04/22/22  2:22 PM   Result Value Ref Range    Hemoglobin 7.3 (L) 13.5 - 18.0 GM/DL    Hematocrit 23.3 (L) 42 - 52 %        Imaging/Diagnostics Last 24 Hours   XR FEMUR RIGHT (MIN 2 VIEWS)    Result Date: 4/18/2022  EXAMINATION: 4 XRAY VIEWS OF THE RIGHT FEMUR 4/18/2022 1:16 am COMPARISON: None. HISTORY: ORDERING SYSTEM PROVIDED HISTORY: pain TECHNOLOGIST PROVIDED HISTORY: Reason for exam:->pain Reason for Exam: right leg pain Additional signs and symptoms: patient complains of right thigh pain, nki FINDINGS: There is mild joint space narrowing in the right hip. No fracture or dislocation is seen. The joint spaces are intact.   There is some mild sclerosis scattered along the distal femur which is ill-defined. There is no periosteal reaction. Moderate osteoarthritic changes of the right hip with no acute bony abnormality. Vague mild sclerosis scattered along the distal femur centrally which could represent an old infarct bone infarct or small enchondroma. Recommend orthopedic follow-up and suggest bone scan correlation, if indicated     CT ABDOMEN PELVIS W IV CONTRAST Additional Contrast? None    Result Date: 4/18/2022  EXAMINATION: CT OF THE ABDOMEN AND PELVIS WITH CONTRAST, 4/18/2022 2:01 am TECHNIQUE: CT of the abdomen and pelvis was performed with the administration of intravenous contrast. Multiplanar reformatted images are provided for review. Dose modulation, iterative reconstruction, and/or weight based adjustment of the mA/kV was utilized to reduce the radiation dose to as low as reasonably achievable. COMPARISON: None HISTORY: ORDERING SYSTEM PROVIDED HISTORY: Jaundiced, right hip pain and thigh pain, elevated lactic acid, elevated bili. TECHNOLOGIST PROVIDED HISTORY: Reason for exam:->Jaundiced, right hip pain and thigh pain, elevated lactic acid, elevated bili. Additional Contrast?->None Decision Support Exception - unselect if not a suspected or confirmed emergency medical condition->Emergency Medical Condition (MA) Reason for Exam: Jaundiced, right hip pain and thigh pain, elevated lactic acid, elevated bili. FINDINGS: Lower Chest: The visualized lungs are clear. Organs: Severe diffuse fatty infiltration of the liver is noted. The spleen, adrenal glands, pancreas, and kidneys are unremarkable. Gallbladder wall thickening is seen GI/Bowel: There is no evidence of bowel obstruction. The appendix is normal. Pelvis: The bladder is unremarkable. There is no free fluid. Peritoneum/Retroperitoneum: There is no free air or lymphadenopathy. Bones/Soft Tissues: No destructive osseous lesions are identified.   There is a fluid collection between the right gluteus medius and gluteus naomi muscles with a hematocrit level, concerning for a hematoma. This measures 11.1 x 4.2 x 16.7 cm. There is diffuse enlargement of the adductor muscles of the right lower extremity. 1. There is a hematoma measuring 11.1 x 4.2 x 16.7 cm between the right gluteus medius and naomi muscles. There is diffuse enlargement of the adductor muscles of the right lower extremity, likely related to edema. 2. Severe diffuse fatty infiltration of the liver. Gallbladder wall thickening is seen. This could be related to chronic liver disease. If there is concern for acute cholecystitis, a right upper quadrant ultrasound can be obtained. CT FEMUR RIGHT W CONTRAST    Result Date: 4/18/2022  EXAMINATION: CT OF THE RIGHT FEMUR WITH CONTRAST 4/18/2022 2:01 am TECHNIQUE: CT of the right femur was performed with the administration of intravenous contrast.  Multiplanar reformatted images are provided for review. Dose modulation, iterative reconstruction, and/or weight based adjustment of the mA/kV was utilized to reduce the radiation dose to as low as reasonably achievable. COMPARISON: None. HISTORY ORDERING SYSTEM PROVIDED HISTORY: pain TECHNOLOGIST PROVIDED HISTORY: Reason for exam:->pain Decision Support Exception - unselect if not a suspected or confirmed emergency medical condition->Emergency Medical Condition (MA) Reason for Exam: Jaundiced, right hip pain and thigh pain, elevated lactic acid, elevated bili FINDINGS: THE STUDY IS LIMITED DUE TO LACK OF INTRAVENOUS CONTRAST. Bones: No acute fracture or dislocation. 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 No suspicious sclerotic or lytic lesion. .  No CT evidence of osteomyelitis. Fusion of the proximal tibiofibular joint, likely congenital. Soft Tissue: Ill-defined hypodensity in the right gluteus medius muscle, image 1. Also, a smaller ill-defined hyperdensity in the right gluteus naomi muscle, image number 34 no soft tissue gas.   No radiopaque foreign body. Joint: No significant hip or knee joint effusion. Mild degenerative changes of the right hip joint are seen. Ill-defined hypodensity in the right gluteus medius muscle and ill-defined hyperdensity in the right gluteus naomi muscle. These may represent hematomas in different phases of evolution. However simple and complex abscesses cannot be excluded. Clinical correlation and further evaluation by MRI with and without IV contrast recommended. VL DUP LOWER EXTREMITY VENOUS RIGHT    Result Date: 4/18/2022  EXAMINATION: DUPLEX VENOUS ULTRASOUND OF THE RIGHT LOWER EXTREMITY 4/18/2022 1:10 am TECHNIQUE: Duplex ultrasound using B-mode/gray scaled imaging and Doppler spectral analysis and color flow was obtained of the deep venous structures of the right extremity. COMPARISON: None. HISTORY: ORDERING SYSTEM PROVIDED HISTORY: pain nki TECHNOLOGIST PROVIDED HISTORY: Reason for exam:->pain nki Reason for Exam: Rt leg pain FINDINGS: The visualized veins of the right lower extremity are patent and free of echogenic thrombus. The veins demonstrate good compressibility with normal color flow study and spectral analysis. No evidence of DVT in the right lower extremity.        Electronically signed by Melyssa Pak MD on 4/22/2022 at 3:28 PM

## 2022-04-22 NOTE — PROGRESS NOTES
Pt HR is in the high 40s patient asymptomatic. Ovalle Decree comes up in the low 50s when stirred NP informed not new ordered. Told to monitor if symptomatic then will treat.

## 2022-04-22 NOTE — PROGRESS NOTES
Cefepime and Flagyl D/C'ed yesterday  VSS  Somnolent, sedated  Abdomen soft, non-tender, non-distended  Labs noted-- hopefully LFT's have levelled off  Continue current Rx

## 2022-04-23 ENCOUNTER — APPOINTMENT (OUTPATIENT)
Dept: GENERAL RADIOLOGY | Age: 42
DRG: 871 | End: 2022-04-23
Payer: COMMERCIAL

## 2022-04-23 LAB
ALBUMIN SERPL-MCNC: 2.3 GM/DL (ref 3.4–5)
ALP BLD-CCNC: 252 IU/L (ref 40–128)
ALT SERPL-CCNC: 115 U/L (ref 10–40)
AMMONIA: 23 UMOL/L (ref 16–60)
ANION GAP SERPL CALCULATED.3IONS-SCNC: 8 MMOL/L (ref 4–16)
ANISOCYTOSIS: ABNORMAL
AST SERPL-CCNC: 486 IU/L (ref 15–37)
BANDED NEUTROPHILS ABSOLUTE COUNT: 1.31 K/CU MM
BANDED NEUTROPHILS RELATIVE PERCENT: 13 % (ref 5–11)
BILIRUB SERPL-MCNC: 22.2 MG/DL (ref 0–1)
BUN BLDV-MCNC: 18 MG/DL (ref 6–23)
CALCIUM IONIZED: 4.6 MG/DL (ref 4.48–5.28)
CALCIUM SERPL-MCNC: 7.2 MG/DL (ref 8.3–10.6)
CHLORIDE BLD-SCNC: 108 MMOL/L (ref 99–110)
CO2: 23 MMOL/L (ref 21–32)
CREAT SERPL-MCNC: <0.5 MG/DL (ref 0.9–1.3)
CULTURE: NORMAL
DIFFERENTIAL TYPE: ABNORMAL
FOLATE: 5.2 NG/ML (ref 3.1–17.5)
GFR AFRICAN AMERICAN: ABNORMAL ML/MIN/1.73M2
GFR NON-AFRICAN AMERICAN: ABNORMAL ML/MIN/1.73M2
GLUCOSE BLD-MCNC: 136 MG/DL (ref 70–99)
HCT VFR BLD CALC: 22.3 % (ref 42–52)
HCT VFR BLD CALC: 22.4 % (ref 42–52)
HCT VFR BLD CALC: 26.3 % (ref 42–52)
HEMOGLOBIN: 6.9 GM/DL (ref 13.5–18)
HEMOGLOBIN: 7 GM/DL (ref 13.5–18)
HEMOGLOBIN: 8.5 GM/DL (ref 13.5–18)
IONIZED CA: 1.15 MMOL/L (ref 1.12–1.32)
LYMPHOCYTES ABSOLUTE: 1.6 K/CU MM
LYMPHOCYTES RELATIVE PERCENT: 16 % (ref 24–44)
Lab: NORMAL
MACROCYTES: ABNORMAL
MCH RBC QN AUTO: 31.5 PG (ref 27–31)
MCHC RBC AUTO-ENTMCNC: 31.4 % (ref 32–36)
MCV RBC AUTO: 100.5 FL (ref 78–100)
METAMYELOCYTES ABSOLUTE COUNT: 0.3 K/CU MM
METAMYELOCYTES PERCENT: 3 %
MONOCYTES ABSOLUTE: 0.6 K/CU MM
MONOCYTES RELATIVE PERCENT: 6 % (ref 0–4)
MYELOCYTE PERCENT: 2 %
MYELOCYTES ABSOLUTE COUNT: 0.2 K/CU MM
NUCLEATED RED BLOOD CELLS: 3
PDW BLD-RTO: 26 % (ref 11.7–14.9)
PLATELET # BLD: 239 K/CU MM (ref 140–440)
PLT MORPHOLOGY: ADEQUATE
PMV BLD AUTO: 11 FL (ref 7.5–11.1)
POLYCHROMASIA: ABNORMAL
POTASSIUM SERPL-SCNC: 4.5 MMOL/L (ref 3.5–5.1)
RBC # BLD: 2.22 M/CU MM (ref 4.6–6.2)
SEGMENTED NEUTROPHILS ABSOLUTE COUNT: 6.1 K/CU MM
SEGMENTED NEUTROPHILS RELATIVE PERCENT: 60 % (ref 36–66)
SODIUM BLD-SCNC: 139 MMOL/L (ref 135–145)
SPECIMEN: NORMAL
TOTAL PROTEIN: 3.4 GM/DL (ref 6.4–8.2)
WBC # BLD: 10.1 K/CU MM (ref 4–10.5)

## 2022-04-23 PROCEDURE — 6360000002 HC RX W HCPCS: Performed by: PHYSICIAN ASSISTANT

## 2022-04-23 PROCEDURE — 82746 ASSAY OF FOLIC ACID SERUM: CPT

## 2022-04-23 PROCEDURE — 85007 BL SMEAR W/DIFF WBC COUNT: CPT

## 2022-04-23 PROCEDURE — 71045 X-RAY EXAM CHEST 1 VIEW: CPT

## 2022-04-23 PROCEDURE — 6360000002 HC RX W HCPCS: Performed by: INTERNAL MEDICINE

## 2022-04-23 PROCEDURE — 86901 BLOOD TYPING SEROLOGIC RH(D): CPT

## 2022-04-23 PROCEDURE — 87449 NOS EACH ORGANISM AG IA: CPT

## 2022-04-23 PROCEDURE — 99232 SBSQ HOSP IP/OBS MODERATE 35: CPT | Performed by: INTERNAL MEDICINE

## 2022-04-23 PROCEDURE — 82330 ASSAY OF CALCIUM: CPT

## 2022-04-23 PROCEDURE — 6370000000 HC RX 637 (ALT 250 FOR IP): Performed by: HOSPITALIST

## 2022-04-23 PROCEDURE — 85027 COMPLETE CBC AUTOMATED: CPT

## 2022-04-23 PROCEDURE — 2580000003 HC RX 258: Performed by: PHYSICIAN ASSISTANT

## 2022-04-23 PROCEDURE — 99232 SBSQ HOSP IP/OBS MODERATE 35: CPT | Performed by: SPECIALIST

## 2022-04-23 PROCEDURE — 86850 RBC ANTIBODY SCREEN: CPT

## 2022-04-23 PROCEDURE — P9016 RBC LEUKOCYTES REDUCED: HCPCS

## 2022-04-23 PROCEDURE — 2580000003 HC RX 258: Performed by: INTERNAL MEDICINE

## 2022-04-23 PROCEDURE — 81001 URINALYSIS AUTO W/SCOPE: CPT

## 2022-04-23 PROCEDURE — 85014 HEMATOCRIT: CPT

## 2022-04-23 PROCEDURE — 36592 COLLECT BLOOD FROM PICC: CPT

## 2022-04-23 PROCEDURE — 94761 N-INVAS EAR/PLS OXIMETRY MLT: CPT

## 2022-04-23 PROCEDURE — 2000000000 HC ICU R&B

## 2022-04-23 PROCEDURE — 6360000002 HC RX W HCPCS: Performed by: SPECIALIST

## 2022-04-23 PROCEDURE — 82140 ASSAY OF AMMONIA: CPT

## 2022-04-23 PROCEDURE — 2580000003 HC RX 258: Performed by: HOSPITALIST

## 2022-04-23 PROCEDURE — 2500000003 HC RX 250 WO HCPCS: Performed by: NURSE PRACTITIONER

## 2022-04-23 PROCEDURE — 85018 HEMOGLOBIN: CPT

## 2022-04-23 PROCEDURE — 80053 COMPREHEN METABOLIC PANEL: CPT

## 2022-04-23 PROCEDURE — 86900 BLOOD TYPING SEROLOGIC ABO: CPT

## 2022-04-23 PROCEDURE — 6370000000 HC RX 637 (ALT 250 FOR IP): Performed by: SPECIALIST

## 2022-04-23 PROCEDURE — 36430 TRANSFUSION BLD/BLD COMPNT: CPT

## 2022-04-23 PROCEDURE — 86922 COMPATIBILITY TEST ANTIGLOB: CPT

## 2022-04-23 PROCEDURE — 6370000000 HC RX 637 (ALT 250 FOR IP): Performed by: PHYSICIAN ASSISTANT

## 2022-04-23 RX ORDER — SODIUM CHLORIDE 9 MG/ML
INJECTION, SOLUTION INTRAVENOUS PRN
Status: DISCONTINUED | OUTPATIENT
Start: 2022-04-23 | End: 2022-04-25

## 2022-04-23 RX ADMIN — LACTULOSE 20 G: 10 SOLUTION ORAL at 15:52

## 2022-04-23 RX ADMIN — DEXMEDETOMIDINE HYDROCHLORIDE 1.5 MCG/KG/HR: 4 INJECTION, SOLUTION INTRAVENOUS at 10:55

## 2022-04-23 RX ADMIN — RIFAXIMIN 550 MG: 550 TABLET ORAL at 20:39

## 2022-04-23 RX ADMIN — LORAZEPAM 4 MG: 2 INJECTION INTRAMUSCULAR; INTRAVENOUS at 16:30

## 2022-04-23 RX ADMIN — DEXMEDETOMIDINE HYDROCHLORIDE 1.5 MCG/KG/HR: 4 INJECTION, SOLUTION INTRAVENOUS at 14:10

## 2022-04-23 RX ADMIN — VANCOMYCIN HYDROCHLORIDE 1250 MG: 10 INJECTION, POWDER, LYOPHILIZED, FOR SOLUTION INTRAVENOUS at 03:19

## 2022-04-23 RX ADMIN — METHYLPREDNISOLONE SODIUM SUCCINATE 20 MG: 40 INJECTION, POWDER, LYOPHILIZED, FOR SOLUTION INTRAMUSCULAR; INTRAVENOUS at 15:53

## 2022-04-23 RX ADMIN — LORAZEPAM 2 MG: 1 TABLET ORAL at 06:47

## 2022-04-23 RX ADMIN — LORAZEPAM 3 MG: 1 TABLET ORAL at 13:01

## 2022-04-23 RX ADMIN — SODIUM CHLORIDE, PRESERVATIVE FREE 10 ML: 5 INJECTION INTRAVENOUS at 08:41

## 2022-04-23 RX ADMIN — DEXMEDETOMIDINE HYDROCHLORIDE 1.5 MCG/KG/HR: 4 INJECTION, SOLUTION INTRAVENOUS at 06:06

## 2022-04-23 RX ADMIN — RIFAXIMIN 550 MG: 550 TABLET ORAL at 11:01

## 2022-04-23 RX ADMIN — HYDROMORPHONE HYDROCHLORIDE 1 MG: 1 INJECTION, SOLUTION INTRAMUSCULAR; INTRAVENOUS; SUBCUTANEOUS at 14:14

## 2022-04-23 RX ADMIN — VANCOMYCIN HYDROCHLORIDE 1250 MG: 10 INJECTION, POWDER, LYOPHILIZED, FOR SOLUTION INTRAVENOUS at 16:48

## 2022-04-23 RX ADMIN — SODIUM CHLORIDE: 9 INJECTION, SOLUTION INTRAVENOUS at 07:42

## 2022-04-23 RX ADMIN — LACTULOSE 20 G: 10 SOLUTION ORAL at 11:01

## 2022-04-23 RX ADMIN — SODIUM CHLORIDE: 9 INJECTION, SOLUTION INTRAVENOUS at 16:45

## 2022-04-23 RX ADMIN — DEXMEDETOMIDINE HYDROCHLORIDE 1.5 MCG/KG/HR: 4 INJECTION, SOLUTION INTRAVENOUS at 15:50

## 2022-04-23 RX ADMIN — HYDROMORPHONE HYDROCHLORIDE 1 MG: 1 INJECTION, SOLUTION INTRAMUSCULAR; INTRAVENOUS; SUBCUTANEOUS at 11:01

## 2022-04-23 RX ADMIN — LACTULOSE 20 G: 10 SOLUTION ORAL at 20:39

## 2022-04-23 RX ADMIN — SODIUM CHLORIDE, PRESERVATIVE FREE 10 ML: 5 INJECTION INTRAVENOUS at 20:40

## 2022-04-23 RX ADMIN — DEXMEDETOMIDINE HYDROCHLORIDE 1.5 MCG/KG/HR: 4 INJECTION, SOLUTION INTRAVENOUS at 03:40

## 2022-04-23 RX ADMIN — DEXMEDETOMIDINE HYDROCHLORIDE 1.5 MCG/KG/HR: 4 INJECTION, SOLUTION INTRAVENOUS at 01:18

## 2022-04-23 RX ADMIN — LORAZEPAM 2 MG: 1 TABLET ORAL at 20:39

## 2022-04-23 RX ADMIN — THIAMINE HYDROCHLORIDE 100 MG: 100 INJECTION, SOLUTION INTRAMUSCULAR; INTRAVENOUS at 11:01

## 2022-04-23 RX ADMIN — LORAZEPAM 2 MG: 2 INJECTION INTRAMUSCULAR; INTRAVENOUS at 08:40

## 2022-04-23 RX ADMIN — DEXMEDETOMIDINE HYDROCHLORIDE 1.5 MCG/KG/HR: 4 INJECTION, SOLUTION INTRAVENOUS at 08:39

## 2022-04-23 RX ADMIN — METHYLPREDNISOLONE SODIUM SUCCINATE 20 MG: 40 INJECTION, POWDER, LYOPHILIZED, FOR SOLUTION INTRAMUSCULAR; INTRAVENOUS at 05:48

## 2022-04-23 RX ADMIN — LORAZEPAM 4 MG: 2 INJECTION INTRAMUSCULAR; INTRAVENOUS at 22:41

## 2022-04-23 RX ADMIN — LORAZEPAM 2 MG: 2 INJECTION INTRAMUSCULAR; INTRAVENOUS at 02:34

## 2022-04-23 RX ADMIN — DEXMEDETOMIDINE HYDROCHLORIDE 1.5 MCG/KG/HR: 4 INJECTION, SOLUTION INTRAVENOUS at 20:35

## 2022-04-23 RX ADMIN — DEXMEDETOMIDINE HYDROCHLORIDE 1.5 MCG/KG/HR: 4 INJECTION, SOLUTION INTRAVENOUS at 22:55

## 2022-04-23 RX ADMIN — DEXMEDETOMIDINE HYDROCHLORIDE 1.5 MCG/KG/HR: 4 INJECTION, SOLUTION INTRAVENOUS at 17:25

## 2022-04-23 RX ADMIN — OXYCODONE HYDROCHLORIDE 5 MG: 5 TABLET ORAL at 01:18

## 2022-04-23 ASSESSMENT — PAIN DESCRIPTION - PROGRESSION

## 2022-04-23 ASSESSMENT — PAIN DESCRIPTION - ORIENTATION
ORIENTATION: LOWER

## 2022-04-23 ASSESSMENT — PAIN DESCRIPTION - DESCRIPTORS
DESCRIPTORS: DISCOMFORT
DESCRIPTORS: TIGHTNESS;NAGGING
DESCRIPTORS: NAGGING;TIGHTNESS

## 2022-04-23 ASSESSMENT — PAIN DESCRIPTION - LOCATION
LOCATION: BUTTOCKS;SCROTUM

## 2022-04-23 ASSESSMENT — PAIN DESCRIPTION - PAIN TYPE
TYPE: ACUTE PAIN
TYPE: ACUTE PAIN

## 2022-04-23 ASSESSMENT — PAIN - FUNCTIONAL ASSESSMENT: PAIN_FUNCTIONAL_ASSESSMENT: PREVENTS OR INTERFERES SOME ACTIVE ACTIVITIES AND ADLS

## 2022-04-23 ASSESSMENT — PAIN DESCRIPTION - ONSET
ONSET: ON-GOING
ONSET: ON-GOING

## 2022-04-23 ASSESSMENT — PAIN SCALES - GENERAL
PAINLEVEL_OUTOF10: 7
PAINLEVEL_OUTOF10: 8

## 2022-04-23 ASSESSMENT — PAIN DESCRIPTION - FREQUENCY: FREQUENCY: CONTINUOUS

## 2022-04-23 NOTE — PROGRESS NOTES
Progress Note  Date:2022       RBCD:[id number]  Patient Name:Ronan Dumont     Date of Birth:     Age:41 y.o. Confused and on precedex    Subjective    Subjective:  Diet:  Poor intake. Pain:  He complains of pain that is mild. Review of Systems  Objective         Vitals Last 24 Hours:  TEMPERATURE:  Temp  Av.7 °F (36.5 °C)  Min: 97.3 °F (36.3 °C)  Max: 98 °F (36.7 °C)  RESPIRATIONS RANGE: Resp  Av.6  Min: 8  Max: 21  PULSE OXIMETRY RANGE: SpO2  Av.5 %  Min: 85 %  Max: 100 %  PULSE RANGE: Pulse  Av.7  Min: 46  Max: 60  BLOOD PRESSURE RANGE: Systolic (91HOZ), FOA:283 , Min:89 , NAW:121   ; Diastolic (71XOR), IVJ:15, Min:41, Max:96    I/O (24Hr): Intake/Output Summary (Last 24 hours) at 2022 0814  Last data filed at 2022 0617  Gross per 24 hour   Intake --   Output 2450 ml   Net -2450 ml     Objective:  General Appearance:  Comfortable. Vital signs: (most recent): Blood pressure (!) 119/51, pulse 54, temperature 97.7 °F (36.5 °C), temperature source Oral, resp. rate 8, height 6' 3.98\" (1.93 m), weight 276 lb 10.8 oz (125.5 kg), SpO2 99 %. Vassie Lime on precedex). HEENT: Normal HEENT exam.    Lungs:  Normal effort. There are wheezes. Heart: Normal rate. Abdomen: Abdomen is soft. Extremities: Decreased range of motion. There is dependent edema. Neurological: (Opens eyes at times). Pupils:  Pupils are equal.   Skin:  Warm. Labs/Imaging/Diagnostics    Labs:  CBC:  Recent Labs     22  0600 22  1344 22  0435 22  0435 22  1422 22  2100 22  0600   WBC 12.3*  --  10.5  --   --   --  10.1   RBC 2.28*  --  2.38*  --   --   --  2.22*   HGB 7.2*   < > 7.6*   < > 7.3* 7.6* 7.0*   HCT 22.1*   < > 22.9*   < > 23.3* 23.7* 22.3*   MCV 96.9  --  96.2  --   --   --  100.5*   RDW 22.8*  --  23.9*  --   --   --  26.0*     --  233  --   --   --  239    < > = values in this interval not displayed. CHEMISTRIES:  Recent Labs     04/21/22  0600 04/22/22  0435 04/23/22  0600   * 134* 139   K 4.6 4.4 4.5    104 108   CO2 22 22 23   BUN 25* 22 18   CREATININE 0.4* 0.3* <0.5*   GLUCOSE 121* 162* 136*     PT/INR:  Recent Labs     04/21/22  0600 04/22/22  0435   PROTIME 14.0 14.2   INR 1.08 1.10     APTT:No results for input(s): APTT in the last 72 hours. LIVER PROFILE:  Recent Labs     04/21/22  0600 04/22/22  0435 04/23/22  0600   * 509* 486*   ALT 87* 98* 115*   BILITOT 22.4* 22.4* 22.2*   ALKPHOS 238* 241* 252*       Imaging Last 24 Hours:  US SCROTUM AND TESTICLES    Result Date: 4/22/2022  EXAMINATION: ULTRASOUND OF THE SCROTUM/TESTICLES WITH COLOR DOPPLER FLOW EVALUATION; DOPPLER EVALUATION OF THE PELVIS 4/22/2022 TECHNIQUE: Duplex ultrasound using B-mode/gray scaled imaging, Doppler spectral analysis and color flow Doppler was obtained of the testicles.; Duplex ultrasound using B-mode/gray scaled imaging and Doppler spectral analysis and color flow was obtained of the pelvis. COMPARISON: None. HISTORY: ORDERING SYSTEM PROVIDED HISTORY: bruising of scrotum TECHNOLOGIST PROVIDED HISTORY: Reason for exam:->bruising of scrotum Reason for Exam: Bruising of scrotum FINDINGS: Measurements: Right Testicle: 4.2 x 2.7 x 2.7 cm Left Testicle: 3.9 x 2.7 x 2.6 cm Right: Grey Scale: The right testicle demonstrates normal homogeneous echotexture without focal lesion. No evidence of testicular microlithiasis. Doppler Evaluation: There is normal arterial and venous Doppler flow within the testicle. Scrotal Sac: No evidence of hydrocele. Extensive scrotal wall thickening. Epididymis: No acute abnormality. Left: Grey Scale: The left testicle demonstrates normal homogeneous echotexture without focal lesion. No evidence of testicular microlithiasis. Doppler Evaluation: There is normal arterial and venous Doppler flow within the testicle. Scrotal Sac: No evidence of hydrocele.   Extensive scrotal wall thickening. Epididymis: There is a cyst versus spermatocele within the left epididymis which measures 1.3 x 1.3 x 1.4 cm. 1. No evidence of testicular torsion or mass. 2. 1.4 cm left epididymal cyst versus spermatocele. 3. Diffuse scrotal wall thickening. US DUP ABD PEL RETRO SCROT COMPLETE    Result Date: 4/22/2022  EXAMINATION: ULTRASOUND OF THE SCROTUM/TESTICLES WITH COLOR DOPPLER FLOW EVALUATION; DOPPLER EVALUATION OF THE PELVIS 4/22/2022 TECHNIQUE: Duplex ultrasound using B-mode/gray scaled imaging, Doppler spectral analysis and color flow Doppler was obtained of the testicles.; Duplex ultrasound using B-mode/gray scaled imaging and Doppler spectral analysis and color flow was obtained of the pelvis. COMPARISON: None. HISTORY: ORDERING SYSTEM PROVIDED HISTORY: bruising of scrotum TECHNOLOGIST PROVIDED HISTORY: Reason for exam:->bruising of scrotum Reason for Exam: Bruising of scrotum FINDINGS: Measurements: Right Testicle: 4.2 x 2.7 x 2.7 cm Left Testicle: 3.9 x 2.7 x 2.6 cm Right: Grey Scale: The right testicle demonstrates normal homogeneous echotexture without focal lesion. No evidence of testicular microlithiasis. Doppler Evaluation: There is normal arterial and venous Doppler flow within the testicle. Scrotal Sac: No evidence of hydrocele. Extensive scrotal wall thickening. Epididymis: No acute abnormality. Left: Grey Scale: The left testicle demonstrates normal homogeneous echotexture without focal lesion. No evidence of testicular microlithiasis. Doppler Evaluation: There is normal arterial and venous Doppler flow within the testicle. Scrotal Sac: No evidence of hydrocele. Extensive scrotal wall thickening. Epididymis: There is a cyst versus spermatocele within the left epididymis which measures 1.3 x 1.3 x 1.4 cm. 1. No evidence of testicular torsion or mass. 2. 1.4 cm left epididymal cyst versus spermatocele. 3. Diffuse scrotal wall thickening.      Assessment//Plan           Hospital Problems           Last Modified POA    * (Principal) Intramuscular hematoma 4/18/2022 Yes    Right leg pain 4/18/2022 Yes        Assessment & Plan  Intramuscular hematoma right gluteus medius  -CT noted and no surgical intervention  Doppler neg for DVT  surg consutled and no signs of compartment syndrome  Etoh hepatitis  -HIDA with no cholecystitis  -CT abd diffuse fatty liver and MRCP with no bililary duct dilatation  -lactulose and rifaxamin  -DILI? -solumedrol  HAG MA with lactic acidodiss  -improved with IVF  Etoh wtihdrawal  -precedex  -thiamine  Leukocytosis  -vanc   -bld cx with CONS  -stopped flagyl and cefepime  Anemia  -AOCD  -B12 wnl  -S/p PRBC  severe PCM  Scrotal bruising  -extenstion gluteal hematoma  -left epididymal cyst vs spermatocoels      Check CXR.  Legionella       Electronically signed by Dwight Chacko MD on 4/23/22 at 8:14 AM EDT

## 2022-04-23 NOTE — PROGRESS NOTES
Physical Therapy  Attempt/ Hold Note    PT / OT eval and tx attempted this PM. On conversation w/ RN, pt cont to be in alcohol withdrawal, Ativan for agitation, and inapropriate for therapy participation. Will re-attempt when more appropriate.      Ary Neal PT, DPT

## 2022-04-23 NOTE — PROGRESS NOTES
[id number]  consulted by Dr. Alex Larkin for monitoring and adjustment. Indication for treatment: Sepsis  Goal trough: [] 10-15 mcg/mL or [x] 15-20 mcg/ml  AUC/KADIE: [] <500 or [x] 400-600    Pertinent Laboratory Values:   Temp Readings from Last 3 Encounters:   04/23/22 97.7 °F (36.5 °C) (Oral)   02/23/22 97.8 °F (36.6 °C)   11/08/21 97.9 °F (36.6 °C)     Recent Labs     04/21/22  0600 04/22/22  0435 04/23/22  0600   WBC 12.3* 10.5 10.1   LACTATE 1.2  --   --      Recent Labs     04/21/22  0600 04/22/22  0435 04/23/22  0600   BUN 25* 22 18   CREATININE 0.4* 0.3* <0.5*     Estimated Creatinine Clearance: 281 mL/min (based on SCr of 0.5 mg/dL). Intake/Output Summary (Last 24 hours) at 4/23/2022 1459  Last data filed at 4/23/2022 1155  Gross per 24 hour   Intake --   Output 3350 ml   Net -3350 ml       Pertinent Cultures:  Date    Source    Results  4/18   Blood    1/4 GPC    Vancomycin level:   TROUGH:    No results for input(s): VANCOTROUGH in the last 72 hours.   RANDOM:    Recent Labs     04/22/22  0035   VANCORANDOM 18.5       Assessment:  · SCr, BUN, and urine output:  · Scr was elevated earlier in admission  · Renal trends are now stable, at baseline  · Day(s) of therapy: 6  · Vancomycin concentration:   · 4/20: 14.3, 10 hours post-dose, , therapeutic (1250 q12h)  · 4/20:  18.5, 9 hours post-dose    Plan:  · Continue Vancomycin 1250 mg IV every 12 hours  · Predicted  at steady state  · Repeat next level in 2 days  · Pharmacy will continue to monitor patient and adjust therapy as indicated    Joshua 3 4/25 @ 0202    Thank you for the consult,  Cristian Marshall [facility], PharmD  4/23/2022 2:59 PM

## 2022-04-23 NOTE — PROGRESS NOTES
Coming out of withdrawal  Today's LFT's not back yet  EXAM:  Vital signs: BP (!) 142/85   Pulse (!) 48   Temp 98 °F (36.7 °C) (Oral)   Resp 9   Ht 6' 3.98\" (1.93 m)   Wt 276 lb 10.8 oz (125.5 kg)   SpO2 95%   BMI 33.69 kg/m²   Alert, NAD, responding normally  Lungs clear to auscultation  Cor: regular rhythm w/o murmer  Abd: soft, non-tender, non distended  Extrem: no edema    Liver workup negative-- has immunity to both Hep A and B so will not need vaccination  EBV, CMV, HSV, autoimmune markers all negative  Impression:    1) underlying severe alcoholic hepatitis   2) prob superimposed secondary insult _ ??drug-induced       Plan:   1) continue steroids and monitoring of LFT's    Discussed the above with his wife

## 2022-04-24 LAB
ABO/RH: NORMAL
ALBUMIN SERPL-MCNC: 2.4 GM/DL (ref 3.4–5)
ALP BLD-CCNC: 254 IU/L (ref 40–128)
ALT SERPL-CCNC: 133 U/L (ref 10–40)
AMMONIA: 24 UMOL/L (ref 16–60)
ANION GAP SERPL CALCULATED.3IONS-SCNC: 7 MMOL/L (ref 4–16)
ANTIBODY SCREEN: NEGATIVE
AST SERPL-CCNC: 415 IU/L (ref 15–37)
BACTERIA: ABNORMAL /HPF
BANDED NEUTROPHILS ABSOLUTE COUNT: 0.63 K/CU MM
BANDED NEUTROPHILS RELATIVE PERCENT: 6 % (ref 5–11)
BILIRUB SERPL-MCNC: 19.1 MG/DL (ref 0–1)
BILIRUBIN URINE: ABNORMAL MG/DL
BLOOD, URINE: ABNORMAL
BUN BLDV-MCNC: 14 MG/DL (ref 6–23)
CALCIUM SERPL-MCNC: 7.4 MG/DL (ref 8.3–10.6)
CHLORIDE BLD-SCNC: 109 MMOL/L (ref 99–110)
CLARITY: CLEAR
CO2: 24 MMOL/L (ref 21–32)
COLOR: YELLOW
COMPONENT: NORMAL
CREAT SERPL-MCNC: <0.5 MG/DL (ref 0.9–1.3)
CROSSMATCH RESULT: NORMAL
DIFFERENTIAL TYPE: ABNORMAL
FERRITIN: 987 NG/ML (ref 30–400)
FOLATE: 4.3 NG/ML (ref 3.1–17.5)
GFR AFRICAN AMERICAN: ABNORMAL ML/MIN/1.73M2
GFR NON-AFRICAN AMERICAN: ABNORMAL ML/MIN/1.73M2
GLUCOSE BLD-MCNC: 138 MG/DL (ref 70–99)
GLUCOSE, URINE: 250 MG/DL
HCT VFR BLD CALC: 22.9 % (ref 42–52)
HCT VFR BLD CALC: 26.6 % (ref 42–52)
HCT VFR BLD CALC: 27.5 % (ref 42–52)
HEMOGLOBIN: 7.6 GM/DL (ref 13.5–18)
HEMOGLOBIN: 8.4 GM/DL (ref 13.5–18)
HEMOGLOBIN: 8.6 GM/DL (ref 13.5–18)
IRON: 61 UG/DL (ref 59–158)
KETONES, URINE: NEGATIVE MG/DL
LEGIONELLA URINARY AG: NEGATIVE
LEUKOCYTE ESTERASE, URINE: NEGATIVE
LYMPHOCYTES ABSOLUTE: 1.6 K/CU MM
LYMPHOCYTES RELATIVE PERCENT: 15 % (ref 24–44)
MACROCYTES: ABNORMAL
MCH RBC QN AUTO: 31.9 PG (ref 27–31)
MCHC RBC AUTO-ENTMCNC: 33.2 % (ref 32–36)
MCV RBC AUTO: 96.2 FL (ref 78–100)
MONOCYTES ABSOLUTE: 0.6 K/CU MM
MONOCYTES RELATIVE PERCENT: 6 % (ref 0–4)
MUCUS: ABNORMAL HPF
MYELOCYTE PERCENT: 2 %
MYELOCYTES ABSOLUTE COUNT: 0.21 K/CU MM
NITRITE URINE, QUANTITATIVE: NEGATIVE
NUCLEATED RED BLOOD CELLS: 5
PCT TRANSFERRIN: 45 % (ref 10–44)
PDW BLD-RTO: 23.9 % (ref 11.7–14.9)
PH, URINE: 5.5 (ref 5–8)
PLATELET # BLD: 233 K/CU MM (ref 140–440)
PMV BLD AUTO: 10.7 FL (ref 7.5–11.1)
POLYCHROMASIA: ABNORMAL
POTASSIUM SERPL-SCNC: 4.5 MMOL/L (ref 3.5–5.1)
PROTEIN UA: NEGATIVE MG/DL
RBC # BLD: 2.38 M/CU MM (ref 4.6–6.2)
RBC URINE: 2 /HPF (ref 0–3)
RETICULOCYTE COUNT PCT: 7.8 % (ref 0.2–2.2)
SEGMENTED NEUTROPHILS ABSOLUTE COUNT: 7.5 K/CU MM
SEGMENTED NEUTROPHILS RELATIVE PERCENT: 71 % (ref 36–66)
SODIUM BLD-SCNC: 140 MMOL/L (ref 135–145)
SPECIFIC GRAVITY UA: 1.01 (ref 1–1.03)
STATUS: NORMAL
TOTAL IRON BINDING CAPACITY: 136 UG/DL (ref 250–450)
TOTAL PROTEIN: 3.7 GM/DL (ref 6.4–8.2)
TRANSFUSION STATUS: NORMAL
TRICHOMONAS: ABNORMAL /HPF
UNIT DIVISION: 0
UNIT NUMBER: NORMAL
UNSATURATED IRON BINDING CAPACITY: 75 UG/DL (ref 110–370)
UROBILINOGEN, URINE: 0.2 MG/DL (ref 0.2–1)
VITAMIN B-12: 1010 PG/ML (ref 211–911)
WBC # BLD: 10.5 K/CU MM (ref 4–10.5)
WBC UA: 2 /HPF (ref 0–2)

## 2022-04-24 PROCEDURE — 2580000003 HC RX 258: Performed by: HOSPITALIST

## 2022-04-24 PROCEDURE — 82140 ASSAY OF AMMONIA: CPT

## 2022-04-24 PROCEDURE — 2580000003 HC RX 258: Performed by: PHYSICIAN ASSISTANT

## 2022-04-24 PROCEDURE — 85018 HEMOGLOBIN: CPT

## 2022-04-24 PROCEDURE — 6360000002 HC RX W HCPCS: Performed by: INTERNAL MEDICINE

## 2022-04-24 PROCEDURE — 6360000002 HC RX W HCPCS: Performed by: PHYSICIAN ASSISTANT

## 2022-04-24 PROCEDURE — 6370000000 HC RX 637 (ALT 250 FOR IP): Performed by: HOSPITALIST

## 2022-04-24 PROCEDURE — 6370000000 HC RX 637 (ALT 250 FOR IP): Performed by: SPECIALIST

## 2022-04-24 PROCEDURE — 2500000003 HC RX 250 WO HCPCS: Performed by: NURSE PRACTITIONER

## 2022-04-24 PROCEDURE — 36592 COLLECT BLOOD FROM PICC: CPT

## 2022-04-24 PROCEDURE — 2580000003 HC RX 258: Performed by: INTERNAL MEDICINE

## 2022-04-24 PROCEDURE — 99232 SBSQ HOSP IP/OBS MODERATE 35: CPT | Performed by: INTERNAL MEDICINE

## 2022-04-24 PROCEDURE — 2000000000 HC ICU R&B

## 2022-04-24 PROCEDURE — 6370000000 HC RX 637 (ALT 250 FOR IP): Performed by: PHYSICIAN ASSISTANT

## 2022-04-24 PROCEDURE — 94761 N-INVAS EAR/PLS OXIMETRY MLT: CPT

## 2022-04-24 PROCEDURE — 80053 COMPREHEN METABOLIC PANEL: CPT

## 2022-04-24 PROCEDURE — 6360000002 HC RX W HCPCS: Performed by: SPECIALIST

## 2022-04-24 PROCEDURE — 85014 HEMATOCRIT: CPT

## 2022-04-24 RX ORDER — FUROSEMIDE 20 MG/1
20 TABLET ORAL ONCE
Status: COMPLETED | OUTPATIENT
Start: 2022-04-24 | End: 2022-04-24

## 2022-04-24 RX ADMIN — DEXMEDETOMIDINE HYDROCHLORIDE 1.5 MCG/KG/HR: 4 INJECTION, SOLUTION INTRAVENOUS at 10:38

## 2022-04-24 RX ADMIN — HYDROMORPHONE HYDROCHLORIDE 1 MG: 1 INJECTION, SOLUTION INTRAMUSCULAR; INTRAVENOUS; SUBCUTANEOUS at 04:55

## 2022-04-24 RX ADMIN — LORAZEPAM 2 MG: 2 INJECTION INTRAMUSCULAR; INTRAVENOUS at 01:42

## 2022-04-24 RX ADMIN — LORAZEPAM 4 MG: 2 INJECTION INTRAMUSCULAR; INTRAVENOUS at 15:14

## 2022-04-24 RX ADMIN — DEXMEDETOMIDINE HYDROCHLORIDE 1.5 MCG/KG/HR: 4 INJECTION, SOLUTION INTRAVENOUS at 17:48

## 2022-04-24 RX ADMIN — DEXMEDETOMIDINE HYDROCHLORIDE 1.5 MCG/KG/HR: 4 INJECTION, SOLUTION INTRAVENOUS at 01:41

## 2022-04-24 RX ADMIN — LORAZEPAM 2 MG: 2 INJECTION INTRAMUSCULAR; INTRAVENOUS at 03:45

## 2022-04-24 RX ADMIN — RIFAXIMIN 550 MG: 550 TABLET ORAL at 20:51

## 2022-04-24 RX ADMIN — DEXMEDETOMIDINE HYDROCHLORIDE 1.3 MCG/KG/HR: 4 INJECTION, SOLUTION INTRAVENOUS at 23:37

## 2022-04-24 RX ADMIN — THIAMINE HYDROCHLORIDE 100 MG: 100 INJECTION, SOLUTION INTRAMUSCULAR; INTRAVENOUS at 09:12

## 2022-04-24 RX ADMIN — FUROSEMIDE 20 MG: 20 TABLET ORAL at 12:38

## 2022-04-24 RX ADMIN — DEXMEDETOMIDINE HYDROCHLORIDE 1.5 MCG/KG/HR: 4 INJECTION, SOLUTION INTRAVENOUS at 15:14

## 2022-04-24 RX ADMIN — METHYLPREDNISOLONE SODIUM SUCCINATE 20 MG: 40 INJECTION, POWDER, LYOPHILIZED, FOR SOLUTION INTRAMUSCULAR; INTRAVENOUS at 03:50

## 2022-04-24 RX ADMIN — HYDROMORPHONE HYDROCHLORIDE 1 MG: 1 INJECTION, SOLUTION INTRAMUSCULAR; INTRAVENOUS; SUBCUTANEOUS at 18:41

## 2022-04-24 RX ADMIN — HYDROMORPHONE HYDROCHLORIDE 1 MG: 1 INJECTION, SOLUTION INTRAMUSCULAR; INTRAVENOUS; SUBCUTANEOUS at 22:07

## 2022-04-24 RX ADMIN — SODIUM CHLORIDE, PRESERVATIVE FREE 10 ML: 5 INJECTION INTRAVENOUS at 21:18

## 2022-04-24 RX ADMIN — SODIUM CHLORIDE: 9 INJECTION, SOLUTION INTRAVENOUS at 01:40

## 2022-04-24 RX ADMIN — DEXMEDETOMIDINE HYDROCHLORIDE 1.5 MCG/KG/HR: 4 INJECTION, SOLUTION INTRAVENOUS at 08:10

## 2022-04-24 RX ADMIN — RIFAXIMIN 550 MG: 550 TABLET ORAL at 09:12

## 2022-04-24 RX ADMIN — DEXMEDETOMIDINE HYDROCHLORIDE 1.5 MCG/KG/HR: 4 INJECTION, SOLUTION INTRAVENOUS at 04:07

## 2022-04-24 RX ADMIN — LORAZEPAM 4 MG: 2 INJECTION INTRAMUSCULAR; INTRAVENOUS at 12:38

## 2022-04-24 RX ADMIN — DEXMEDETOMIDINE HYDROCHLORIDE 1.5 MCG/KG/HR: 4 INJECTION, SOLUTION INTRAVENOUS at 05:47

## 2022-04-24 RX ADMIN — HYDROMORPHONE HYDROCHLORIDE 1 MG: 1 INJECTION, SOLUTION INTRAMUSCULAR; INTRAVENOUS; SUBCUTANEOUS at 13:40

## 2022-04-24 RX ADMIN — DEXMEDETOMIDINE HYDROCHLORIDE 1.3 MCG/KG/HR: 4 INJECTION, SOLUTION INTRAVENOUS at 20:49

## 2022-04-24 RX ADMIN — METHYLPREDNISOLONE SODIUM SUCCINATE 20 MG: 40 INJECTION, POWDER, LYOPHILIZED, FOR SOLUTION INTRAMUSCULAR; INTRAVENOUS at 17:10

## 2022-04-24 RX ADMIN — LACTULOSE 20 G: 10 SOLUTION ORAL at 20:51

## 2022-04-24 RX ADMIN — SODIUM CHLORIDE: 9 INJECTION, SOLUTION INTRAVENOUS at 11:53

## 2022-04-24 RX ADMIN — VANCOMYCIN HYDROCHLORIDE 1250 MG: 10 INJECTION, POWDER, LYOPHILIZED, FOR SOLUTION INTRAVENOUS at 17:11

## 2022-04-24 RX ADMIN — LORAZEPAM 2 MG: 2 INJECTION INTRAMUSCULAR; INTRAVENOUS at 21:08

## 2022-04-24 RX ADMIN — DEXMEDETOMIDINE HYDROCHLORIDE 0.69 MCG/KG/HR: 4 INJECTION, SOLUTION INTRAVENOUS at 12:41

## 2022-04-24 RX ADMIN — HYDROMORPHONE HYDROCHLORIDE 1 MG: 1 INJECTION, SOLUTION INTRAMUSCULAR; INTRAVENOUS; SUBCUTANEOUS at 09:12

## 2022-04-24 RX ADMIN — LACTULOSE 20 G: 10 SOLUTION ORAL at 13:39

## 2022-04-24 RX ADMIN — LACTULOSE 20 G: 10 SOLUTION ORAL at 09:12

## 2022-04-24 RX ADMIN — LORAZEPAM 4 MG: 1 TABLET ORAL at 10:30

## 2022-04-24 RX ADMIN — VANCOMYCIN HYDROCHLORIDE 1250 MG: 10 INJECTION, POWDER, LYOPHILIZED, FOR SOLUTION INTRAVENOUS at 02:50

## 2022-04-24 ASSESSMENT — PAIN SCALES - WONG BAKER
WONGBAKER_NUMERICALRESPONSE: 4

## 2022-04-24 ASSESSMENT — PAIN DESCRIPTION - PROGRESSION

## 2022-04-24 ASSESSMENT — PAIN DESCRIPTION - DESCRIPTORS
DESCRIPTORS: NAGGING
DESCRIPTORS: DISCOMFORT

## 2022-04-24 ASSESSMENT — PAIN DESCRIPTION - FREQUENCY
FREQUENCY: CONTINUOUS

## 2022-04-24 ASSESSMENT — PAIN DESCRIPTION - ORIENTATION
ORIENTATION: LOWER
ORIENTATION: MID

## 2022-04-24 ASSESSMENT — PAIN DESCRIPTION - PAIN TYPE
TYPE: ACUTE PAIN

## 2022-04-24 ASSESSMENT — PAIN DESCRIPTION - ONSET
ONSET: ON-GOING

## 2022-04-24 ASSESSMENT — PAIN SCALES - GENERAL
PAINLEVEL_OUTOF10: 7
PAINLEVEL_OUTOF10: 7
PAINLEVEL_OUTOF10: 4
PAINLEVEL_OUTOF10: 7
PAINLEVEL_OUTOF10: 10
PAINLEVEL_OUTOF10: 5

## 2022-04-24 ASSESSMENT — PAIN DESCRIPTION - LOCATION
LOCATION: BUTTOCKS;SCROTUM
LOCATION: BUTTOCKS;SCROTUM
LOCATION: SCROTUM

## 2022-04-24 NOTE — PROGRESS NOTES
Hematology/Oncology  Progress Note    HISTORY OF PRESENT ILLNESS:      The patient is a 39 y.o. male with significant past medical history of EtOH abuse who presents with above on 4/18/2022. He was jaundiced and found to have intramuscular hematoma of the right gluteus muscles. CT abdomen and pelvis 4/18/2022:   1. There is a hematoma measuring 11.1 x 4.2 x 16.7 cm between the right gluteus medius and naomi muscles.  There is diffuse enlargement of the adductor muscles of the right lower extremity, likely related to edema. 2. Severe diffuse fatty infiltration of the liver.  Gallbladder wall thickening is seen.  This could be related to chronic liver disease.  If there is concern for acute cholecystitis, a right upper quadrant ultrasound  can be obtained. CT right femur 4/18/2022:  Ill-defined hypodensity in the right gluteus medius muscle and ill-defined  hyperdensity in the right gluteus naomi muscle.  These may represent hematomas in different phases of evolution.  However simple and complex abscesses cannot be excluded.  Clinical correlation and further evaluation by MRI with and without IV contrast recommended. MRI abdomen 4/18/2022:  Markedly limited exam due to patient condition.   Mild gallbladder wall thickening, nonspecific, in the setting of underlying  liver disease.  No biliary duct dilation. Repeat CT abdomen 4/18/2022:  1. Severe diffuse fatty infiltration of the liver. 2. Hematoma involving the right gluteus medius and naomi musculature without significant change.  There is also a partially visualized hematoma surrounding the hamstring musculature on the right.  Continued follow-up is recommended until resolution. US abdomen 4/21/2022:  1. Limited exam.  2. Severe diffuse fatty infiltration of the liver.   3. Sludge is seen within the gallbladder lumen.  The gallbladder wall is  thickened at 6 mm however there is no evidence of a positive sonographic Reynoso sign.  Findings could be related to chronic liver disease. 4. No common bile duct dilatation. GI and surgeon were consulted. CBC on 4/5/2022 was grossly unremarkable with Hg 15.7 and .1. B-12 >2000. ALk phos 539, , , bilirubin 17.1. On admission 4/18/2022 WBC was 19.8, Hg 10.1, .1 and plt 391. ANC was 15.2. Ferritin ws 1266, TIBC <195, saturation <91. Iron 178. Hapto <10. TB was 18.8, direct 15.6 and indirect 3.2. LDH was 327. B 12 > 2000. SHAUN was negative. He received PRBC on 4/202/2022. He is on steroid. Spouse stated that he is better. Currently he is asleep. Hg was 8.4 after transfusion. Bili was 19.1. He has pierce with dark color urine. Will continue to monitor CBC. PHYSICAL EXAM:      Vitals:    BP (!) 161/96   Pulse (!) 45   Temp 97.7 °F (36.5 °C) (Oral)   Resp 16   Ht 6' 3.98\" (1.93 m)   Wt (!) 312 lb 6.3 oz (141.7 kg)   SpO2 97%   BMI 38.04 kg/m²     CONSTITUTIONAL:  Awake and no distress  EYES:  pale, extra-ocular muscles intact and icteric sclerae  NECK:  supple, symmetrical, trachea midline and no lymphadenopathy  LUNGS:  clear to auscultation, no crackles or wheezing  CARDIOVASCULAR:  normal S1 and S2 and no murmur noted  ABDOMEN:  normal bowel sounds, soft, non-distended, non-tender and  pierce cath noted  MUSCULOSKELETAL:  there is no redness, warmth, or swelling of the joints. full range of motion noted  NEUROLOGIC: cranial nerves II-XII are grossly intact  SKIN:  Hematoma noted.  No bleeding. + jaundice    DATA:    Labs:  General Labs:    CBC with Differential:    Lab Results   Component Value Date    WBC 10.1 04/23/2022    RBC 2.22 04/23/2022    HGB 8.4 04/24/2022    HCT 26.6 04/24/2022     04/23/2022    .5 04/23/2022    MCH 31.5 04/23/2022    MCHC 31.4 04/23/2022    RDW 26.0 04/23/2022    NRBC 3 04/23/2022    SEGSPCT 60.0 04/23/2022    BANDSPCT 13 04/23/2022    LYMPHOPCT 16.0 04/23/2022    MONOPCT 6.0 04/23/2022    MYELOPCT 2 04/23/2022 BASOPCT 0.5 04/18/2022    MONOSABS 0.6 04/23/2022    LYMPHSABS 1.6 04/23/2022    EOSABS 0.2 04/19/2022    BASOSABS 0.1 04/18/2022    DIFFTYPE MANUAL DIFFERENTIAL 04/23/2022     CMP:    Lab Results   Component Value Date     04/24/2022    K 4.5 04/24/2022     04/24/2022    CO2 24 04/24/2022    BUN 14 04/24/2022    CREATININE <0.5 04/24/2022    GFRAA NOT CALCULATED 04/24/2022    AGRATIO 1.9 04/05/2022    LABGLOM NOT CALCULATED 04/24/2022    GLUCOSE 138 04/24/2022    PROT 3.7 04/24/2022    LABALBU 2.4 04/24/2022    CALCIUM 7.4 04/24/2022    BILITOT 19.1 04/24/2022    ALKPHOS 254 04/24/2022     04/24/2022     04/24/2022       IMPRESSION/RECOMMENDATIONS:    1. Anemia is likely related to acute blood loss. He has intramuscular hematoma. Less likely hemolytic anemia. Hg was 8.4 on /24/2022 s/p transfusion. 2. He has severe alcoholic hepatitis. On Steroid. Bili is down at 19.1. Will follow up.

## 2022-04-24 NOTE — PROGRESS NOTES
[facility]  consulted by Dr. Vira Henning for monitoring and adjustment. Indication for treatment: Sepsis  Goal trough: [] 10-15 mcg/mL or [x] 15-20 mcg/ml  AUC/KADIE: [] <500 or [x] 400-600    Pertinent Laboratory Values:   Temp Readings from Last 3 Encounters:   04/24/22 97.7 °F (36.5 °C) (Oral)   02/23/22 97.8 °F (36.6 °C)   11/08/21 97.9 °F (36.6 °C)     Recent Labs     04/22/22  0435 04/23/22  0600   WBC 10.5 10.1     Recent Labs     04/22/22  0435 04/23/22  0600 04/24/22  0535   BUN 22 18 14   CREATININE 0.3* <0.5* <0.5*     Estimated Creatinine Clearance: 299 mL/min (based on SCr of 0.5 mg/dL). Intake/Output Summary (Last 24 hours) at 4/24/2022 1326  Last data filed at 4/24/2022 0551  Gross per 24 hour   Intake 7080.1 ml   Output 3500 ml   Net 3580.1 ml       Pertinent Cultures:  Date    Source    Results  4/18   Blood    1/4 GPC    Vancomycin level:   TROUGH:    No results for input(s): VANCOTROUGH in the last 72 hours.   RANDOM:    Recent Labs     04/22/22  0035   VANCORANDOM 18.5       Assessment:  · SCr, BUN, and urine output:  · Scr was elevated earlier in admission  · Renal trends are now stable, at baseline  · Day(s) of therapy: 7  · Vancomycin concentration:   · 4/20: 14.3, 10 hours post-dose, , therapeutic (1250 q12h)  · 4/22:  18.5, 9 hours post-dose, therapeutic    Plan:  · Continue Vancomycin 1250 mg IV every 12 hours  · Predicted  at steady state  · Repeat next level tomorrow AM  · Pharmacy will continue to monitor patient and adjust therapy as indicated    Joshua 3 4/25 @ 0200    Thank you for the consult,  Jonnathan Lao, [address] [place] [address], PharmD  4/24/2022 1:26 PM

## 2022-04-24 NOTE — PROGRESS NOTES
Progress Note  Date:2022       [facility]:[id number]  Patient Name:Ronan Mott     Date of Birth:     Age:41 y.o. Alert and awake but still as some confusion at times    Subjective    Subjective:  Symptoms:  Stable. Diet:  Poor intake. Pain:  He complains of pain that is mild. Review of Systems  Objective         Vitals Last 24 Hours:  TEMPERATURE:  Temp  Av.7 °F (36.5 °C)  Min: 97.7 °F (36.5 °C)  Max: 97.7 °F (36.5 °C)  RESPIRATIONS RANGE: Resp  Av.5  Min: 7  Max: 27  PULSE OXIMETRY RANGE: SpO2  Av.8 %  Min: 86 %  Max: 100 %  PULSE RANGE: Pulse  Av.3  Min: 45  Max: 63  BLOOD PRESSURE RANGE: Systolic (04VLP), ECR:212 , Min:85 , ZDZ:592   ; Diastolic (97QVM), EYE:53, Min:43, Max:112    I/O (24Hr): Intake/Output Summary (Last 24 hours) at 2022 0905  Last data filed at 2022 0551  Gross per 24 hour   Intake 7080.1 ml   Output 4400 ml   Net 2680.1 ml     Objective:  General Appearance:  Comfortable. Vital signs: (most recent): Blood pressure (!) 161/96, pulse (!) 45, temperature 97.7 °F (36.5 °C), temperature source Oral, resp. rate 16, height 6' 3.98\" (1.93 m), weight (!) 312 lb 6.3 oz (141.7 kg), SpO2 97 %. Kole Vazquez on precedex). HEENT: Normal HEENT exam.    Lungs:  Normal effort. There are wheezes. Heart: Normal rate. Abdomen: Abdomen is soft. Extremities: Decreased range of motion. There is dependent edema. Neurological: (Alert). Pupils:  Pupils are equal.   Skin:  Warm. There is ecchymosis.   (Thigh right)    Labs/Imaging/Diagnostics    Labs:  CBC:  Recent Labs     22  0435 22  1422 22  0600 22  0600 22  1425 22  2300 22  0535   WBC 10.5  --  10.1  --   --   --   --    RBC 2.38*  --  2.22*  --   --   --   --    HGB 7.6*   < > 7.0*   < > 6.9* 8.5* 8.4*   HCT 22.9*   < > 22.3*   < > 22.4* 26.3* 26.6*   MCV 96.2  --  100.5*  --   --   --   --    RDW 23.9*  --  26.0*  --   --   --   --    PLT 233  --  239  --   --   --   --     < > = values in this interval not displayed. CHEMISTRIES:  Recent Labs     04/22/22 0435 04/23/22  0600 04/24/22  0535   * 139 140   K 4.4 4.5 4.5    108 109   CO2 22 23 24   BUN 22 18 14   CREATININE 0.3* <0.5* <0.5*   GLUCOSE 162* 136* 138*     PT/INR:  Recent Labs     04/22/22 0435   PROTIME 14.2   INR 1.10     APTT:No results for input(s): APTT in the last 72 hours. LIVER PROFILE:  Recent Labs     04/22/22 0435 04/23/22  0600 04/24/22  0535   * 486* 415*   ALT 98* 115* 133*   BILITOT 22.4* 22.2* 19.1*   ALKPHOS 241* 252* 254*       Imaging Last 24 Hours:  US SCROTUM AND TESTICLES    Result Date: 4/22/2022  EXAMINATION: ULTRASOUND OF THE SCROTUM/TESTICLES WITH COLOR DOPPLER FLOW EVALUATION; DOPPLER EVALUATION OF THE PELVIS 4/22/2022 TECHNIQUE: Duplex ultrasound using B-mode/gray scaled imaging, Doppler spectral analysis and color flow Doppler was obtained of the testicles.; Duplex ultrasound using B-mode/gray scaled imaging and Doppler spectral analysis and color flow was obtained of the pelvis. COMPARISON: None. HISTORY: ORDERING SYSTEM PROVIDED HISTORY: bruising of scrotum TECHNOLOGIST PROVIDED HISTORY: Reason for exam:->bruising of scrotum Reason for Exam: Bruising of scrotum FINDINGS: Measurements: Right Testicle: 4.2 x 2.7 x 2.7 cm Left Testicle: 3.9 x 2.7 x 2.6 cm Right: Grey Scale: The right testicle demonstrates normal homogeneous echotexture without focal lesion. No evidence of testicular microlithiasis. Doppler Evaluation: There is normal arterial and venous Doppler flow within the testicle. Scrotal Sac: No evidence of hydrocele. Extensive scrotal wall thickening. Epididymis: No acute abnormality. Left: Grey Scale: The left testicle demonstrates normal homogeneous echotexture without focal lesion. No evidence of testicular microlithiasis. Doppler Evaluation: There is normal arterial and venous Doppler flow within the testicle.  Scrotal Sac: No evidence of hydrocele. Extensive scrotal wall thickening. Epididymis: There is a cyst versus spermatocele within the left epididymis which measures 1.3 x 1.3 x 1.4 cm. 1. No evidence of testicular torsion or mass. 2. 1.4 cm left epididymal cyst versus spermatocele. 3. Diffuse scrotal wall thickening. US DUP ABD PEL RETRO SCROT COMPLETE    Result Date: 4/22/2022  EXAMINATION: ULTRASOUND OF THE SCROTUM/TESTICLES WITH COLOR DOPPLER FLOW EVALUATION; DOPPLER EVALUATION OF THE PELVIS 4/22/2022 TECHNIQUE: Duplex ultrasound using B-mode/gray scaled imaging, Doppler spectral analysis and color flow Doppler was obtained of the testicles.; Duplex ultrasound using B-mode/gray scaled imaging and Doppler spectral analysis and color flow was obtained of the pelvis. COMPARISON: None. HISTORY: ORDERING SYSTEM PROVIDED HISTORY: bruising of scrotum TECHNOLOGIST PROVIDED HISTORY: Reason for exam:->bruising of scrotum Reason for Exam: Bruising of scrotum FINDINGS: Measurements: Right Testicle: 4.2 x 2.7 x 2.7 cm Left Testicle: 3.9 x 2.7 x 2.6 cm Right: Grey Scale: The right testicle demonstrates normal homogeneous echotexture without focal lesion. No evidence of testicular microlithiasis. Doppler Evaluation: There is normal arterial and venous Doppler flow within the testicle. Scrotal Sac: No evidence of hydrocele. Extensive scrotal wall thickening. Epididymis: No acute abnormality. Left: Grey Scale: The left testicle demonstrates normal homogeneous echotexture without focal lesion. No evidence of testicular microlithiasis. Doppler Evaluation: There is normal arterial and venous Doppler flow within the testicle. Scrotal Sac: No evidence of hydrocele. Extensive scrotal wall thickening. Epididymis: There is a cyst versus spermatocele within the left epididymis which measures 1.3 x 1.3 x 1.4 cm. 1. No evidence of testicular torsion or mass. 2. 1.4 cm left epididymal cyst versus spermatocele.  3. Diffuse scrotal wall thickening. Assessment//Plan           Hospital Problems           Last Modified POA    * (Principal) Intramuscular hematoma 2022 Yes    Right leg pain 2022 Yes        Assessment & Plan  Intramuscular hematoma right gluteus medius  -CT noted and no surgical intervention  -Doppler neg for DVT  -surg consulted and no signs of compartment syndrome  Etoh hepatitis  -HIDA with no cholecystitis  -CT abd diffuse fatty liver and MRCP with no bililary duct dilatation  -lactulose and rifaxamin  -DILI? -solumedrol  HAG MA with lactic acidodiss  -improved with IVF  Etoh wtihdrawal  -precedex  -thiamine  Leukocytosis  -vanc   -bld cx with CONS  -stopped flagyl and cefepime  Anemia  -AOCD  -B12 wnl  -S/p PRBC  severe PCM  Scrotal bruising  -extenstion gluteal hematoma  -left epididymal cyst vs spermatocoels      Give dose of lasix as 3L over. Awaiting legionella. Underwent PRBC yesterday and noted  bilirubin. Has some mild bandemia and monitor.  IF hb continues to decrease then re imaging leg to make sure no extension of hematoma      Electronically signed by Gustavo Paez MD on 22 at 8:14 AM EDT

## 2022-04-25 LAB
ALBUMIN SERPL-MCNC: 2.3 GM/DL (ref 3.4–5)
ALP BLD-CCNC: 253 IU/L (ref 40–129)
ALT SERPL-CCNC: 153 U/L (ref 10–40)
AMMONIA: 22 UMOL/L (ref 16–60)
AST SERPL-CCNC: 357 IU/L (ref 15–37)
BILIRUB SERPL-MCNC: 14.4 MG/DL (ref 0–1)
BILIRUBIN DIRECT: 10.9 MG/DL (ref 0–0.3)
BILIRUBIN, INDIRECT: 3.5 MG/DL (ref 0–0.7)
DOSE AMOUNT: NORMAL
DOSE TIME: NORMAL
HCT VFR BLD CALC: 27.5 % (ref 42–52)
HCT VFR BLD CALC: 27.6 % (ref 42–52)
HEMOGLOBIN: 8.5 GM/DL (ref 13.5–18)
HEMOGLOBIN: 8.5 GM/DL (ref 13.5–18)
TOTAL PROTEIN: 3.9 GM/DL (ref 6.4–8.2)
VANCOMYCIN RANDOM: 13.7 UG/ML

## 2022-04-25 PROCEDURE — 85018 HEMOGLOBIN: CPT

## 2022-04-25 PROCEDURE — 36591 DRAW BLOOD OFF VENOUS DEVICE: CPT

## 2022-04-25 PROCEDURE — 99232 SBSQ HOSP IP/OBS MODERATE 35: CPT | Performed by: SURGERY

## 2022-04-25 PROCEDURE — 2580000003 HC RX 258: Performed by: NURSE PRACTITIONER

## 2022-04-25 PROCEDURE — 6360000002 HC RX W HCPCS: Performed by: PHYSICIAN ASSISTANT

## 2022-04-25 PROCEDURE — 36592 COLLECT BLOOD FROM PICC: CPT

## 2022-04-25 PROCEDURE — 2000000000 HC ICU R&B

## 2022-04-25 PROCEDURE — 2500000003 HC RX 250 WO HCPCS: Performed by: NURSE PRACTITIONER

## 2022-04-25 PROCEDURE — 6360000002 HC RX W HCPCS: Performed by: NURSE PRACTITIONER

## 2022-04-25 PROCEDURE — 6370000000 HC RX 637 (ALT 250 FOR IP): Performed by: HOSPITALIST

## 2022-04-25 PROCEDURE — 80076 HEPATIC FUNCTION PANEL: CPT

## 2022-04-25 PROCEDURE — 85014 HEMATOCRIT: CPT

## 2022-04-25 PROCEDURE — 80202 ASSAY OF VANCOMYCIN: CPT

## 2022-04-25 PROCEDURE — 99232 SBSQ HOSP IP/OBS MODERATE 35: CPT | Performed by: INTERNAL MEDICINE

## 2022-04-25 PROCEDURE — 2580000003 HC RX 258: Performed by: INTERNAL MEDICINE

## 2022-04-25 PROCEDURE — 94761 N-INVAS EAR/PLS OXIMETRY MLT: CPT

## 2022-04-25 PROCEDURE — 2580000003 HC RX 258: Performed by: HOSPITALIST

## 2022-04-25 PROCEDURE — 82140 ASSAY OF AMMONIA: CPT

## 2022-04-25 PROCEDURE — 6370000000 HC RX 637 (ALT 250 FOR IP): Performed by: SPECIALIST

## 2022-04-25 PROCEDURE — 2580000003 HC RX 258: Performed by: PHYSICIAN ASSISTANT

## 2022-04-25 PROCEDURE — 6360000002 HC RX W HCPCS: Performed by: INTERNAL MEDICINE

## 2022-04-25 PROCEDURE — 6360000002 HC RX W HCPCS: Performed by: SPECIALIST

## 2022-04-25 RX ORDER — CLONIDINE HYDROCHLORIDE 0.2 MG/1
0.2 TABLET ORAL ONCE
Status: COMPLETED | OUTPATIENT
Start: 2022-04-25 | End: 2022-04-26

## 2022-04-25 RX ADMIN — VANCOMYCIN HYDROCHLORIDE 1250 MG: 10 INJECTION, POWDER, LYOPHILIZED, FOR SOLUTION INTRAVENOUS at 03:56

## 2022-04-25 RX ADMIN — METHYLPREDNISOLONE SODIUM SUCCINATE 20 MG: 40 INJECTION, POWDER, LYOPHILIZED, FOR SOLUTION INTRAMUSCULAR; INTRAVENOUS at 04:06

## 2022-04-25 RX ADMIN — HYDRALAZINE HYDROCHLORIDE 10 MG: 20 INJECTION, SOLUTION INTRAMUSCULAR; INTRAVENOUS at 20:53

## 2022-04-25 RX ADMIN — OXYCODONE HYDROCHLORIDE 5 MG: 5 TABLET ORAL at 21:50

## 2022-04-25 RX ADMIN — LORAZEPAM 4 MG: 2 INJECTION INTRAMUSCULAR; INTRAVENOUS at 03:55

## 2022-04-25 RX ADMIN — SODIUM CHLORIDE, PRESERVATIVE FREE 10 ML: 5 INJECTION INTRAVENOUS at 20:56

## 2022-04-25 RX ADMIN — THIAMINE HYDROCHLORIDE 100 MG: 100 INJECTION, SOLUTION INTRAMUSCULAR; INTRAVENOUS at 08:55

## 2022-04-25 RX ADMIN — DEXMEDETOMIDINE HYDROCHLORIDE 1.5 MCG/KG/HR: 4 INJECTION, SOLUTION INTRAVENOUS at 11:04

## 2022-04-25 RX ADMIN — DEXMEDETOMIDINE HYDROCHLORIDE 1.5 MCG/KG/HR: 4 INJECTION, SOLUTION INTRAVENOUS at 08:53

## 2022-04-25 RX ADMIN — RIFAXIMIN 550 MG: 550 TABLET ORAL at 09:10

## 2022-04-25 RX ADMIN — DEXMEDETOMIDINE HYDROCHLORIDE 1.5 MCG/KG/HR: 4 INJECTION, SOLUTION INTRAVENOUS at 18:56

## 2022-04-25 RX ADMIN — DEXMEDETOMIDINE HYDROCHLORIDE 1.3 MCG/KG/HR: 4 INJECTION, SOLUTION INTRAVENOUS at 20:48

## 2022-04-25 RX ADMIN — LORAZEPAM 4 MG: 2 INJECTION INTRAMUSCULAR; INTRAVENOUS at 23:18

## 2022-04-25 RX ADMIN — DEXMEDETOMIDINE HYDROCHLORIDE 1.5 MCG/KG/HR: 4 INJECTION, SOLUTION INTRAVENOUS at 01:16

## 2022-04-25 RX ADMIN — METHYLPREDNISOLONE SODIUM SUCCINATE 20 MG: 40 INJECTION, POWDER, LYOPHILIZED, FOR SOLUTION INTRAMUSCULAR; INTRAVENOUS at 18:12

## 2022-04-25 RX ADMIN — DEXMEDETOMIDINE HYDROCHLORIDE 1.5 MCG/KG/HR: 4 INJECTION, SOLUTION INTRAVENOUS at 14:04

## 2022-04-25 RX ADMIN — DEXMEDETOMIDINE HYDROCHLORIDE 1.3 MCG/KG/HR: 4 INJECTION, SOLUTION INTRAVENOUS at 23:20

## 2022-04-25 RX ADMIN — SODIUM CHLORIDE, PRESERVATIVE FREE 10 ML: 5 INJECTION INTRAVENOUS at 08:54

## 2022-04-25 RX ADMIN — OXYCODONE HYDROCHLORIDE 5 MG: 5 TABLET ORAL at 15:01

## 2022-04-25 RX ADMIN — LORAZEPAM 4 MG: 2 INJECTION INTRAMUSCULAR; INTRAVENOUS at 01:16

## 2022-04-25 RX ADMIN — DEXMEDETOMIDINE HYDROCHLORIDE 1.5 MCG/KG/HR: 4 INJECTION, SOLUTION INTRAVENOUS at 06:13

## 2022-04-25 RX ADMIN — SODIUM CHLORIDE: 9 INJECTION, SOLUTION INTRAVENOUS at 03:48

## 2022-04-25 RX ADMIN — DEXMEDETOMIDINE HYDROCHLORIDE 1.5 MCG/KG/HR: 4 INJECTION, SOLUTION INTRAVENOUS at 16:40

## 2022-04-25 RX ADMIN — RIFAXIMIN 550 MG: 550 TABLET ORAL at 20:48

## 2022-04-25 RX ADMIN — LORAZEPAM 2 MG: 2 INJECTION INTRAMUSCULAR; INTRAVENOUS at 15:07

## 2022-04-25 RX ADMIN — DEXMEDETOMIDINE HYDROCHLORIDE 1.5 MCG/KG/HR: 4 INJECTION, SOLUTION INTRAVENOUS at 03:47

## 2022-04-25 RX ADMIN — LORAZEPAM 2 MG: 2 INJECTION INTRAMUSCULAR; INTRAVENOUS at 20:48

## 2022-04-25 ASSESSMENT — PAIN DESCRIPTION - ONSET
ONSET: ON-GOING

## 2022-04-25 ASSESSMENT — PAIN SCALES - WONG BAKER
WONGBAKER_NUMERICALRESPONSE: 4

## 2022-04-25 ASSESSMENT — PAIN DESCRIPTION - PROGRESSION
CLINICAL_PROGRESSION: NOT CHANGED

## 2022-04-25 ASSESSMENT — PAIN DESCRIPTION - LOCATION
LOCATION: SCROTUM
LOCATION: LEG
LOCATION: LEG;SCROTUM
LOCATION: BACK;LEG;GROIN
LOCATION: BACK;LEG

## 2022-04-25 ASSESSMENT — PAIN SCALES - GENERAL
PAINLEVEL_OUTOF10: 4
PAINLEVEL_OUTOF10: 7
PAINLEVEL_OUTOF10: 4
PAINLEVEL_OUTOF10: 6
PAINLEVEL_OUTOF10: 4
PAINLEVEL_OUTOF10: 6
PAINLEVEL_OUTOF10: 7

## 2022-04-25 ASSESSMENT — PAIN DESCRIPTION - DESCRIPTORS
DESCRIPTORS: ACHING
DESCRIPTORS: NAGGING
DESCRIPTORS: ACHING
DESCRIPTORS: ACHING

## 2022-04-25 ASSESSMENT — PAIN DESCRIPTION - PAIN TYPE
TYPE: ACUTE PAIN

## 2022-04-25 ASSESSMENT — PAIN DESCRIPTION - ORIENTATION
ORIENTATION: RIGHT
ORIENTATION: MID
ORIENTATION: RIGHT
ORIENTATION: RIGHT

## 2022-04-25 ASSESSMENT — PAIN DESCRIPTION - FREQUENCY
FREQUENCY: CONTINUOUS

## 2022-04-25 NOTE — PROGRESS NOTES
After multiple times of asking and educating patient on the use of pierce cath and reasons not to pull on the device a restraint order was obtained and patient family ( wife alma )  Was notified by phone of the application of restraints. Patient safety was noted as the main reason family was understanding.

## 2022-04-25 NOTE — PROGRESS NOTES
Hematology/Oncology  Progress Note    HISTORY OF PRESENT ILLNESS:      The patient is a 39 y.o. male with significant past medical history of EtOH abuse who presents with above on 4/18/2022. He was jaundiced and found to have intramuscular hematoma of the right gluteus muscles. CT abdomen and pelvis 4/18/2022:   1. There is a hematoma measuring 11.1 x 4.2 x 16.7 cm between the right gluteus medius and naomi muscles.  There is diffuse enlargement of the adductor muscles of the right lower extremity, likely related to edema. 2. Severe diffuse fatty infiltration of the liver.  Gallbladder wall thickening is seen.  This could be related to chronic liver disease.  If there is concern for acute cholecystitis, a right upper quadrant ultrasound  can be obtained. CT right femur 4/18/2022:  Ill-defined hypodensity in the right gluteus medius muscle and ill-defined  hyperdensity in the right gluteus naomi muscle.  These may represent hematomas in different phases of evolution.  However simple and complex abscesses cannot be excluded.  Clinical correlation and further evaluation by MRI with and without IV contrast recommended. MRI abdomen 4/18/2022:  Markedly limited exam due to patient condition.   Mild gallbladder wall thickening, nonspecific, in the setting of underlying  liver disease.  No biliary duct dilation. Repeat CT abdomen 4/18/2022:  1. Severe diffuse fatty infiltration of the liver. 2. Hematoma involving the right gluteus medius and naomi musculature without significant change.  There is also a partially visualized hematoma surrounding the hamstring musculature on the right.  Continued follow-up is recommended until resolution. US abdomen 4/21/2022:  1. Limited exam.  2. Severe diffuse fatty infiltration of the liver.   3. Sludge is seen within the gallbladder lumen.  The gallbladder wall is  thickened at 6 mm however there is no evidence of a positive sonographic Reynoso sign.  Findings could be related to chronic liver disease. 4. No common bile duct dilatation. GI and surgeon were consulted. CBC on 4/5/2022 was grossly unremarkable with Hg 15.7 and .1. B-12 >2000. ALk phos 539, , , bilirubin 17.1. On admission 4/18/2022 WBC was 19.8, Hg 10.1, .1 and plt 391. ANC was 15.2. Ferritin ws 1266, TIBC <195, saturation <91. Iron 178. Hapto <10. TB was 18.8, direct 15.6 and indirect 3.2. LDH was 327. B 12 > 2000. SHAUN was negative. He received PRBC on 4/202/2022. He is on steroid. Reportedly he pulled the rectal tube. He is confused this morning. He is on wrist restraint. Hg was 8.5. He has pierce with dark color urine. Will continue to monitor CBC. PHYSICAL EXAM:      Vitals:    BP (!) 191/101   Pulse (!) 47   Temp 98.1 °F (36.7 °C) (Oral)   Resp 11   Ht 6' 3.98\" (1.93 m)   Wt (!) 312 lb 6.3 oz (141.7 kg)   SpO2 100%   BMI 38.04 kg/m²     CONSTITUTIONAL:  Awake and no distress  EYES:  pale, extra-ocular muscles intact and icteric sclera  NECK:  supple, symmetrical, trachea midline and no lymphadenopathy  LUNGS:  clear to auscultation, no crackles or wheezing  CARDIOVASCULAR:  normal S1 and S2 and no murmur noted  ABDOMEN:  normal bowel sounds, soft, non-distended, non-tender and  pierce cath noted  MUSCULOSKELETAL:  He has wrist restraint. NEUROLOGIC: cranial nerves II-XII are grossly intact. confused  SKIN:  Hematoma stable.  No bleeding. + jaundice    DATA:    Labs:  General Labs:    CBC with Differential:    Lab Results   Component Value Date    WBC 10.1 04/23/2022    RBC 2.22 04/23/2022    HGB 8.5 04/25/2022    HCT 27.6 04/25/2022     04/23/2022    .5 04/23/2022    MCH 31.5 04/23/2022    MCHC 31.4 04/23/2022    RDW 26.0 04/23/2022    NRBC 3 04/23/2022    SEGSPCT 60.0 04/23/2022    BANDSPCT 13 04/23/2022    LYMPHOPCT 16.0 04/23/2022    MONOPCT 6.0 04/23/2022    MYELOPCT 2 04/23/2022    BASOPCT 0.5 04/18/2022    MONOSABS 0.6 04/23/2022    LYMPHSABS 1.6 04/23/2022    EOSABS 0.2 04/19/2022    BASOSABS 0.1 04/18/2022    DIFFTYPE MANUAL DIFFERENTIAL 04/23/2022     CMP:    Lab Results   Component Value Date     04/24/2022    K 4.5 04/24/2022     04/24/2022    CO2 24 04/24/2022    BUN 14 04/24/2022    CREATININE <0.5 04/24/2022    GFRAA NOT CALCULATED 04/24/2022    AGRATIO 1.9 04/05/2022    LABGLOM NOT CALCULATED 04/24/2022    GLUCOSE 138 04/24/2022    PROT 3.7 04/24/2022    LABALBU 2.4 04/24/2022    CALCIUM 7.4 04/24/2022    BILITOT 19.1 04/24/2022    ALKPHOS 254 04/24/2022     04/24/2022     04/24/2022       IMPRESSION/RECOMMENDATIONS:    1. Anemia is likely related to acute blood loss. He has intramuscular hematoma. Less likely hemolytic anemia. Hg was 8.4 on /24/2022 s/p transfusion. Hg was stable at 8.5 today    2. He has severe alcoholic hepatitis. On Steroid. Bili is down at 19.1 on 4/24/2022. Will follow up.  Thanks

## 2022-04-25 NOTE — PROGRESS NOTES
Comprehensive Nutrition Assessment    Type and Reason for Visit:  Reassess    Nutrition Recommendations/Plan:   1. Add standard oral nutrition supplement TID  2. Encourage po intake as able  3. Please document all po intake  4. Will closely monitor po intake, weight trends, poc     Malnutrition Assessment:  Malnutrition Status: At risk for malnutrition (Comment) (04/25/22 1947)  Context:  Acute Illness       Nutrition Assessment:    Today is day 7 NPO/clear liquid diet, pt has clear liquid oral nutrition supplements ordered, +precedex ggt, perfectserved attending physician regarding nutrition plan, per physician 'He was confused on precedex so he was on clears but mental status improving so can advance diet as tolerated,' pt at high risk for malnutrition, will continue to follow at high nutrition risk    Nutrition Related Findings:    Elevated LFTs, H/H: 8.5/27.6 Wound Type: None       Current Nutrition Intake & Therapies:    Average Meal Intake: Unable to assess  Average Supplements Intake: Unable to assess  ADULT ORAL NUTRITION SUPPLEMENT; Breakfast, Lunch, Dinner; Clear Liquid Oral Supplement  ADULT DIET; Full Liquid    Anthropometric Measures:  Height: 6' 3.98\" (193 cm)  Ideal Body Weight (IBW): 202 lbs (92 kg)    Admission Body Weight:  (n/a)  Current Body Weight: 312 lb 6.3 oz (141.7 kg), 154.6 % IBW.  Weight Source: Bed Scale  Current BMI (kg/m2): 38  Weight Adjustment For: No Adjustment  BMI Categories: Obese Class 2 (BMI 35.0 -39.9)    Estimated Daily Nutrient Needs:  Energy Requirements Based On: Formula  Weight Used for Energy Requirements: Current  Energy (kcal/day): 1575-4959 (T[place] Eliana w/ stress factor 1.0-1.2)  Weight Used for Protein Requirements: Ideal  Protein (g/day):  (1.0-1.2 g/kg)  Method Used for Fluid Requirements: 1 ml/kcal    Nutrition Diagnosis:   · Inadequate oral intake related to acute injury/trauma as evidenced by NPO or clear liquid status due to medical condition    Nutrition Interventions:   Food and/or Nutrient Delivery: Modify Current Diet,Modify Oral Nutrition Supplement (advance to at least full liquid diet)  Nutrition Education/Counseling: No recommendation at this time  Coordination of Nutrition Care: Continue to monitor while inpatient     Goals:  Previous Goal Met: No Progress toward Goal(s)  Goals:  (Pt will advance to at least full liquid diet in the next 24 hr)     Nutrition Monitoring and Evaluation:   Behavioral-Environmental Outcomes: None Identified  Food/Nutrient Intake Outcomes: Diet Advancement/Tolerance,Supplement Intake,Food and Nutrient Intake  Physical Signs/Symptoms Outcomes: Biochemical Data,Weight,Skin,Fluid Status or Edema,GI Status    Discharge Planning:     Too soon to determine     [place] [address], [address] [place] [address],   Contact: [phone]

## 2022-04-25 NOTE — PROGRESS NOTES
Progress Note  Date:2022       [facility]:[id number]  Patient Name:Ronan Mott     Date of Birth:     Age:41 y.o. Mother at bedside and remembers my name    Subjective    Subjective:  Symptoms:  Stable. Diet:  Poor intake. Pain:  He complains of pain that is mild. Review of Systems  Objective         Vitals Last 24 Hours:  TEMPERATURE:  Temp  Av.6 °F (36.4 °C)  Min: 97.6 °F (36.4 °C)  Max: 97.7 °F (36.5 °C)  RESPIRATIONS RANGE: Resp  Av.5  Min: 5  Max: 21  PULSE OXIMETRY RANGE: SpO2  Av.3 %  Min: 82 %  Max: 100 %  PULSE RANGE: Pulse  Av.2  Min: 44  Max: 64  BLOOD PRESSURE RANGE: Systolic (04SBJ), OMF:436 , Min:117 , ALB:758   ; Diastolic (81XYG), KJK:039, Min:64, Max:187    I/O (24Hr): Intake/Output Summary (Last 24 hours) at 2022 0719  Last data filed at 2022 8177  Gross per 24 hour   Intake 3643.55 ml   Output 5900 ml   Net -2256.45 ml     Objective:  General Appearance:  Comfortable. Vital signs: (most recent): Blood pressure (!) 142/118, pulse 57, temperature 97.6 °F (36.4 °C), temperature source Oral, resp. rate 18, height 6' 3.98\" (1.93 m), weight (!) 312 lb 6.3 oz (141.7 kg), SpO2 100 %. Kole Vazquez on precedex). HEENT: Normal HEENT exam.    Lungs:  Normal effort. There are wheezes. Heart: Normal rate. Abdomen: Abdomen is soft. Extremities: Decreased range of motion. There is dependent edema. Neurological: (Alert). Pupils:  Pupils are equal. (Scleral icterus). Skin:  Warm. There is ecchymosis. (Thigh right)    Labs/Imaging/Diagnostics    Labs:  CBC:  Recent Labs     22  0600 22  1425 22  1500 22  2300 22  0542   WBC 10.1  --   --   --   --    RBC 2.22*  --   --   --   --    HGB 7.0*   < > 8.6* 8.5* 8.5*   HCT 22.3*   < > 27.5* 27.5* 27.6*   .5*  --   --   --   --    RDW 26.0*  --   --   --   --      --   --   --   --     < > = values in this interval not displayed. CHEMISTRIES:  Recent Labs     04/23/22  0600 04/24/22  0535    140   K 4.5 4.5    109   CO2 23 24   BUN 18 14   CREATININE <0.5* <0.5*   GLUCOSE 136* 138*     PT/INR:  No results for input(s): PROTIME, INR in the last 72 hours. APTT:No results for input(s): APTT in the last 72 hours. LIVER PROFILE:  Recent Labs     04/23/22  0600 04/24/22  0535   * 415*   * 133*   BILITOT 22.2* 19.1*   ALKPHOS 252* 254*       Imaging Last 24 Hours:  US SCROTUM AND TESTICLES    Result Date: 4/22/2022  EXAMINATION: ULTRASOUND OF THE SCROTUM/TESTICLES WITH COLOR DOPPLER FLOW EVALUATION; DOPPLER EVALUATION OF THE PELVIS 4/22/2022 TECHNIQUE: Duplex ultrasound using B-mode/gray scaled imaging, Doppler spectral analysis and color flow Doppler was obtained of the testicles.; Duplex ultrasound using B-mode/gray scaled imaging and Doppler spectral analysis and color flow was obtained of the pelvis. COMPARISON: None. HISTORY: ORDERING SYSTEM PROVIDED HISTORY: bruising of scrotum TECHNOLOGIST PROVIDED HISTORY: Reason for exam:->bruising of scrotum Reason for Exam: Bruising of scrotum FINDINGS: Measurements: Right Testicle: 4.2 x 2.7 x 2.7 cm Left Testicle: 3.9 x 2.7 x 2.6 cm Right: Grey Scale: The right testicle demonstrates normal homogeneous echotexture without focal lesion. No evidence of testicular microlithiasis. Doppler Evaluation: There is normal arterial and venous Doppler flow within the testicle. Scrotal Sac: No evidence of hydrocele. Extensive scrotal wall thickening. Epididymis: No acute abnormality. Left: Grey Scale: The left testicle demonstrates normal homogeneous echotexture without focal lesion. No evidence of testicular microlithiasis. Doppler Evaluation: There is normal arterial and venous Doppler flow within the testicle. Scrotal Sac: No evidence of hydrocele. Extensive scrotal wall thickening. Epididymis:  There is a cyst versus spermatocele within the left epididymis which measures 1.3 x 1.3 x 1.4 cm. 1. No evidence of testicular torsion or mass. 2. 1.4 cm left epididymal cyst versus spermatocele. 3. Diffuse scrotal wall thickening. US DUP ABD PEL RETRO SCROT COMPLETE    Result Date: 4/22/2022  EXAMINATION: ULTRASOUND OF THE SCROTUM/TESTICLES WITH COLOR DOPPLER FLOW EVALUATION; DOPPLER EVALUATION OF THE PELVIS 4/22/2022 TECHNIQUE: Duplex ultrasound using B-mode/gray scaled imaging, Doppler spectral analysis and color flow Doppler was obtained of the testicles.; Duplex ultrasound using B-mode/gray scaled imaging and Doppler spectral analysis and color flow was obtained of the pelvis. COMPARISON: None. HISTORY: ORDERING SYSTEM PROVIDED HISTORY: bruising of scrotum TECHNOLOGIST PROVIDED HISTORY: Reason for exam:->bruising of scrotum Reason for Exam: Bruising of scrotum FINDINGS: Measurements: Right Testicle: 4.2 x 2.7 x 2.7 cm Left Testicle: 3.9 x 2.7 x 2.6 cm Right: Grey Scale: The right testicle demonstrates normal homogeneous echotexture without focal lesion. No evidence of testicular microlithiasis. Doppler Evaluation: There is normal arterial and venous Doppler flow within the testicle. Scrotal Sac: No evidence of hydrocele. Extensive scrotal wall thickening. Epididymis: No acute abnormality. Left: Grey Scale: The left testicle demonstrates normal homogeneous echotexture without focal lesion. No evidence of testicular microlithiasis. Doppler Evaluation: There is normal arterial and venous Doppler flow within the testicle. Scrotal Sac: No evidence of hydrocele. Extensive scrotal wall thickening. Epididymis: There is a cyst versus spermatocele within the left epididymis which measures 1.3 x 1.3 x 1.4 cm. 1. No evidence of testicular torsion or mass. 2. 1.4 cm left epididymal cyst versus spermatocele. 3. Diffuse scrotal wall thickening.      Assessment//Plan           Hospital Problems           Last Modified POA    * (Principal) Intramuscular hematoma 4/18/2022 Yes    Right leg pain 4/18/2022 Yes        Assessment & Plan  Intramuscular hematoma right gluteus medius  -CT noted and no surgical intervention  -Doppler neg for DVT  -surg consulted and no signs of compartment syndrome  Etoh hepatitis  -HIDA with no cholecystitis  -CT abd diffuse fatty liver and MRCP with no bililary duct dilatation  -lactulose and rifaxamin  -DILI? -solumedrol  HAG MA with lactic acidodiss  -improved with IVF  Etoh wtihdrawal  -precedex  -thiamine  Leukocytosis  -vanc   -bld cx with CONS  -stopped flagyl and cefepime  Anemia  -AOCD  -CBC tomorrow and monitor bandemia  -B12 wnl  -S/p PRBC  severe PCM  Scrotal bruising  -extenstion gluteal hematoma  -left epididymal cyst vs spermatocoele    Hb stable and await LFTs. Stop IVF. Titrate precedex drip if tolerated.  Once off precedex then can transfer out of ICU      Electronically signed by Deon Batista MD on 4/23/22 at 8:14 AM EDT

## 2022-04-25 NOTE — PROGRESS NOTES
'F systolic. HR 40's. Perfect serve sent to [place] NP asking if she wants to give something to lower BP. Waiting on response.

## 2022-04-25 NOTE — PROGRESS NOTES
After applying restraints, patient continue to make attempts to remove rectal pierce, patient was successful at last, cleaning up augustus area no signs of trauma were observed, patient made no complaints of pain.

## 2022-04-25 NOTE — PROGRESS NOTES
GENERAL SURGERY INPATIENT PROGRESS NOTE  [facility]) Physicians    PATIENT: Isha Toledo, 39 y.o., male, MRN: [id number]    Hospital Day:  LOS: 7 days     Isha Toledo is a 39 y.o. male with acute alcoholic hepatitis with a Tbili of 23. CT scan showed intramuscular hematoma of the right gluteus muscles          Subjective:  Chief Complaint: (sedated on Precedex)  Pain: controlled  BM:    Diet: ADULT DIET; Clear Liquid  ADULT ORAL NUTRITION SUPPLEMENT; Breakfast, Lunch, Dinner; Clear Liquid Oral Supplement  Activity: as tolerated    Hb has been up and down, getting more stable. LFTs slowly improving. Leg swelling stable to improving.       Objective:    Vitals: BP (!) 180/78   Pulse (!) 41   Temp 98.1 °F (36.7 °C) (Oral)   Resp (!) 7   Ht 6' 3.98\" (1.93 m)   Wt (!) 312 lb 6.3 oz (141.7 kg)   SpO2 94%   BMI 38.04 kg/m²   Vital Signs (Last 24 Hours)  Temp  Av.7 °F (36.5 °C)  Min: 97.6 °F (36.4 °C)  Max: 98.1 °F (36.7 °C)  Pulse  Av.9  Min: 41  Max: 64  BP  Min: 117/88  Max: 217/187  Resp  Av.3  Min: 5  Max: 21  SpO2  Av.9 %  Min: 82 %  Max: 100 %  Wt Readings from Last 3 Encounters:   22 (!) 312 lb 6.3 oz (141.7 kg)   22 240 lb (108.9 kg)   22 254 lb (115.2 kg)       I/O:  1901 -  0700  In: 5642.5 [I.V.:4897.8]  Out: 8500 [Urine:8350]    IV Fluids: sodium chloride    sodium chloride    sodium chloride    dexmedetomidine HCl in NaCl Last Rate: 1.5 mcg/kg/hr (22 1104)    Scheduled Meds: rifAXIMin, 550 mg, Oral, BID    [Held by provider] lactulose, 20 g, Oral, TID    thiamine, 100 mg, IntraVENous, Daily    lidocaine PF, 5 mL, IntraDERmal, Once    sodium chloride flush, 5-40 mL, IntraVENous, 2 times per day    vancomycin, 1,250 mg, IntraVENous, Q12H    sodium chloride flush, 5-40 mL, IntraVENous, 2 times per day    methylPREDNISolone, 20 mg, IntraVENous, Q12H    Physical Exam:  General Appearance:   Calm, no distress    Head:   Normocephalic, atraumatic    Lungs:    Equal chest rise, respirations unlabored   Heart:   Regular rate and rhythm    Abdomen:    Soft, non-tender, no rebound or guarding    Extremities:  No cyanosis. Right lower extremity edema, compartments soft, scattered ecchymoses of the bilateral lower extremities. Pulses intact. Neurologic:  Nonfocal        Labs/Imaging Results:   Recent Results (from the past 24 hour(s))   Hemoglobin and Hematocrit    Collection Time: 04/24/22  3:00 PM   Result Value Ref Range    Hemoglobin 8.6 (L) 13.5 - 18.0 GM/DL    Hematocrit 27.5 (L) 42 - 52 %   Hemoglobin and Hematocrit    Collection Time: 04/24/22 11:00 PM   Result Value Ref Range    Hemoglobin 8.5 (L) 13.5 - 18.0 GM/DL    Hematocrit 27.5 (L) 42 - 52 %   Vancomycin Level, Random    Collection Time: 04/25/22  2:15 AM   Result Value Ref Range    Vancomycin Rm 13.7 UG/ML    DOSE AMOUNT DOSE AMT. GIVEN - 1250mg     DOSE TIME DOSE TIME GIVEN - 0300    Ammonia    Collection Time: 04/25/22  5:41 AM   Result Value Ref Range    Ammonia 22 16 - 60 UMOL/L   Hemoglobin and Hematocrit    Collection Time: 04/25/22  5:42 AM   Result Value Ref Range    Hemoglobin 8.5 (L) 13.5 - 18.0 GM/DL    Hematocrit 27.6 (L) 42 - 52 %   Hepatic Function Panel    Collection Time: 04/25/22 10:56 AM   Result Value Ref Range    Albumin 2.3 (L) 3.4 - 5.0 GM/DL    Total Bilirubin 14.4 (H) 0.0 - 1.0 MG/DL    Bilirubin, Direct 10.9 (H) 0.0 - 0.3 MG/DL    Bilirubin, Indirect 3.5 (H) 0 - 0.7 MG/DL    Alkaline Phosphatase 253 (H) 40 - 129 IU/L     (H) 15 - 37 IU/L     (H) 10 - 40 U/L    Total Protein 3.9 (L) 6.4 - 8.2 GM/DL     Assessment:  Cuate Astudillo is a 39 y.o. male with  acute alcoholic hepatitis with a Tbili of 19. CT scan showed intramuscular hematoma of the right gluteus muscles     Principal Problem:    Intramuscular hematoma  Active Problems:    Right leg pain  Resolved Problems:    * No resolved hospital problems.  *      Plan:  · Imaging showed hematoma was similar in size on re-evaluation. Monitor for now and transfuse for Hb <7  · Compartments are soft, neurovascularly intact. Continue to check with vital signs. Appears to be improving slowly  · Appreciate Dr. Jayashree Dahl recommendations, being tx for alcohol withdrawal.   · Alcohol abuse. Continue CIWA protocols, on Precedex currently. · Gallbladder wall thickening - likely secondary to hepatic dysfunction given his LFTs and current imaging, also with recent HIDA scan showing cholestasis in the liver. No surgical plans at this time  · Will continue to follow loosely.  Please call if needed  · Thank you for the consultation and the opportunity to care for Flores Quincy     Electronically signed: Job Guerrier MD 4/25/2022 1:21 PM

## 2022-04-25 NOTE — PLAN OF CARE
Nutrition Problem #1: Inadequate oral intake  Intervention: Food and/or Nutrient Delivery: Modify Current Diet,Modify Oral Nutrition Supplement (advance to at least full liquid diet)

## 2022-04-25 NOTE — PROGRESS NOTES
[address] [facility]  consulted by Dr. Regla Price for monitoring and adjustment. Indication for treatment: Sepsis  Goal trough: [] 10-15 mcg/mL or [x] 15-20 mcg/ml  AUC/KADIE: [] <500 or [x] 400-600    Pertinent Laboratory Values:   Temp Readings from Last 3 Encounters:   04/25/22 98.1 °F (36.7 °C) (Oral)   02/23/22 97.8 °F (36.6 °C)   11/08/21 97.9 °F (36.6 °C)     Recent Labs     04/23/22  0600   WBC 10.1     Recent Labs     04/23/22  0600 04/24/22  0535   BUN 18 14   CREATININE <0.5* <0.5*     Estimated Creatinine Clearance: 299 mL/min (based on SCr of 0.5 mg/dL). Intake/Output Summary (Last 24 hours) at 4/25/2022 1526  Last data filed at 4/25/2022 1305  Gross per 24 hour   Intake 4091.75 ml   Output 6425 ml   Net -2333.25 ml       Pertinent Cultures:  Date    Source    Results  4/18   Blood    1/4 GPC. CoNS    Vancomycin level:   TROUGH:    No results for input(s): VANCOTROUGH in the last 72 hours.   RANDOM:    Recent Labs     04/25/22  0215   VANCORANDOM 13.7       Assessment:  · SCr, BUN, and urine output:  · Scr was elevated earlier in admission  · Renal trends are now stable, at baseline  · Day(s) of therapy: 8  · Vancomycin concentration:   · 4/20: 14.3, 10 hours post-dose, , therapeutic (1250 q12h)  · 4/22:  18.5, 9 hours post-dose, therapeutic (1250 q12h)  · 4/25: 13.7, 9h post-dose, , therapeutic (1250 q12h)    Plan:  · Continue Vancomycin 1250 mg IV every 12 hours with a therapeutic level and AUC  · Predicted  at steady state  · Repeat next level in 3-5 days (sooner if renal function changes)  · Pharmacy will continue to monitor patient and adjust therapy as indicated    Joshua 3 4/29 @ 0200    Thank you for the consult,  Alvaro Gamez, PharmD  4/25/2022 3:26 PM

## 2022-04-26 LAB
ALBUMIN SERPL-MCNC: 2.5 GM/DL (ref 3.4–5)
ALP BLD-CCNC: 255 IU/L (ref 40–129)
ALT SERPL-CCNC: 162 U/L (ref 10–40)
AST SERPL-CCNC: 317 IU/L (ref 15–37)
BANDED NEUTROPHILS ABSOLUTE COUNT: 1.03 K/CU MM
BANDED NEUTROPHILS RELATIVE PERCENT: 10 % (ref 5–11)
BILIRUB SERPL-MCNC: 13.2 MG/DL (ref 0–1)
BILIRUBIN DIRECT: 9.8 MG/DL (ref 0–0.3)
BILIRUBIN, INDIRECT: 3.4 MG/DL (ref 0–0.7)
CULTURE: ABNORMAL
CULTURE: ABNORMAL
DIFFERENTIAL TYPE: ABNORMAL
DOHLE BODIES: PRESENT
HCT VFR BLD CALC: 27.6 % (ref 42–52)
HEMOGLOBIN: 8.5 GM/DL (ref 13.5–18)
INR BLD: 1.18 INDEX
LV EF: 60 %
LVEF MODALITY: NORMAL
LYMPHOCYTES ABSOLUTE: 1.1 K/CU MM
LYMPHOCYTES RELATIVE PERCENT: 11 % (ref 24–44)
Lab: ABNORMAL
MCH RBC QN AUTO: 31.4 PG (ref 27–31)
MCHC RBC AUTO-ENTMCNC: 30.8 % (ref 32–36)
MCV RBC AUTO: 101.8 FL (ref 78–100)
METAMYELOCYTES ABSOLUTE COUNT: 0.21 K/CU MM
METAMYELOCYTES PERCENT: 2 %
MONOCYTES ABSOLUTE: 0.8 K/CU MM
MONOCYTES RELATIVE PERCENT: 8 % (ref 0–4)
MYELOCYTE PERCENT: 1 %
MYELOCYTES ABSOLUTE COUNT: 0.1 K/CU MM
PDW BLD-RTO: 26.6 % (ref 11.7–14.9)
PLATELET # BLD: 257 K/CU MM (ref 140–440)
PMV BLD AUTO: 10.9 FL (ref 7.5–11.1)
PROTHROMBIN TIME: 15.2 SECONDS (ref 11.7–14.5)
RBC # BLD: 2.71 M/CU MM (ref 4.6–6.2)
RBC # BLD: ABNORMAL 10*6/UL
SEGMENTED NEUTROPHILS ABSOLUTE COUNT: 7.1 K/CU MM
SEGMENTED NEUTROPHILS RELATIVE PERCENT: 68 % (ref 36–66)
SPECIMEN: ABNORMAL
TOTAL PROTEIN: 4.1 GM/DL (ref 6.4–8.2)
TOXIC GRANULATION: PRESENT
WBC # BLD: 10.3 K/CU MM (ref 4–10.5)

## 2022-04-26 PROCEDURE — 6370000000 HC RX 637 (ALT 250 FOR IP): Performed by: PHYSICIAN ASSISTANT

## 2022-04-26 PROCEDURE — 2580000003 HC RX 258: Performed by: PHYSICIAN ASSISTANT

## 2022-04-26 PROCEDURE — 99232 SBSQ HOSP IP/OBS MODERATE 35: CPT | Performed by: INTERNAL MEDICINE

## 2022-04-26 PROCEDURE — 6370000000 HC RX 637 (ALT 250 FOR IP): Performed by: NURSE PRACTITIONER

## 2022-04-26 PROCEDURE — 6370000000 HC RX 637 (ALT 250 FOR IP): Performed by: SPECIALIST

## 2022-04-26 PROCEDURE — 6360000002 HC RX W HCPCS: Performed by: INTERNAL MEDICINE

## 2022-04-26 PROCEDURE — 85027 COMPLETE CBC AUTOMATED: CPT

## 2022-04-26 PROCEDURE — 99231 SBSQ HOSP IP/OBS SF/LOW 25: CPT | Performed by: SPECIALIST

## 2022-04-26 PROCEDURE — 6360000002 HC RX W HCPCS: Performed by: PHYSICIAN ASSISTANT

## 2022-04-26 PROCEDURE — 93306 TTE W/DOPPLER COMPLETE: CPT

## 2022-04-26 PROCEDURE — 2500000003 HC RX 250 WO HCPCS: Performed by: NURSE PRACTITIONER

## 2022-04-26 PROCEDURE — 80076 HEPATIC FUNCTION PANEL: CPT

## 2022-04-26 PROCEDURE — 85007 BL SMEAR W/DIFF WBC COUNT: CPT

## 2022-04-26 PROCEDURE — 85610 PROTHROMBIN TIME: CPT

## 2022-04-26 PROCEDURE — 6360000002 HC RX W HCPCS: Performed by: SPECIALIST

## 2022-04-26 PROCEDURE — 6370000000 HC RX 637 (ALT 250 FOR IP): Performed by: INTERNAL MEDICINE

## 2022-04-26 PROCEDURE — 6360000002 HC RX W HCPCS: Performed by: NURSE PRACTITIONER

## 2022-04-26 PROCEDURE — 6370000000 HC RX 637 (ALT 250 FOR IP): Performed by: HOSPITALIST

## 2022-04-26 PROCEDURE — 2000000000 HC ICU R&B

## 2022-04-26 RX ORDER — PREDNISOLONE 15 MG/5 ML
40 SOLUTION, ORAL ORAL DAILY
Status: DISCONTINUED | OUTPATIENT
Start: 2022-04-26 | End: 2022-05-02 | Stop reason: HOSPADM

## 2022-04-26 RX ORDER — DIPHENHYDRAMINE HYDROCHLORIDE 50 MG/ML
25 INJECTION INTRAMUSCULAR; INTRAVENOUS ONCE
Status: COMPLETED | OUTPATIENT
Start: 2022-04-26 | End: 2022-04-26

## 2022-04-26 RX ORDER — ZIPRASIDONE MESYLATE 20 MG/ML
20 INJECTION, POWDER, LYOPHILIZED, FOR SOLUTION INTRAMUSCULAR ONCE
Status: DISCONTINUED | OUTPATIENT
Start: 2022-04-26 | End: 2022-04-26

## 2022-04-26 RX ADMIN — HYDROMORPHONE HYDROCHLORIDE 1 MG: 1 INJECTION, SOLUTION INTRAMUSCULAR; INTRAVENOUS; SUBCUTANEOUS at 22:21

## 2022-04-26 RX ADMIN — ONDANSETRON 4 MG: 2 INJECTION INTRAMUSCULAR; INTRAVENOUS at 20:27

## 2022-04-26 RX ADMIN — SODIUM CHLORIDE, PRESERVATIVE FREE 10 ML: 5 INJECTION INTRAVENOUS at 08:47

## 2022-04-26 RX ADMIN — LORAZEPAM 2 MG: 1 TABLET ORAL at 20:27

## 2022-04-26 RX ADMIN — OXYCODONE HYDROCHLORIDE 5 MG: 5 TABLET ORAL at 17:19

## 2022-04-26 RX ADMIN — CLONIDINE HYDROCHLORIDE 0.2 MG: 0.2 TABLET ORAL at 00:49

## 2022-04-26 RX ADMIN — METHYLPREDNISOLONE SODIUM SUCCINATE 20 MG: 40 INJECTION, POWDER, LYOPHILIZED, FOR SOLUTION INTRAMUSCULAR; INTRAVENOUS at 03:15

## 2022-04-26 RX ADMIN — HYDROMORPHONE HYDROCHLORIDE 1 MG: 1 INJECTION, SOLUTION INTRAMUSCULAR; INTRAVENOUS; SUBCUTANEOUS at 18:32

## 2022-04-26 RX ADMIN — RIFAXIMIN 550 MG: 550 TABLET ORAL at 08:45

## 2022-04-26 RX ADMIN — SODIUM CHLORIDE, PRESERVATIVE FREE 10 ML: 5 INJECTION INTRAVENOUS at 20:45

## 2022-04-26 RX ADMIN — DEXMEDETOMIDINE HYDROCHLORIDE 0.3 MCG/KG/HR: 4 INJECTION, SOLUTION INTRAVENOUS at 17:11

## 2022-04-26 RX ADMIN — DEXMEDETOMIDINE HYDROCHLORIDE 0.7 MCG/KG/HR: 4 INJECTION, SOLUTION INTRAVENOUS at 15:31

## 2022-04-26 RX ADMIN — RIFAXIMIN 550 MG: 550 TABLET ORAL at 20:27

## 2022-04-26 RX ADMIN — SODIUM CHLORIDE, PRESERVATIVE FREE 10 ML: 5 INJECTION INTRAVENOUS at 20:28

## 2022-04-26 RX ADMIN — DEXMEDETOMIDINE HYDROCHLORIDE 1.1 MCG/KG/HR: 4 INJECTION, SOLUTION INTRAVENOUS at 03:01

## 2022-04-26 RX ADMIN — LORAZEPAM 2 MG: 2 INJECTION INTRAMUSCULAR; INTRAVENOUS at 08:45

## 2022-04-26 RX ADMIN — ONDANSETRON 4 MG: 4 TABLET, ORALLY DISINTEGRATING ORAL at 22:21

## 2022-04-26 RX ADMIN — THIAMINE HYDROCHLORIDE 100 MG: 100 INJECTION, SOLUTION INTRAMUSCULAR; INTRAVENOUS at 08:45

## 2022-04-26 RX ADMIN — SODIUM CHLORIDE, PRESERVATIVE FREE 10 ML: 5 INJECTION INTRAVENOUS at 08:46

## 2022-04-26 RX ADMIN — LORAZEPAM 3 MG: 1 TABLET ORAL at 22:21

## 2022-04-26 RX ADMIN — DIPHENHYDRAMINE HYDROCHLORIDE 25 MG: 50 INJECTION, SOLUTION INTRAMUSCULAR; INTRAVENOUS at 04:05

## 2022-04-26 RX ADMIN — DEXMEDETOMIDINE HYDROCHLORIDE 1.1 MCG/KG/HR: 4 INJECTION, SOLUTION INTRAVENOUS at 06:04

## 2022-04-26 RX ADMIN — DEXMEDETOMIDINE HYDROCHLORIDE 0.9 MCG/KG/HR: 4 INJECTION, SOLUTION INTRAVENOUS at 09:55

## 2022-04-26 RX ADMIN — Medication 40 MG: at 09:54

## 2022-04-26 ASSESSMENT — PAIN DESCRIPTION - LOCATION
LOCATION: GENERALIZED
LOCATION: LEG

## 2022-04-26 ASSESSMENT — PAIN SCALES - GENERAL
PAINLEVEL_OUTOF10: 7
PAINLEVEL_OUTOF10: 5
PAINLEVEL_OUTOF10: 7
PAINLEVEL_OUTOF10: 4
PAINLEVEL_OUTOF10: 7
PAINLEVEL_OUTOF10: 4
PAINLEVEL_OUTOF10: 2
PAINLEVEL_OUTOF10: 7

## 2022-04-26 ASSESSMENT — PAIN DESCRIPTION - FREQUENCY
FREQUENCY: CONTINUOUS
FREQUENCY: CONTINUOUS

## 2022-04-26 ASSESSMENT — PAIN DESCRIPTION - DESCRIPTORS
DESCRIPTORS: ACHING
DESCRIPTORS: ACHING
DESCRIPTORS: ACHING;CRAMPING
DESCRIPTORS: ACHING
DESCRIPTORS: ACHING

## 2022-04-26 ASSESSMENT — PAIN SCALES - WONG BAKER: WONGBAKER_NUMERICALRESPONSE: 4

## 2022-04-26 ASSESSMENT — PAIN DESCRIPTION - ONSET
ONSET: ON-GOING
ONSET: ON-GOING

## 2022-04-26 ASSESSMENT — PAIN DESCRIPTION - PAIN TYPE
TYPE: ACUTE PAIN
TYPE: ACUTE PAIN

## 2022-04-26 ASSESSMENT — PAIN DESCRIPTION - ORIENTATION
ORIENTATION: RIGHT

## 2022-04-26 NOTE — PROGRESS NOTES
Pt calling out. This RN walked into room and pt stated \"people on the TV were throwing stuff at me and next thing I knew I was bleeding out of my arm\". Upon assessment, pt had pulled his midline out. Janneth Gudino RN placed a new PIV and sheets were changed. Other midline and PIV wrapped in coband and reminded pt not to pull at his lines. Pt states his right  leg \"feels like it's going to explode\" and stated he wanted some ice. Ice applied and pt now resting comfortably in bed.

## 2022-04-26 NOTE — PROGRESS NOTES
More alert, oriented, calm  Abdomen soft, non-tender, non-distended  Impression:    1) severe alcoholic hepatitis- perhaps with superimposed medication hepatotoxicity- improving        Plan:   1) change to oral prednisolone   2) encourage nutrirtion

## 2022-04-26 NOTE — PLAN OF CARE
Problem: Falls - Risk of:  Goal: Will remain free from falls  Description: Will remain free from falls  Outcome: Progressing  Goal: Absence of physical injury  Description: Absence of physical injury  Outcome: Progressing     Problem: Pain:  Goal: Pain level will decrease  Description: Pain level will decrease  Outcome: Progressing  Goal: Control of acute pain  Description: Control of acute pain  Outcome: Progressing  Goal: Control of chronic pain  Description: Control of chronic pain  Outcome: Progressing     Problem: Skin Integrity:  Goal: Will show no infection signs and symptoms  Description: Will show no infection signs and symptoms  Outcome: Progressing  Goal: Absence of new skin breakdown  Description: Absence of new skin breakdown  Outcome: Progressing     Problem: Discharge Planning  Goal: Discharge to home or other facility with appropriate resources  Outcome: Progressing     Problem: ABCDS Injury Assessment  Goal: Absence of physical injury  Outcome: Progressing     Problem: Safety - Medical Restraint  Goal: Remains free of injury from restraints (Restraint for Interference with Medical Device)  Description: INTERVENTIONS:  1. Determine that other, less restrictive measures have been tried or would not be effective before applying the restraint  2. Evaluate the patient's condition at the time of restraint application  3. Inform patient/family regarding the reason for restraint  4.  Q2H: Monitor safety, psychosocial status, comfort, nutrition and hydration  Outcome: Progressing

## 2022-04-26 NOTE — PROGRESS NOTES
Hematology/Oncology  Progress Note    HISTORY OF PRESENT ILLNESS:      The patient is a 39 y.o. male with significant past medical history of EtOH abuse who presents with above on 4/18/2022. He was jaundiced and found to have intramuscular hematoma of the right gluteus muscles. CT abdomen and pelvis 4/18/2022:   1. There is a hematoma measuring 11.1 x 4.2 x 16.7 cm between the right gluteus medius and naomi muscles.  There is diffuse enlargement of the adductor muscles of the right lower extremity, likely related to edema. 2. Severe diffuse fatty infiltration of the liver.  Gallbladder wall thickening is seen.  This could be related to chronic liver disease.  If there is concern for acute cholecystitis, a right upper quadrant ultrasound  can be obtained. CT right femur 4/18/2022:  Ill-defined hypodensity in the right gluteus medius muscle and ill-defined  hyperdensity in the right gluteus naomi muscle.  These may represent hematomas in different phases of evolution.  However simple and complex abscesses cannot be excluded.  Clinical correlation and further evaluation by MRI with and without IV contrast recommended. MRI abdomen 4/18/2022:  Markedly limited exam due to patient condition.   Mild gallbladder wall thickening, nonspecific, in the setting of underlying  liver disease.  No biliary duct dilation. Repeat CT abdomen 4/18/2022:  1. Severe diffuse fatty infiltration of the liver. 2. Hematoma involving the right gluteus medius and naomi musculature without significant change.  There is also a partially visualized hematoma surrounding the hamstring musculature on the right.  Continued follow-up is recommended until resolution. US abdomen 4/21/2022:  1. Limited exam.  2. Severe diffuse fatty infiltration of the liver.   3. Sludge is seen within the gallbladder lumen.  The gallbladder wall is  thickened at 6 mm however there is no evidence of a positive sonographic Reynoso sign.  Findings could be related to chronic liver disease. 4. No common bile duct dilatation. GI and surgeon were consulted. CBC on 4/5/2022 was grossly unremarkable with Hg 15.7 and .1. B-12 >2000. ALk phos 539, , , bilirubin 17.1. On admission 4/18/2022 WBC was 19.8, Hg 10.1, .1 and plt 391. ANC was 15.2. Ferritin ws 1266, TIBC <195, saturation <91. Iron 178. Hapto <10. TB was 18.8, direct 15.6 and indirect 3.2. LDH was 327. B 12 > 2000. SHAUN was negative. He received PRBC on 4/202/2022. He is on steroid. He is more awake and alert. He knows that he is in the hospital. He asks when pierce cath can be removed. He requests pain medication and something to help him sleeping. Hg was stable at 8.5 on 4/26/2022. TB was 13.2, direct was 9.8. No signs of active bleeding. PHYSICAL EXAM:      Vitals:    BP (!) 161/86   Pulse (!) 49   Temp 97.8 °F (36.6 °C) (Oral)   Resp 13   Ht 6' 3.98\" (1.93 m)   Wt (!) 312 lb 6.3 oz (141.7 kg)   SpO2 98%   BMI 38.04 kg/m²     CONSTITUTIONAL:  Awake, oriented and no distress  EYES:  pale, extra-ocular muscles intact and icteric sclera  NECK:  supple, symmetrical, trachea midline and no lymphadenopathy  LUNGS:  clear to auscultation, no crackles or wheezing  CARDIOVASCULAR:  normal S1 and S2 and no murmur noted  ABDOMEN:  normal bowel sounds, soft, non-distended, non-tender and  pierce cath noted  MUSCULOSKELETAL: Swelling to LE.  NEUROLOGIC: CN II-XII  grossly intact. confused  SKIN:  Hematoma stable.  No bleeding. + jaundice    DATA:    Labs:  General Labs:    CBC with Differential:    Lab Results   Component Value Date    WBC 10.3 04/26/2022    RBC 2.71 04/26/2022    HGB 8.5 04/26/2022    HCT 27.6 04/26/2022     04/26/2022    .8 04/26/2022    MCH 31.4 04/26/2022    MCHC 30.8 04/26/2022    RDW 26.6 04/26/2022    NRBC 3 04/23/2022    SEGSPCT 68.0 04/26/2022    BANDSPCT 10 04/26/2022    LYMPHOPCT 11.0 04/26/2022    MONOPCT 8.0 04/26/2022 MYELOPCT 1 04/26/2022    BASOPCT 0.5 04/18/2022    MONOSABS 0.8 04/26/2022    LYMPHSABS 1.1 04/26/2022    EOSABS 0.2 04/19/2022    BASOSABS 0.1 04/18/2022    DIFFTYPE MANUAL DIFFERENTIAL 04/26/2022     CMP:    Lab Results   Component Value Date     04/24/2022    K 4.5 04/24/2022     04/24/2022    CO2 24 04/24/2022    BUN 14 04/24/2022    CREATININE <0.5 04/24/2022    GFRAA NOT CALCULATED 04/24/2022    AGRATIO 1.9 04/05/2022    LABGLOM NOT CALCULATED 04/24/2022    GLUCOSE 138 04/24/2022    PROT 4.1 04/26/2022    LABALBU 2.5 04/26/2022    CALCIUM 7.4 04/24/2022    BILITOT 13.2 04/26/2022    ALKPHOS 255 04/26/2022     04/26/2022     04/26/2022       IMPRESSION/RECOMMENDATIONS:    1. Anemia is likely related to acute blood loss. He has intramuscular hematoma. Less likely hemolytic anemia. Hg was 8.4 on /24/2022 s/p transfusion. Hg was stable at 8.5 today. 2. He has severe alcoholic hepatitis. GI changed to oral steroid. Bili continues to drop. To continue with supportive care. He may need PT/OT evaluation. Will follow up.  Thanks

## 2022-04-26 NOTE — PROGRESS NOTES
Hospitalist Progress Note      Name:  Nighat Florez /Age/Sex: 1980  (39 y.o. male)   MRN & CSN:  [id number] & [id number] Admission Date/Time: 2022 12:25 AM   Location:  -A PCP: Enmanuel Borden MD         Hospital Day: 9    Assessment and Plan:   Nighat Florez is a 39 y.o.  male  who presents with Intramuscular hematoma    Intramuscular hematoma right gluteus medius  -CT noted; no surgical intervention  -Doppler neg for DVT  -Appreciate General Surgery recs; no surgical intervention needed  -Hgb stable and will trend  -PT/OT for right leg mobility    Etoh hepatitis  -HIDA with no cholecystitis  -CT abd diffuse fatty liver and MRCP with no bililary duct dilatation  -Rifaximin  -DILI?  -Changed to PO Prednisolone today; appreciate GI assistance    HAG MA with lactic acidodiss  -Resolved with IVF    Etoh wtihdrawal  -Precedex and Thiamine    Leukocytosis  -vanc   -bld cx with CONS  -stopped flagyl and cefepime    Anemia  -AOCD  -B12 wnl  -S/p PRBC earlier in course of hospitalization  -Daily CBC; monitor and transfuse as needed    Hypertension  -On Hydralazine  -Med rec completed this AM and is on Clonidine at home, resumed  -Adjust medication regimen as needed    Severe PCM  -Full liquids now  -Will advance as able and monitor    Scrotal bruising  -Extenstion gluteal hematoma  -Left epididymal cyst vs spermatocoele; monitor    Diet ADULT DIET;  Full Liquid  ADULT ORAL NUTRITION SUPPLEMENT; Breakfast, Lunch, Dinner; Standard High Calorie/High Protein Oral Supplement   DVT Prophylaxis [] Lovenox, []  Heparin, [] SCDs, [] Ambulation   GI Prophylaxis [] PPI,  [] H2 Blocker,  [] Carafate,  [] Diet/Tube Feeds   Code Status Full Code   Disposition Patient requires continued admission due to    MDM [] Low, [] Moderate,[]  High  Patient's risk as above due to      History of Present Illness:     Patient resting comfortably this AM on Precedex; states that overall he feels he is improving, tolerating diet and PO steroids now; denies any abdominal pain    Objective: Intake/Output Summary (Last 24 hours) at 4/26/2022 1506  Last data filed at 4/26/2022 1200  Gross per 24 hour   Intake 818.85 ml   Output 5850 ml   Net -5031.15 ml      Vitals:   Vitals:    04/26/22 1500   BP:    Pulse: 56   Resp: 18   Temp:    SpO2:      Physical Exam:   GEN Awake male, sitting upright in bed in no apparent distress. Appears given age. EYES Pupils are equally round. No scleral erythema, discharge, or conjunctivitis. HENT Mucous membranes are moist. Oral pharynx without exudates, no evidence of thrush. NECK Supple, no apparent thyromegaly or masses. RESP Clear to auscultation, no wheezes, rales or rhonchi. Symmetric chest movement while on room air. CARDIO/VASC S1/S2 auscultated. Regular rate without appreciable murmurs, rubs, or gallops. No JVD or carotid bruits. Peripheral pulses equal bilaterally and palpable. No peripheral edema. GI Abdomen is soft without significant tenderness, masses, or guarding  SKIN    Jaundiced  PSYCH Awake, alert, oriented x 4. Affect appropriate.     Medications:   Medications:    prednisoLONE  40 mg Oral Daily    rifAXIMin  550 mg Oral BID    [Held by provider] lactulose  20 g Oral TID    thiamine  100 mg IntraVENous Daily    lidocaine PF  5 mL IntraDERmal Once    sodium chloride flush  5-40 mL IntraVENous 2 times per day    sodium chloride flush  5-40 mL IntraVENous 2 times per day      Infusions:    sodium chloride      sodium chloride      sodium chloride      dexmedetomidine HCl in NaCl 0.7 mcg/kg/hr (04/26/22 0956)     PRN Meds: albuterol sulfate HFA, 2 puff, Q6H PRN  oxyCODONE, 5 mg, Q6H PRN  sodium chloride flush, 5-40 mL, PRN  sodium chloride, , PRN  ondansetron, 4 mg, Q8H PRN   Or  ondansetron, 4 mg, Q6H PRN  polyethylene glycol, 17 g, Daily PRN  acetaminophen, 650 mg, Q6H PRN   Or  acetaminophen, 650 mg, Q6H PRN  HYDROmorphone, 1 mg, Q3H PRN  naloxone, 0.4 mg, PRN  sodium chloride flush, 5-40 mL, PRN  sodium chloride, , PRN  sodium chloride flush, 5-40 mL, PRN  sodium chloride, , PRN  LORazepam, 1 mg, Q1H PRN   Or  LORazepam, 1 mg, Q1H PRN   Or  LORazepam, 2 mg, Q1H PRN   Or  LORazepam, 2 mg, Q1H PRN   Or  LORazepam, 3 mg, Q1H PRN   Or  LORazepam, 3 mg, Q1H PRN   Or  LORazepam, 4 mg, Q1H PRN   Or  LORazepam, 4 mg, Q1H PRN          Electronically signed by Stefani Jones MD on 4/26/2022 at 3:06 PM

## 2022-04-26 NOTE — PROGRESS NOTES
Physician Progress Note      Hernandez Ledesma  CSN #:                  [id number]  :                       1980  ADMIT DATE:       2022 12:25 AM  DISCH DATE:  RESPONDING  PROVIDER #:        Bernie Fisher MD          QUERY TEXT:    Pt admitted with sepsis. Pt noted to have confusion. If possible, please   document in the progress notes and discharge summary if you are evaluating and   / or treating any of the following: The medical record reflects the following:  Risk Factors: sepsis, ETOH dependence  Clinical Indicators: Per attending progress notes, \"Patient requires continued   admission due to require closer monitoring for hematoma, DT with confusion. \"   Nursing assessment: disoriented to time and situation. Poor attention,   judgement and poor safety awareness. \"  Treatment: IVF, Chronulac, Thiamine, labs, imaging    SRINIVASA Mata, RN, [facility]  Clinical   [phone]  Options provided:  -- Alcoholic encephalopathy  -- Hepatic encephalopathy acute  -- Hepatic encephalopathy chronic  -- Metabolic encephalopathy  -- Septic encephalopathy  -- Wernicke?s encephalopathy  -- Other - I will add my own diagnosis  -- Disagree - Not applicable / Not valid  -- Disagree - Clinically unable to determine / Unknown  -- Refer to Clinical Documentation Reviewer    PROVIDER RESPONSE TEXT:    This patient has acute hepatic encephalopathy.     Query created by: Lisandro Reilly on 2022 1:41 PM      Electronically signed by:  Bernie Fisher MD 2022 9:48 AM

## 2022-04-26 NOTE — CARE COORDINATION
CM in to see pt for follow up. Pt's wife at bedside. Pt remains on precedex gtt. Pt is able to converse with CM today. Cm discussed anticipated need for PT/OT evaluations when medically able to participate due to lengthy hospitalization and time in bed. PT/OT was ordered 4/20 and tried to see pt at that time but pt was not medically stable. Will ask physician to reorder PT/OT when medically stable. Cm initiated discussion with pt re; alcohol abuse. Cm advised pt that there is concern about physical dependence on Alcohol and that CM wants to be able to provide resources and information as needed to assist pt in maintaining sobriety after discharge. Pt indicates that he realizes things need to change and that it will benefit not only him but the family and he wants to and per wife is family focused. Cm advised pt that Cm will continue and discuss further his plans for sobriety and any assistance he may need with that goal. Cm will also follow up for PT/OT evals and recs should any phyisical rehabilitation services be needed.

## 2022-04-27 LAB
ALBUMIN SERPL-MCNC: 2.7 GM/DL (ref 3.4–5)
ALP BLD-CCNC: 252 IU/L (ref 40–129)
ALT SERPL-CCNC: 176 U/L (ref 10–40)
ANION GAP SERPL CALCULATED.3IONS-SCNC: 11 MMOL/L (ref 4–16)
AST SERPL-CCNC: 276 IU/L (ref 15–37)
BASOPHILS ABSOLUTE: 0 K/CU MM
BASOPHILS RELATIVE PERCENT: 0.2 % (ref 0–1)
BILIRUB SERPL-MCNC: 12.7 MG/DL (ref 0–1)
BILIRUBIN DIRECT: 9.2 MG/DL (ref 0–0.3)
BILIRUBIN, INDIRECT: 3.5 MG/DL (ref 0–0.7)
BUN BLDV-MCNC: 10 MG/DL (ref 6–23)
CALCIUM SERPL-MCNC: 8.3 MG/DL (ref 8.3–10.6)
CHLORIDE BLD-SCNC: 102 MMOL/L (ref 99–110)
CO2: 27 MMOL/L (ref 21–32)
CREAT SERPL-MCNC: 0.4 MG/DL (ref 0.9–1.3)
DIFFERENTIAL TYPE: ABNORMAL
EKG ATRIAL RATE: 109 BPM
EKG DIAGNOSIS: NORMAL
EKG P AXIS: 39 DEGREES
EKG P-R INTERVAL: 152 MS
EKG Q-T INTERVAL: 342 MS
EKG QRS DURATION: 84 MS
EKG QTC CALCULATION (BAZETT): 460 MS
EKG R AXIS: 12 DEGREES
EKG T AXIS: 29 DEGREES
EKG VENTRICULAR RATE: 109 BPM
EOSINOPHILS ABSOLUTE: 0 K/CU MM
EOSINOPHILS RELATIVE PERCENT: 0.3 % (ref 0–3)
GFR AFRICAN AMERICAN: >60 ML/MIN/1.73M2
GFR NON-AFRICAN AMERICAN: >60 ML/MIN/1.73M2
GLUCOSE BLD-MCNC: 87 MG/DL (ref 70–99)
HCT VFR BLD CALC: 29.9 % (ref 42–52)
HEMOGLOBIN: 9.2 GM/DL (ref 13.5–18)
IMMATURE NEUTROPHIL %: 2.2 % (ref 0–0.43)
LYMPHOCYTES ABSOLUTE: 1.6 K/CU MM
LYMPHOCYTES RELATIVE PERCENT: 10.3 % (ref 24–44)
MAGNESIUM: 1.8 MG/DL (ref 1.8–2.4)
MCH RBC QN AUTO: 31.7 PG (ref 27–31)
MCHC RBC AUTO-ENTMCNC: 30.8 % (ref 32–36)
MCV RBC AUTO: 103.1 FL (ref 78–100)
MONOCYTES ABSOLUTE: 1.3 K/CU MM
MONOCYTES RELATIVE PERCENT: 8.3 % (ref 0–4)
NUCLEATED RBC %: 0.6 %
PDW BLD-RTO: 26.7 % (ref 11.7–14.9)
PLATELET # BLD: 310 K/CU MM (ref 140–440)
PMV BLD AUTO: 10.7 FL (ref 7.5–11.1)
POTASSIUM SERPL-SCNC: 3.5 MMOL/L (ref 3.5–5.1)
RBC # BLD: 2.9 M/CU MM (ref 4.6–6.2)
SEGMENTED NEUTROPHILS ABSOLUTE COUNT: 12.1 K/CU MM
SEGMENTED NEUTROPHILS RELATIVE PERCENT: 78.7 % (ref 36–66)
SODIUM BLD-SCNC: 140 MMOL/L (ref 135–145)
TOTAL IMMATURE NEUTOROPHIL: 0.34 K/CU MM
TOTAL NUCLEATED RBC: 0.1 K/CU MM
TOTAL PROTEIN: 4.6 GM/DL (ref 6.4–8.2)
WBC # BLD: 15.4 K/CU MM (ref 4–10.5)

## 2022-04-27 PROCEDURE — 6370000000 HC RX 637 (ALT 250 FOR IP): Performed by: PHYSICIAN ASSISTANT

## 2022-04-27 PROCEDURE — 94761 N-INVAS EAR/PLS OXIMETRY MLT: CPT

## 2022-04-27 PROCEDURE — 6370000000 HC RX 637 (ALT 250 FOR IP): Performed by: HOSPITALIST

## 2022-04-27 PROCEDURE — 6360000002 HC RX W HCPCS: Performed by: INTERNAL MEDICINE

## 2022-04-27 PROCEDURE — 6370000000 HC RX 637 (ALT 250 FOR IP): Performed by: SPECIALIST

## 2022-04-27 PROCEDURE — 97530 THERAPEUTIC ACTIVITIES: CPT

## 2022-04-27 PROCEDURE — 80053 COMPREHEN METABOLIC PANEL: CPT

## 2022-04-27 PROCEDURE — 6360000002 HC RX W HCPCS: Performed by: PHYSICIAN ASSISTANT

## 2022-04-27 PROCEDURE — 6370000000 HC RX 637 (ALT 250 FOR IP): Performed by: INTERNAL MEDICINE

## 2022-04-27 PROCEDURE — 85025 COMPLETE CBC W/AUTO DIFF WBC: CPT

## 2022-04-27 PROCEDURE — 93010 ELECTROCARDIOGRAM REPORT: CPT | Performed by: INTERNAL MEDICINE

## 2022-04-27 PROCEDURE — 2060000000 HC ICU INTERMEDIATE R&B

## 2022-04-27 PROCEDURE — 99231 SBSQ HOSP IP/OBS SF/LOW 25: CPT | Performed by: SPECIALIST

## 2022-04-27 PROCEDURE — 83735 ASSAY OF MAGNESIUM: CPT

## 2022-04-27 PROCEDURE — 2580000003 HC RX 258: Performed by: INTERNAL MEDICINE

## 2022-04-27 PROCEDURE — 36592 COLLECT BLOOD FROM PICC: CPT

## 2022-04-27 PROCEDURE — 97535 SELF CARE MNGMENT TRAINING: CPT

## 2022-04-27 PROCEDURE — 93005 ELECTROCARDIOGRAM TRACING: CPT | Performed by: INTERNAL MEDICINE

## 2022-04-27 PROCEDURE — 82248 BILIRUBIN DIRECT: CPT

## 2022-04-27 PROCEDURE — 99232 SBSQ HOSP IP/OBS MODERATE 35: CPT | Performed by: INTERNAL MEDICINE

## 2022-04-27 PROCEDURE — 2580000003 HC RX 258: Performed by: PHYSICIAN ASSISTANT

## 2022-04-27 PROCEDURE — 97166 OT EVAL MOD COMPLEX 45 MIN: CPT

## 2022-04-27 RX ORDER — PROCHLORPERAZINE EDISYLATE 5 MG/ML
10 INJECTION INTRAMUSCULAR; INTRAVENOUS EVERY 6 HOURS PRN
Status: DISCONTINUED | OUTPATIENT
Start: 2022-04-27 | End: 2022-05-02 | Stop reason: HOSPADM

## 2022-04-27 RX ORDER — ENOXAPARIN SODIUM 100 MG/ML
40 INJECTION SUBCUTANEOUS DAILY
Status: DISCONTINUED | OUTPATIENT
Start: 2022-04-27 | End: 2022-04-27 | Stop reason: DRUGHIGH

## 2022-04-27 RX ORDER — ENOXAPARIN SODIUM 100 MG/ML
30 INJECTION SUBCUTANEOUS 2 TIMES DAILY
Status: DISCONTINUED | OUTPATIENT
Start: 2022-04-27 | End: 2022-05-02 | Stop reason: HOSPADM

## 2022-04-27 RX ORDER — MAGNESIUM SULFATE 1 G/100ML
1000 INJECTION INTRAVENOUS ONCE
Status: COMPLETED | OUTPATIENT
Start: 2022-04-27 | End: 2022-04-27

## 2022-04-27 RX ORDER — POTASSIUM CHLORIDE 20 MEQ/1
20 TABLET, EXTENDED RELEASE ORAL ONCE
Status: COMPLETED | OUTPATIENT
Start: 2022-04-27 | End: 2022-04-27

## 2022-04-27 RX ADMIN — LORAZEPAM 4 MG: 2 INJECTION INTRAMUSCULAR; INTRAVENOUS at 00:34

## 2022-04-27 RX ADMIN — OXYCODONE HYDROCHLORIDE 5 MG: 5 TABLET ORAL at 00:32

## 2022-04-27 RX ADMIN — LORAZEPAM 4 MG: 2 INJECTION INTRAMUSCULAR; INTRAVENOUS at 03:57

## 2022-04-27 RX ADMIN — LORAZEPAM 4 MG: 2 INJECTION INTRAMUSCULAR; INTRAVENOUS at 01:43

## 2022-04-27 RX ADMIN — SODIUM CHLORIDE, PRESERVATIVE FREE 10 ML: 5 INJECTION INTRAVENOUS at 01:44

## 2022-04-27 RX ADMIN — SODIUM CHLORIDE, PRESERVATIVE FREE 10 ML: 5 INJECTION INTRAVENOUS at 04:54

## 2022-04-27 RX ADMIN — SODIUM CHLORIDE, PRESERVATIVE FREE 10 ML: 5 INJECTION INTRAVENOUS at 04:58

## 2022-04-27 RX ADMIN — HYDROMORPHONE HYDROCHLORIDE 1 MG: 1 INJECTION, SOLUTION INTRAMUSCULAR; INTRAVENOUS; SUBCUTANEOUS at 02:29

## 2022-04-27 RX ADMIN — TRIMETHOBENZAMIDE HYDROCHLORIDE 200 MG: 100 INJECTION INTRAMUSCULAR at 04:41

## 2022-04-27 RX ADMIN — ENOXAPARIN SODIUM 30 MG: 100 INJECTION SUBCUTANEOUS at 12:30

## 2022-04-27 RX ADMIN — OXYCODONE HYDROCHLORIDE 5 MG: 5 TABLET ORAL at 20:45

## 2022-04-27 RX ADMIN — SODIUM CHLORIDE, PRESERVATIVE FREE 10 ML: 5 INJECTION INTRAVENOUS at 20:45

## 2022-04-27 RX ADMIN — Medication 40 MG: at 09:36

## 2022-04-27 RX ADMIN — LORAZEPAM 4 MG: 2 INJECTION INTRAMUSCULAR; INTRAVENOUS at 04:58

## 2022-04-27 RX ADMIN — RIFAXIMIN 550 MG: 550 TABLET ORAL at 09:27

## 2022-04-27 RX ADMIN — SODIUM CHLORIDE, PRESERVATIVE FREE 10 ML: 5 INJECTION INTRAVENOUS at 22:40

## 2022-04-27 RX ADMIN — PROCHLORPERAZINE EDISYLATE 10 MG: 5 INJECTION INTRAMUSCULAR; INTRAVENOUS at 01:44

## 2022-04-27 RX ADMIN — SODIUM CHLORIDE, PRESERVATIVE FREE 10 ML: 5 INJECTION INTRAVENOUS at 09:28

## 2022-04-27 RX ADMIN — LORAZEPAM 2 MG: 1 TABLET ORAL at 22:40

## 2022-04-27 RX ADMIN — HYDROMORPHONE HYDROCHLORIDE 1 MG: 1 INJECTION, SOLUTION INTRAMUSCULAR; INTRAVENOUS; SUBCUTANEOUS at 22:40

## 2022-04-27 RX ADMIN — MAGNESIUM SULFATE IN DEXTROSE 1000 MG: 10 INJECTION, SOLUTION INTRAVENOUS at 12:34

## 2022-04-27 RX ADMIN — SODIUM CHLORIDE, PRESERVATIVE FREE 10 ML: 5 INJECTION INTRAVENOUS at 09:39

## 2022-04-27 RX ADMIN — ACETAMINOPHEN 650 MG: 325 TABLET ORAL at 04:49

## 2022-04-27 RX ADMIN — RIFAXIMIN 550 MG: 550 TABLET ORAL at 20:45

## 2022-04-27 RX ADMIN — SODIUM CHLORIDE, PRESERVATIVE FREE 10 ML: 5 INJECTION INTRAVENOUS at 00:35

## 2022-04-27 RX ADMIN — SODIUM CHLORIDE, PRESERVATIVE FREE 10 ML: 5 INJECTION INTRAVENOUS at 03:58

## 2022-04-27 RX ADMIN — OXYCODONE HYDROCHLORIDE 5 MG: 5 TABLET ORAL at 09:27

## 2022-04-27 RX ADMIN — POTASSIUM CHLORIDE 20 MEQ: 20 TABLET, EXTENDED RELEASE ORAL at 12:30

## 2022-04-27 RX ADMIN — THIAMINE HYDROCHLORIDE 100 MG: 100 INJECTION, SOLUTION INTRAMUSCULAR; INTRAVENOUS at 09:27

## 2022-04-27 RX ADMIN — SODIUM CHLORIDE, PRESERVATIVE FREE 10 ML: 5 INJECTION INTRAVENOUS at 02:29

## 2022-04-27 RX ADMIN — ENOXAPARIN SODIUM 30 MG: 100 INJECTION SUBCUTANEOUS at 20:45

## 2022-04-27 ASSESSMENT — PAIN DESCRIPTION - LOCATION
LOCATION: SCROTUM
LOCATION: GENERALIZED
LOCATION: LEG
LOCATION: SCROTUM

## 2022-04-27 ASSESSMENT — PAIN - FUNCTIONAL ASSESSMENT
PAIN_FUNCTIONAL_ASSESSMENT: PREVENTS OR INTERFERES WITH MANY ACTIVE NOT PASSIVE ACTIVITIES
PAIN_FUNCTIONAL_ASSESSMENT: PREVENTS OR INTERFERES SOME ACTIVE ACTIVITIES AND ADLS
PAIN_FUNCTIONAL_ASSESSMENT: PREVENTS OR INTERFERES SOME ACTIVE ACTIVITIES AND ADLS

## 2022-04-27 ASSESSMENT — PAIN DESCRIPTION - ORIENTATION
ORIENTATION: RIGHT
ORIENTATION: RIGHT

## 2022-04-27 ASSESSMENT — PAIN DESCRIPTION - FREQUENCY
FREQUENCY: CONTINUOUS
FREQUENCY: CONTINUOUS

## 2022-04-27 ASSESSMENT — PAIN SCALES - GENERAL
PAINLEVEL_OUTOF10: 8
PAINLEVEL_OUTOF10: 7
PAINLEVEL_OUTOF10: 0
PAINLEVEL_OUTOF10: 2
PAINLEVEL_OUTOF10: 0
PAINLEVEL_OUTOF10: 3
PAINLEVEL_OUTOF10: 0
PAINLEVEL_OUTOF10: 7
PAINLEVEL_OUTOF10: 0
PAINLEVEL_OUTOF10: 5

## 2022-04-27 ASSESSMENT — PAIN DESCRIPTION - DESCRIPTORS
DESCRIPTORS: ACHING
DESCRIPTORS: DISCOMFORT;SORE;TIGHTNESS

## 2022-04-27 ASSESSMENT — PAIN DESCRIPTION - ONSET
ONSET: ON-GOING
ONSET: ON-GOING

## 2022-04-27 ASSESSMENT — PAIN DESCRIPTION - PAIN TYPE
TYPE: ACUTE PAIN
TYPE: ACUTE PAIN

## 2022-04-27 NOTE — PROGRESS NOTES
Hematology/Oncology  Progress Note    HISTORY OF PRESENT ILLNESS:      Feng Almanza is a 39 y.o. male with significant past medical history of EtOH abuse who presents with above on 4/18/2022. He was jaundiced and found to have intramuscular hematoma of the right gluteus muscles. CT abdomen and pelvis 4/18/2022:   1. There is a hematoma measuring 11.1 x 4.2 x 16.7 cm between the right gluteus medius and naomi muscles.  There is diffuse enlargement of the adductor muscles of the right lower extremity, likely related to edema. 2. Severe diffuse fatty infiltration of the liver.  Gallbladder wall thickening is seen.  This could be related to chronic liver disease.  If there is concern for acute cholecystitis, a right upper quadrant ultrasound  can be obtained. CT right femur 4/18/2022:  Ill-defined hypodensity in the right gluteus medius muscle and ill-defined  hyperdensity in the right gluteus naomi muscle.  These may represent hematomas in different phases of evolution.  However simple and complex abscesses cannot be excluded.  Clinical correlation and further evaluation by MRI with and without IV contrast recommended. MRI abdomen 4/18/2022:  Markedly limited exam due to patient condition.   Mild gallbladder wall thickening, nonspecific, in the setting of underlying  liver disease.  No biliary duct dilation. Repeat CT abdomen 4/18/2022:  1. Severe diffuse fatty infiltration of the liver. 2. Hematoma involving the right gluteus medius and naomi musculature without significant change.  There is also a partially visualized hematoma surrounding the hamstring musculature on the right.  Continued follow-up is recommended until resolution. US abdomen 4/21/2022:  1. Limited exam.  2. Severe diffuse fatty infiltration of the liver.   3. Sludge is seen within the gallbladder lumen.  The gallbladder wall is  thickened at 6 mm however there is no evidence of a positive sonographic Reynoso sign.  Findings could be related to chronic liver disease. 4. No common bile duct dilatation. GI and surgeon were consulted. CBC on 4/5/2022 was grossly unremarkable with Hg 15.7 and .1. B-12 >2000. ALk phos 539, , , bilirubin 17.1. On admission 4/18/2022 WBC was 19.8, Hg 10.1, .1 and plt 391. ANC was 15.2. Ferritin ws 1266, TIBC <195, saturation <91. Iron 178. Hapto <10. TB was 18.8, direct 15.6 and indirect 3.2. LDH was 327. B 12 > 2000. SHAUN was negative. He received PRBC on 4/202/2022. Hg was stable at 8.5 on 4/26/2022. TB was 13.2, direct was 9.8. No signs of active bleeding. He is resting comfortably. No family is at bedside. No events overnight. Agreed with GI plan. He is on tapering oral steroid. No labs this morning. PHYSICAL EXAM:      Vitals:    BP (!) 168/92   Pulse 124   Temp 99.9 °F (37.7 °C) (Oral)   Resp 11   Ht 6' 3.98\" (1.93 m)   Wt (!) 312 lb 6.3 oz (141.7 kg)   SpO2 91%   BMI 38.04 kg/m²     CONSTITUTIONAL:  no distress  EYES:  pale, extra-ocular muscles intact and icteric sclera  NECK:  supple, symmetrical, trachea midline and no lymphadenopathy  LUNGS:  clear to auscultation, no crackles or wheezing  CARDIOVASCULAR:  normal S1 and S2 and no murmur noted  ABDOMEN: soft, non-distended, non-tender and  pierce cath noted  MUSCULOSKELETAL: Swelling to LE.  NEUROLOGIC: CN II-XII  grossly intact. confused  SKIN:  Hematoma stable.  No bleeding. + jaundice    DATA:    Labs:  General Labs:    CBC with Differential:    Lab Results   Component Value Date    WBC 10.3 04/26/2022    RBC 2.71 04/26/2022    HGB 8.5 04/26/2022    HCT 27.6 04/26/2022     04/26/2022    .8 04/26/2022    MCH 31.4 04/26/2022    MCHC 30.8 04/26/2022    RDW 26.6 04/26/2022    NRBC 3 04/23/2022    SEGSPCT 68.0 04/26/2022    BANDSPCT 10 04/26/2022    LYMPHOPCT 11.0 04/26/2022    MONOPCT 8.0 04/26/2022    MYELOPCT 1 04/26/2022    BASOPCT 0.5 04/18/2022    MONOSABS 0.8 04/26/2022 LYMPHSABS 1.1 04/26/2022    EOSABS 0.2 04/19/2022    BASOSABS 0.1 04/18/2022    DIFFTYPE MANUAL DIFFERENTIAL 04/26/2022     CMP:    Lab Results   Component Value Date     04/24/2022    K 4.5 04/24/2022     04/24/2022    CO2 24 04/24/2022    BUN 14 04/24/2022    CREATININE <0.5 04/24/2022    GFRAA NOT CALCULATED 04/24/2022    AGRATIO 1.9 04/05/2022    LABGLOM NOT CALCULATED 04/24/2022    GLUCOSE 138 04/24/2022    PROT 4.1 04/26/2022    LABALBU 2.5 04/26/2022    CALCIUM 7.4 04/24/2022    BILITOT 13.2 04/26/2022    ALKPHOS 255 04/26/2022     04/26/2022     04/26/2022     IMPRESSION/RECOMMENDATIONS:    1. Anemia is likely related to acute blood loss. He has intramuscular hematoma. Less likely hemolytic anemia. Hg was 8.4 on /24/2022 s/p transfusion. Hg was stable at 8.5 yesterday. 2. He has severe alcoholic hepatitis. GI changed to oral steroid. Bili continues to drop. To continue with supportive care. He may need PT/OT evaluation. Will follow up.  Thanks

## 2022-04-27 NOTE — PROGRESS NOTES
Occupational [address] [place] [address] ACUTE CARE OCCUPATIONAL THERAPY EVALUATION  Amarilys Pugh, 1980, 2107/2107-A, 4/27/2022    History  [place]:  The primary encounter diagnosis was Right leg pain. Diagnoses of Abnormal CT scan, Elevated bilirubin, and Elevated lactic acid level were also pertinent to this visit. Patient  has a past medical history of Acute hepatitis, Allergic rhinitis, Ankle pain, Anxiety, COVID-19, Epigastric abdominal pain, Essential hypertension, Family history of coronary artery disease, Herniated lumbar intervertebral disc, Insomnia, LBP (low back pain), Marfan's syndrome with ocular manifestation, and Oral herpes. Patient  has a past surgical history that includes Tonsillectomy; back surgery; and Umbilical hernia repair (N/A, 7/2/2020). Subjective:  Patient states:  \"I haven't gotten up since I've been here\".     Pain:  7/10 pain in RLE sharp, constant  Pain Intervention: Repositioned, RN notified  Communication with other providers:  Handoff to RN  Restrictions: General Precautions, Fall Risk    Home Setup/Prior level of function  Social/Functional History  Lives With: Spouse  Type of Home: House  Home Layout: Two level (Bathroom on 2nd floor)  Home Access: Stairs to enter without rails  Entrance Stairs - Number of Steps: 2  Bathroom Shower/Tub: Tub/Shower unit  Bathroom Toilet: Standard  Bathroom Accessibility: Accessible  Has the patient had two or more falls in the past year or any fall with injury in the past year?: Yes (2 falls due to lightheadedness and dizziness leading to current hospitalization)  ADL Assistance: Independent  Homemaking Assistance: Independent  Homemaking Responsibilities: Yes  Ambulation Assistance: Independent  Transfer Assistance: Independent  Active : Yes  Mode of Transportation: Car  Occupation: On disability    Examination of body systems (includes body structures/functions, activity/participation limitations):  · Observation: Supine in bed upon arrival, agreeable to therapy, RLE hematoma on RUE thigh, RLE minimal swelling  · Vision:  Glasses  · [facility]:  [facility]  · Cardiopulmonary:  No 02 needs. HR increased ~140 during STS, supine returned ~105, up in chair position ~120. BP initiation of session: 114/73 (82); in chair position: 196/109 (130). Nursing notified and pt returned to supine      Body Systems and functions:  · ROM R/L:  WFL. · Strength R/L:  4+/5,   · Sensation: Numbness in RLE toes due to swelling  · Tone: Normal  · Coordination: Decreased speed BUE  · Perception: WNL    Activities of Daily Living (ADLs):  · Feeding: Independent  · Grooming: CGA (washing hands/face w/ warm washcloth)  · UB bathing: CGA  · LB bathing: maxA  · UB dressing: CGA  · LB dressing: maxA (doffing/donning socks supine in bed)  · Toileting: maxA    Cognitive and Psychosocial Functioning:  · Overall cognitive status: WFL  · Affect: Normal        Mobility:  · Supine to sit:  maxA  · Transfers: modA STS from EOB up to RW. HR increased ~140 during STS, declined ~105 supine back in bed  · Sitting balance:  Vincenzo ~2 minutes. · Standing balance:  modA w/ RW ~30 seconds before fatiguing.  Shaking in stand  · Functional Mobility DNT  · Toilet/Shower Transfers: DNT  · Sit to supine: Vincenzo to advance RLE  · Scooting to HOB: maxA x 2               AM-PAC Daily Activity Inpatient   How much help for putting on and taking off regular lower body clothing?: Total  How much help for Bathing?: A Lot  How much help for Toileting?: Total  How much help for putting on and taking off regular upper body clothing?: A Little  How much help for taking care of personal grooming?: A Little  How much help for eating meals?: None  AM-PAC Inpatient Daily Activity Raw Score: 14  AM-PAC Inpatient ADL T-Scale Score : 33.39  ADL Inpatient CMS 0-100% Score: 59.67  ADL Inpatient CMS G-Code Modifier : CK    Treatment:  Self Care Training:   Cues were given for safety, sequence, UE/LE placement, visual cues, and balance. Activities performed today included grooming, LB dressing    Therapeutic Activity Training:   Therapeutic activity training was instructed today. Cues were given for safety, sequence, UE/LE placement, awareness, and balance. Activities performed today included bed mobility training, sup-sit, sit-stand, stand to sit, sit to supine, scooting to [facility]      Safety: patient left in bed with bed alarm, call light within reach, RN notified, gait belt used. Assessment:  Pt is a 40 yo male admitted from home for intramuscular hematoma. Pt at baseline is Independent for ADLs Independent for high level IADLs and Inependent for functional transfers/mobility w/ AD. Pt currently presents w/ deficits in ADL and high level IADL independence, functional activity tolerance, dynamic sitting and standing balance and tolerance and functional transfers, BUE strength and OOB activity Pt would benefit from continued acute care OT services w/ discharge to ARU  Complexity: Moderate  Prognosis: Good, no significant barriers to participation at this time.    Plan  Times per Week: 3x+  Times per Day: Daily  Current Treatment Recommendations: Strengthening,ROM,Balance training,Functional mobility training,Endurance training,Pain management,Safety education & training,Patient/Caregiver education & training,Equipment evaluation, education, & procurement,Self-Care / ADL,Home management training,Positioning     Equipment: defer    Goals:  Pt goal: go home  Time Frame for STGs: discharge  Goal 1: Pt will perform UE ADLs Independent  Goal 2: Pt will perform LE ADLs Jani w/ AD  Goal 3: Pt will perform toileting Jani  Goal 4: Pt will perform functional transfer w/ AD Jani  Goal 5: Pt will perform functional mobility w/ AD Jani  Goal 6: Pt will perform therex/theract in order to increase functional activity tolerance and dynamic standing balance    Treatment plan:  Pt will perform functional task in sit reaching in all 3 planes in order to increase dynamic sitting balance and functional activity tolerance    Recommendations for NURSING activity: Chair position vs Dangle     Time:   Time in: 1315  Time out: 1408  Timed treatment minutes: 38 minutes  Total time: 53 minutes    Electronically signed by:  Giovanni PRATHER/L [id number]  2:26 PM,4/27/2022

## 2022-04-27 NOTE — PROGRESS NOTES
Hospitalist Progress Note      Name:  Kirt Schilling /Age/Sex: 1980  (89 y.o. male)   MRN & CSN:  [id number] & [id number] Admission Date/Time: 2022 12:25 AM   Location:  -A PCP: Jennifer Orantes MD         Hospital Day: 10    Assessment and Plan:   Kirt Schilling is a 39 y.o.  male  who presents with Intramuscular hematoma    Intramuscular hematoma right gluteus medius  -CT noted; no surgical intervention  -Doppler neg for DVT  -Appreciate General Surgery recs; no surgical intervention needed  -Hgb stable and will trend  -PT/OT for right leg mobility    Etoh hepatitis  -HIDA with no cholecystitis  -CT abd diffuse fatty liver and MRCP with no bililary duct dilatation  -Rifaximin  -DILI? Cont PO Prednisolone 40 mg daily for 1 month ; appreciate GI assistance    HAG MA with lactic acidodiss  -Resolved with IVF    Etoh wtihdrawal  -Precedex and Thiamine    Leukocytosis  Possibly s/t steroid  -vanc   -bld cx with CONS  -stopped flagyl and cefepime    Anemia  -AOCD  -B12 wnl  -S/p PRBC earlier in course of hospitalization  -Daily CBC; monitor and transfuse as needed    Hypertension  -On Hydralazine  -Med rec completed this AM and is on Clonidine at home, resumed  -Adjust medication regimen as needed    Severe PCM  -Full liquids now  -Will advance as able and monitor    Scrotal bruising  -Extenstion gluteal hematoma  -Left epididymal cyst vs spermatocoele; monitor    Diet ADULT DIET;  Full Liquid  ADULT ORAL NUTRITION SUPPLEMENT; Breakfast, Lunch, Dinner; Standard High Calorie/High Protein Oral Supplement   DVT Prophylaxis [x] Lovenox, []  Heparin, [] SCDs, [] Ambulation   GI Prophylaxis [] PPI,  [] H2 Blocker,  [] Carafate,  [] Diet/Tube Feeds   Code Status Full Code   Disposition  tx to step down unit today   MDM [] Low, [x] Moderate,[]  High  Patient's risk as above due to      History of Present Illness:     Patient resting comfortably this AM off Precedex; states that overall he feels he is improving, tolerating diet and PO steroids now; denies any abdominal pain    Objective: Intake/Output Summary (Last 24 hours) at 4/27/2022 1123  Last data filed at 4/27/2022 0949  Gross per 24 hour   Intake 1024.37 ml   Output 5075 ml   Net -4050.63 ml      Vitals:   Vitals:    04/27/22 0900   BP: (!) 142/87   Pulse: 117   Resp: 16   Temp:    SpO2: 97%     Physical Exam:   GEN Awake male, sitting upright in bed in no apparent distress. Appears given age. EYES Pupils are equally round. No scleral erythema, discharge, or conjunctivitis. HENT Mucous membranes are moist. Oral pharynx without exudates, no evidence of thrush. NECK Supple, no apparent thyromegaly or masses. RESP Clear to auscultation, no wheezes, rales or rhonchi. Symmetric chest movement while on room air. CARDIO/VASC S1/S2 auscultated. Regular rate without appreciable murmurs, rubs, or gallops. No JVD or carotid bruits. Peripheral pulses equal bilaterally and palpable. pitting edema 2+ RLE  GI Abdomen is soft without significant tenderness, masses, or guarding  SKIN    Jaundiced  PSYCH Awake, alert, oriented x 4. Affect appropriate.     Medications:   Medications:    magnesium sulfate  1,000 mg IntraVENous Once    potassium chloride  20 mEq Oral Once    prednisoLONE  40 mg Oral Daily    rifAXIMin  550 mg Oral BID    thiamine  100 mg IntraVENous Daily    lidocaine PF  5 mL IntraDERmal Once    sodium chloride flush  5-40 mL IntraVENous 2 times per day    sodium chloride flush  5-40 mL IntraVENous 2 times per day      Infusions:    sodium chloride      sodium chloride      sodium chloride      dexmedetomidine HCl in NaCl Stopped (04/26/22 1827)     PRN Meds: trimethobenzamide, 200 mg, Q8H PRN  prochlorperazine, 10 mg, Q6H PRN  albuterol sulfate HFA, 2 puff, Q6H PRN  oxyCODONE, 5 mg, Q6H PRN  sodium chloride flush, 5-40 mL, PRN  sodium chloride, , PRN  polyethylene glycol, 17 g, Daily PRN  acetaminophen, 650 mg, Q6H PRN Or  acetaminophen, 650 mg, Q6H PRN  HYDROmorphone, 1 mg, Q3H PRN  naloxone, 0.4 mg, PRN  sodium chloride flush, 5-40 mL, PRN  sodium chloride, , PRN  sodium chloride flush, 5-40 mL, PRN  sodium chloride, , PRN  LORazepam, 1 mg, Q1H PRN   Or  LORazepam, 1 mg, Q1H PRN   Or  LORazepam, 2 mg, Q1H PRN   Or  LORazepam, 2 mg, Q1H PRN   Or  LORazepam, 3 mg, Q1H PRN   Or  LORazepam, 3 mg, Q1H PRN   Or  LORazepam, 4 mg, Q1H PRN   Or  LORazepam, 4 mg, Q1H PRN          Electronically signed by Dwayne Gabriel PA-C on 4/27/2022 at 11:23 AM

## 2022-04-27 NOTE — PROGRESS NOTES
Awake, oriented, alert  Says he is not eating much due to nausea  Abdomen soft, non-tender, non-distended    Yesterdays LFT's were improved- today's not back yet    Impression:               1) severe alcoholic hepatitis- perhaps with superimposed medication hepatotoxicity- improving                   Plan:              1) again stressed the importance of nutrition and suggested he take the supplements as much as possible   2) continue Prednisolone 40 mg/d x 1 month total then wean and D/C over 1 month              3) needs plan for alcohol treatment after discharge

## 2022-04-27 NOTE — PROGRESS NOTES
Patient arrived to unit @ 1800, oriented to room and unit. No complaints at this time. Belongings brought to unit with patient: cell phone, ear bud , cell phone , belongings bag with clothing. All other items sent with wife.

## 2022-04-27 NOTE — PROGRESS NOTES
Report given to [facility] RN on unit. Pt transferred to room 2029 from ICU. Pt wife, mother, and father at bedside and notified. Pt wife packed up all belongings and took them over with her.

## 2022-04-28 ENCOUNTER — APPOINTMENT (OUTPATIENT)
Dept: CT IMAGING | Age: 42
DRG: 871 | End: 2022-04-28
Payer: COMMERCIAL

## 2022-04-28 ENCOUNTER — APPOINTMENT (OUTPATIENT)
Dept: GENERAL RADIOLOGY | Age: 42
DRG: 871 | End: 2022-04-28
Payer: COMMERCIAL

## 2022-04-28 LAB
ALBUMIN SERPL-MCNC: 2.6 GM/DL (ref 3.4–5)
ALP BLD-CCNC: 205 IU/L (ref 40–129)
ALT SERPL-CCNC: 148 U/L (ref 10–40)
ANION GAP SERPL CALCULATED.3IONS-SCNC: 8 MMOL/L (ref 4–16)
AST SERPL-CCNC: 219 IU/L (ref 15–37)
BASOPHILS ABSOLUTE: 0 K/CU MM
BASOPHILS RELATIVE PERCENT: 0.2 % (ref 0–1)
BILIRUB SERPL-MCNC: 12.2 MG/DL (ref 0–1)
BILIRUBIN DIRECT: 8 MG/DL (ref 0–0.3)
BILIRUBIN, INDIRECT: 4.2 MG/DL (ref 0–0.7)
BUN BLDV-MCNC: 10 MG/DL (ref 6–23)
CALCIUM SERPL-MCNC: 8.1 MG/DL (ref 8.3–10.6)
CHLORIDE BLD-SCNC: 103 MMOL/L (ref 99–110)
CO2: 30 MMOL/L (ref 21–32)
CREAT SERPL-MCNC: 0.3 MG/DL (ref 0.9–1.3)
DIFFERENTIAL TYPE: ABNORMAL
EOSINOPHILS ABSOLUTE: 0.1 K/CU MM
EOSINOPHILS RELATIVE PERCENT: 0.5 % (ref 0–3)
GFR AFRICAN AMERICAN: >60 ML/MIN/1.73M2
GFR NON-AFRICAN AMERICAN: >60 ML/MIN/1.73M2
GLUCOSE BLD-MCNC: 93 MG/DL (ref 70–99)
HCT VFR BLD CALC: 26.6 % (ref 42–52)
HEMOGLOBIN: 8.4 GM/DL (ref 13.5–18)
IMMATURE NEUTROPHIL %: 1.3 % (ref 0–0.43)
LYMPHOCYTES ABSOLUTE: 1.4 K/CU MM
LYMPHOCYTES RELATIVE PERCENT: 10.6 % (ref 24–44)
MAGNESIUM: 1.8 MG/DL (ref 1.8–2.4)
MCH RBC QN AUTO: 32.3 PG (ref 27–31)
MCHC RBC AUTO-ENTMCNC: 31.6 % (ref 32–36)
MCV RBC AUTO: 102.3 FL (ref 78–100)
MONOCYTES ABSOLUTE: 0.9 K/CU MM
MONOCYTES RELATIVE PERCENT: 7.1 % (ref 0–4)
NUCLEATED RBC %: 0.3 %
PDW BLD-RTO: 25.4 % (ref 11.7–14.9)
PLATELET # BLD: 266 K/CU MM (ref 140–440)
PMV BLD AUTO: 10.9 FL (ref 7.5–11.1)
POTASSIUM SERPL-SCNC: 3.5 MMOL/L (ref 3.5–5.1)
RBC # BLD: 2.6 M/CU MM (ref 4.6–6.2)
SEGMENTED NEUTROPHILS ABSOLUTE COUNT: 10.3 K/CU MM
SEGMENTED NEUTROPHILS RELATIVE PERCENT: 80.3 % (ref 36–66)
SODIUM BLD-SCNC: 141 MMOL/L (ref 135–145)
TOTAL IMMATURE NEUTOROPHIL: 0.16 K/CU MM
TOTAL NUCLEATED RBC: 0 K/CU MM
TOTAL PROTEIN: 4.2 GM/DL (ref 6.4–8.2)
WBC # BLD: 12.8 K/CU MM (ref 4–10.5)

## 2022-04-28 PROCEDURE — 73600 X-RAY EXAM OF ANKLE: CPT

## 2022-04-28 PROCEDURE — 83735 ASSAY OF MAGNESIUM: CPT

## 2022-04-28 PROCEDURE — 6360000002 HC RX W HCPCS: Performed by: INTERNAL MEDICINE

## 2022-04-28 PROCEDURE — 70450 CT HEAD/BRAIN W/O DYE: CPT

## 2022-04-28 PROCEDURE — 85025 COMPLETE CBC W/AUTO DIFF WBC: CPT

## 2022-04-28 PROCEDURE — 2580000003 HC RX 258: Performed by: PHYSICIAN ASSISTANT

## 2022-04-28 PROCEDURE — 6370000000 HC RX 637 (ALT 250 FOR IP): Performed by: FAMILY MEDICINE

## 2022-04-28 PROCEDURE — 6370000000 HC RX 637 (ALT 250 FOR IP): Performed by: SPECIALIST

## 2022-04-28 PROCEDURE — 2580000003 HC RX 258: Performed by: FAMILY MEDICINE

## 2022-04-28 PROCEDURE — 82248 BILIRUBIN DIRECT: CPT

## 2022-04-28 PROCEDURE — 80053 COMPREHEN METABOLIC PANEL: CPT

## 2022-04-28 PROCEDURE — 94761 N-INVAS EAR/PLS OXIMETRY MLT: CPT

## 2022-04-28 PROCEDURE — 2060000000 HC ICU INTERMEDIATE R&B

## 2022-04-28 PROCEDURE — 73564 X-RAY EXAM KNEE 4 OR MORE: CPT

## 2022-04-28 PROCEDURE — 6370000000 HC RX 637 (ALT 250 FOR IP): Performed by: PHYSICIAN ASSISTANT

## 2022-04-28 PROCEDURE — 6370000000 HC RX 637 (ALT 250 FOR IP): Performed by: HOSPITALIST

## 2022-04-28 PROCEDURE — 99232 SBSQ HOSP IP/OBS MODERATE 35: CPT | Performed by: INTERNAL MEDICINE

## 2022-04-28 PROCEDURE — 6360000002 HC RX W HCPCS: Performed by: PHYSICIAN ASSISTANT

## 2022-04-28 RX ORDER — DIPHENHYDRAMINE HCL 25 MG
50 TABLET ORAL NIGHTLY PRN
Status: DISCONTINUED | OUTPATIENT
Start: 2022-04-28 | End: 2022-05-02 | Stop reason: HOSPADM

## 2022-04-28 RX ORDER — SODIUM CHLORIDE 9 MG/ML
INJECTION, SOLUTION INTRAVENOUS CONTINUOUS
Status: DISCONTINUED | OUTPATIENT
Start: 2022-04-28 | End: 2022-04-29

## 2022-04-28 RX ORDER — METOPROLOL TARTRATE 5 MG/5ML
5 INJECTION INTRAVENOUS EVERY 6 HOURS
Status: DISCONTINUED | OUTPATIENT
Start: 2022-04-28 | End: 2022-05-02 | Stop reason: HOSPADM

## 2022-04-28 RX ORDER — ATENOLOL 25 MG/1
50 TABLET ORAL DAILY
Status: DISCONTINUED | OUTPATIENT
Start: 2022-04-28 | End: 2022-05-02 | Stop reason: HOSPADM

## 2022-04-28 RX ADMIN — SODIUM CHLORIDE, PRESERVATIVE FREE 10 ML: 5 INJECTION INTRAVENOUS at 10:05

## 2022-04-28 RX ADMIN — RIFAXIMIN 550 MG: 550 TABLET ORAL at 09:43

## 2022-04-28 RX ADMIN — DIPHENHYDRAMINE HYDROCHLORIDE 50 MG: 25 TABLET ORAL at 20:40

## 2022-04-28 RX ADMIN — ATENOLOL 50 MG: 25 TABLET ORAL at 19:02

## 2022-04-28 RX ADMIN — THIAMINE HYDROCHLORIDE 100 MG: 100 INJECTION, SOLUTION INTRAMUSCULAR; INTRAVENOUS at 09:45

## 2022-04-28 RX ADMIN — RIFAXIMIN 550 MG: 550 TABLET ORAL at 20:37

## 2022-04-28 RX ADMIN — Medication 40 MG: at 10:05

## 2022-04-28 RX ADMIN — HYDROMORPHONE HYDROCHLORIDE 1 MG: 1 INJECTION, SOLUTION INTRAMUSCULAR; INTRAVENOUS; SUBCUTANEOUS at 20:40

## 2022-04-28 RX ADMIN — HYDROMORPHONE HYDROCHLORIDE 1 MG: 1 INJECTION, SOLUTION INTRAMUSCULAR; INTRAVENOUS; SUBCUTANEOUS at 12:16

## 2022-04-28 RX ADMIN — ENOXAPARIN SODIUM 30 MG: 100 INJECTION SUBCUTANEOUS at 20:37

## 2022-04-28 RX ADMIN — OXYCODONE HYDROCHLORIDE 5 MG: 5 TABLET ORAL at 17:39

## 2022-04-28 RX ADMIN — SODIUM CHLORIDE: 9 INJECTION, SOLUTION INTRAVENOUS at 19:11

## 2022-04-28 RX ADMIN — LORAZEPAM 1 MG: 1 TABLET ORAL at 09:44

## 2022-04-28 RX ADMIN — OXYCODONE HYDROCHLORIDE 5 MG: 5 TABLET ORAL at 09:44

## 2022-04-28 RX ADMIN — OXYCODONE HYDROCHLORIDE 5 MG: 5 TABLET ORAL at 03:34

## 2022-04-28 RX ADMIN — ENOXAPARIN SODIUM 30 MG: 100 INJECTION SUBCUTANEOUS at 09:43

## 2022-04-28 RX ADMIN — SODIUM CHLORIDE, PRESERVATIVE FREE 10 ML: 5 INJECTION INTRAVENOUS at 20:41

## 2022-04-28 ASSESSMENT — PAIN SCALES - GENERAL
PAINLEVEL_OUTOF10: 7
PAINLEVEL_OUTOF10: 8
PAINLEVEL_OUTOF10: 7

## 2022-04-28 ASSESSMENT — PAIN DESCRIPTION - ORIENTATION
ORIENTATION: RIGHT

## 2022-04-28 ASSESSMENT — PAIN DESCRIPTION - LOCATION
LOCATION: KNEE;ANKLE;LEG
LOCATION: LEG
LOCATION: LEG

## 2022-04-28 ASSESSMENT — PAIN DESCRIPTION - DESCRIPTORS
DESCRIPTORS: ACHING;SPASM
DESCRIPTORS: ACHING

## 2022-04-28 NOTE — PROGRESS NOTES
Physical Therapy  Attempt/ Hold Note    PT eval and tx attempted this AM. Was awaiting DC of precedex (April 27) and medical appropriateness; he suffered fall from EOB last night, landed on involved R hip/thigh/knee, grossly swollen and inhibited this session. He wishes for PT re-attempt on Mon 5/2, not agreeable for therapy participation today. Pt was reoriented to need for inc mobility and will re-attempt when more agreeable as co-tx to promote improved safety 2/2 recent R LE weakness/collapse.     Lilli Gottron PT, DPT

## 2022-04-28 NOTE — PROGRESS NOTES
Hematology/Oncology  Progress Note    HISTORY OF PRESENT ILLNESS:    Jacque Kendrick is a 39 y.o. male with significant past medical history of EtOH abuse who presents with above on 4/18/2022. He was jaundiced and found to have intramuscular hematoma of the right gluteus muscles. CT abdomen and pelvis 4/18/2022:   1. There is a hematoma measuring 11.1 x 4.2 x 16.7 cm between the right gluteus medius and naomi muscles.  There is diffuse enlargement of the adductor muscles of the right lower extremity, likely related to edema. 2. Severe diffuse fatty infiltration of the liver.  Gallbladder wall thickening is seen.  This could be related to chronic liver disease.  If there is concern for acute cholecystitis, a right upper quadrant ultrasound can be obtained. CT right femur 4/18/2022:  Ill-defined hypodensity in the right gluteus medius muscle and ill-defined  hyperdensity in the right gluteus naomi muscle.  These may represent hematomas in different phases of evolution.  However simple and complex abscesses cannot be excluded.  Clinical correlation and further evaluation by MRI with and without IV contrast recommended. MRI abdomen 4/18/2022:  Markedly limited exam due to patient condition. Mild gallbladder wall thickening, nonspecific, in the setting of underlying liver disease.  No biliary duct dilation. Repeat CT abdomen 4/18/2022:  1. Severe diffuse fatty infiltration of the liver. 2. Hematoma involving the right gluteus medius and naomi musculature without significant change.  There is also a partially visualized hematoma surrounding the hamstring musculature on the right.  Continued follow-up is recommended until resolution. US abdomen 4/21/2022:  1. Limited exam.  2. Severe diffuse fatty infiltration of the liver.   3. Sludge is seen within the gallbladder lumen.  The gallbladder wall is  thickened at 6 mm however there is no evidence of a positive sonographic Reynoso sign.  Findings could be related to chronic liver disease. 4. No common bile duct dilatation. GI and surgeon were consulted. CBC on 4/5/2022 was grossly unremarkable with Hg 15.7 and .1. B-12 >2000. ALk phos 539, , , bilirubin 17.1. On admission 4/18/2022 WBC was 19.8, Hg 10.1, .1 and plt 391. ANC was 15.2. Ferritin ws 1266, TIBC <195, saturation <91. Iron 178. Hapto <10. TB was 18.8, direct 15.6 and indirect 3.2. LDH was 327. B 12 > 2000. SHAUN was negative. He received PRBC on 4/202/2022. Hg was stable at 8.5 on 4/26/2022. TB was 13.2, direct was 9.8.    4/28/2022 he looks comfortable. Labs showed improvement of bili and hg. He is eating. I again discussed about EtOH cessation. No signs of active bleeding. No events overnight. PHYSICAL EXAM:      Vitals:    /72   Pulse 108   Temp 99.9 °F (37.7 °C) (Oral)   Resp 18   Ht 6' 3.98\" (1.93 m)   Wt (!) 312 lb 6.3 oz (141.7 kg)   SpO2 96%   BMI 38.04 kg/m²     CONSTITUTIONAL:  no distress. Awake and alert. EYES:  pale, extra-ocular muscles intact and icteric sclera  NECK:  supple, symmetrical, trachea midline and no lymphadenopathy  LUNGS:  clear to auscultation, no crackles or wheezing  CARDIOVASCULAR:  normal S1 and S2 and no murmur noted  ABDOMEN: soft, non-distended, non-tender and  pierce cath noted  MUSCULOSKELETAL: Swelling to LE.  NEUROLOGIC: CN II-XII  grossly intact. SKIN:  Hematoma stable.  No bleeding. + jaundice    DATA:    Labs:  General Labs:    CBC with Differential:    Lab Results   Component Value Date    WBC 12.8 04/28/2022    RBC 2.60 04/28/2022    HGB 8.4 04/28/2022    HCT 26.6 04/28/2022     04/28/2022    .3 04/28/2022    MCH 32.3 04/28/2022    MCHC 31.6 04/28/2022    RDW 25.4 04/28/2022    NRBC 3 04/23/2022    SEGSPCT 80.3 04/28/2022    BANDSPCT 10 04/26/2022    LYMPHOPCT 10.6 04/28/2022    MONOPCT 7.1 04/28/2022    MYELOPCT 1 04/26/2022    BASOPCT 0.2 04/28/2022    MONOSABS 0.9 04/28/2022 LYMPHSABS 1.4 04/28/2022    EOSABS 0.1 04/28/2022    BASOSABS 0.0 04/28/2022    DIFFTYPE AUTOMATED DIFFERENTIAL 04/28/2022     CMP:    Lab Results   Component Value Date     04/27/2022    K 3.5 04/27/2022     04/27/2022    CO2 27 04/27/2022    BUN 10 04/27/2022    CREATININE 0.4 04/27/2022    GFRAA >60 04/27/2022    AGRATIO 1.9 04/05/2022    LABGLOM >60 04/27/2022    GLUCOSE 87 04/27/2022    PROT 4.6 04/27/2022    LABALBU 2.7 04/27/2022    CALCIUM 8.3 04/27/2022    BILITOT 12.7 04/27/2022    ALKPHOS 252 04/27/2022     04/27/2022     04/27/2022     IMPRESSION/RECOMMENDATIONS:    1. Anemia is likely related to acute blood loss. He has intramuscular hematoma. Less likely hemolytic anemia. Hg was 8.4 on /24/2022 s/p transfusion. Recent labs were reviewed. 2. He has severe alcoholic hepatitis. GI changed to oral steroid. Bili continues to drop. To continue with supportive care. He may need PT/OT evaluation. Will follow up.  Thanks

## 2022-04-28 NOTE — PLAN OF CARE
Problem: Falls - Risk of:  Goal: Will remain free from falls  Description: Will remain free from falls  Outcome: Progressing     Problem: Pain:  Goal: Pain level will decrease  Description: Pain level will decrease  Outcome: Progressing     Problem: Skin Integrity:  Goal: Will show no infection signs and symptoms  Description: Will show no infection signs and symptoms  Outcome: Progressing  Goal: Absence of new skin breakdown  Description: Absence of new skin breakdown  Outcome: Progressing

## 2022-04-28 NOTE — PROGRESS NOTES
Pt fell at 01:05 this AM. Pt was trying sit on the side of the bed to use his urinal. He then slid to the ground and landed on his right knee and sat on his right ankle. Pt denies hitting head. Pt found with arms still on top of the bed. Pt assisted back to bed. C/o of right knee and right ankle pain. Charge, supervisor and hospitalist notified. No deformities noted. Pt taken to CT of head and xray of right knee and right ankle. Pt A/O x 4.

## 2022-04-28 NOTE — PROGRESS NOTES
Comprehensive Nutrition Assessment    Type and Reason for Visit:  Reassess    Nutrition Recommendations/Plan:   1. Please advance diet   2. Continue oral nutrition supplement TID   3. Please encourage and record intake TID      Malnutrition Assessment:  Malnutrition Status: At risk for malnutrition (Comment) (04/25/22 8377)    Context:  Acute Illness       Nutrition Assessment:    Pt continues on full liquid diet. Less than 25% intake of meals recorded. Per flowsheets pt A&O x4 at this time, please advance diet. Will continue to follow at high nutrition risk. Nutrition Related Findings:    Elevated LFTs, H/H 9.2/29.9 Wound Type: None       Current Nutrition Intake & Therapies:    Average Meal Intake: 1-25%  Average Supplements Intake: Unable to assess  ADULT DIET; Full Liquid  ADULT ORAL NUTRITION SUPPLEMENT; Breakfast, Lunch, Dinner; Standard High Calorie/High Protein Oral Supplement    Anthropometric Measures:  Height: 6' 3.98\" (193 cm)  Ideal Body Weight (IBW): 202 lbs (92 kg)    Admission Body Weight:  (n/a)  Current Body Weight: 312 lb 6.3 oz (141.7 kg), 154.6 % IBW.  Weight Source: Bed Scale  Current BMI (kg/m2): 38        Weight Adjustment For: No Adjustment                 BMI Categories: Obese Class 2 (BMI 35.0 -39.9)    Estimated Daily Nutrient Needs:  Energy Requirements Based On: Formula  Weight Used for Energy Requirements: Current  Energy (kcal/day): 6637-6596 (Donnice Ditto w/ stress factor 1.0-1.2)  Weight Used for Protein Requirements: Ideal  Protein (g/day):  (1.0-1.2 g/kg)  Method Used for Fluid Requirements: 1 ml/kcal  Fluid (ml/day):  1 mL/kcal     Nutrition Diagnosis:   · Inadequate oral intake related to acute injury/trauma as evidenced by NPO or clear liquid status due to medical condition      Nutrition Interventions:   Food and/or Nutrient Delivery: Modify Current Diet,Continue Oral Nutrition Supplement  Nutrition Education/Counseling: No recommendation at this time  Coordination of Nutrition Care: Continue to monitor while inpatient       Goals:  Previous Goal Met: No Progress toward Goal(s)  Goals: PO intake 50% or greater (continue to advance diet)       Nutrition Monitoring and Evaluation:   Behavioral-Environmental Outcomes: None Identified  Food/Nutrient Intake Outcomes: Diet Advancement/Tolerance,Supplement Intake,Food and Nutrient Intake  Physical Signs/Symptoms Outcomes: Biochemical Data,Weight,Skin,Fluid Status or Edema,GI Status    Discharge Planning:     Too soon to determine     Carmela Lozoya RD, LD  Contact: [phone]

## 2022-04-28 NOTE — PROGRESS NOTES
V2.0  [facility] Hospitalist Progress Note      Name:  Sara Finn /Age/Sex: 1980  (39 y.o. male)   MRN & CSN:  [id number] & [id number] Encounter Date/Time: 2022 4:28 PM EDT    Location:  -A PCP: Iris Brown MD       Hospital Day: 11    Assessment and Plan:   Sara Finn is a 39 y.o. male who presents with Intramuscular hematoma after an accidental fall backwards unto the stairs after having 1-2 shots of bourbon.     Intramuscular hematoma right gluteus medius  -CT noted; no surgical intervention  -Doppler neg for DVT  -Hgb stable and will trend  -PT/OT for right leg mobility     Etoh hepatitis  -HIDA with no cholecystitis  -CT abd diffuse fatty liver and MRCP with no bililary duct dilatation  -Rifaximin  -DILI? Per GI, Cont PO Prednisolone 40 mg/d x 1 month total then wean and D/C over 1 month   HAG MA with lactic acidodiss  -Resolved with IVF     Etoh wtihdrawal  -resolved     Leukocytosis  Possibly s/t steroid  -vanc   -bld cx with CONS  -stopped flagyl and cefepime     Anemia  -AOCD  -B12 wnl  -S/p PRBC earlier in course of hospitalization  -Daily CBC; monitor and transfuse as needed     Hypertension  -On Hydralazine  -Med rec completed this AM and is on Clonidine at home, resumed  -Adjust medication regimen as needed     Severe Protein calorie Malnutrition  -encourage alcohol cessation  - balanced diet with supplements.     Scrotal bruising  -Extenstion gluteal hematoma  -Left epididymal cyst vs spermatocoele; monitor    Subjective:     Patient is seen laying comfortably in bed, not in distress. Objective: Intake/Output Summary (Last 24 hours) at 2022 1628  Last data filed at 2022 1008  Gross per 24 hour   Intake --   Output 1630 ml   Net -1630 ml        Vitals:   Vitals:    22 1250   BP:    Pulse:    Resp: 12   Temp:    SpO2: 93%       Physical Exam:     General: NAD  Eyes: EOMI  ENT: neck supple  Cardiovascular: Regular rate.   Respiratory: Clear to auscultation  Gastrointestinal: Soft, non tender  Genitourinary: no suprapubic tenderness  Musculoskeletal: Diffuse edema in the right lower extremity extending from the feet to the thigh. Skin: warm, dry  Neuro: Alert. Psych: Mood appropriate.      Medications:   Medications:    enoxaparin  30 mg SubCUTAneous BID    prednisoLONE  40 mg Oral Daily    rifAXIMin  550 mg Oral BID    thiamine  100 mg IntraVENous Daily    lidocaine PF  5 mL IntraDERmal Once    sodium chloride flush  5-40 mL IntraVENous 2 times per day    sodium chloride flush  5-40 mL IntraVENous 2 times per day      Infusions:    sodium chloride      sodium chloride      sodium chloride      dexmedetomidine HCl in NaCl Stopped (04/26/22 0247)     PRN Meds: diphenhydrAMINE, 50 mg, Nightly PRN  trimethobenzamide, 200 mg, Q8H PRN  prochlorperazine, 10 mg, Q6H PRN  albuterol sulfate HFA, 2 puff, Q6H PRN  oxyCODONE, 5 mg, Q6H PRN  sodium chloride flush, 5-40 mL, PRN  sodium chloride, , PRN  polyethylene glycol, 17 g, Daily PRN  acetaminophen, 650 mg, Q6H PRN   Or  acetaminophen, 650 mg, Q6H PRN  HYDROmorphone, 1 mg, Q3H PRN  naloxone, 0.4 mg, PRN  sodium chloride flush, 5-40 mL, PRN  sodium chloride, , PRN  sodium chloride flush, 5-40 mL, PRN  sodium chloride, , PRN  LORazepam, 1 mg, Q1H PRN   Or  LORazepam, 1 mg, Q1H PRN   Or  LORazepam, 2 mg, Q1H PRN   Or  LORazepam, 2 mg, Q1H PRN   Or  LORazepam, 3 mg, Q1H PRN   Or  LORazepam, 3 mg, Q1H PRN   Or  LORazepam, 4 mg, Q1H PRN   Or  LORazepam, 4 mg, Q1H PRN        Labs      Recent Results (from the past 24 hour(s))   CBC with Auto Differential    Collection Time: 04/28/22  7:10 AM   Result Value Ref Range    WBC 12.8 (H) 4.0 - 10.5 K/CU MM    RBC 2.60 (L) 4.6 - 6.2 M/CU MM    Hemoglobin 8.4 (L) 13.5 - 18.0 GM/DL    Hematocrit 26.6 (L) 42 - 52 %    .3 (H) 78 - 100 FL    MCH 32.3 (H) 27 - 31 PG    MCHC 31.6 (L) 32.0 - 36.0 %    RDW 25.4 (H) 11.7 - 14.9 %    Platelets 656 749 - 057 K/CU MM    MPV 10.9 7.5 - 11.1 FL    Differential Type AUTOMATED DIFFERENTIAL     Segs Relative 80.3 (H) 36 - 66 %    Lymphocytes % 10.6 (L) 24 - 44 %    Monocytes % 7.1 (H) 0 - 4 %    Eosinophils % 0.5 0 - 3 %    Basophils % 0.2 0 - 1 %    Segs Absolute 10.3 K/CU MM    Lymphocytes Absolute 1.4 K/CU MM    Monocytes Absolute 0.9 K/CU MM    Eosinophils Absolute 0.1 K/CU MM    Basophils Absolute 0.0 K/CU MM    Nucleated RBC % 0.3 %    Total Nucleated RBC 0.0 K/CU MM    Total Immature Neutrophil 0.16 K/CU MM    Immature Neutrophil % 1.3 (H) 0 - 0.43 %   Hepatic Function Panel    Collection Time: 04/28/22  7:10 AM   Result Value Ref Range    Albumin 2.6 (L) 3.4 - 5.0 GM/DL    Total Bilirubin 12.2 (H) 0.0 - 1.0 MG/DL    Bilirubin, Direct 8.0 (H) 0.0 - 0.3 MG/DL    Bilirubin, Indirect 4.2 (H) 0 - 0.7 MG/DL    Alkaline Phosphatase 205 (H) 40 - 129 IU/L     (H) 15 - 37 IU/L     (H) 10 - 40 U/L    Total Protein 4.2 (L) 6.4 - 8.2 GM/DL   Basic Metabolic Panel w/ Reflex to MG    Collection Time: 04/28/22  7:10 AM   Result Value Ref Range    Sodium 141 135 - 145 MMOL/L    Potassium 3.5 3.5 - 5.1 MMOL/L    Chloride 103 99 - 110 mMol/L    CO2 30 21 - 32 MMOL/L    Anion Gap 8 4 - 16    BUN 10 6 - 23 MG/DL    CREATININE 0.3 (L) 0.9 - 1.3 MG/DL    Glucose 93 70 - 99 MG/DL    Calcium 8.1 (L) 8.3 - 10.6 MG/DL    GFR Non-African American >60 >60 mL/min/1.73m2    GFR African American >60 >60 mL/min/1.73m2   Magnesium    Collection Time: 04/28/22  7:10 AM   Result Value Ref Range    Magnesium 1.8 1.8 - 2.4 mg/dl        Imaging/Diagnostics Last 24 Hours   XR KNEE RIGHT (MIN 4 VIEWS)    Result Date: 4/28/2022  EXAMINATION: FOUR XRAY VIEWS OF THE RIGHT KNEE 4/28/2022 1:20 am COMPARISON: CT right femur 04/18/2022 HISTORY: ORDERING SYSTEM PROVIDED HISTORY: fall TECHNOLOGIST PROVIDED HISTORY: Reason for exam:->fall Reason for Exam: RIGHT LEG PAIN FALL Additional signs and symptoms: RIGHT LEG PAIN FALL FINDINGS: No acute fracture or dislocation at the right knee. The joint spaces are preserved. There is a trace amount of joint effusion at the suprapatellar region. Bridging ossification is noted at the fibular head neck to the adjacent tibia. This may be sequela of old trauma or congenital abnormality. Was also present on the previous CT. No acute fracture or dislocation at the right knee. XR ANKLE RIGHT (2 VIEWS)    Result Date: 4/28/2022  EXAMINATION: 2 XRAY VIEWS OF THE RIGHT ANKLE 4/28/2022 1:19 am COMPARISON: None. HISTORY: ORDERING SYSTEM PROVIDED HISTORY: fall TECHNOLOGIST PROVIDED HISTORY: Reason for exam:->fall Reason for Exam: RIGHT LEG PAIN FALL Additional signs and symptoms: RIGHT LEG PAIN FALL FINDINGS: Positioning is suboptimal but without evidence of acute fracture or dislocation at the right ankle. Extensive soft tissue swelling/diffuse edema throughout the included calf, ankle, and foot. No radiopaque foreign bodies or soft tissue emphysema. Suboptimal positioning without evidence of acute fracture or dislocation. Diffuse swelling/general edema at the calf, ankle, and foot. No radiopaque foreign bodies or soft tissue emphysema. CT HEAD WO CONTRAST    Result Date: 4/28/2022  EXAMINATION: CT OF THE HEAD WITHOUT CONTRAST  4/28/2022 2:00 am TECHNIQUE: CT of the head was performed without the administration of intravenous contrast. Dose modulation, iterative reconstruction, and/or weight based adjustment of the mA/kV was utilized to reduce the radiation dose to as low as reasonably achievable. COMPARISON: 02/23/2022 HISTORY: ORDERING SYSTEM PROVIDED HISTORY: fall TECHNOLOGIST PROVIDED HISTORY: Reason for exam:->fall Has a \"code stroke\" or \"stroke alert\" been called? ->No Reason for Exam: fall FINDINGS: BRAIN/VENTRICLES: There is no acute intracranial hemorrhage, mass effect or midline shift. No abnormal extra-axial fluid collection.   The gray-white differentiation is maintained without evidence of an acute infarct. There is no evidence of hydrocephalus. ORBITS: The visualized portion of the orbits demonstrate no acute abnormality. SINUSES: The visualized paranasal sinuses and mastoid air cells demonstrate no acute abnormality. SOFT TISSUES/SKULL:  No acute abnormality of the visualized skull or soft tissues. No acute intracranial abnormality.        Electronically signed by Levon Kim MD on 4/28/2022 at 4:28 PM

## 2022-04-29 LAB
ANION GAP SERPL CALCULATED.3IONS-SCNC: 9 MMOL/L (ref 4–16)
BASOPHILS ABSOLUTE: 0 K/CU MM
BASOPHILS RELATIVE PERCENT: 0.2 % (ref 0–1)
BUN BLDV-MCNC: 9 MG/DL (ref 6–23)
CALCIUM SERPL-MCNC: 7.9 MG/DL (ref 8.3–10.6)
CHLORIDE BLD-SCNC: 103 MMOL/L (ref 99–110)
CO2: 27 MMOL/L (ref 21–32)
CREAT SERPL-MCNC: 0.4 MG/DL (ref 0.9–1.3)
DIFFERENTIAL TYPE: ABNORMAL
EOSINOPHILS ABSOLUTE: 0.1 K/CU MM
EOSINOPHILS RELATIVE PERCENT: 0.8 % (ref 0–3)
GFR AFRICAN AMERICAN: >60 ML/MIN/1.73M2
GFR NON-AFRICAN AMERICAN: >60 ML/MIN/1.73M2
GLUCOSE BLD-MCNC: 94 MG/DL (ref 70–99)
HCT VFR BLD CALC: 27.9 % (ref 42–52)
HEMOGLOBIN: 8.6 GM/DL (ref 13.5–18)
IMMATURE NEUTROPHIL %: 1.1 % (ref 0–0.43)
LYMPHOCYTES ABSOLUTE: 1.4 K/CU MM
LYMPHOCYTES RELATIVE PERCENT: 11.2 % (ref 24–44)
MCH RBC QN AUTO: 31.7 PG (ref 27–31)
MCHC RBC AUTO-ENTMCNC: 30.8 % (ref 32–36)
MCV RBC AUTO: 103 FL (ref 78–100)
MONOCYTES ABSOLUTE: 0.9 K/CU MM
MONOCYTES RELATIVE PERCENT: 7.3 % (ref 0–4)
NUCLEATED RBC %: 0.2 %
PDW BLD-RTO: 24 % (ref 11.7–14.9)
PLATELET # BLD: 243 K/CU MM (ref 140–440)
PMV BLD AUTO: 10.5 FL (ref 7.5–11.1)
POTASSIUM SERPL-SCNC: 3.6 MMOL/L (ref 3.5–5.1)
RBC # BLD: 2.71 M/CU MM (ref 4.6–6.2)
SEGMENTED NEUTROPHILS ABSOLUTE COUNT: 9.5 K/CU MM
SEGMENTED NEUTROPHILS RELATIVE PERCENT: 79.4 % (ref 36–66)
SODIUM BLD-SCNC: 139 MMOL/L (ref 135–145)
TOTAL IMMATURE NEUTOROPHIL: 0.13 K/CU MM
TOTAL NUCLEATED RBC: 0 K/CU MM
WBC # BLD: 12 K/CU MM (ref 4–10.5)

## 2022-04-29 PROCEDURE — 97530 THERAPEUTIC ACTIVITIES: CPT

## 2022-04-29 PROCEDURE — 6360000002 HC RX W HCPCS: Performed by: INTERNAL MEDICINE

## 2022-04-29 PROCEDURE — 6370000000 HC RX 637 (ALT 250 FOR IP): Performed by: SPECIALIST

## 2022-04-29 PROCEDURE — 6360000002 HC RX W HCPCS: Performed by: PHYSICIAN ASSISTANT

## 2022-04-29 PROCEDURE — 6370000000 HC RX 637 (ALT 250 FOR IP): Performed by: INTERNAL MEDICINE

## 2022-04-29 PROCEDURE — 1200000000 HC SEMI PRIVATE

## 2022-04-29 PROCEDURE — 99232 SBSQ HOSP IP/OBS MODERATE 35: CPT | Performed by: INTERNAL MEDICINE

## 2022-04-29 PROCEDURE — 99231 SBSQ HOSP IP/OBS SF/LOW 25: CPT | Performed by: SPECIALIST

## 2022-04-29 PROCEDURE — 2580000003 HC RX 258: Performed by: PHYSICIAN ASSISTANT

## 2022-04-29 PROCEDURE — 97162 PT EVAL MOD COMPLEX 30 MIN: CPT

## 2022-04-29 PROCEDURE — 2500000003 HC RX 250 WO HCPCS: Performed by: FAMILY MEDICINE

## 2022-04-29 PROCEDURE — 2060000000 HC ICU INTERMEDIATE R&B

## 2022-04-29 PROCEDURE — 6370000000 HC RX 637 (ALT 250 FOR IP): Performed by: FAMILY MEDICINE

## 2022-04-29 PROCEDURE — 6370000000 HC RX 637 (ALT 250 FOR IP): Performed by: HOSPITALIST

## 2022-04-29 PROCEDURE — 94761 N-INVAS EAR/PLS OXIMETRY MLT: CPT

## 2022-04-29 PROCEDURE — 85025 COMPLETE CBC W/AUTO DIFF WBC: CPT

## 2022-04-29 PROCEDURE — 82550 ASSAY OF CK (CPK): CPT

## 2022-04-29 PROCEDURE — 80048 BASIC METABOLIC PNL TOTAL CA: CPT

## 2022-04-29 RX ORDER — AMLODIPINE BESYLATE 10 MG/1
10 TABLET ORAL DAILY
Status: DISCONTINUED | OUTPATIENT
Start: 2022-04-29 | End: 2022-05-02 | Stop reason: HOSPADM

## 2022-04-29 RX ORDER — METHOCARBAMOL 500 MG/1
750 TABLET, FILM COATED ORAL 4 TIMES DAILY
Status: DISCONTINUED | OUTPATIENT
Start: 2022-04-29 | End: 2022-05-02 | Stop reason: HOSPADM

## 2022-04-29 RX ADMIN — RIFAXIMIN 550 MG: 550 TABLET ORAL at 20:44

## 2022-04-29 RX ADMIN — Medication 40 MG: at 08:03

## 2022-04-29 RX ADMIN — METOPROLOL TARTRATE 5 MG: 5 INJECTION INTRAVENOUS at 13:44

## 2022-04-29 RX ADMIN — OXYCODONE HYDROCHLORIDE 5 MG: 5 TABLET ORAL at 20:43

## 2022-04-29 RX ADMIN — ENOXAPARIN SODIUM 30 MG: 100 INJECTION SUBCUTANEOUS at 08:01

## 2022-04-29 RX ADMIN — AMLODIPINE BESYLATE 10 MG: 10 TABLET ORAL at 13:44

## 2022-04-29 RX ADMIN — SODIUM CHLORIDE, PRESERVATIVE FREE 10 ML: 5 INJECTION INTRAVENOUS at 08:07

## 2022-04-29 RX ADMIN — METHOCARBAMOL TABLETS 750 MG: 500 TABLET, COATED ORAL at 14:22

## 2022-04-29 RX ADMIN — SODIUM CHLORIDE, PRESERVATIVE FREE 10 ML: 5 INJECTION INTRAVENOUS at 20:45

## 2022-04-29 RX ADMIN — THIAMINE HYDROCHLORIDE 100 MG: 100 INJECTION, SOLUTION INTRAMUSCULAR; INTRAVENOUS at 08:01

## 2022-04-29 RX ADMIN — SODIUM CHLORIDE, PRESERVATIVE FREE 10 ML: 5 INJECTION INTRAVENOUS at 20:44

## 2022-04-29 RX ADMIN — OXYCODONE HYDROCHLORIDE 5 MG: 5 TABLET ORAL at 14:22

## 2022-04-29 RX ADMIN — TRIMETHOBENZAMIDE HYDROCHLORIDE 200 MG: 100 INJECTION INTRAMUSCULAR at 13:44

## 2022-04-29 RX ADMIN — RIFAXIMIN 550 MG: 550 TABLET ORAL at 08:02

## 2022-04-29 RX ADMIN — METOPROLOL TARTRATE 5 MG: 5 INJECTION INTRAVENOUS at 08:02

## 2022-04-29 RX ADMIN — ENOXAPARIN SODIUM 30 MG: 100 INJECTION SUBCUTANEOUS at 20:42

## 2022-04-29 RX ADMIN — OXYCODONE HYDROCHLORIDE 5 MG: 5 TABLET ORAL at 01:01

## 2022-04-29 RX ADMIN — HYDROMORPHONE HYDROCHLORIDE 1 MG: 1 INJECTION, SOLUTION INTRAMUSCULAR; INTRAVENOUS; SUBCUTANEOUS at 05:25

## 2022-04-29 RX ADMIN — OXYCODONE HYDROCHLORIDE 5 MG: 5 TABLET ORAL at 08:13

## 2022-04-29 RX ADMIN — ATENOLOL 50 MG: 25 TABLET ORAL at 08:02

## 2022-04-29 RX ADMIN — METHOCARBAMOL TABLETS 750 MG: 500 TABLET, COATED ORAL at 20:44

## 2022-04-29 RX ADMIN — METHOCARBAMOL TABLETS 750 MG: 500 TABLET, COATED ORAL at 18:10

## 2022-04-29 ASSESSMENT — PAIN SCALES - WONG BAKER
WONGBAKER_NUMERICALRESPONSE: 4

## 2022-04-29 ASSESSMENT — PAIN DESCRIPTION - LOCATION
LOCATION: LEG

## 2022-04-29 ASSESSMENT — PAIN SCALES - GENERAL
PAINLEVEL_OUTOF10: 5
PAINLEVEL_OUTOF10: 7
PAINLEVEL_OUTOF10: 6
PAINLEVEL_OUTOF10: 8
PAINLEVEL_OUTOF10: 0
PAINLEVEL_OUTOF10: 7

## 2022-04-29 ASSESSMENT — PAIN DESCRIPTION - ORIENTATION
ORIENTATION: RIGHT

## 2022-04-29 ASSESSMENT — PAIN DESCRIPTION - DESCRIPTORS
DESCRIPTORS: ACHING;SPASM
DESCRIPTORS: SHOOTING
DESCRIPTORS: ACHING;SPASM

## 2022-04-29 ASSESSMENT — PAIN - FUNCTIONAL ASSESSMENT
PAIN_FUNCTIONAL_ASSESSMENT: PREVENTS OR INTERFERES SOME ACTIVE ACTIVITIES AND ADLS
PAIN_FUNCTIONAL_ASSESSMENT: ACTIVITIES ARE NOT PREVENTED

## 2022-04-29 ASSESSMENT — PAIN DESCRIPTION - PAIN TYPE: TYPE: ACUTE PAIN

## 2022-04-29 ASSESSMENT — PAIN DESCRIPTION - FREQUENCY: FREQUENCY: CONTINUOUS

## 2022-04-29 NOTE — PROGRESS NOTES
Physical Therapy  Facility/Department: [facility] ICU STEPDOWN  Physical Therapy Initial Assessment    Name: Valery Parker  : 1980  MRN: [id number]  Date of Service: 2022    Discharge Recommendations:   (ARU)   PT Equipment Recommendations  Equipment Needed:  (pend to next LOC)      Patient Diagnosis(es): The primary encounter diagnosis was Right leg pain. Diagnoses of Abnormal CT scan, Elevated bilirubin, and Elevated lactic acid level were also pertinent to this visit. Past Medical History:  has a past medical history of Acute hepatitis, Allergic rhinitis, Ankle pain, Anxiety, COVID-19, Epigastric abdominal pain, Essential hypertension, Family history of coronary artery disease, Herniated lumbar intervertebral disc, Insomnia, LBP (low back pain), Marfan's syndrome with ocular manifestation, and Oral herpes. Past Surgical History:  has a past surgical history that includes Tonsillectomy; back surgery; and Umbilical hernia repair (N/A, 2020). Assessment   Body Structures, Functions, Activity Limitations Requiring Skilled Therapeutic Intervention: Decreased functional mobility ; Decreased strength;Decreased ADL status; Decreased endurance;Decreased sensation;Decreased balance; Increased pain  Assessment: Pt presents 11 days s/p admission after fall on stairs at home; had been having inc medical issues recently w/ noted inc weakness, impaired balance, impaired functional mobility, falls 2/2 weakness. Pt presents from home, lives w/ spouse and kids in 2 story home, 2 [place]ANT[place], ind PLOF w/o need for AD. Medical hx includes anxiety, HTN, LBP. Pt presents w/ the above listed deficits, however, he is primarily limited by weakness. Impaired functional mobility, R LE weakness w/ knee buckling and collapse if not blocked, extensive sensation deficits w/ inability to locate or accurately indicate the application of tactile stimuli, impaired ROM 2/2 weakness, dec endurance w/ noted weakness when WB through OOB. Recommending ARU w/ extensive deficits 2/2 nerve compression related to swelling. Therapy Prognosis: Good  Decision Making: Medium Complexity  Requires PT Follow-Up: Yes  Activity Tolerance  Activity Tolerance: Patient limited by endurance; Patient limited by pain     Treatment:  Therapeutic Activity Training:   Therapeutic activity training was instructed today. Cues were given for safety, sequence, UE/LE placement, awareness, and balance. Activities performed today included bed mobility training, sup-sit, sit-stand, SPT. Functional mobility, DC planning, OOB activity initiated this session      Plan   Plan  Plan:  (4+)  Plan weeks: 1  Current Treatment Recommendations: Strengthening,ROM,Balance training,Functional mobility training,Transfer training,Endurance training,ADL/Self-care training,Gait training,Pain management,Cognitive reorientation,Safety education & training,Patient/Caregiver education & training,Equipment evaluation, education, & procurement,Therapeutic activities,Positioning,Modalities  Safety Devices  Type of Devices: Call light within reach,Gait belt,Bed alarm in place,Patient at risk for falls,Left in bed  Restraints  Restraints Initially in Place: No     Restrictions  Restrictions/Precautions  Restrictions/Precautions: Fall Risk,General Precautions     Subjective   General  Chart Reviewed: Yes  Patient assessed for rehabilitation services?: Yes  Family / Caregiver Present: Yes (wife)  Follows Commands: Within Functional Limits  Subjective  Subjective: The swelling is definitely getting better.          Social/Functional History  Social/Functional History  Lives With: Spouse  Type of Home: House  Home Layout: Two level (Bathroom on 2nd floor)  Home Access: Stairs to enter without rails  Entrance Stairs - Number of Steps: 2  Bathroom Shower/Tub: Tub/Shower unit  Bathroom Toilet: Standard  Bathroom Accessibility: Accessible  Has the patient had two or more falls in the past year or any fall with injury in the past year?: Yes (2 falls due to lightheadedness and dizziness leading to current hospitalization)  ADL Assistance: [address]: Independent  Homemaking Responsibilities: Yes  Ambulation Assistance: Independent  Transfer Assistance: Independent  Active : Yes  Mode of Transportation: Car  Occupation: On disability  Vision/Hearing  Vision Exceptions: Wears glasses at all times  Hearing: Within functional limits    Cognition   Orientation  Overall Orientation Status: Within Functional Limits  Cognition  Overall Cognitive Status: WFL     Objective   Pulse: 74  Heart Rate Source: Monitor  BP: 132/88  BP Location: Right upper arm  BP Method: Automatic  Patient Position: Semi fowlers  MAP (Calculated): 102.67  Resp: 11  SpO2: 96 %  O2 Device: None (Room air)     Observation/Palpation  Posture: Good  Observation: Supine, awake, alert, agreeable. He is intermittently tearful about his current level of function and overall health. Tele, BP cuff, bed alarm in place.         PROM RLE (degrees)  RLE PROM: WFL  AROM RLE (degrees)  RLE General AROM: hip and knee ROM WFL; near hypotonia in ankle, only trace contractions detected; knee extension ~5* 2/2 swelling  AROM LLE (degrees)  LLE AROM : WNL  Strength RLE  Comment: knee flexion/ext 3 to 3-/5, hip flexion 3+/5, hip abd 3 to 3-/5, ankle DF/PF 1/5  Strength LLE  Strength LLE: WFL  Comment: grossly 4+/5           Bed mobility  Supine to Sit: Maximum assistance;2 Person assistance  Sit to Supine: Maximum assistance;2 Person assistance  Scooting: Maximal assistance;2 Person assistance  Transfers  Sit to Stand: Maximum Assistance;2 Person Assistance (REQUIRES L KNEE BLOCKING TO OBTAIN FUNCTIONAL POSITIONING)  Stand to sit: Moderate Assistance  Ambulation  Comments: NT 2/2 safety concerns L knee buckling requiring blocking ; 4 lateral steps to reposition closer to [facility] at mod A x 2     Balance  Sitting - Static: Fair;+  Sitting - Dynamic: Fair  Standing - Static: Fair;- (L knee blocking and steady)    AM-PAC Score  AM-PAC Inpatient Mobility Raw Score : 10 (04/29/22 1239)  AM-PAC Inpatient T-Scale Score : 32.29 (04/29/22 1239)  Mobility Inpatient CMS 0-100% Score: 76.75 (04/29/22 1239)  Mobility Inpatient CMS G-Code Modifier : CL (04/29/22 1239)          Goals  Long Term Goals  Time Frame for Long term goals : 1 week  Long term goal 1: pt will complete bed mobility at min A  Long term goal 2: pt will complete transfers at min A x 2  Long term goal 3: pt will ambulate 15 ft using FWW at min A x 2 w/ L knee blocking  Patient Goals   Patient goals : return to ind PLOF       Therapy Time   Individual Concurrent Group Co-treatment   Time In 0907         Time Out 0953         Minutes 46         Timed Code Treatment Minutes: 36 Minutes (TA x 2)       Rayo Parrish, PT, DPT

## 2022-04-29 NOTE — PROGRESS NOTES
Pt alert, oriented, cooperative with medication. Pt eats breakfast by wife. Pt has no complaints, medicated for pain, and nausea, see mar. Wife at bedside agreeable to plan of care. Pt voids in urinal. Pt denies complaints, all needs met,call light in reach, alarm on for safety.

## 2022-04-29 NOTE — PROGRESS NOTES
Occupational Therapy  . Occupational Therapy Treatment Note  Name: Sundeep Contreras MRN: [id number] :   1980   Date:  2022   Admission Date: 2022 Room:  -A     Discharge rec-  Pt would benefit from continued acute care OT services w/ discharge to ARU    Primary Problem:      Restrictions/Precautions:  Restrictions/Precautions  Restrictions/Precautions: Fall Risk,General Precautions           cotx for safety purposes     Communication with other providers:  cotx with PT Roel for safety, assist and endurance. Subjective:  Patient states: Im a little wobbly  Pain: no numerical rating but some pain in LUE.   (location, type, intensity)    Objective:    Observation:  patient supine. Family present. wife very active with patients recover. initially patient somewhat resistive to therapy but very grateful after session over. Objective Measures:  Tele, vitals stable throughout. Treatment, including education:    Therapeutic activity training was instructed today. Cues were given for safety, sequence, UE/LE placement, awareness, and balance. Activities performed today included bed mobility training, sup-sit, sit-stand, SPT. Supine to EOB- Max x2  Sitting balance- initially Max progressed to SBA. Tolerated x25 minutes. Stand to [address]- 1st trial- Max x2 plus L knee block. Patient c/o feeling wobbly/unsteady. tolerated x3 min  2nd stand- still Max x2 . Cues for both trials to push from bed. Patient able to take x5-6 side steps to [facility] with Max x2 for L knee block, safety and advancing walker. Return supine- Max x2  Repositioned for comfort and swelling. Patient educated on role of OT , benefits of OT and rationale for therapeutic intervention. Safety, lympathic drainage for swelling.      All therapeutic intervention performed c emphasis on dynamic balance / standing tolerance to increase strength, endurance and activity tolerance for increased [place] c ADL tasks and func transfers / mobility. Patient left safely in bed at end of session, with call light in reach, alarm on and nursing aware. Gait belt was used for func transfers / mobility.     Assessment / Impression:      Patient's tolerance of treatment: well  Adverse Reaction: none  Significant change in status and impact:  none  Barriers to improvement: weakness,     Plan for Next Session:    Continue with OT POC    Time in:  906  Time out:  953  Timed treatment minutes:  47  Total treatment time:  52    Previously filed items:  Social/Functional History  Lives With: Spouse  Type of Home: House  Home Layout: Two level (Bathroom on 2nd floor)  Home Access: Stairs to enter without rails  Entrance Stairs - Number of Steps: 2  Bathroom Shower/Tub: Tub/Shower unit  Bathroom Toilet: Standard  Bathroom Accessibility: Accessible  Has the patient had two or more falls in the past year or any fall with injury in the past year?: Yes (2 falls due to lightheadedness and dizziness leading to current hospitalization)  ADL Assistance: [address] [place] [address] Avenue: Independent  Homemaking Responsibilities: Yes  Ambulation Assistance: Independent  Transfer Assistance: Independent  Active : Yes  Mode of Transportation: Car  Occupation: On disability             Electronically signed by:    LINO Rodriguez [address]    4/29/2022, 10:47 AM

## 2022-04-29 NOTE — PROGRESS NOTES
Comprehensive Nutrition Assessment    Type and Reason for Visit:  Reassess    Nutrition Recommendations/Plan:   1. Continue current diet and supplement regimen as ordered  2. Please document all po intake  3. Will closely monitor po intake, weight trends, poc     Malnutrition Assessment:  Malnutrition Status: At risk for malnutrition (Comment) (04/25/22 7377)  Context:  Acute Illness     Nutrition Assessment:    noted diet got advanced to regular diet with standard oral nutrition supplements, pt consumed 1-25% x 4 meals and % x 2 meals in the past 72 hr per chart review, noted pt working with therapy, will continue to follow at high nutrition risk    Nutrition Related Findings:    H/H: 8.6/27.9 Wound Type: None       Current Nutrition Intake & Therapies:    Average Meal Intake: 1-25%,%  Average Supplements Intake: Unable to assess  ADULT ORAL NUTRITION SUPPLEMENT; Breakfast, Lunch, Dinner; Standard High Calorie/High Protein Oral Supplement  ADULT DIET; Regular    Anthropometric Measures:  Height: 6' 3.98\" (193 cm)  Ideal Body Weight (IBW): 202 lbs (92 kg)    Admission Body Weight:  (n/a)  Current Body Weight: 312 lb 6.3 oz (141.7 kg), 154.6 % IBW.  Weight Source: Bed Scale  Current BMI (kg/m2): 38  Weight Adjustment For: No Adjustment  BMI Categories: Obese Class 2 (BMI 35.0 -39.9)    Estimated Daily Nutrient Needs:  Energy Requirements Based On: Formula  Weight Used for Energy Requirements: Current  Energy (kcal/day): 4011-7651 ([address] w/ stress factor 1.0-1.2)  Weight Used for Protein Requirements: Ideal  Protein (g/day):  (1.0-1.2 g/kg)  Method Used for Fluid Requirements: 1 ml/kcal    Nutrition Diagnosis:   · Inadequate oral intake related to acute injury/trauma as evidenced by intake 26-50%    Nutrition Interventions:   Food and/or Nutrient Delivery: Continue Current Diet,Continue Oral Nutrition Supplement  Nutrition Education/Counseling: No recommendation at this time  Coordination of Nutrition Care: Continue to monitor while inpatient     Goals:  Previous Goal Met: Progressing toward Goal(s)  Goals: Meet at least 75% of estimated needs,within 2 days    Nutrition Monitoring and Evaluation:   Behavioral-Environmental Outcomes: None Identified  Food/Nutrient Intake Outcomes: Food and Nutrient Intake,Supplement Intake  Physical Signs/Symptoms Outcomes: Biochemical Data,GI Status,Hemodynamic Status,Fluid Status or Edema,Weight,Skin    Discharge Planning:     Too soon to determine     Sea [address]ello [address], [address] [place] [address],   Contact: [phone]

## 2022-04-29 NOTE — PROGRESS NOTES
Improving  Feels he is gaining strength  Wants to try regular diet  Abdomen soft, non-tender, non-distended  Impression:               8) severe alcoholic hepatitis- perhaps with superimposed medication hepatotoxicity- improving                   Plan:              9) regular diet with supplements              2) continue Prednisolone 40 mg/d x 1 month total then wean and D/C over 1 month              3) needs plan for alcohol treatment after discharge- I strongly encouraged him to seek a formal treatment program   4) suggested follow up visit in my office in 1-2 months

## 2022-04-29 NOTE — PROGRESS NOTES
Hematology/Oncology  Progress Note    HISTORY OF PRESENT ILLNESS:    Massiel Ramirez is a 39 y.o. male with significant past medical history of EtOH abuse who presents with above on 4/18/2022. He was jaundiced and found to have intramuscular hematoma of the right gluteus muscles. CT abdomen and pelvis 4/18/2022:   1. There is a hematoma measuring 11.1 x 4.2 x 16.7 cm between the right gluteus medius and naomi muscles.  There is diffuse enlargement of the adductor muscles of the right lower extremity, likely related to edema. 2. Severe diffuse fatty infiltration of the liver.  Gallbladder wall thickening is seen.  This could be related to chronic liver disease.  If there is concern for acute cholecystitis, a right upper quadrant ultrasound can be obtained. CT right femur 4/18/2022:  Ill-defined hypodensity in the right gluteus medius muscle and ill-defined  hyperdensity in the right gluteus naomi muscle.  These may represent hematomas in different phases of evolution.  However simple and complex abscesses cannot be excluded.  Clinical correlation and further evaluation by MRI with and without IV contrast recommended. MRI abdomen 4/18/2022:  Markedly limited exam due to patient condition. Mild gallbladder wall thickening, nonspecific, in the setting of underlying liver disease.  No biliary duct dilation. Repeat CT abdomen 4/18/2022:  1. Severe diffuse fatty infiltration of the liver. 2. Hematoma involving the right gluteus medius and naomi musculature without significant change.  There is also a partially visualized hematoma surrounding the hamstring musculature on the right.  Continued follow-up is recommended until resolution. US abdomen 4/21/2022:  1. Limited exam.  2. Severe diffuse fatty infiltration of the liver.   3. Sludge is seen within the gallbladder lumen.  The gallbladder wall is  thickened at 6 mm however there is no evidence of a positive sonographic Reynoso sign.  Findings could be related to chronic liver disease. 4. No common bile duct dilatation. GI and surgeon were consulted. CBC on 4/5/2022 was grossly unremarkable with Hg 15.7 and .1. B-12 >2000. ALk phos 539, , , bilirubin 17.1. On admission 4/18/2022 WBC was 19.8, Hg 10.1, .1 and plt 391. ANC was 15.2. Ferritin ws 1266, TIBC <195, saturation <91. Iron 178. Hapto <10. TB was 18.8, direct 15.6 and indirect 3.2. LDH was 327. B 12 > 2000. SHAUN was negative. He received PRBC on 4/202/2022. Hg was stable at 8.5 on 4/26/2022. TB was 13.2, direct was 9.8.    4/29/2022 he is resting comfortably. Spouse is at bedside. Labs showed improvement of bili and Hg. Hg was 8.6, WBC 12 and plt 243. No signs of active bleeding. No events overnight. Agreed with Dr Guillermo Elliott to continue with steroid, prednisolone 40 mg/d for one month then taper to d/c over one month. PHYSICAL EXAM:      Vitals:    BP (!) 153/96   Pulse 66   Temp 100.2 °F (37.9 °C) (Oral)   Resp 11   Ht 6' 3.98\" (1.93 m)   Wt (!) 312 lb 6.3 oz (141.7 kg)   SpO2 96%   BMI 38.04 kg/m²     CONSTITUTIONAL:  no distress. Awake and alert. EYES:  pale, extra-ocular muscles intact and icteric sclera  NECK:  supple, symmetrical, trachea midline and no lymphadenopathy  LUNGS:  clear to auscultation, no crackles or wheezing  CARDIOVASCULAR:  normal S1 and S2 and no murmur noted  ABDOMEN: soft, non-distended, non-tender  MUSCULOSKELETAL: Swelling to LE.  NEUROLOGIC: CN II-XII  grossly intact. SKIN:  Hematoma stable.  No bleeding. + jaundice    DATA:    Labs:  General Labs:    CBC with Differential:    Lab Results   Component Value Date    WBC 12.0 04/29/2022    RBC 2.71 04/29/2022    HGB 8.6 04/29/2022    HCT 27.9 04/29/2022     04/29/2022    .0 04/29/2022    MCH 31.7 04/29/2022    MCHC 30.8 04/29/2022    RDW 24.0 04/29/2022    NRBC 3 04/23/2022    SEGSPCT 79.4 04/29/2022    BANDSPCT 10 04/26/2022    LYMPHOPCT 11.2 04/29/2022 MONOPCT 7.3 04/29/2022    MYELOPCT 1 04/26/2022    BASOPCT 0.2 04/29/2022    MONOSABS 0.9 04/29/2022    LYMPHSABS 1.4 04/29/2022    EOSABS 0.1 04/29/2022    BASOSABS 0.0 04/29/2022    DIFFTYPE AUTOMATED DIFFERENTIAL 04/29/2022     CMP:    Lab Results   Component Value Date     04/28/2022    K 3.5 04/28/2022     04/28/2022    CO2 30 04/28/2022    BUN 10 04/28/2022    CREATININE 0.3 04/28/2022    GFRAA >60 04/28/2022    AGRATIO 1.9 04/05/2022    LABGLOM >60 04/28/2022    GLUCOSE 93 04/28/2022    PROT 4.2 04/28/2022    LABALBU 2.6 04/28/2022    CALCIUM 8.1 04/28/2022    BILITOT 12.2 04/28/2022    ALKPHOS 205 04/28/2022     04/28/2022     04/28/2022     IMPRESSION/RECOMMENDATIONS:    1. Anemia is likely related to acute blood loss. He has intramuscular hematoma. Less likely hemolytic anemia. Hg was 8.4 on /24/2022 s/p transfusion. Hg on 4/29/2022 was 8.6. 2. He has severe alcoholic hepatitis. GI changed to oral steroid. Bili continues to drop. To continue with supportive care and PT/OT. If he is discharged, please follow up with me as outpatient.   Thanks

## 2022-04-29 NOTE — CARE COORDINATION
Spoke with Nasreen from [place] [id number] regarding potential referral. Magali Moe with patient and his wife at bedside regarding PT/OT evals recommending ARU and they are both agreeable to ARU .

## 2022-04-29 NOTE — PROGRESS NOTES
Hospitalist Progress Note      Name:  Stalin Jordan /Age/Sex: 1980  (63 y.o. male)   MRN & CSN:  [id number] & [id number] Admission Date/Time: 2022 12:25 AM   Location:  -A PCP: Reba Bautista MD         Hospital Day: 12  Discharge barrier/Reason for continued hospitalization: Needs ARU. Assessment and Plan:   Stalin Jordan is a 39 y.o.  male COVID-19 recovered, alcohol abuse who presented to the ED on 2027 account of poor energy, poor appetite, poor balance, and fall leading to Intramuscular hematoma  On arrival to the hospital, patient had abnormal LFTs and was admitted for impending alcohol withdrawal.  Per H&P,At presentation patient was noted to have /81, HR 84, RR 20, temp 97.8, saturating 98% on room air. Lab work significant for sodium 131, CO2 18, anion gap 20, lactic acid 9.1, random glucose 189, albumin 2.6, , ALT 43, , total bilirubin 19, lipase 45, CK 37. PT 15.6, INR 1.21. Hepatitis panel negative. CT abdomen/pelvis-hematoma measuring 11.1 x 4.2 x 16.7 cm between the right gluteus medius and naomi muscle. Diffuse enlargement of the adductor muscles of the right lower extremity related to edema, severe diffuse fatty infiltration of the liver, gallbladder wall thickening. 1.  Alcohol withdrawal: Leading to fall. Now resolved. Off Precedex. 2.  Acute severe alcohol hepatitis: Elevated transaminases, AST >>ALT  Madrey's discriminant function of 32 per GI  Started on steroids per GI-prednisolone 40 mg daily for at least 1 month  CBV trending down. 3.  High anion gap metabolic acidosis: Due to #2 above  Resolved    4. Right gluteus hematoma: Traumatic due to fall. Expect slow resolution  Check CK levels    5. Acute blood loss anemia: Due to 4 above  Has received 4 units PRBC this admit  Continue to monitor H&H.    6.  Severe myopathy: Not explained by alcohol abuse.   Suspect Long-COVID  PT/OT recommend acute rehab  Could possibly benefit from EMG/NCS-physiatry consult. 7.  Acute kidney injury: Transient. Likely contrast.  Creatinine 1.6 on day 2 of admission, now resolved. Cr 0.3-0.4.    8.  Hyperbilirubinemia: Improving with steroids. T bili of 17.4 on arrival, peaked at 22.4, trended down to 12.2 today. 9.  Hypertension: Uncontrolled. Continue atenolol daily. Add amlodipine daily    10. Alcohol abuse: Lifestyle modification counseling  Okay to discontinue CIWA protocol at this time. Diet ADULT ORAL NUTRITION SUPPLEMENT; Breakfast, Lunch, Dinner; Standard High Calorie/High Protein Oral Supplement  ADULT DIET; Regular   DVT Prophylaxis [x] Lovenox, []  Heparin, [] SCDs, [] Ambulation   GI Prophylaxis [] PPI,  [] H2 Blocker,  [] Carafate,  [x] Diet/Tube Feeds   Code Status Full Code   MDM [] Low, [] Moderate,[x]  High       History of Present Illness:     Chief Complaint: Intramuscular hematoma     Silvia Spencer is a 39 y.o.  male  who presents with several complaints including poor appetite, poor energy, gait imbalance ultimately leading to fall    4/29/2022: Patient is seen and examined. Wife at bedside, given history. Reports that patient continues to drink quite heavily but cut back in February. He is currently drinking only about 2-3 shots of bourbon's per day. He is a very active man and he has been working full-time. It has become increasingly difficult in the last 3 weeks due to above symptoms. In the last 1 week, he deteriorated and he was not able drinking alcohol due to his poor appetite. He fell down a few steps due to his gait imbalance. During my evaluation, he is currently working with therapies and is unable to get up unassisted. Ten point ROS reviewed, negative, unless as noted above    Objective:        Intake/Output Summary (Last 24 hours) at 4/29/2022 1104  Last data filed at 4/28/2022 1913  Gross per 24 hour   Intake --   Output 1150 ml   Net -1150 ml Vitals:   Vitals:    04/28/22 1913 04/28/22 2000 04/29/22 0000 04/29/22 0421   BP:  (!) 149/96 (!) 149/94 (!) 153/96   Pulse: 88 86 74 66   Resp: 10 13 12 11   Temp:  99.5 °F (37.5 °C) 99.1 °F (37.3 °C) 100.2 °F (37.9 °C)   TempSrc:  Oral Oral Oral   SpO2: 100% 100% 99% 96%   Weight:       Height:            Physical Exam:   GEN: Awake male, alert and oriented x3 in no apparent distress. Appears given age. HEENT: Normal   RESP: Clear lung fields bilaterally. Symmetric chest movement while on room air  CVS: RRR, S1, S2  GI/: Abdomen is soft, nontender, no organomegaly. . Bowel sounds normal, rectal exam deferred. No CVA tenderness. MSK: No gross joint deformities. No tenderness. Right thigh diffusely swollen, tender to deep palpation  SKIN: Normal coloration, warm, dry. Ecchymotic right thigh more prominent in the medial aspect. Small area of ecchymosis and lumbar spine  NEURO: Cranial nerves appear grossly intact, normal speech, no lateralizing weakness.   PSYCH:  Affect appropriate    Medications:   Medications:    atenolol  50 mg Oral Daily    metoprolol  5 mg IntraVENous Q6H    enoxaparin  30 mg SubCUTAneous BID    prednisoLONE  40 mg Oral Daily    rifAXIMin  550 mg Oral BID    thiamine  100 mg IntraVENous Daily    lidocaine PF  5 mL IntraDERmal Once    sodium chloride flush  5-40 mL IntraVENous 2 times per day    sodium chloride flush  5-40 mL IntraVENous 2 times per day      Infusions:    sodium chloride      sodium chloride      sodium chloride      dexmedetomidine HCl in NaCl Stopped (04/26/22 1827)     PRN Meds: diphenhydrAMINE, 50 mg, Nightly PRN  trimethobenzamide, 200 mg, Q8H PRN  prochlorperazine, 10 mg, Q6H PRN  albuterol sulfate HFA, 2 puff, Q6H PRN  oxyCODONE, 5 mg, Q6H PRN  sodium chloride flush, 5-40 mL, PRN  sodium chloride, , PRN  polyethylene glycol, 17 g, Daily PRN  acetaminophen, 650 mg, Q6H PRN   Or  acetaminophen, 650 mg, Q6H PRN  HYDROmorphone, 1 mg, Q3H PRN  naloxone, 0.4 mg, PRN  sodium chloride flush, 5-40 mL, PRN  sodium chloride, , PRN  sodium chloride flush, 5-40 mL, PRN  sodium chloride, , PRN  LORazepam, 1 mg, Q1H PRN   Or  LORazepam, 1 mg, Q1H PRN   Or  LORazepam, 2 mg, Q1H PRN   Or  LORazepam, 2 mg, Q1H PRN   Or  LORazepam, 3 mg, Q1H PRN   Or  LORazepam, 3 mg, Q1H PRN   Or  LORazepam, 4 mg, Q1H PRN   Or  LORazepam, 4 mg, Q1H PRN          Electronically signed by Ge Posadas MD on 4/29/2022 at 11:04 AM

## 2022-04-29 NOTE — PROGRESS NOTES
Pt asks about discontinuation of dilaudid, denies tylenol due to a concern from his PCP about his liver. Pt requesting pain medication at this time, rating at 6.5.

## 2022-04-29 NOTE — PROGRESS NOTES
Pt is alert and oriented, has urge to have bowel movement. Pt able to stand and use startastedy to bathroom toilet. Pt has large BM and voids  Large amount. Pt back to bed without complications. Thanks staff for care. Pt is a heavy x2 assist for this, and was denied bedside commode out of safety considerations.

## 2022-04-29 NOTE — CARE COORDINATION
Received referral for ARU. Will review patients clinicals and PT/OT notes. Thank you for the referral.     Christel Craven CM to talk to patient and spouse regarding possible d/c to ARU. Patients primary insurance is [employer], pre-cert needed. Insurance will need updated therapy notes on Monday if patient and spouse are interested in ARU. ARU will follow continue to follow.

## 2022-04-30 ENCOUNTER — APPOINTMENT (OUTPATIENT)
Dept: MRI IMAGING | Age: 42
DRG: 871 | End: 2022-04-30
Payer: COMMERCIAL

## 2022-04-30 LAB
ALBUMIN SERPL-MCNC: 2.9 GM/DL (ref 3.4–5)
ALP BLD-CCNC: 228 IU/L (ref 40–129)
ALT SERPL-CCNC: 145 U/L (ref 10–40)
ANION GAP SERPL CALCULATED.3IONS-SCNC: 12 MMOL/L (ref 4–16)
AST SERPL-CCNC: 168 IU/L (ref 15–37)
BASOPHILS ABSOLUTE: 0 K/CU MM
BASOPHILS RELATIVE PERCENT: 0.3 % (ref 0–1)
BILIRUB SERPL-MCNC: 10.7 MG/DL (ref 0–1)
BILIRUBIN DIRECT: 7 MG/DL (ref 0–0.3)
BILIRUBIN, INDIRECT: 3.7 MG/DL (ref 0–0.7)
BUN BLDV-MCNC: 11 MG/DL (ref 6–23)
CALCIUM SERPL-MCNC: 8.6 MG/DL (ref 8.3–10.6)
CHLORIDE BLD-SCNC: 100 MMOL/L (ref 99–110)
CO2: 28 MMOL/L (ref 21–32)
CREAT SERPL-MCNC: 0.4 MG/DL (ref 0.9–1.3)
DIFFERENTIAL TYPE: ABNORMAL
EOSINOPHILS ABSOLUTE: 0.1 K/CU MM
EOSINOPHILS RELATIVE PERCENT: 0.5 % (ref 0–3)
GFR AFRICAN AMERICAN: >60 ML/MIN/1.73M2
GFR NON-AFRICAN AMERICAN: >60 ML/MIN/1.73M2
GLUCOSE BLD-MCNC: 96 MG/DL (ref 70–99)
HCT VFR BLD CALC: 31.5 % (ref 42–52)
HEMOGLOBIN: 9.5 GM/DL (ref 13.5–18)
IMMATURE NEUTROPHIL %: 1.1 % (ref 0–0.43)
LYMPHOCYTES ABSOLUTE: 1.6 K/CU MM
LYMPHOCYTES RELATIVE PERCENT: 10.7 % (ref 24–44)
MAGNESIUM: 1.9 MG/DL (ref 1.8–2.4)
MCH RBC QN AUTO: 31.7 PG (ref 27–31)
MCHC RBC AUTO-ENTMCNC: 30.2 % (ref 32–36)
MCV RBC AUTO: 105 FL (ref 78–100)
MONOCYTES ABSOLUTE: 1.1 K/CU MM
MONOCYTES RELATIVE PERCENT: 7.1 % (ref 0–4)
NUCLEATED RBC %: 0.2 %
PDW BLD-RTO: 23.9 % (ref 11.7–14.9)
PLATELET # BLD: 293 K/CU MM (ref 140–440)
PMV BLD AUTO: 10.9 FL (ref 7.5–11.1)
POTASSIUM SERPL-SCNC: 3.5 MMOL/L (ref 3.5–5.1)
RBC # BLD: 3 M/CU MM (ref 4.6–6.2)
SEGMENTED NEUTROPHILS ABSOLUTE COUNT: 11.9 K/CU MM
SEGMENTED NEUTROPHILS RELATIVE PERCENT: 80.3 % (ref 36–66)
SODIUM BLD-SCNC: 140 MMOL/L (ref 135–145)
TOTAL IMMATURE NEUTOROPHIL: 0.17 K/CU MM
TOTAL NUCLEATED RBC: 0 K/CU MM
TOTAL PROTEIN: 4.6 GM/DL (ref 6.4–8.2)
WBC # BLD: 14.8 K/CU MM (ref 4–10.5)

## 2022-04-30 PROCEDURE — 94761 N-INVAS EAR/PLS OXIMETRY MLT: CPT

## 2022-04-30 PROCEDURE — 83735 ASSAY OF MAGNESIUM: CPT

## 2022-04-30 PROCEDURE — 80053 COMPREHEN METABOLIC PANEL: CPT

## 2022-04-30 PROCEDURE — 97110 THERAPEUTIC EXERCISES: CPT

## 2022-04-30 PROCEDURE — 97535 SELF CARE MNGMENT TRAINING: CPT

## 2022-04-30 PROCEDURE — 1200000000 HC SEMI PRIVATE

## 2022-04-30 PROCEDURE — 6360000002 HC RX W HCPCS: Performed by: PHYSICIAN ASSISTANT

## 2022-04-30 PROCEDURE — 72157 MRI CHEST SPINE W/O & W/DYE: CPT

## 2022-04-30 PROCEDURE — 2060000000 HC ICU INTERMEDIATE R&B

## 2022-04-30 PROCEDURE — 97530 THERAPEUTIC ACTIVITIES: CPT

## 2022-04-30 PROCEDURE — 2580000003 HC RX 258: Performed by: PHYSICIAN ASSISTANT

## 2022-04-30 PROCEDURE — 6360000004 HC RX CONTRAST MEDICATION: Performed by: INTERNAL MEDICINE

## 2022-04-30 PROCEDURE — 6360000002 HC RX W HCPCS: Performed by: INTERNAL MEDICINE

## 2022-04-30 PROCEDURE — 85025 COMPLETE CBC W/AUTO DIFF WBC: CPT

## 2022-04-30 PROCEDURE — 82248 BILIRUBIN DIRECT: CPT

## 2022-04-30 PROCEDURE — A9579 GAD-BASE MR CONTRAST NOS,1ML: HCPCS | Performed by: INTERNAL MEDICINE

## 2022-04-30 PROCEDURE — 6370000000 HC RX 637 (ALT 250 FOR IP): Performed by: SPECIALIST

## 2022-04-30 PROCEDURE — 6370000000 HC RX 637 (ALT 250 FOR IP): Performed by: INTERNAL MEDICINE

## 2022-04-30 PROCEDURE — 6370000000 HC RX 637 (ALT 250 FOR IP): Performed by: FAMILY MEDICINE

## 2022-04-30 PROCEDURE — 72158 MRI LUMBAR SPINE W/O & W/DYE: CPT

## 2022-04-30 PROCEDURE — 6370000000 HC RX 637 (ALT 250 FOR IP): Performed by: HOSPITALIST

## 2022-04-30 RX ADMIN — OXYCODONE HYDROCHLORIDE 5 MG: 5 TABLET ORAL at 22:00

## 2022-04-30 RX ADMIN — GADOTERIDOL 20 ML: 279.3 INJECTION, SOLUTION INTRAVENOUS at 17:04

## 2022-04-30 RX ADMIN — Medication 40 MG: at 10:15

## 2022-04-30 RX ADMIN — ENOXAPARIN SODIUM 30 MG: 100 INJECTION SUBCUTANEOUS at 10:11

## 2022-04-30 RX ADMIN — RIFAXIMIN 550 MG: 550 TABLET ORAL at 22:00

## 2022-04-30 RX ADMIN — METHOCARBAMOL TABLETS 750 MG: 500 TABLET, COATED ORAL at 18:24

## 2022-04-30 RX ADMIN — METHOCARBAMOL TABLETS 750 MG: 500 TABLET, COATED ORAL at 10:44

## 2022-04-30 RX ADMIN — SODIUM CHLORIDE, PRESERVATIVE FREE 10 ML: 5 INJECTION INTRAVENOUS at 22:00

## 2022-04-30 RX ADMIN — RIFAXIMIN 550 MG: 550 TABLET ORAL at 10:09

## 2022-04-30 RX ADMIN — SODIUM CHLORIDE, PRESERVATIVE FREE 10 ML: 5 INJECTION INTRAVENOUS at 09:30

## 2022-04-30 RX ADMIN — ENOXAPARIN SODIUM 30 MG: 100 INJECTION SUBCUTANEOUS at 22:01

## 2022-04-30 RX ADMIN — OXYCODONE HYDROCHLORIDE 5 MG: 5 TABLET ORAL at 10:09

## 2022-04-30 RX ADMIN — THIAMINE HYDROCHLORIDE 100 MG: 100 INJECTION, SOLUTION INTRAMUSCULAR; INTRAVENOUS at 10:11

## 2022-04-30 RX ADMIN — OXYCODONE HYDROCHLORIDE 5 MG: 5 TABLET ORAL at 15:08

## 2022-04-30 RX ADMIN — METHOCARBAMOL TABLETS 750 MG: 500 TABLET, COATED ORAL at 13:31

## 2022-04-30 RX ADMIN — ATENOLOL 50 MG: 25 TABLET ORAL at 10:11

## 2022-04-30 RX ADMIN — AMLODIPINE BESYLATE 10 MG: 10 TABLET ORAL at 10:11

## 2022-04-30 RX ADMIN — METHOCARBAMOL TABLETS 750 MG: 500 TABLET, COATED ORAL at 21:57

## 2022-04-30 ASSESSMENT — PAIN SCALES - GENERAL
PAINLEVEL_OUTOF10: 4
PAINLEVEL_OUTOF10: 7
PAINLEVEL_OUTOF10: 7
PAINLEVEL_OUTOF10: 3
PAINLEVEL_OUTOF10: 6
PAINLEVEL_OUTOF10: 7
PAINLEVEL_OUTOF10: 5

## 2022-04-30 ASSESSMENT — PAIN DESCRIPTION - ORIENTATION
ORIENTATION: RIGHT

## 2022-04-30 ASSESSMENT — PAIN DESCRIPTION - LOCATION
LOCATION: LEG

## 2022-04-30 ASSESSMENT — PAIN - FUNCTIONAL ASSESSMENT: PAIN_FUNCTIONAL_ASSESSMENT: PREVENTS OR INTERFERES SOME ACTIVE ACTIVITIES AND ADLS

## 2022-04-30 ASSESSMENT — PAIN DESCRIPTION - DESCRIPTORS
DESCRIPTORS: DISCOMFORT
DESCRIPTORS: ACHING;BURNING
DESCRIPTORS: ACHING;CRAMPING;HEAVINESS

## 2022-04-30 NOTE — PROGRESS NOTES
Physical Therapy    Physical Therapy Treatment Note  Name: Desiree Dee MRN: [id number] :   1980   Date:  2022   Admission Date: 2022 Room:  -A   Restrictions/Precautions:  Restrictions/Precautions  Restrictions/Precautions: Fall Risk,General Precautions  Communication with other providers:  RN handoff  Subjective:  Patient states:  \"I don't want to step on any toes, but last night felt unsafe\"   Pain:   Location, Type, Intensity (0/10 to 10/10):  7/10 RLE pain during movement  Objective:    Observation:  Supine, awake, alert, agreeable. He is intermittently tearful talking about experience last night; will cont to follow to ensure pt is receiving appropriate/ethical care. Tele, pulse ox, BP cuff, bed alarm in place. Treatment, including education/measures:  Therapeutic Activity Training:   Therapeutic activity training was instructed today. Cues were given for safety, sequence, UE/LE placement, awareness, and balance. Activities performed today included bed mobility training, sup-sit, sit-stand, SPT. Supine <-> sit: min A x 2  Seated balance: good   Sit <-> stand: mod A x 2 progressing to min A x 2 w/ time on task and functional mobility training to improve body mechanics, set-up, sequencing ; 5 reps t/o session  Sit <-> supine: min A x 2 ; R LE mgmt and trunk steady  Scooting: min A x 2 w/ pt assist  Educated on need for safety, performance of toileting at bed pan/BSC until more functionally able to safely complete from standard height toilet in BR ; additionally consulted pt on professional behavior and navigating inc stress, advocating for himself, prognosis, DC planning  Positioned for comfort, pressure relief, swelling mgmt ; left in bed, call light in reach, all needs met, gait belt for OOB    Therapeutic Exercise:  Cues were given for technique, safety, recruitment, and rationale. Cues were verbal and/or tactile.   2 x 10 R/L WSing, mini-marching  2 x 5-8 mini squat  10 ankle pumps, LAQ's  Inc time and effort ; inc time to facilitate education    Assessment / Impression: Tolerating well, dec assist to perform transfers and tentative response to therapeutic activities initiated w/ good carryover. Cont w/ RLE DF weakness, impaired/lack of RLE sensation affecting balance/proprioception, R LE swelling. Will cont to benefit from intensive skilled subacute therapy services to facilitate return to ind PLOF.     Patient's tolerance of treatment:  good  Barriers to improvement:  Comorbid conditions; deconditioning 2/2 prolonged/complicated withdrawal; large body habitus  Plan for Next Session:    Cont w/ est POC and DC plan (ARU)  Progress functional mobility, transfer training, neuro re-ed, toilet/BSC transfer w/ 2 person assist     Time in:  1357  Time out:  1500  Timed treatment minutes:  53 (TA x 2, TE x 2)  Total treatment time:  61    Previously filed items:  Social/Functional History  Lives With: Spouse  Type of Home: House  Home Layout: Two level (Bathroom on 2nd floor)  Home Access: Stairs to enter without rails  Entrance Stairs - Number of Steps: 2  Bathroom Shower/Tub: Tub/Shower unit  Bathroom Toilet: Standard  Bathroom Accessibility: Accessible  Has the patient had two or more falls in the past year or any fall with injury in the past year?: Yes (2 falls due to lightheadedness and dizziness leading to current hospitalization)  ADL Assistance: Independent  Homemaking Assistance: Independent  Homemaking Responsibilities: Yes  Ambulation Assistance: Independent  Transfer Assistance: Independent  Active : Yes  Mode of Transportation: Car  Occupation: On disability  Patient Goals   Patient goals : return to [address]  Time Frame for Long term goals : 1 week  Long term goal 1: pt will complete bed mobility at min A  Long term goal 2: pt will complete transfers at min A x 2  Long term goal 3: pt will ambulate 15 ft using FWW at min A x 2 w/ L knee blocking Electronically signed by:    Jenaro Nielsen PT, DPT  4/30/2022, 3:43 PM

## 2022-04-30 NOTE — PROGRESS NOTES
Hospitalist Progress Note      Name:  Feliciano Mckay /Age/Sex: 1980  (89 y.o. male)   MRN & CSN:  [id number] & [id number] Admission Date/Time: 2022 12:25 AM   Location:  -A PCP: Shira Shook MD         Hospital Day: 13  Discharge barrier/Reason for continued hospitalization: Needs ARU. Assessment and Plan:   Feliciano Mckay is a 39 y.o.  male COVID-19 recovered, alcohol abuse who presented to the ED on 2027 account of poor energy, poor appetite, poor balance, and fall leading to Intramuscular hematoma  On arrival to the hospital, patient had abnormal LFTs and was admitted for impending alcohol withdrawal.  Per H&P,At presentation patient was noted to have /81, HR 84, RR 20, temp 97.8, saturating 98% on room air. Lab work significant for sodium 131, CO2 18, anion gap 20, lactic acid 9.1, random glucose 189, albumin 2.6, , ALT 43, , total bilirubin 19, lipase 45, CK 37. PT 15.6, INR 1.21. Hepatitis panel negative. CT abdomen/pelvis-hematoma measuring 11.1 x 4.2 x 16.7 cm between the right gluteus medius and naomi muscle. Diffuse enlargement of the adductor muscles of the right lower extremity related to edema, severe diffuse fatty infiltration of the liver, gallbladder wall thickening. 1.  Alcohol withdrawal: Leading to fall. Now resolved. Off Precedex. 2.  Acute severe alcohol hepatitis: Elevated transaminases, AST >>ALT  Steroids due to high MDF-prednisolone 40 mg daily for at least 1 month  Total bilirubin trending down. Down to 10.7 today from a peak of 22.4.    3.  High anion gap metabolic acidosis: Due to #2 above  Resolved    4. Right gluteus hematoma: Traumatic due to fall. Expect slow resolution  Check CK levels    5. Acute blood loss anemia: Due to 4 above  Has received 4 units PRBC this admit  Continue to monitor H&H.    6.  Paraparesis, myelopathy: Likely due to fall and back injury.   No other red flags Possibly underlying Long-COVID  Check MRI thoracolumbar spine to rule out cord compression, lumbar myelopathy. Acute rehab at discharge. Could possibly benefit from EMG/NCS-physiatry consult. 7.  Acute kidney injury: Transient. Likely contrast.  Creatinine 1.6 on day 2 of admission, now resolved. Cr 0.3-0.4.    8.  Hyperbilirubinemia: Improving with steroids. T bili of 17.4 on arrival, peaked at 22.4, trended down to 12.2 today. 9.  Hypertension: Uncontrolled. Continue atenolol and amlodipine daily. 10.  Alcohol abuse: Lifestyle modification counseling  Okay to discontinue CIWA protocol at this time. Diet ADULT ORAL NUTRITION SUPPLEMENT; Breakfast, Lunch, Dinner; Standard High Calorie/High Protein Oral Supplement  ADULT DIET; Regular   DVT Prophylaxis [x] Lovenox, []  Heparin, [] SCDs, [] Ambulation   GI Prophylaxis [] PPI,  [] H2 Blocker,  [] Carafate,  [x] Diet/Tube Feeds   Code Status Full Code   MDM [] Low, [] Moderate,[x]  High       History of Present Illness:     Chief Complaint: Intramuscular hematoma     Forrest Caballero is a 39 y.o.  male  who presents with several complaints including poor appetite, poor energy, gait imbalance ultimately leading to fall    4/29/2022: Patient is seen and examined. Wife at bedside, given history. Reports that patient continues to drink quite heavily but cut back in February. He is currently drinking only about 2-3 shots of bourbon's per day. He is a very active man and he has been working full-time. It has become increasingly difficult in the last 3 weeks due to above symptoms. In the last 1 week, he deteriorated and he was not able drinking alcohol due to his poor appetite. He fell down a few steps due to his gait imbalance. During my evaluation, he is currently working with therapies and is unable to get up unassisted. 4/30/2022: Patient is seen and examined, continues to endorse generalized weakness.   He now mentions today that he has no feeling below his knees. He also endorses that he needs right leg thigh swelling is now much improved. Ten point ROS reviewed, negative, unless as noted above    Objective: Intake/Output Summary (Last 24 hours) at 4/30/2022 1117  Last data filed at 4/30/2022 1015  Gross per 24 hour   Intake 720 ml   Output 1000 ml   Net -280 ml        Vitals:   Vitals:    04/30/22 0749 04/30/22 0800 04/30/22 0907 04/30/22 1009   BP: (!) 156/93 (!) 169/105  (!) 142/97   Pulse: 70 82     Resp: 15 16     Temp: 99.4 °F (37.4 °C)      TempSrc: Oral      SpO2: 100% 91% 92%    Weight:       Height:            Physical Exam:   GEN: Awake male, alert and oriented x3 in no apparent distress. Appears given age. HEENT: Normal   RESP: Clear lung fields bilaterally. Symmetric chest movement while on room air  CVS: RRR, S1, S2  GI/: Abdomen is soft, nontender, no organomegaly. . Bowel sounds normal, rectal exam deferred. No CVA tenderness. MSK: No gross joint deformities. No tenderness. Right thigh diffusely swollen, tender to deep palpation  SKIN: Normal coloration, warm, dry. Ecchymotic right thigh more prominent in the medial aspect. Small area of ecchymosis and lumbar spine  NEURO: Paraparesis. Bilateral lower extremities hypoesthesia below the knees.   PSYCH:  Affect appropriate    Medications:   Medications:    amLODIPine  10 mg Oral Daily    methocarbamol  750 mg Oral 4x Daily    atenolol  50 mg Oral Daily    metoprolol  5 mg IntraVENous Q6H    enoxaparin  30 mg SubCUTAneous BID    prednisoLONE  40 mg Oral Daily    rifAXIMin  550 mg Oral BID    thiamine  100 mg IntraVENous Daily    lidocaine PF  5 mL IntraDERmal Once    sodium chloride flush  5-40 mL IntraVENous 2 times per day    sodium chloride flush  5-40 mL IntraVENous 2 times per day      Infusions:    sodium chloride      sodium chloride      sodium chloride       PRN Meds: diphenhydrAMINE, 50 mg, Nightly PRN  trimethobenzamide, 200 mg, Q8H PRN  prochlorperazine, 10 mg, Q6H PRN  albuterol sulfate HFA, 2 puff, Q6H PRN  oxyCODONE, 5 mg, Q6H PRN  sodium chloride flush, 5-40 mL, PRN  sodium chloride, , PRN  polyethylene glycol, 17 g, Daily PRN  acetaminophen, 650 mg, Q6H PRN   Or  acetaminophen, 650 mg, Q6H PRN  naloxone, 0.4 mg, PRN  sodium chloride flush, 5-40 mL, PRN  sodium chloride, , PRN  sodium chloride flush, 5-40 mL, PRN  sodium chloride, , PRN          Electronically signed by Franki Cleary MD on 4/30/2022 at 11:17 AM

## 2022-04-30 NOTE — PROGRESS NOTES
Occupational Therapy    Occupational Therapy Treatment Note  Name: Wan Lepe MRN: [id number] :   1980   Date:  2022   Admission Date: 2022 Room:  -A     Restrictions/Precautions:  Restrictions/Precautions  Restrictions/Precautions: Fall Risk,General Precautions         Communication with other providers:  RN handoff  Subjective:  Patient states:  \"I was uncomfortable trying to use the regular bathroom already\"  Pain: 7/10 pain R LE (location, type, intensity)    Objective:    Observation:  Supine in bed, agreeable to tx session. Reported anxiety about care from previous night involving episodes of incontinence and attempting to use standard toilet. Objective Measures:  Vitals WNL on room air    Treatment, including education:  Therapeutic Activity Training:   Therapeutic activity training was instructed today. Cues were given for safety, sequence, UE/LE placement, awareness, and balance. Activities performed today included bed mobility training, sup-sit, sit-stand.     Supine to EOB - Min A x2  Sitting balance -  SBA static and dynamic sitting unsupported EOB   Stand to FWW - Mod Ax2 w/L knee block for first attempt, progressed to min Ax2 (5 reps). Pt tolerated static/dynamic standing/weight shifting exercises on feet ~2 min each bout. Sitting EOB to supine- Min A x2, repositioning in bed w/ min Ax2 and VCs for sequencing pt participation.      Patient educated on role of OT , benefits of OT and rationale for therapeutic intervention. Continued pt and spouse education on lympatic drainage     All therapeutic intervention completed to increase functional strength and improve functional activity tolerance necessary for ADLs and transfers/mobility.      Pt left supine in bed w/ all needs met and wife present. Gait belt was used for func transfers / mobility. Self Care Training:   Cues were given for safety, sequence, UE/LE placement, visual cues, and balance. Activities performed today included oral care. Pt brushed teeth while seated EOB w/ SBA. Assessment / Impression:    Pt improved independence with functional transfers, good carryover w/ sequencing/safety training w/ transfers. Pt motivated to continue progressing w/ therapy, continues to present w/ R LE impaired sensation and weakness. Continued Acute OT necessary to increase safety/independence with ADLs and functional transfers/mobility.      Patient's tolerance of treatment: good  Adverse Reaction: none  Significant change in status and impact:  none  Barriers to improvement: none  Plan for Next Session:    BSC transfer, continue w/ OT POC      Time in:  1357  Time out:  1500  Timed treatment minutes:  53  Total treatment time:  61    Previously filed items:  Social/Functional History  Lives With: Spouse  Type of Home: House  Home Layout: Two level (Bathroom on 2nd floor)  Home Access: Stairs to enter without rails  Entrance Stairs - Number of Steps: 2  Bathroom Shower/Tub: Tub/Shower unit  Bathroom Toilet: Standard  Bathroom Accessibility: Accessible  Has the patient had two or more falls in the past year or any fall with injury in the past year?: Yes (2 falls due to lightheadedness and dizziness leading to current hospitalization)  ADL Assistance: Independent  Homemaking Assistance: Independent  Homemaking Responsibilities: Yes  Ambulation Assistance: Independent  Transfer Assistance: Independent  Active : Yes  Mode of Transportation: Car  Occupation: On disability             Electronically signed by:    Abdiaziz Cerrato OT,   4/30/2022, 4:39 PM

## 2022-04-30 NOTE — PLAN OF CARE
Problem: Pain:  Goal: Pain level will decrease  Description: Pain level will decrease  Outcome: Progressing  Goal: Control of acute pain  Description: Control of acute pain  Outcome: Progressing  Goal: Control of chronic pain  Description: Control of chronic pain  Outcome: Progressing

## 2022-05-01 LAB
ANION GAP SERPL CALCULATED.3IONS-SCNC: 13 MMOL/L (ref 4–16)
BASOPHILS ABSOLUTE: 0.1 K/CU MM
BASOPHILS RELATIVE PERCENT: 0.4 % (ref 0–1)
BUN BLDV-MCNC: 12 MG/DL (ref 6–23)
CALCIUM SERPL-MCNC: 8.2 MG/DL (ref 8.3–10.6)
CHLORIDE BLD-SCNC: 100 MMOL/L (ref 99–110)
CO2: 24 MMOL/L (ref 21–32)
CREAT SERPL-MCNC: 0.4 MG/DL (ref 0.9–1.3)
DIFFERENTIAL TYPE: ABNORMAL
EOSINOPHILS ABSOLUTE: 0.1 K/CU MM
EOSINOPHILS RELATIVE PERCENT: 0.6 % (ref 0–3)
GFR AFRICAN AMERICAN: >60 ML/MIN/1.73M2
GFR NON-AFRICAN AMERICAN: >60 ML/MIN/1.73M2
GLUCOSE BLD-MCNC: 86 MG/DL (ref 70–99)
HCT VFR BLD CALC: 29.9 % (ref 42–52)
HEMOGLOBIN: 9.2 GM/DL (ref 13.5–18)
IMMATURE NEUTROPHIL %: 1.1 % (ref 0–0.43)
LYMPHOCYTES ABSOLUTE: 1.4 K/CU MM
LYMPHOCYTES RELATIVE PERCENT: 10.1 % (ref 24–44)
MCH RBC QN AUTO: 31.9 PG (ref 27–31)
MCHC RBC AUTO-ENTMCNC: 30.8 % (ref 32–36)
MCV RBC AUTO: 103.8 FL (ref 78–100)
MONOCYTES ABSOLUTE: 1.2 K/CU MM
MONOCYTES RELATIVE PERCENT: 8.2 % (ref 0–4)
NUCLEATED RBC %: 0.3 %
PDW BLD-RTO: 23.8 % (ref 11.7–14.9)
PLATELET # BLD: 295 K/CU MM (ref 140–440)
PMV BLD AUTO: 10.9 FL (ref 7.5–11.1)
POTASSIUM SERPL-SCNC: 4.2 MMOL/L (ref 3.5–5.1)
RBC # BLD: 2.88 M/CU MM (ref 4.6–6.2)
SEGMENTED NEUTROPHILS ABSOLUTE COUNT: 11.2 K/CU MM
SEGMENTED NEUTROPHILS RELATIVE PERCENT: 79.6 % (ref 36–66)
SODIUM BLD-SCNC: 137 MMOL/L (ref 135–145)
TOTAL CK: 3119 IU/L (ref 38–174)
TOTAL IMMATURE NEUTOROPHIL: 0.16 K/CU MM
TOTAL NUCLEATED RBC: 0 K/CU MM
WBC # BLD: 14.1 K/CU MM (ref 4–10.5)

## 2022-05-01 PROCEDURE — 6370000000 HC RX 637 (ALT 250 FOR IP): Performed by: INTERNAL MEDICINE

## 2022-05-01 PROCEDURE — 80048 BASIC METABOLIC PNL TOTAL CA: CPT

## 2022-05-01 PROCEDURE — 94761 N-INVAS EAR/PLS OXIMETRY MLT: CPT

## 2022-05-01 PROCEDURE — 6370000000 HC RX 637 (ALT 250 FOR IP): Performed by: FAMILY MEDICINE

## 2022-05-01 PROCEDURE — 6360000002 HC RX W HCPCS: Performed by: PHYSICIAN ASSISTANT

## 2022-05-01 PROCEDURE — 97530 THERAPEUTIC ACTIVITIES: CPT

## 2022-05-01 PROCEDURE — 6370000000 HC RX 637 (ALT 250 FOR IP): Performed by: SPECIALIST

## 2022-05-01 PROCEDURE — 97116 GAIT TRAINING THERAPY: CPT

## 2022-05-01 PROCEDURE — 85025 COMPLETE CBC W/AUTO DIFF WBC: CPT

## 2022-05-01 PROCEDURE — 6360000002 HC RX W HCPCS: Performed by: INTERNAL MEDICINE

## 2022-05-01 PROCEDURE — 2580000003 HC RX 258: Performed by: PHYSICIAN ASSISTANT

## 2022-05-01 PROCEDURE — 6370000000 HC RX 637 (ALT 250 FOR IP): Performed by: HOSPITALIST

## 2022-05-01 PROCEDURE — 97535 SELF CARE MNGMENT TRAINING: CPT

## 2022-05-01 PROCEDURE — 36415 COLL VENOUS BLD VENIPUNCTURE: CPT

## 2022-05-01 PROCEDURE — 1200000000 HC SEMI PRIVATE

## 2022-05-01 PROCEDURE — 2500000003 HC RX 250 WO HCPCS: Performed by: FAMILY MEDICINE

## 2022-05-01 PROCEDURE — 2060000000 HC ICU INTERMEDIATE R&B

## 2022-05-01 RX ADMIN — OXYCODONE HYDROCHLORIDE 5 MG: 5 TABLET ORAL at 16:16

## 2022-05-01 RX ADMIN — METOPROLOL TARTRATE 5 MG: 5 INJECTION INTRAVENOUS at 00:13

## 2022-05-01 RX ADMIN — RIFAXIMIN 550 MG: 550 TABLET ORAL at 21:39

## 2022-05-01 RX ADMIN — METHOCARBAMOL TABLETS 750 MG: 500 TABLET, COATED ORAL at 16:16

## 2022-05-01 RX ADMIN — Medication 40 MG: at 09:01

## 2022-05-01 RX ADMIN — THIAMINE HYDROCHLORIDE 100 MG: 100 INJECTION, SOLUTION INTRAMUSCULAR; INTRAVENOUS at 08:50

## 2022-05-01 RX ADMIN — OXYCODONE HYDROCHLORIDE 5 MG: 5 TABLET ORAL at 10:18

## 2022-05-01 RX ADMIN — RIFAXIMIN 550 MG: 550 TABLET ORAL at 08:49

## 2022-05-01 RX ADMIN — OXYCODONE HYDROCHLORIDE 5 MG: 5 TABLET ORAL at 22:27

## 2022-05-01 RX ADMIN — METHOCARBAMOL TABLETS 750 MG: 500 TABLET, COATED ORAL at 21:38

## 2022-05-01 RX ADMIN — METHOCARBAMOL TABLETS 750 MG: 500 TABLET, COATED ORAL at 12:56

## 2022-05-01 RX ADMIN — SODIUM CHLORIDE, PRESERVATIVE FREE 10 ML: 5 INJECTION INTRAVENOUS at 21:39

## 2022-05-01 RX ADMIN — METOPROLOL TARTRATE 5 MG: 5 INJECTION INTRAVENOUS at 06:31

## 2022-05-01 RX ADMIN — ENOXAPARIN SODIUM 30 MG: 100 INJECTION SUBCUTANEOUS at 08:50

## 2022-05-01 RX ADMIN — METHOCARBAMOL TABLETS 750 MG: 500 TABLET, COATED ORAL at 08:48

## 2022-05-01 RX ADMIN — ENOXAPARIN SODIUM 30 MG: 100 INJECTION SUBCUTANEOUS at 21:38

## 2022-05-01 RX ADMIN — PROCHLORPERAZINE EDISYLATE 10 MG: 5 INJECTION INTRAMUSCULAR; INTRAVENOUS at 21:38

## 2022-05-01 RX ADMIN — ATENOLOL 50 MG: 25 TABLET ORAL at 08:49

## 2022-05-01 RX ADMIN — AMLODIPINE BESYLATE 10 MG: 10 TABLET ORAL at 08:49

## 2022-05-01 RX ADMIN — SODIUM CHLORIDE, PRESERVATIVE FREE 10 ML: 5 INJECTION INTRAVENOUS at 08:53

## 2022-05-01 RX ADMIN — OXYCODONE HYDROCHLORIDE 5 MG: 5 TABLET ORAL at 04:40

## 2022-05-01 ASSESSMENT — PAIN DESCRIPTION - LOCATION
LOCATION: GENERALIZED
LOCATION: GENERALIZED
LOCATION: FOOT
LOCATION: LEG
LOCATION: LEG

## 2022-05-01 ASSESSMENT — PAIN DESCRIPTION - ORIENTATION
ORIENTATION: RIGHT
ORIENTATION: RIGHT

## 2022-05-01 ASSESSMENT — PAIN DESCRIPTION - FREQUENCY
FREQUENCY: CONTINUOUS
FREQUENCY: CONTINUOUS

## 2022-05-01 ASSESSMENT — PAIN DESCRIPTION - DESCRIPTORS
DESCRIPTORS: DISCOMFORT
DESCRIPTORS: ACHING;DISCOMFORT
DESCRIPTORS: ACHING;DISCOMFORT
DESCRIPTORS: DISCOMFORT

## 2022-05-01 ASSESSMENT — PAIN - FUNCTIONAL ASSESSMENT
PAIN_FUNCTIONAL_ASSESSMENT: PREVENTS OR INTERFERES SOME ACTIVE ACTIVITIES AND ADLS

## 2022-05-01 ASSESSMENT — PAIN SCALES - GENERAL
PAINLEVEL_OUTOF10: 6
PAINLEVEL_OUTOF10: 2
PAINLEVEL_OUTOF10: 7
PAINLEVEL_OUTOF10: 7
PAINLEVEL_OUTOF10: 8
PAINLEVEL_OUTOF10: 6
PAINLEVEL_OUTOF10: 2
PAINLEVEL_OUTOF10: 10
PAINLEVEL_OUTOF10: 7

## 2022-05-01 ASSESSMENT — PAIN DESCRIPTION - ONSET: ONSET: ON-GOING

## 2022-05-01 ASSESSMENT — PAIN DESCRIPTION - PROGRESSION: CLINICAL_PROGRESSION: NOT CHANGED

## 2022-05-01 ASSESSMENT — PAIN DESCRIPTION - PAIN TYPE: TYPE: ACUTE PAIN

## 2022-05-01 NOTE — PROGRESS NOTES
Occupational Therapy    Occupational Therapy Treatment Note  Name: Ary Marie MRN: [id number] :   1980   Date:  2022   Admission Date: 2022 Room:  -A     Restrictions/Precautions:  Restrictions/Precautions  Restrictions/Precautions: Fall Risk,General Precautions         Communication with other providers:  cotx w/ PT, handoff to RN    Subjective:  Patient states:  I think you'll like what I've been doing to stretch out this leg  Pain: reports none (location, type, intensity)    Objective:    Observation:  Seated in w/c, agreeable. Excited to demo LE AAROM exercises he has been completing on his own w/ gait belt assist  Objective Measures:  Vitals stable on room air    Treatment, including education:  Self Care Training:   Cues were given for safety, sequence, UE/LE placement, visual cues, and balance. Activities performed today included simulated toileting routine. Provision of BSC, fitted to pt. Educated pt on transfer technique to/from [facility] to increase safety w/ toileting routine. SPT from w/c to [facility] w/ min Ax2. Extensive education on safety/sequencing and positioning of LEs during transfer and toileting to inc safety and independence. Therapeutic Activity Training:   Therapeutic activity training was instructed today. Cues were given for safety, sequence, UE/LE placement, awareness, and balance. Activities performed today included bed mobility training, sup-sit, sit-stand, ambulation.     STS from w/c, from commode, and from recliner all w/ min Ax2, PT blocking R knee for safety  Stand to sit to all surfaces w/ CGA and cues for sequencing/hand placement  Ambulated short functional household distance x2 bouts using RW w/ min A and max VCs for sequencing heather and RW use, PT assisting R LE w/ knee blocking during WB  Sitting EOB to supine:  educated on L LE hooking R LE to assist into bed and compensate for weakness, verbal education and visual demonstration provided. Completed w/ min Ax2 using LE hooking technique  Repositioned self in bed w/ min Ax2, education on pt participation to improve independence with bed mobility and repositioning      Assessment / Impression:    Pt continues to improve assist levels, good carryover w/ safety training. Recommend BSC use for all toileting needs at this time. Continues to benefit from skilled acute OT to address weakness, activity tolerance, and safety and independence with ADL routine. Cont to recommend ARU.      Patient's tolerance of treatment: Good  Adverse Reaction: none  Significant change in status and impact:  none  Barriers to improvement: none    Plan for Next Session:    Cont w/ est POC, addressing ADL independence and transfer/mobility safety/ind    Time in:  1401  Time out:  1452  Timed treatment minutes:  51  Total treatment time:  46    Previously filed items:  Social/Functional History  Lives With: Spouse  Type of Home: House  Home Layout: Two level (Bathroom on 2nd floor)  Home Access: Stairs to enter without rails  Entrance Stairs - Number of Steps: 2  Bathroom Shower/Tub: Tub/Shower unit  Bathroom Toilet: Standard  Bathroom Accessibility: Accessible  Has the patient had two or more falls in the past year or any fall with injury in the past year?: Yes (2 falls due to lightheadedness and dizziness leading to current hospitalization)  ADL Assistance: Independent  Homemaking Assistance: Independent  Homemaking Responsibilities: Yes  Ambulation Assistance: Independent  Transfer Assistance: Independent  Active : Yes  Mode of Transportation: Car  Occupation: On disability             Electronically signed by:    Lissy Lynch,   5/1/2022, 4:47 PM

## 2022-05-01 NOTE — PROGRESS NOTES
Physical Therapy    Physical Therapy Treatment Note  Name: Elena Smith MRN: [id number] :   1980   Date:  2022   Admission Date: 2022 Room:  -A   Restrictions/Precautions:  Restrictions/Precautions  Restrictions/Precautions: Fall Risk,General Precautions , R LE weakness (Drop foot and quad lag requires blocking) , COTX! Communication with other providers:  RN handoff  Subjective:  Patient states: \"Let me show you what I've worked out to get my leg moving\"  Pain:   Location, Type, Intensity (0/10 to 10/10): Does not endorse any  Objective:    Observation:  Supine, awake, alert, agreeable. He is in better spirits this session and been participating in exercises provided by therapy during our absence. Tele, pulse ox, BP cuff, bed alarm in place. Treatment, including education/measures:  Therapeutic Activity Training:   Therapeutic activity training was instructed today. Cues were given for safety, sequence, UE/LE placement, awareness, and balance. Activities performed today included bed mobility training, sup-sit, sit-stand, SPT.   Sit <-> stand: min A x 2 ; improved quality w/ R knee blocking assist to promote improved functional mobility and compensation for quad weakness  Standing balance: fair +  Stand <-> sit: CGA w/ mod cues for sequencing  Seated balance: good  Stand Step: min A for same reasoning as w/ gait below ; to [facility], from [facility] to recliner, from recliner to bed  Toilet transfer: min A x 2  Sit <-> supine: min A x 2 ; max time and cues to educated on ways to compensate for RLE weakness and assist w/ initiation using LLE hooking under R foot ; assist to guide into position and cues to assist in direction  Scooting: min A x 2 ; using BL UE/LE and therapist to boost in bed  Positioned for comfort, pressure relief, swelling mgmt ; call light in reach, all needs met, bed alarmed, left in bed, gait belt for OOB    Gait Training:  Cues were given for safety, sequence, device management, balance, posture, awareness, path. 10 ft + 10 ft using FWW at min A w/ max cues ; dec heather, step-to leading w/ R LE w/ assist for fwd limb progression, R knee blocking during RLE WB, max cues for sequencing and attention to quality, heavy BL UE reliance, quick onset of fatigue, intermittently inconsistent gait quality w/ need for cues to promote more consistent patterning    Assessment / Impression: Tolerating well, performing formal gait training this session. Cont to be limited by R foot drop, RLE swelling, dec sensation affecting proprioception, impaired endurance, weakness, impaired functional mobility. Will cont to benefit from intensive skilled subacute therapy to address extensive deficits and return to ind PLOF.     Patient's tolerance of treatment:  good  Barriers to improvement:  Comorbid conditions; deconditioning 2/2 prolonged/complicated withdrawal; large body habitus  Plan for Next Session:    Cont w/ est POC and DC plan (ARU)  Progress ambulation distance, neuro re-ed, transfer training, balance training, functional strengthening, therapeutic exercise  Cont to require cotx for safety    Time in:  1401  Time out:  1452  Timed treatment minutes:  49  Total treatment time:  46    Previously filed items:  Social/Functional History  Lives With: Spouse  Type of Home: House  Home Layout: Two level (Bathroom on 2nd floor)  Home Access: Stairs to enter without rails  Entrance Stairs - Number of Steps: 2  Bathroom Shower/Tub: Tub/Shower unit  Bathroom Toilet: Standard  Bathroom Accessibility: Accessible  Has the patient had two or more falls in the past year or any fall with injury in the past year?: Yes (2 falls due to lightheadedness and dizziness leading to current hospitalization)  ADL Assistance: Independent  Homemaking Assistance: Independent  Homemaking Responsibilities: Yes  Ambulation Assistance: Independent  Transfer Assistance: Independent  Active : Yes  Mode of Transportation: Car  Occupation: On disability  Patient Goals   Patient goals : return to [id number]  Time Frame for Long term goals : 1 week  Long term goal 1: pt will complete bed mobility at min A  Long term goal 2: pt will complete transfers at min A x 2  Long term goal 3: pt will ambulate 15 ft using FWW at min A x 2 w/ L knee blocking       Electronically signed by:    Rodolfo Rosales PT, DPT  5/1/2022, 3:25 PM

## 2022-05-02 ENCOUNTER — HOSPITAL ENCOUNTER (INPATIENT)
Age: 42
LOS: 9 days | Discharge: HOME | DRG: 091 | End: 2022-05-11
Attending: PHYSICAL MEDICINE & REHABILITATION | Admitting: PHYSICAL MEDICINE & REHABILITATION
Payer: COMMERCIAL

## 2022-05-02 DIAGNOSIS — S30.0XXA TRAUMATIC HEMATOMA OF BUTTOCK, INITIAL ENCOUNTER: Primary | ICD-10-CM

## 2022-05-02 PROBLEM — G72.81 CRITICAL ILLNESS MYOPATHY: Status: ACTIVE | Noted: 2022-05-02

## 2022-05-02 LAB
ALBUMIN SERPL-MCNC: 2.9 GM/DL (ref 3.4–5)
ALP BLD-CCNC: 199 IU/L (ref 40–129)
ALT SERPL-CCNC: 129 U/L (ref 10–40)
ANION GAP SERPL CALCULATED.3IONS-SCNC: 12 MMOL/L (ref 4–16)
AST SERPL-CCNC: 141 IU/L (ref 15–37)
BASOPHILS ABSOLUTE: 0 K/CU MM
BASOPHILS RELATIVE PERCENT: 0.2 % (ref 0–1)
BILIRUB SERPL-MCNC: 8.6 MG/DL (ref 0–1)
BILIRUBIN DIRECT: 5.1 MG/DL (ref 0–0.3)
BILIRUBIN, INDIRECT: 3.5 MG/DL (ref 0–0.7)
BUN BLDV-MCNC: 11 MG/DL (ref 6–23)
CALCIUM SERPL-MCNC: 9.5 MG/DL (ref 8.3–10.6)
CHLORIDE BLD-SCNC: 99 MMOL/L (ref 99–110)
CO2: 27 MMOL/L (ref 21–32)
CREAT SERPL-MCNC: 0.5 MG/DL (ref 0.9–1.3)
DIFFERENTIAL TYPE: ABNORMAL
EOSINOPHILS ABSOLUTE: 0.1 K/CU MM
EOSINOPHILS RELATIVE PERCENT: 0.5 % (ref 0–3)
GFR AFRICAN AMERICAN: >60 ML/MIN/1.73M2
GFR NON-AFRICAN AMERICAN: >60 ML/MIN/1.73M2
GLUCOSE BLD-MCNC: 103 MG/DL (ref 70–99)
HCT VFR BLD CALC: 30.1 % (ref 42–52)
HEMOGLOBIN: 9.2 GM/DL (ref 13.5–18)
IMMATURE NEUTROPHIL %: 1.4 % (ref 0–0.43)
LYMPHOCYTES ABSOLUTE: 1.8 K/CU MM
LYMPHOCYTES RELATIVE PERCENT: 12.5 % (ref 24–44)
MCH RBC QN AUTO: 32.7 PG (ref 27–31)
MCHC RBC AUTO-ENTMCNC: 30.6 % (ref 32–36)
MCV RBC AUTO: 107.1 FL (ref 78–100)
MONOCYTES ABSOLUTE: 1.2 K/CU MM
MONOCYTES RELATIVE PERCENT: 8.5 % (ref 0–4)
NUCLEATED RBC %: 0.4 %
PDW BLD-RTO: 24 % (ref 11.7–14.9)
PLATELET # BLD: 276 K/CU MM (ref 140–440)
PMV BLD AUTO: 10.7 FL (ref 7.5–11.1)
POTASSIUM SERPL-SCNC: 3.7 MMOL/L (ref 3.5–5.1)
RBC # BLD: 2.81 M/CU MM (ref 4.6–6.2)
SARS-COV-2, NAAT: NOT DETECTED
SEGMENTED NEUTROPHILS ABSOLUTE COUNT: 10.8 K/CU MM
SEGMENTED NEUTROPHILS RELATIVE PERCENT: 76.9 % (ref 36–66)
SODIUM BLD-SCNC: 138 MMOL/L (ref 135–145)
SOURCE: NORMAL
TOTAL IMMATURE NEUTOROPHIL: 0.19 K/CU MM
TOTAL NUCLEATED RBC: 0.1 K/CU MM
TOTAL PROTEIN: 4.6 GM/DL (ref 6.4–8.2)
WBC # BLD: 14.1 K/CU MM (ref 4–10.5)

## 2022-05-02 PROCEDURE — 6360000002 HC RX W HCPCS: Performed by: PHYSICIAN ASSISTANT

## 2022-05-02 PROCEDURE — 94761 N-INVAS EAR/PLS OXIMETRY MLT: CPT

## 2022-05-02 PROCEDURE — 6360000002 HC RX W HCPCS: Performed by: INTERNAL MEDICINE

## 2022-05-02 PROCEDURE — 80076 HEPATIC FUNCTION PANEL: CPT

## 2022-05-02 PROCEDURE — 6370000000 HC RX 637 (ALT 250 FOR IP): Performed by: INTERNAL MEDICINE

## 2022-05-02 PROCEDURE — 2580000003 HC RX 258: Performed by: PHYSICIAN ASSISTANT

## 2022-05-02 PROCEDURE — 85025 COMPLETE CBC W/AUTO DIFF WBC: CPT

## 2022-05-02 PROCEDURE — 1280000000 HC REHAB R&B

## 2022-05-02 PROCEDURE — 99231 SBSQ HOSP IP/OBS SF/LOW 25: CPT | Performed by: SPECIALIST

## 2022-05-02 PROCEDURE — 87635 SARS-COV-2 COVID-19 AMP PRB: CPT

## 2022-05-02 PROCEDURE — 2500000003 HC RX 250 WO HCPCS: Performed by: FAMILY MEDICINE

## 2022-05-02 PROCEDURE — 99223 1ST HOSP IP/OBS HIGH 75: CPT | Performed by: PHYSICAL MEDICINE & REHABILITATION

## 2022-05-02 PROCEDURE — 6370000000 HC RX 637 (ALT 250 FOR IP): Performed by: HOSPITALIST

## 2022-05-02 PROCEDURE — 36415 COLL VENOUS BLD VENIPUNCTURE: CPT

## 2022-05-02 PROCEDURE — 6370000000 HC RX 637 (ALT 250 FOR IP): Performed by: FAMILY MEDICINE

## 2022-05-02 PROCEDURE — 99232 SBSQ HOSP IP/OBS MODERATE 35: CPT | Performed by: INTERNAL MEDICINE

## 2022-05-02 PROCEDURE — 80048 BASIC METABOLIC PNL TOTAL CA: CPT

## 2022-05-02 PROCEDURE — 6370000000 HC RX 637 (ALT 250 FOR IP): Performed by: SPECIALIST

## 2022-05-02 RX ORDER — PREDNISOLONE 15 MG/5 ML
40 SOLUTION, ORAL ORAL DAILY
Status: DISCONTINUED | OUTPATIENT
Start: 2022-05-03 | End: 2022-05-11 | Stop reason: HOSPADM

## 2022-05-02 RX ORDER — BISACODYL 10 MG
10 SUPPOSITORY, RECTAL RECTAL DAILY PRN
Status: DISCONTINUED | OUTPATIENT
Start: 2022-05-02 | End: 2022-05-11 | Stop reason: HOSPADM

## 2022-05-02 RX ORDER — AMLODIPINE BESYLATE 10 MG/1
10 TABLET ORAL DAILY
Status: CANCELLED | OUTPATIENT
Start: 2022-05-02

## 2022-05-02 RX ORDER — METHOCARBAMOL 750 MG/1
750 TABLET, FILM COATED ORAL 4 TIMES DAILY
Status: DISCONTINUED | OUTPATIENT
Start: 2022-05-02 | End: 2022-05-11 | Stop reason: HOSPADM

## 2022-05-02 RX ORDER — AMLODIPINE BESYLATE 10 MG/1
10 TABLET ORAL DAILY
Status: DISCONTINUED | OUTPATIENT
Start: 2022-05-03 | End: 2022-05-04

## 2022-05-02 RX ORDER — ENOXAPARIN SODIUM 100 MG/ML
30 INJECTION SUBCUTANEOUS 2 TIMES DAILY
Status: DISCONTINUED | OUTPATIENT
Start: 2022-05-02 | End: 2022-05-09

## 2022-05-02 RX ORDER — DIPHENHYDRAMINE HCL 25 MG
50 TABLET ORAL NIGHTLY PRN
Status: CANCELLED | OUTPATIENT
Start: 2022-05-02

## 2022-05-02 RX ORDER — SODIUM CHLORIDE 9 MG/ML
INJECTION, SOLUTION INTRAVENOUS PRN
Status: CANCELLED | OUTPATIENT
Start: 2022-05-02

## 2022-05-02 RX ORDER — SODIUM CHLORIDE 9 MG/ML
INJECTION, SOLUTION INTRAVENOUS PRN
Status: DISCONTINUED | OUTPATIENT
Start: 2022-05-02 | End: 2022-05-05 | Stop reason: SDUPTHER

## 2022-05-02 RX ORDER — PROCHLORPERAZINE EDISYLATE 5 MG/ML
10 INJECTION INTRAMUSCULAR; INTRAVENOUS EVERY 6 HOURS PRN
Status: CANCELLED | OUTPATIENT
Start: 2022-05-02

## 2022-05-02 RX ORDER — ALBUTEROL SULFATE 90 UG/1
2 AEROSOL, METERED RESPIRATORY (INHALATION) EVERY 6 HOURS PRN
Status: CANCELLED | OUTPATIENT
Start: 2022-05-02

## 2022-05-02 RX ORDER — OXYCODONE HYDROCHLORIDE 5 MG/1
5 TABLET ORAL EVERY 6 HOURS PRN
Status: DISCONTINUED | OUTPATIENT
Start: 2022-05-02 | End: 2022-05-11 | Stop reason: HOSPADM

## 2022-05-02 RX ORDER — SODIUM CHLORIDE 0.9 % (FLUSH) 0.9 %
5-40 SYRINGE (ML) INJECTION PRN
Status: DISCONTINUED | OUTPATIENT
Start: 2022-05-02 | End: 2022-05-11 | Stop reason: HOSPADM

## 2022-05-02 RX ORDER — ATENOLOL 25 MG/1
50 TABLET ORAL DAILY
Status: CANCELLED | OUTPATIENT
Start: 2022-05-02

## 2022-05-02 RX ORDER — NALOXONE HYDROCHLORIDE 0.4 MG/ML
0.4 INJECTION, SOLUTION INTRAMUSCULAR; INTRAVENOUS; SUBCUTANEOUS PRN
Status: CANCELLED | OUTPATIENT
Start: 2022-05-02

## 2022-05-02 RX ORDER — OXYCODONE HYDROCHLORIDE 5 MG/1
5 TABLET ORAL EVERY 6 HOURS PRN
Status: CANCELLED | OUTPATIENT
Start: 2022-05-02

## 2022-05-02 RX ORDER — METHOCARBAMOL 500 MG/1
750 TABLET, FILM COATED ORAL 4 TIMES DAILY
Status: CANCELLED | OUTPATIENT
Start: 2022-05-02

## 2022-05-02 RX ORDER — PROCHLORPERAZINE EDISYLATE 5 MG/ML
10 INJECTION INTRAMUSCULAR; INTRAVENOUS EVERY 6 HOURS PRN
Status: DISCONTINUED | OUTPATIENT
Start: 2022-05-02 | End: 2022-05-10

## 2022-05-02 RX ORDER — SODIUM CHLORIDE 9 MG/ML
INJECTION, SOLUTION INTRAVENOUS PRN
Status: DISCONTINUED | OUTPATIENT
Start: 2022-05-02 | End: 2022-05-11 | Stop reason: HOSPADM

## 2022-05-02 RX ORDER — SODIUM CHLORIDE 0.9 % (FLUSH) 0.9 %
5-40 SYRINGE (ML) INJECTION PRN
Status: CANCELLED | OUTPATIENT
Start: 2022-05-02

## 2022-05-02 RX ORDER — DIPHENHYDRAMINE HCL 25 MG
50 TABLET ORAL NIGHTLY PRN
Status: DISCONTINUED | OUTPATIENT
Start: 2022-05-02 | End: 2022-05-11 | Stop reason: HOSPADM

## 2022-05-02 RX ORDER — PREDNISOLONE 15 MG/5 ML
40 SOLUTION, ORAL ORAL DAILY
Status: CANCELLED | OUTPATIENT
Start: 2022-05-02

## 2022-05-02 RX ORDER — POLYETHYLENE GLYCOL 3350 17 G/17G
17 POWDER, FOR SOLUTION ORAL DAILY PRN
Status: CANCELLED | OUTPATIENT
Start: 2022-05-02

## 2022-05-02 RX ORDER — POLYETHYLENE GLYCOL 3350 17 G/17G
17 POWDER, FOR SOLUTION ORAL DAILY PRN
Status: DISCONTINUED | OUTPATIENT
Start: 2022-05-02 | End: 2022-05-02

## 2022-05-02 RX ORDER — ATENOLOL 25 MG/1
50 TABLET ORAL DAILY
Status: DISCONTINUED | OUTPATIENT
Start: 2022-05-03 | End: 2022-05-11 | Stop reason: HOSPADM

## 2022-05-02 RX ORDER — NALOXONE HYDROCHLORIDE 0.4 MG/ML
0.4 INJECTION, SOLUTION INTRAMUSCULAR; INTRAVENOUS; SUBCUTANEOUS PRN
Status: DISCONTINUED | OUTPATIENT
Start: 2022-05-02 | End: 2022-05-10

## 2022-05-02 RX ORDER — ENOXAPARIN SODIUM 100 MG/ML
30 INJECTION SUBCUTANEOUS 2 TIMES DAILY
Status: CANCELLED | OUTPATIENT
Start: 2022-05-02

## 2022-05-02 RX ORDER — ALBUTEROL SULFATE 90 UG/1
2 AEROSOL, METERED RESPIRATORY (INHALATION) EVERY 6 HOURS PRN
Status: DISCONTINUED | OUTPATIENT
Start: 2022-05-02 | End: 2022-05-11 | Stop reason: HOSPADM

## 2022-05-02 RX ORDER — POLYETHYLENE GLYCOL 3350 17 G/17G
17 POWDER, FOR SOLUTION ORAL DAILY PRN
Status: DISCONTINUED | OUTPATIENT
Start: 2022-05-02 | End: 2022-05-11 | Stop reason: HOSPADM

## 2022-05-02 RX ADMIN — OXYCODONE HYDROCHLORIDE 5 MG: 5 TABLET ORAL at 06:13

## 2022-05-02 RX ADMIN — RIFAXIMIN 550 MG: 550 TABLET ORAL at 21:42

## 2022-05-02 RX ADMIN — METHOCARBAMOL TABLETS 750 MG: 500 TABLET, COATED ORAL at 13:32

## 2022-05-02 RX ADMIN — ENOXAPARIN SODIUM 30 MG: 100 INJECTION SUBCUTANEOUS at 08:31

## 2022-05-02 RX ADMIN — OXYCODONE HYDROCHLORIDE 5 MG: 5 TABLET ORAL at 21:42

## 2022-05-02 RX ADMIN — METHOCARBAMOL TABLETS 750 MG: 500 TABLET, COATED ORAL at 08:35

## 2022-05-02 RX ADMIN — METHOCARBAMOL TABLETS 750 MG: 750 TABLET, COATED ORAL at 21:42

## 2022-05-02 RX ADMIN — SODIUM CHLORIDE, PRESERVATIVE FREE 10 ML: 5 INJECTION INTRAVENOUS at 08:38

## 2022-05-02 RX ADMIN — ATENOLOL 50 MG: 25 TABLET ORAL at 08:33

## 2022-05-02 RX ADMIN — RIFAXIMIN 550 MG: 550 TABLET ORAL at 08:33

## 2022-05-02 RX ADMIN — METOPROLOL TARTRATE 5 MG: 5 INJECTION INTRAVENOUS at 06:13

## 2022-05-02 RX ADMIN — Medication 40 MG: at 08:41

## 2022-05-02 RX ADMIN — OXYCODONE HYDROCHLORIDE 5 MG: 5 TABLET ORAL at 13:32

## 2022-05-02 RX ADMIN — THIAMINE HYDROCHLORIDE 100 MG: 100 INJECTION, SOLUTION INTRAMUSCULAR; INTRAVENOUS at 08:38

## 2022-05-02 RX ADMIN — ENOXAPARIN SODIUM 30 MG: 100 INJECTION SUBCUTANEOUS at 21:41

## 2022-05-02 RX ADMIN — AMLODIPINE BESYLATE 10 MG: 10 TABLET ORAL at 08:35

## 2022-05-02 ASSESSMENT — PAIN DESCRIPTION - PAIN TYPE
TYPE: ACUTE PAIN
TYPE: ACUTE PAIN

## 2022-05-02 ASSESSMENT — PAIN SCALES - GENERAL
PAINLEVEL_OUTOF10: 5
PAINLEVEL_OUTOF10: 7
PAINLEVEL_OUTOF10: 9
PAINLEVEL_OUTOF10: 5
PAINLEVEL_OUTOF10: 7
PAINLEVEL_OUTOF10: 7

## 2022-05-02 ASSESSMENT — PAIN DESCRIPTION - LOCATION
LOCATION: LEG

## 2022-05-02 ASSESSMENT — PAIN DESCRIPTION - ORIENTATION
ORIENTATION: RIGHT
ORIENTATION: RIGHT;UPPER
ORIENTATION: RIGHT
ORIENTATION: RIGHT

## 2022-05-02 ASSESSMENT — LIFESTYLE VARIABLES
HOW MANY STANDARD DRINKS CONTAINING ALCOHOL DO YOU HAVE ON A TYPICAL DAY: 3 OR 4
HOW OFTEN DO YOU HAVE A DRINK CONTAINING ALCOHOL: 4 OR MORE TIMES A WEEK

## 2022-05-02 ASSESSMENT — PAIN DESCRIPTION - ONSET: ONSET: ON-GOING

## 2022-05-02 ASSESSMENT — PAIN DESCRIPTION - FREQUENCY: FREQUENCY: CONTINUOUS

## 2022-05-02 ASSESSMENT — PAIN DESCRIPTION - DESCRIPTORS
DESCRIPTORS: ACHING;BURNING;SHOOTING
DESCRIPTORS: DISCOMFORT;SORE

## 2022-05-02 ASSESSMENT — PAIN SCALES - WONG BAKER: WONGBAKER_NUMERICALRESPONSE: 4

## 2022-05-02 NOTE — PLAN OF CARE
ARU Interdisciplinary Plan of Care (IPOC)  [facility]  [address] [facility], [address]  [phone]  Fax: [phone]        Criss Oliveros    : 1980  Acct #: [de-identified]  MRN: [id number]   PHYSICIAN:  Vasile Brown MD  Primary Active Problems:   Active Hospital Problems    Diagnosis Date Noted    Generalized weakness [R53.1]      Priority: Medium    Gait disturbance [R26.9]      Priority: Medium    Alcoholic hepatitis with ascites [K70.11]      Priority: Medium    Uncontrolled pain [R52]      Priority: Medium    Traumatic hematoma of buttock [S30. 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     Priority: Medium       Rehabilitation Diagnosis:     Critical illness myopathy [G72.81]  Critical illness myopathy [G72.81]          CARE PLAN     NURSING:  Criss Oliveros while on this unit will:      Bowel and Bladder   [x] Be continent of bowel and bladder      [x] Have an adequate number of bowel movements   [] Urinate with no urinary retention >300ml in bladder   [] Bladder Scan: (details)   [] Complete bladder protocol with pierce removal   [] Initiate Bladder Program to toilet every ___ hours   [] Initiate Bowel Program to toilet every ___hours   [] Bladder training    [] Bowel training  Pulmonary   [x] Maintain O2 SATs at 92% or greater  Pain Management   [x] Have pain managed while on ARU        [] Be pain free by discharge    [x] Medication Management and Education  Maintenance of Skin Integrity/Wound Management   [x] Have no skin breakdown while on ARU   [] Have improved skin integrity via wound measurements   [x] Have no signs/symptoms of infection via infection protection and monitoring at the          wound site  Fall Prevention   [x] Be free from injury during hospitalization via fall prevention measures     [x] Disease management and Education  Precautions   [x] Weight Bearing Precautions   [] Swallowing Precautions   [x] Monitoring of Risks of Complications   [x] DVT Prophylaxis    [x] Fluid/electrolyte/Nutrition Management    [] Complete education with patient/family with understanding demonstrated for          in-room safety with transfers to bed, toilet, wheelchair, shower as well as                bathroom activities and hygiene. [] Adjustment   [] Other:   Nursing interventions may include bowel/bladder training, education for medical assistive devices, medication education, O2 saturation management, energy conservation, stress management techniques, fall prevention, alarms protocol, seating and positioning, skin/wound care, pressure relief instruction,dressing changes,  infection protection, DVT prophylaxis, and/or assistance with in room safety with transfers to bed, toilet, wheelchair, shower as well as bathroom activities and hygiene.      Patient/caregiver education for:   [x] Disease/sustained injury/management      [x] Medication Use   [] Surgical intervention   [x] Safety/Precautions   [x] Body mechanics and or joint protection   [x] Health maintenance         PHYSICAL THERAPY:  Goals:                               Long Term Goals  Time Frame for Long term goals : 7 days STG=LTG  Long term goal 1: pt will complete bed mobility with mod I  Long term goal 2: Pt will complete sit to stand, pivot and car transfers with mod I  Long term goal 3: Pt will ambulate 150' with 2ww on level surfaces and 10' on unlevel surfaces with mod I  Long term goal 4: Pt will ascend/descend curb step with 2ww and 12 steps with rail with mod I  Long term goal 5: Pt will retrieve light object from ground with 2ww and reacher with mod I  These goals were reviewed with this patient at the time of assessment and Desiree Dee is in agreement. Plan of Care: Pt to be seen 5 days per week for a minimum of 60 minutes for 7 days. Current Treatment Recommendations: Strengthening,ROM,Balance training,Functional mobility training,Transfer training,Endurance training,ADL/Self-care training,Gait training,Pain management,Cognitive reorientation,Safety education & training,Patient/Caregiver education & training,Equipment evaluation, education, & procurement,Therapeutic activities,Positioning,Modalities,Stair training,IADL training,Neuromuscular re-education,Home exercise program community reintegration,animal assisted therapy, and concurrent/group therapy.     PT IRF-EVA scores and goals for initial assessment:   Bed Mobility:   Sit to Lying  Assistance Needed: Supervision or touching assistance  Comment: supervision with cues for technique  CARE Score: 4  Discharge Goal: Independent  Roll Left and Right  Assistance Needed: Independent  CARE Score: 6  Discharge Goal: Independent  Lying to Sitting on Side of Bed  Assistance Needed: Independent  CARE Score: 6  Discharge Goal: Independent    Transfers:    Sit to Stand  Assistance Needed: Partial/moderate assistance  Comment: min assist 50% of ttime due to R knee buckle/unsteadiness  CARE Score: 3  Discharge Goal: Independent  Chair/Bed-to-Chair Transfer  Assistance Needed: Partial/moderate assistance  Comment: min assist with 2ww, control of RLE  CARE Score: 3  Discharge Goal: Independent     Car Transfer  Assistance Needed: Supervision or touching assistance  Comment: approach with 2ww  CARE Score: 4  Discharge Goal: Independent    Ambulation:    Walking Ability  Does the Patient Walk?: Yes     Walk 10 Feet  Assistance Needed: Partial/moderate assistance  Comment: min assist with 2ww, R toe drag intially then changing to steppage gait with inconsistent step pattern/width/length using 3-4 point gait, R knee intability in stance  CARE Score: 3  Discharge Goal: Independent     Walk 50 Feet with Two Turns  Assistance Needed: Partial/moderate assistance  Comment: min assist with 2ww, deviations as 10' with discontinuous sep and standing rests with increased distance  CARE Score: 3  Discharge Goal: Independent     Walk 150 Feet  Comment: 50' max distance tolerated due to LE muscle fatigue  Discharge Goal: Independent     Walking 10 Feet on Uneven Surfaces  Assistance Needed: Partial/moderate assistance  Comment: min assist with 2ww  CARE Score: 3  Discharge Goal: Independent     1 Step (Curb)  Assistance Needed: Partial/moderate assistance  Comment: min assist for control of RLE  CARE Score: 3  Discharge Goal: Independent     4 Steps  Assistance Needed: Partial/moderate assistance  Comment: min assist for control of RLE, cues or sequence  CARE Score: 3  Discharge Goal: Independent     12 Steps  Assistance Needed: Partial/moderate assistance  Comment: one instance of RLE knee buckling on descent, min assist overall  CARE Score: 3  Discharge Goal: Independent    Gait Deviations: []None []Slow heather  [] Increased JOSEE  [] Staggers []Deviated Path  [] Decreased step length  [] Decreased step height  []Decreased arm swing  [] Shuffles  [] Decreased head and trunk rotation  []other:        Wheelchair:  w/c Ability: Wheelchair Ability  Uses a Wheelchair and/or Scooter?: No                Balance:        Object: Picking Up Object  Assistance Needed: Supervision or touching assistance  Comment: supervision with 2ww and reacher  CARE Score: 4  Discharge Goal: Independent  OCCUPATIONAL THERAPY:  Goals:             Short Term Goals  Time Frame for Short term goals: STGs=LTGs :  Long Term Goals  Time Frame for Long term goals : 7-10 days or until d/c. Long Term Goal 1: Pt will complete grooming tasks c Ind. Long Term Goal 2: Pt will complete total body bathing c Mod I and AE PRN. Long Term Goal 3: Pt will complete UB dressing c Ind.   Long Term Goal 4: Pt will complete LB dressing c Mod I and AE PRN. Long Term Goal 5: Pt will doff/don footwear c Mod I and AE PRN. Additional Goals?: Yes  Long Term Goal 6: Pt will complete toileting c Mod I.  Long Term Goal 7: Pt will perform functional transfers (bed, chair, toilet, shower) c DME PRN and Mod I.  Long Term Goal 8: Pt will perform therex/therax to facilitate an increase in edurance/ax tolerance (c emphasis on dynamic standing balance/tolerance > 10 mins) c Mod I.  Long Term Goal 9: Pt will complete home management tasks c Mod I. :    These goals were reviewed with this patient at the time of assessment and Avel Vivas is in agreement    Plan of Care:  Pt to be seen 5 days per week for a minimum of 60 minutes for 10 days. Plan  Times per Day: Daily  Current Treatment Recommendations: Strengthening,Balance training,Functional mobility training,Endurance training,Neuromuscular re-education,Safety education & training,Patient/Caregiver education & training,Equipment evaluation, education, & procurement,Self-Care / ADL,Home management training         cognitive training, home management, energy conservation training, community reintegration, splint fabrication, patient/caregiver education and training, animal assisted therapy, and concurrent and/or group therapy. OT IRF-EVA scores and goals for initial assessment:    ADLs:    Eating: Eating  Assistance Needed: Independent  Comment: Pt reports Ind c meal, able to open all packages/containers  CARE Score: 6  Discharge Goal: Independent       Oral Hygiene: Oral Hygiene  Assistance Needed: Independent  Comment: Mod I seated. CARE Score: 6  Discharge Goal: Independent    UB/LB Bathing: Shower/Bathe Self  Assistance Needed: Partial/moderate assistance  Comment: Pt required assist to wash bottom of R foot. Pt able to complete the rest of full shower c CG/SBA.   CARE Score: 3  Discharge Goal: Independent    UB Dressing: Upper Body Dressing  Assistance Needed: Setup or clean-up assistance  Comment: setup assist to doff/don pullover tshirt. CARE Score: 5  Discharge Goal: Independent         LB Dressing: Lower Body Dressing  Assistance Needed: Partial/moderate assistance  Comment: Pt required assist to thread RLE into underwear and pants but able to complete the rest of doffing/donning c CGA. CARE Score: 3  Discharge Goal: Independent    Donning and Nunez Footwear: Putting On/Taking Off Footwear  Assistance Needed: Partial/moderate assistance  Comment: Pt able to doff socks and don L sock, however required therapist don R sock. CARE Score: 3  Discharge Goal: Independent      Toiletin [address] needed: Supervision or touching assistance  Comment: Pt reports he is able to complete perineal hygiene c someone standing next to him. CARE Score: 4  Discharge Goal: Independent      Toilet Transfers: Toilet Transfer  Assistance needed: Supervision or touching assistance  Comment: CGA c use of grab bars and 2WW. CARE Score: 4  Discharge Goal: Independent      SPEECH THERAPY: (If ordered)  Plan of Care and Goals:   LTG                                                            LTG:                           Treatments may include speech/language/communication therapy, cognitive training, animal assisted therapy, group therapy, education, and/or dysphagia therapy based on the above goals. Co-treats where appropriate with PT or OT to facilitate patient goals in functional tasks. These goals were reviewed with this patient at the time of assessment and Feliciano Mckay is in agreement. CASE MANAGEMENT:  Goals:   Assist patient/family with discharge planning, patient/family counseling, assistance in obtaining recommended equipment and other services, and coordination with insurance during ARU stay.   Patient Goals: Return to maximum level of independent function    Activities Prior to Admit:   Homemaking Responsibilities: Yes  Active : Yes  Mode of Transportation: SUV,Truck  Occupation: On disability            Intensity of Therapy  Flores Mathew will be seen a minimum of 3 hours of therapy per day/a minimum of 5 out of 7 days per week. [] In this rare instance due to the nature of this patient's medical involvement, this patient will be seen 15 hours per week (900 minutes within a 7 day period). Treatments may include therapeutic exercises, gait training, neuromuscular re-ed, transfer training, community reintegration, bed mobility, w/c mobility and training, self care, home mgmt, cognitive training, energy conservation,dysphagia tx, speech/language/communication therapy, group therapy, and patient/family education. In addition, dietician/nutritionist may monitor calorie count as well as intake and collaboratively work with SLP on dietary upgrades. Neuropsychology/Psychology may evaluate and provide necessary support. Group therapy as appropriate to facilitate improved endurance, STR, COORD, function, safety, transfers, awareness and insight into deficits, problem solving, memory, and social interaction and engagement. Medical issues being managed closely and that require 24 hour availability of a physician:   [] Swallowing Precautions                                     [] Weight bearing precautions   [] Wound Care                             [x] Infection Prevention   [x] DVT Prophylaxis/assessment              [x] Monitoring for complications    [x] Fall Precautions/Prevention                         [x] Fluid/Electrolyte/Nutrition Balance   [] Voice Protection                           [x] Medication Management   [x] Respiratory                   [x] Pain Mgmt   [x] Bowel/Bladder Fx    Medical Prognosis: [] Good  [x] Fair    [] Guarded   Total expected IRF days 14                                            Physician anticipated functional outcomes:  FWW and HHC OT/PT and supervision.   Rehab Goals:   [] Return to premorbid function of_______________________________.    [] Independent   [] Mod I  [x] Supervision  [] CGA   [] Min A   [] Mod A  Level for ambulation []without assistive device  [x] with assistive device        [] Independent   [] Mod I  [x] Supervision [] CGA   [] Min A   [] Mod A  Level for transfers []without assistive device  [x] with assistive device         [] Independent   [] Mod I  [x] Supervision [] CGA   [] Min A   [] Mod A Level with ADL's []without assistive device   [x] with assistive device     ___________________     Level with cognitive skills requiring [] No assist [x] Supervision  [] Active Assist/Cues     [] Maximize level of mobility and ADL's to decrease burden on caregiver    IPOC brief synthesis of Preadmission Screen, Post-Admission Evaluation, and Therapy Evaluations: Acute inpatient rehabilitation with occupational and physical therapy 180 minutes 5 out of every 7 days. Will address basic and  advancing mobility with self-care instruction and adaptive equipment training. Caregiver education will be offered. Expected length of stay  prior to a supervised level of function for discharge home with a walker and [facility] OT/PT is 12 to 14 days.     Additional recommendation:     1. Critical illness myopathy with gait disturbance: The patient requires daily occupational and physical therapy. He needs adaptive equipment training, caregiver education and possibly bowel and bladder retraining. We must cautiously manage his pain while providing nutritional support and monitoring him for further complications of alcohol withdrawal.  He must monitor his renal function, treat his blood pressure and provide DVT prophylaxis. 2. DVT prophylaxis: Lovenox 30 mg SQ twice daily. I must monitor his hemoglobin and platelet count while on this medication. Weightbearing activities will be pursued daily. GI prophylaxis is available. 3. Alcoholic hepatitis: The GI consultant has started prednisolone 40 mg daily for 1 month.   He is on Xifaxan. Encouraging consistent oral nutrition and hydration. Abstinence from alcohol with community resources available. From the gastroenterologist:      1) severe alcoholic hepatitis with probable superimposed medication hepatotoxicity- improving                   Plan:              3) P gave him a schedule for the prednisolone:              -28 MG/D X 4 WEEKS              -24 MG/D X 1 WEEK              -65 MG/D X 1 WEEK              -79 MG/D X 1 WEEK              -5 MG/D X 1 WEEK THEN STOP              2) suggested follow up visit in my office in 1-2 months              4. Acute kidney injury with ATN: Periodic monitoring of his chemistries. Encouraging oral hydration. Avoiding nephrotoxic medications when possible. 5. Gluteal hematoma: Roxicodone and Robaxin. Avoiding acetaminophen. Cautious DVT prophylaxis. Monitoring him for signs of further bleeding into the traumatized area. Limb elevation and cryotherapy. Progressive mobilization. 6. Alcohol abuse with withdrawal: Treating him in a calm and consistent environment. Encouraging alcohol abstinence. Providing short acting Benzodiazepines as needed. 7. Hypertension: Tenormin and Norvasc for blood pressure regulation. Vital signs are checked at rest and with activity. Target systolic blood pressure is 120-140. .     Anticipated discharge destination:    [] Home Independently   [x] Home with supervision    []SNF     [] Other       This plan has been reviewed with me in a language I understand.  I have had the opportunity to include my input with my therapy team.    ________________________________________________   ______________________  Patient/Significant Other      Date    I have reviewed this initial plan of care and agree with its contents:    Title   Name    Date    Time    Physician: Kusum Cuevas MD 5/4/2022 8:36 AM    Case Mgmt: NICKY Barrow, LSW 05/03/22 1500    OT: ALEC Jiménez, FERNR/L 05/03/22 1616    PT: Nadia Rolle, PT 5/3/22, 66 91 [phone]    RN: Anthony Perales RN    ST:    Dietician:     ADMIT DATE:5/2/2022

## 2022-05-02 NOTE — DISCHARGE SUMMARY
Discharge Summary    Name:  Arely Sclaes /Age/Sex: 1980  (76 y.o. male)   MRN & CSN:  [id number] & [id number] Admission Date/Time: 2022 12:25 AM   Attending:  Adrian Allred MD Discharging Physician: Adrian Allred MD     Hospital Course:   Arely Scales is a 39 y.o.  male COVID-19 recovered, alcohol abuse who presented to the ED on 2027 account of poor energy, poor appetite, poor balance, and fall leading to Intramuscular hematoma  On arrival to the hospital, patient had abnormal LFTs and was admitted for impending alcohol withdrawal.  Per H&P,At presentation patient was noted to have /81, HR 84, RR 20, temp 97.8, saturating 98% on room air.  Lab work significant for sodium 131, CO2 18, anion gap 20, lactic acid 9.1, random glucose 189, albumin 2.6, , ALT 43, , total bilirubin 19, lipase 45, CK 37.  PT 15.6, INR 1.21.  Hepatitis panel negative.  CT abdomen/pelvis-hematoma measuring 11.1 x 4.2 x 16.7 cm between the right gluteus medius and naomi muscle.  Diffuse enlargement of the adductor muscles of the right lower extremity related to edema, severe diffuse fatty infiltration of the liver, gallbladder wall thickening. Alcohol withdrawal and gait imbalance was most likely the etiology for fall. Patient was managed for alcohol withdrawal on Precedex gtt. This is now resolved. Patient was also managed for acute severe alcohol hepatitis with a high Madrey's discriminant function. T bili peaked at 22.4 and is trended down to 8.6 today on prednisolone 40 mg daily. He will continue this for at least 1 month. He will follow-up closely with GI. Patient had acute blood loss anemia in the right thigh, right gluteus medius and naomi as seen on CT.     Other issues managed include high anion gap metabolic acidosis, traumatic rhabdomyolysis (CK peaked at 3119), hyperbilirubinemia, transaminasemia, uncontrolled hypertension, KESHA, critical illness myopathy. Due to critical illness myopathy, ongoing ambulatory dysfunction, PT/OT recommends acute rehab at discharge. MRI thoracolumbar spine ruled out cord compression or myelopathy. The patient/family expressed appropriate understanding of and agreement with the discharge recommendations, medications, and plan. Consults this admission:  PHARMACY TO DOSE VANCOMYCIN  IP CONSULT TO GENERAL SURGERY  IP CONSULT TO GI  IP CONSULT TO HOSPITALIST  IP CONSULT TO SOCIAL WORK  IP CONSULT TO HEM/ONC  IP CONSULT TO UROLOGY  IP CONSULT TO IV TEAM  IP CONSULT TO PHYSICAL MEDICINE REHAB  IP CONSULT TO GI  IP CONSULT TO PHYSICAL MEDICINE REHAB    Discharge Instruction:   Follow up appointments: Physiatry  Primary care physician:  within 2 weeks    Diet:  regular diet   Activity: activity as tolerated  Disposition: Discharged to:   []Home, []HHC, []SNF, [x]Acute Rehab, []Hospice   Condition on discharge: Stable    Discharge Medications:        Medication List      STOP taking these medications    ALPRAZolam 0.25 MG tablet  Commonly known as: Xanax     cetirizine 10 MG tablet  Commonly known as: ZYRTEC     dicyclomine 10 MG capsule  Commonly known as: BENTYL     losartan-hydroCHLOROthiazide 50-12.5 MG per tablet  Commonly known as: HYZAAR     promethazine 25 MG tablet  Commonly known as: PHENERGAN        ASK your doctor about these medications    atenolol 50 MG tablet  Commonly known as: Tenormin  Take 1 tablet by mouth daily     zolpidem 5 MG tablet  Commonly known as: Ambien  Take 1 tablet by mouth nightly as needed for Sleep for up to 15 days. Ask about: Should I take this medication? Objective Findings at Discharge:   BP (!) 143/85   Pulse 64   Temp 98.9 °F (37.2 °C) (Axillary)   Resp 15   Ht 6' 3.98\" (1.93 m)   Wt 283 lb 15.2 oz (128.8 kg)   SpO2 95%   BMI 34.58 kg/m²            PHYSICAL EXAM  GEN:   Awake male, alert and oriented x3 in no apparent distress. Appears given age.   HEENT: Normal   RESP: Clear lung fields bilaterally. Symmetric chest movement while on room air  CVS: RRR, S1, S2  GI/: Abdomen is soft, nontender, no organomegaly. . Bowel sounds normal, rectal exam deferred. No CVA tenderness. MSK:   No gross joint deformities. No tenderness. Right thigh diffusely swollen, diffusely tender to palpation  SKIN:   Normal coloration, warm, dry. Ecchymotic right thigh more prominent in the medial aspect. Small area of ecchymosis and lumbar spine  NEURO: 3/5 power bilateral lower extremity. Right lower extremities hypoesthesia below the knees.   PSYCH:  Affect appropriate    BMP/CBC  Recent Labs     04/30/22  0630 05/01/22  0425 05/02/22  0216    137 138   K 3.5 4.2 3.7    100 99   CO2 28 24 27   BUN 11 12 11   CREATININE 0.4* 0.4* 0.5*   WBC 14.8* 14.1* 14.1*   HCT 31.5* 29.9* 30.1*    295 276       Discharge Time of 38 minutes    Electronically signed by Adrian Allred MD on 5/2/2022 at 2:18 PM

## 2022-05-02 NOTE — PROGRESS NOTES
Hematology/Oncology  Progress Note    HISTORY OF PRESENT ILLNESS:    Evonne Moreno is a 39 y.o. male with significant past medical history of EtOH abuse who presents with above on 4/18/2022. He was jaundiced and found to have intramuscular hematoma of the right gluteus muscles. CT abdomen and pelvis 4/18/2022:   1. There is a hematoma measuring 11.1 x 4.2 x 16.7 cm between the right gluteus medius and naomi muscles.  There is diffuse enlargement of the adductor muscles of the right lower extremity, likely related to edema. 2. Severe diffuse fatty infiltration of the liver.  Gallbladder wall thickening is seen.  This could be related to chronic liver disease.  If there is concern for acute cholecystitis, a right upper quadrant ultrasound can be obtained. CT right femur 4/18/2022:  Ill-defined hypodensity in the right gluteus medius muscle and ill-defined  hyperdensity in the right gluteus naomi muscle.  These may represent hematomas in different phases of evolution.  However simple and complex abscesses cannot be excluded.  Clinical correlation and further evaluation by MRI with and without IV contrast recommended. MRI abdomen 4/18/2022:  Markedly limited exam due to patient condition. Mild gallbladder wall thickening, nonspecific, in the setting of underlying liver disease.  No biliary duct dilation. Repeat CT abdomen 4/18/2022:  1. Severe diffuse fatty infiltration of the liver. 2. Hematoma involving the right gluteus medius and naomi musculature without significant change.  There is also a partially visualized hematoma surrounding the hamstring musculature on the right.  Continued follow-up is recommended until resolution. US abdomen 4/21/2022:  1. Limited exam.  2. Severe diffuse fatty infiltration of the liver.   3. Sludge is seen within the gallbladder lumen.  The gallbladder wall is  thickened at 6 mm however there is no evidence of a positive sonographic Reynoso sign.  Findings could be related to chronic liver disease. 4. No common bile duct dilatation. GI and surgeon were consulted. CBC on 4/5/2022 was grossly unremarkable with Hg 15.7 and .1. B-12 >2000. ALk phos 539, , , bilirubin 17.1. On admission 4/18/2022 WBC was 19.8, Hg 10.1, .1 and plt 391. ANC was 15.2. Ferritin ws 1266, TIBC <195, saturation <91. Iron 178. Hapto <10. TB was 18.8, direct 15.6 and indirect 3.2. LDH was 327. B 12 > 2000. SHAUN was negative. He received PRBC on 4/202/2022. Hg was stable at 8.5 on 4/26/2022. TB was 13.2, direct was 9.8.    4/29/2022 labs showed improvement of bili and Hg. Hg was 8.6, WBC 12 and plt 243. No signs of active bleeding. He is awake and alert this morning. Denied any acute complaint. He has been doing PT/OT. Bilirubin on April 30, 2022 was 10.7. Direct bilirubin was 7.0. On May 2, 2022 WBC was 14.1, hemoglobin 9.2, platelet 217. Agreed with Dr Kevin Pérez to continue with steroid, prednisolone 40 mg/d for one month then taper to d/c over one month. PHYSICAL EXAM:      Vitals:    BP (!) 166/88   Pulse 68   Temp 99.3 °F (37.4 °C) (Oral)   Resp 11   Ht 6' 3.98\" (1.93 m)   Wt 283 lb 15.2 oz (128.8 kg)   SpO2 97%   BMI 34.58 kg/m²     CONSTITUTIONAL:  Awake and alert. No acute distress. EYES:  pale, extra-ocular muscles intact and icteric sclera  NECK:  supple, symmetrical, trachea midline and no lymphadenopathy  LUNGS:  clear to auscultation, no crackles or wheezing  CARDIOVASCULAR:  normal S1 and S2 and no murmur noted  ABDOMEN: soft, non-distended, non-tender. Bowel sounds present  MUSCULOSKELETAL able to move all extremities. NEUROLOGIC: CN II-XII  grossly intact. SKIN: No petechial rash. Warm.     DATA:    Labs:  General Labs:    CBC with Differential:    Lab Results   Component Value Date    WBC 14.1 05/02/2022    RBC 2.81 05/02/2022    HGB 9.2 05/02/2022    HCT 30.1 05/02/2022     05/02/2022    .1 05/02/2022 MCH 32.7 05/02/2022    MCHC 30.6 05/02/2022    RDW 24.0 05/02/2022    NRBC 3 04/23/2022    SEGSPCT 76.9 05/02/2022    BANDSPCT 10 04/26/2022    LYMPHOPCT 12.5 05/02/2022    MONOPCT 8.5 05/02/2022    MYELOPCT 1 04/26/2022    BASOPCT 0.2 05/02/2022    MONOSABS 1.2 05/02/2022    LYMPHSABS 1.8 05/02/2022    EOSABS 0.1 05/02/2022    BASOSABS 0.0 05/02/2022    DIFFTYPE AUTOMATED DIFFERENTIAL 05/02/2022     CMP:    Lab Results   Component Value Date     05/02/2022    K 3.7 05/02/2022    CL 99 05/02/2022    CO2 27 05/02/2022    BUN 11 05/02/2022    CREATININE 0.5 05/02/2022    GFRAA >60 05/02/2022    AGRATIO 1.9 04/05/2022    LABGLOM >60 05/02/2022    GLUCOSE 103 05/02/2022    PROT 4.6 04/30/2022    LABALBU 2.9 04/30/2022    CALCIUM 9.5 05/02/2022    BILITOT 10.7 04/30/2022    ALKPHOS 228 04/30/2022     04/30/2022     04/30/2022     IMPRESSION/RECOMMENDATIONS:    1. Anemia is likely related to acute blood loss. He has intramuscular hematoma. Less likely hemolytic anemia. Hg was 8.4 on /24/2022 s/p transfusion. Hg on 4/29/2022 was 8.6. Bilirubin has continued to improve. Hemoglobin has been stable. He has reactive leukocytosis. 2. He has severe alcoholic hepatitis. He is on oral prednisolone. Bilirubin continues to improve. To continue with supportive care and PT/OT. If he is discharged, please follow up with me as outpatient.   Thanks

## 2022-05-02 NOTE — H&P
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 History of present illness: The patient is a [id number] alcoholic male who presented to our ED on 2022 after suffering a fall at home. The etiology was unclear but he had significant pain and trauma to his right buttock and thigh. He was found to have a large soft tissue hematoma in the right buttock with swelling extended down into the thigh. He had elevated transaminases and poorly controlled pain. He went through alcohol withdrawal and was found to have alcoholic hepatitis. Gastroenterology has placed him on prednisolone for 1 month. He has had fluctuating blood pressures. He has been unable to do his own toileting, transfers and self-care and he cannot return directly home at this time. He requires inpatient rehabilitation to address these issues. Review of systems: Right buttock and thigh pain. Right leg numbness. Poor appetite. Some positional dizziness. Poor sleep. The remainder of their review of systems was negative except as mentioned in the history of present illness.     Social History: Lives With: Spouse  Type of Home: House  Home Layout: Two level (Bathroom on 2nd floor)  Home Access: Stairs to enter without rails  Entrance Stairs - Number of Steps: 2  Bathroom Shower/Tub: Tub/Shower unit  Bathroom Toilet: Standard  Bathroom Accessibility: Accessible  Has the patient had two or more falls in the past year or any fall with injury in the past year?: Yes (2 falls due to lightheadedness and dizziness leading to current hospitalization)  ADL Assistance: Independent  Homemaking Assistance: Independent  Homemaking Responsibilities: Yes  Ambulation Assistance: Independent  Transfer Assistance: Independent  Active : Yes  Mode of Transportation: Car  Occupation: On disability    He reports that he has never smoked. He has never used smokeless tobacco. He reports current alcohol use of about 3.0 - 4.0 standard drinks of alcohol per week. He reports that he does not use drugs. Prior (baseline) level of function: Independent with mobility and self-care. Current level of function: Significant verbal cueing and 25% physical assistance for mobility and self-care.     Allergies:  No known allergies    Past Medical History:   Past Medical History:   Diagnosis Date    Acute hepatitis 01/2022    severe nausea w weakness, elevated LFTs noted in ED 2/2022- worsened also fr nsaids and alcohol consumption    Allergic rhinitis     Ankle pain     INACTIVE PROBLEM    Anxiety     COVID-19 02/06/2022    Epigastric abdominal pain     INACTIVE PROBLEM    Essential hypertension 01/08/2018    Family history of coronary artery disease     Herniated lumbar intervertebral disc     Insomnia     LBP (low back pain)     Marfan's syndrome with ocular manifestation     DX'D BY OPT[employer] DR CANTU AT 6Y0    Oral herpes         Past Surgical History:     Past Surgical History:   Procedure Laterality Date    BACK SURGERY      herniated ruptured disc 2008    TONSILLECTOMY      UMBILICAL HERNIA REPAIR N/A 7/2/2020    OPEN HERNIA UMBILICAL REPAIR performed by Harrison Bryant MD at [facility] OR       Current Medications:     Current Facility-Administered Medications:     0.9 % sodium chloride infusion, , IntraVENous, PRN, Tabitha Aden MD    sodium chloride flush 0.9 % injection 5-40 mL, 5-40 mL, IntraVENous, PRNTabitha MD    0.9 % sodium chloride infusion, , IntraVENous, PRNTabitha MD [place] polyethylene glycol (GLYCOLAX) packet 17 g, 17 g, Oral, Daily PRN, Tabitha Aden MD    0.9 % sodium chloride infusion, , IntraVENous, PRN, Tabitha Aden MD    albuterol sulfate  (90 Base) MCG/ACT inhaler 2 puff, 2 puff, Inhalation, Q6H PRN, Tabitha Aden MD    [START ON 5/3/2022] amLODIPine (NORVASC) tablet 10 mg, 10 mg, Oral, Daily, Tabitha Aden MD  [facility]  [START ON 5/3/2022] atenolol (TENORMIN) tablet 50 mg, 50 mg, Oral, Daily, Tabitha Aden MD    diphenhydrAMINE (BENADRYL) tablet 50 mg, 50 mg, Oral, Nightly PRN, Tabitha Aden MD    enoxaparin Sodium (LOVENOX) injection 30 mg, 30 mg, SubCUTAneous, BID, Tabitha Aden MD    methocarbamol (ROBAXIN) tablet 750 mg, 750 mg, Oral, 4x Daily, Tabitha Aden MD    naloxone [facility]) injection 0.4 mg, 0.4 mg, IntraVENous, PRN, Tabitha Aden MD    oxyCODONE (ROXICODONE) immediate release tablet 5 mg, 5 mg, Oral, Q6H PRN, Tabitha Aden MD    [START ON 5/3/2022] prednisoLONE (PRELONE) 15 MG/5ML syrup 40 mg, 40 mg, Oral, Daily, Tabitha Aden MD    prochlorperazine (COMPAZINE) injection 10 mg, 10 mg, IntraVENous, Q6H PRN, Tabitha Aden MD    rifAXIMin (XIFAXAN) tablet 550 mg, 550 mg, Oral, BID, Tabtiha Aden MD    Family History:   History reviewed. No pertinent family history. Exam:    Blood pressure 127/85, pulse 72, temperature 99.4 °F (37.4 °C), temperature source Oral, resp. rate 18, height 6' 4\" (1.93 m), weight 244 lb 11.4 oz (111 kg), SpO2 96 %. General: Patient was seen semirecumbent in bed. He was alert but oriented only x2. Poor insight and reasoning. In no distress. He did follow one-step commands but was easily distracted from the task at hand. HEENT: No signs of trauma to his head or neck. Full visual field. MMM. Neck supple. No JVD or adenopathy. Pulmonary: Shallow respirations without wheezes or rales. Symmetric air exchange.     Cardiac: Regular rate and rhythm. Abdomen: Patient's abdomen was soft and nondistended. Bowel sounds were present throughout. There was no rebound, guarding or masses noted. Spinal exam: Diffuse percussion tenderness from the dorsal apex to the sacrum. No gross malformations or skin breakdown. Some right lower flank bruising. Upper extremities: Slow and deliberate bring his hands up to meet mine. No deep tendon reflexes with tone is normal.  No tremor. 4/5 strength across the shoulders, elbows and wrist.    Lower extremities: Marked swelling throughout the right lower limb. There is significant bruising in the right groin, medial thigh and posterior thigh. No deep tendon reflexes. Give way with MMT with pain. Left hip knee and ankle MMT was 3+/5. Sensation was blunted about the feet. Sitting balance was fair. Standing balance was poor. Lab Results   Component Value Date    WBC 14.1 (H) 05/02/2022    HGB 9.2 (L) 05/02/2022    HCT 30.1 (L) 05/02/2022    .1 (H) 05/02/2022     05/02/2022     Lab Results   Component Value Date    INR 1.18 04/26/2022    INR 1.10 04/22/2022    INR 1.08 04/21/2022    PROTIME 15.2 (H) 04/26/2022    PROTIME 14.2 04/22/2022    PROTIME 14.0 04/21/2022     Lab Results   Component Value Date    CREATININE 0.5 (L) 05/02/2022    BUN 11 05/02/2022     05/02/2022    K 3.7 05/02/2022    CL 99 05/02/2022    CO2 27 05/02/2022     Lab Results   Component Value Date     (H) 05/02/2022     (H) 05/02/2022    ALKPHOS 199 (H) 05/02/2022    BILITOT 8.6 (H) 05/02/2022         Impression: 44-year-old male with a history of alcohol abuse, anxiety and hypertension who was suffered significant gait disturbance with a fall, right gluteal hematoma and generalized weakness along with alcoholic hepatitis. He has been diagnosed with a critical illness myopathy. He has required management of alcohol withdrawal this admission.      Strengths for the patient: Aury Sheikh age, independent habits prior to admission and a reasonably accessible home. Limitations/barriers for the patient: His alcoholism, the uncontrolled nature of his pain and his advanced liver disease. Recommendation: Acute inpatient rehabilitation with occupational and physical therapy 180 minutes 5 out of every 7 days. Will address basic and  advancing mobility with self-care instruction and adaptive equipment training. Caregiver education will be offered. Expected length of stay  prior to a supervised level of function for discharge home with a walker and C OT/PT is 12 to 14 days. Additional recommendation:    1. Critical illness myopathy with gait disturbance: The patient requires daily occupational and physical therapy. He needs adaptive equipment training, caregiver education and possibly bowel and bladder retraining. We must cautiously manage his pain while providing nutritional support and monitoring him for further complications of alcohol withdrawal.  He must monitor his renal function, treat his blood pressure and provide DVT prophylaxis. 2. DVT prophylaxis: Lovenox 30 mg SQ twice daily. I must monitor his hemoglobin and platelet count while on this medication. Weightbearing activities will be pursued daily. GI prophylaxis is available. 3. Alcoholic hepatitis: The GI consultant has started prednisolone 40 mg daily for 1 month. He is on Xifaxan. Encouraging consistent oral nutrition and hydration. Abstinence from alcohol with community resources available.   From the gastroenterologist:     1) severe alcoholic hepatitis with probable superimposed medication hepatotoxicity- improving                   Plan:              4) I gave him a schedule for the prednisolone:              -40 MG/D X 4 WEEKS              -30 MG/D X 1 WEEK              -20 MG/D X 1 WEEK              -10 MG/D X 1 WEEK              -5 MG/D X 1 WEEK THEN STOP              2) suggested follow up visit in my office in 1-2 months           4. Acute kidney injury with ATN: Periodic monitoring of his chemistries. Encouraging oral hydration. Avoiding nephrotoxic medications when possible. 5. Gluteal hematoma: Roxicodone and Robaxin. Avoiding acetaminophen. Cautious DVT prophylaxis. Monitoring him for signs of further bleeding into the traumatized area. Limb elevation and cryotherapy. Progressive mobilization. 6. Alcohol abuse with withdrawal: Treating him in a calm and consistent environment. Encouraging alcohol abstinence. Providing short acting Benzodiazepines as needed. 7. Hypertension: Tenormin and Norvasc for blood pressure regulation. Vital signs are checked at rest and with activity. Target systolic blood pressure is 120-140. .             I personally performed a history and physical on this patient within 24 hours of admission to the rehab unit. I have reviewed the preadmission screening and concur with its findings without change. A detailed plan of care will be established by hospital day 4 and I attest the patient is appropriate for inpatient rehabilitation at this time. I have compared the patient's current functional status noted during my history and physical with that of the preadmission screen and I have found no significant differences.

## 2022-05-02 NOTE — PROGRESS NOTES
4 Eyes Skin Assessment     NAME:  Sandip Moore  YOB: 1980  MEDICAL RECORD NUMBER:  [id number]    The patient is being assess for  Admission    I agree that 2 RN's have performed a thorough Head to Toe Skin Assessment on the patient. ALL assessment sites listed below have been assessed. Areas assessed by both nurses:    Head, Face, Ears, Shoulders, Back, Chest, Arms, Elbows, Hands, Sacrum. Buttock, Coccyx, Ischium and Legs. Feet and Heels        Does the Patient have a Wound?  No noted wound(s)       Joe Prevention initiated:  No   Wound Care Orders initiated:  No    Pressure Injury (Stage 3,4, Unstageable, DTI, NWPT, and Complex wounds) if present place consult order under [de-identified] No    New and Established Ostomies if present place consult order under : No      Nurse 1 eSignature:   Bran Carlisle RN     **SHARE this note so that the co-signing nurse is able to place an eSignature**    Nurse 2 eSignature:   Cherelle Velez RN

## 2022-05-02 NOTE — PROGRESS NOTES
Continues to improve  Abdomen soft, non-tender, non-distended    Impression:               2) severe alcoholic hepatitis with probable superimposed medication hepatotoxicity- improving                   Plan:              1) I gave him a schedule for the prednisolone:   -40 MG/D X 4 WEEKS   -30 MG/D X 1 WEEK   -20 MG/D X 1 WEEK   -10 MG/D X 1 WEEK   -5 MG/D X 1 WEEK THEN STOP              2) suggested follow up visit in my office in 1-2 months    I will sign off now

## 2022-05-02 NOTE — CARE COORDINATION
ARU pre-cert initiated with Ashley and is pending at this time. Ref #[id number]. Will continue to follow for determination. 1330:  Met with patient and wife  and discussed ARU. Explained to patient the required 3 hours of therapy a day. Also explained the average length of stay is 11 days, could be longer or shorter depending on recommendations of therapy and Dr. Xavier Rosenberg. Patient expresses his understanding and states he's agreeable to admit to ARU. Per patient and spouse goal is for patient to return home with his family at discharge from ARU. Received approval for admission to ARU. [facility] #[id number]. Updated clinicals will need to be sent on 5/9. Notified MD of approval.  Per MD patient is medically ready for discharge to ARU. Requested a rapid COVID order. Patient meets criteria and is approved to come to ARU. Patient able to admit once medically stable and after ARU Medical Director and  sign the pre-admission screen (PAS), pending rapid COVID results.

## 2022-05-02 NOTE — CARE COORDINATION
This RN CM spoke with Tequila Mares with ARU and advised her that pt and wife were agreeable per CM note from Friday, to ARU. 1 [employer] will present pt to Dr. Len Garduno and if approved will start precert.

## 2022-05-02 NOTE — PROGRESS NOTES
Pt brought to ARU in  along with all of his belongings. Pt transferred to bed with assist x2 with walker. VS taken; bed weight obtained. T/C to dietary to make sure that his dinner tray would be sent to ARU. Registration called; signed and held orders released; Dr. Jory Fuentes called.   Kenneth Arguello RN

## 2022-05-03 PROCEDURE — 80076 HEPATIC FUNCTION PANEL: CPT

## 2022-05-03 PROCEDURE — 97166 OT EVAL MOD COMPLEX 45 MIN: CPT

## 2022-05-03 PROCEDURE — 6370000000 HC RX 637 (ALT 250 FOR IP): Performed by: INTERNAL MEDICINE

## 2022-05-03 PROCEDURE — 97162 PT EVAL MOD COMPLEX 30 MIN: CPT

## 2022-05-03 PROCEDURE — 6360000002 HC RX W HCPCS: Performed by: INTERNAL MEDICINE

## 2022-05-03 PROCEDURE — 97535 SELF CARE MNGMENT TRAINING: CPT

## 2022-05-03 PROCEDURE — 97530 THERAPEUTIC ACTIVITIES: CPT

## 2022-05-03 PROCEDURE — 97116 GAIT TRAINING THERAPY: CPT

## 2022-05-03 PROCEDURE — 1280000000 HC REHAB R&B

## 2022-05-03 PROCEDURE — 99232 SBSQ HOSP IP/OBS MODERATE 35: CPT | Performed by: PHYSICAL MEDICINE & REHABILITATION

## 2022-05-03 RX ADMIN — RIFAXIMIN 550 MG: 550 TABLET ORAL at 20:03

## 2022-05-03 RX ADMIN — METHOCARBAMOL TABLETS 750 MG: 750 TABLET, COATED ORAL at 09:07

## 2022-05-03 RX ADMIN — METHOCARBAMOL TABLETS 750 MG: 750 TABLET, COATED ORAL at 17:00

## 2022-05-03 RX ADMIN — OXYCODONE HYDROCHLORIDE 5 MG: 5 TABLET ORAL at 23:36

## 2022-05-03 RX ADMIN — METHOCARBAMOL TABLETS 750 MG: 750 TABLET, COATED ORAL at 20:03

## 2022-05-03 RX ADMIN — ATENOLOL 50 MG: 25 TABLET ORAL at 09:08

## 2022-05-03 RX ADMIN — RIFAXIMIN 550 MG: 550 TABLET ORAL at 09:08

## 2022-05-03 RX ADMIN — METHOCARBAMOL TABLETS 750 MG: 750 TABLET, COATED ORAL at 14:21

## 2022-05-03 RX ADMIN — ENOXAPARIN SODIUM 30 MG: 100 INJECTION SUBCUTANEOUS at 09:08

## 2022-05-03 RX ADMIN — OXYCODONE HYDROCHLORIDE 5 MG: 5 TABLET ORAL at 10:56

## 2022-05-03 RX ADMIN — OXYCODONE HYDROCHLORIDE 5 MG: 5 TABLET ORAL at 17:01

## 2022-05-03 RX ADMIN — ENOXAPARIN SODIUM 30 MG: 100 INJECTION SUBCUTANEOUS at 20:04

## 2022-05-03 RX ADMIN — Medication 40 MG: at 09:16

## 2022-05-03 RX ADMIN — OXYCODONE HYDROCHLORIDE 5 MG: 5 TABLET ORAL at 03:54

## 2022-05-03 ASSESSMENT — PAIN DESCRIPTION - LOCATION
LOCATION: LEG

## 2022-05-03 ASSESSMENT — PAIN DESCRIPTION - ORIENTATION
ORIENTATION: RIGHT

## 2022-05-03 ASSESSMENT — PAIN SCALES - GENERAL
PAINLEVEL_OUTOF10: 7
PAINLEVEL_OUTOF10: 6
PAINLEVEL_OUTOF10: 7
PAINLEVEL_OUTOF10: 6

## 2022-05-03 ASSESSMENT — PAIN DESCRIPTION - PAIN TYPE
TYPE: ACUTE PAIN
TYPE: ACUTE PAIN

## 2022-05-03 ASSESSMENT — PAIN DESCRIPTION - DESCRIPTORS
DESCRIPTORS: ACHING
DESCRIPTORS: DISCOMFORT;THROBBING
DESCRIPTORS: ACHING;BURNING;SHOOTING
DESCRIPTORS: THROBBING

## 2022-05-03 ASSESSMENT — PAIN DESCRIPTION - ONSET
ONSET: ON-GOING
ONSET: ON-GOING

## 2022-05-03 ASSESSMENT — PAIN DESCRIPTION - FREQUENCY
FREQUENCY: INTERMITTENT
FREQUENCY: CONTINUOUS

## 2022-05-03 ASSESSMENT — PAIN SCALES - WONG BAKER
WONGBAKER_NUMERICALRESPONSE: 0
WONGBAKER_NUMERICALRESPONSE: 0

## 2022-05-03 NOTE — PROGRESS NOTES
Occupational Therapy                              [facility] ARU OCCUPATIONAL THERAPY EVALUATION    Chart Review:  Past Medical History:   Diagnosis Date    Acute hepatitis 01/2022    severe nausea w weakness, elevated LFTs noted in ED 2/2022- worsened also fr nsaids and alcohol consumption    Allergic rhinitis     Ankle pain     INACTIVE PROBLEM    Anxiety     COVID-19 02/06/2022    Epigastric abdominal pain     INACTIVE PROBLEM    Essential hypertension 01/08/2018    Family history of coronary artery disease     Herniated lumbar intervertebral disc     Insomnia     LBP (low back pain)     Marfan's syndrome with ocular manifestation     DX'D BY OPT[employer] DR CANTU AT 6Y0    Oral herpes      Past Surgical History:   Procedure Laterality Date    BACK SURGERY      herniated ruptured disc 2008    TONSILLECTOMY      UMBILICAL HERNIA REPAIR N/A 7/2/2020    OPEN HERNIA UMBILICAL REPAIR performed by Se Capps MD at [address] [place] [address] History:  Social/Functional History  Lives With: Spouse,Son (13 and 5 y/o sons and dog)  Type of Home: House  Home Layout: Two level,Bed/Bath upstairs  Home Access: Stairs to enter without rails  Entrance Stairs - Number of Steps: 2  Bathroom Shower/Tub: Tub/Shower unit  Bathroom Toilet: Standard  Bathroom Equipment: Shower chair  Bathroom Accessibility: [place] [employer] accessible  Has the patient had two or more falls in the past year or any fall with injury in the past year?: Yes (one fall precipitating this admission)  ADL Assistance: Independent  Homemaking Assistance: Independent  Homemaking Responsibilities: Yes  Meal Prep Responsibility: Secondary  Laundry Responsibility: Secondary  Cleaning Responsibility: Secondary  Bill Paying/Finance Responsibility: Primary  Shopping Responsibility: Secondary  Health Care Management: Primary  Ambulation Assistance: Independent  Transfer Assistance: Independent  Active : Yes  Mode of Transportation: SUV,Truck  Occupation:  On disability  Additional Comments: pt sleeps in flat bed at home    Restrictions:  Restrictions/Precautions  Restrictions/Precautions: Fall Risk,Contact Precautions (Hep A positive)                  Pain Level: 7/10  Pain Location: R posterior, medial thigh. Objective:  Observation/Palpation  Observation: Pt sitting up in bed, pleasant and agreeable to therapy. Pt motivated to get back to Ind level to return home c wife and sons. Edema: Pt c RLE swelling to thigh and foot c skin color darker hue than LLE. Vision  Vision Exceptions: Wears glasses at all times  Vision - Basic Assessment  Prior Vision: Wears contacts  Hearing  Hearing: Within functional limits    ROM:      LUE AROM (degrees)  LUE AROM : WNL     Left Hand AROM (degrees)  Left Hand AROM: WNL     RUE AROM (degrees)  RUE AROM : WNL     Right Hand AROM (degrees)  Right Hand AROM: WNL    Strength:    LUE Strength  L Hand General: 5/5  LUE Strength Comment: 5/5 grossly  RUE Strength  R Hand General: 5/5  RUE Strength Comment: 5/5 grossly    Quality of Movement: Tone RUE  RUE Tone: Normotonic  Tone LUE  LUE Tone: Normotonic  Coordination  Movements Are Fluid And Coordinated: Yes       Sensation:    Sensation  Overall Sensation Status: Impaired (RLE with severe limitations in light touch and proprioception from knee to foot)     ADLs:  Eating: Eating  Assistance Needed: Independent  Comment: Pt reports Ind c meal, able to open all packages/containers  CARE Score: 6  Discharge Goal: Independent       Oral Hygiene: Oral Hygiene  Assistance Needed: Independent  Comment: Mod I seated. CARE Score: 6  Discharge Goal: Independent    UB/LB Bathing: Shower/Bathe Self  Assistance Needed: Partial/moderate assistance  Comment: Pt required assist to wash bottom of R foot. Pt able to complete the rest of full shower c CG/SBA.   CARE Score: 3  Discharge Goal: Independent    UB Dressing: Upper Body Dressing  Assistance Needed: Setup or clean-up assistance  Comment: setup assist to doff/don pullover tshirt. CARE Score: 5  Discharge Goal: Independent         LB Dressing:   Lower Body Dressing  Assistance Needed: Partial/moderate assistance  Comment: Pt required assist to thread RLE into underwear and pants but able to complete the rest of doffing/donning c CGA. CARE Score: 3  Discharge Goal: Independent    Donning and Long Neck Footwear: Putting On/Taking Off Footwear  Assistance Needed: Partial/moderate assistance  Comment: Pt able to doff socks and don L sock, however required therapist don R sock. CARE Score: 3  Discharge Goal: Independent      Toile[address] needed: Supervision or touching assistance  Comment: Pt reports he is able to complete perineal hygiene c someone standing next to him. CARE Score: 4  Discharge Goal: Independent      Bed Mobility:    Bed mobility  Supine to Sit: Modified independent    Transfers:    Transfers  Stand Pivot Transfers: Contact guard assistance  Sit to stand: Contact guard assistance  Stand to sit: Contact guard assistance     Shower Transfers  Shower - Transfer From: Jennifer Kinsey - Transfer Type: To and From  Shower - Transfer To:  (shower bench)  Shower - Technique: Ambulating  Shower Transfers: Contact Guard     Toilet Transfer  Assistance needed: Supervision or touching assistance  Comment: CGA c use of grab bars and 2WW.   CARE Score: 4  Discharge Goal: Independent    Functional Mobility:    Balance  Sitting Balance: Independent  Standing Balance: Contact guard assistance  Standing Balance  Time: 5 mins  Activity: ADL  Functional Mobility  Functional - Mobility Device: Rolling Walker  Activity: To/from bathroom  Assist Level: Contact guard assistance     Cognition:  Cognition  Overall Cognitive Status: WFL    Perception:  Perception  Overall Perceptual Status: WFL      Assessment:     Performance deficits / Impairments: Decreased functional mobility ,Decreased safe awareness,Decreased endurance,Decreased balance,Decreased high-level IADLs,Decreased ADL status,Decreased sensation    The patient is a 39year old male admitted onto ARU after hospitalization for c/o right hip pain and thigh pain. Pt found to have deep jaundice and elevated LFT's. Pt went through alcohol withdrawals during admission. Pt had Covid at the end of last year and MDs feel Pt c long Covid d/t ill x 2 months c anorexia, nausea, vomiting, low grade fever. Imaging obtained and CT showed a large gluteal hematoma, fatty liver and thickened GB wall. Pt c anemia during admission and had 4 units transfused. Pt also found to have severe alcoholic hepatitis c probable superimposed medication hepatotoxicity per gastro. Per hospitalist, Pt dx'd c critical illness myopathy; likely d/t fall, steroid, blood loss. PTA, Pt living c spouse and kids and completely IND c ADLs/IADLs and functional mobility. Today, Pt able to complete all LB ADLs c Min A and UB ADLs c setup assist. Pt CGA for functional mobility and transfers c use of 2WW. The QI, MMT, and ROM standardized assessments were used this date to determine the above performance deficits, which compromise pt's ability to safely complete ADLs/IADLs/mobility. Pt will benefit from ARU OT services to increase functional performance and return to PLOF. Decision Making: Medium Complexity  Clinical Presentation:  Pt presents to this ARU c deficits including functional balance/transfers/mobility, endurance, and ADLs/IADLs. Patient education:   ARU janeen, Role of O.T., O.T. plan of care  []   Patient goal was established and reviewed in Re[facility]er with patient and/or family this date. REQUIRES OT FOLLOW-UP: Yes  Discharge Recommendations:  Home c family and Mollyjose carlos  OT  Equipment Recommendations: No recommendations at this time.     Goals:     Short Term Goals  Time Frame for Short term goals: STGs=LTGs  Long Term Goals  Time Frame for Long term goals : 7-10 days or until d/c.  Long Term Goal 1: Pt will complete grooming tasks c Ind. Long Term Goal 2: Pt will complete total body bathing c Mod I and AE PRN. Long Term Goal 3: Pt will complete UB dressing c Ind. Long Term Goal 4: Pt will complete LB dressing c Mod I and AE PRN. Long Term Goal 5: Pt will doff/don footwear c Mod I and AE PRN.   Additional Goals?: Yes  Long Term Goal 6: Pt will complete toileting c Mod I.  Long Term Goal 7: Pt will perform functional transfers (bed, chair, toilet, shower) c DME PRN and Mod I.  Long Term Goal 8: Pt will perform therex/therax to facilitate an increase in edurance/ax tolerance (c emphasis on dynamic standing balance/tolerance > 10 mins) c Mod I.  Long Term Goal 9: Pt will complete home management tasks c Mod I.    Plan:    Pt will be seen at least 60 minutes per day for a minimum of 5 days per week, plus group therapy as appropriate  Current Treatment Recommendations: Strengthening,Balance training,Functional mobility training,Endurance training,Neuromuscular re-education,Safety education & training,Patient/Caregiver education & training,Equipment evaluation, education, & procurement,Self-Care / ADL,Home management training    OT Individual Minutes  Time In: 5938  Time Out: 6819  Minutes: 78                Number of Minutes/Billable Intervention      OT Evaluation 20   Therapeutic Exercise    ADL Self-care 58   Neuro Re-Ed    Therapeutic Activity    Group    Other:    TOTAL 78     Electronically signed by:    ALEC Dinero OTR/L   5/3/2022, 4:11 PM

## 2022-05-03 NOTE — PLAN OF CARE
Problem: Discharge Planning  Goal: Discharge to home or other facility with appropriate resources  5/2/2022 2342 by Saloni Powell LPN  Outcome: Progressing  5/2/2022 1740 by Narendra Garcia RN  Outcome: Progressing     Problem: Pain  Goal: Verbalizes/displays adequate comfort level or baseline comfort level  5/2/2022 2342 by Saloni Powell LPN  Outcome: Progressing  5/2/2022 1740 by Narendra Garcia RN  Outcome: Progressing     Problem: Safety - Adult  Goal: Free from fall injury  Outcome: Progressing     Problem: ABCDS Injury Assessment  Goal: Absence of physical injury  Outcome: Progressing

## 2022-05-03 NOTE — PROGRESS NOTES
Valery Parker    : 1980  Acct #: [de-identified]  MRN: [id number]              PM&R Progress Note      Admitting diagnosis: Critical illness myopathy ([address])     Comorbid diagnoses impacting rehabilitation: Generalized weakness, gait disturbance, alcoholic hepatitis, uncontrolled pain, acute blood loss anemia, acute kidney injury with ATN, gluteal hematoma, alcohol abuse with recent withdrawal, depression with anxiety, essential hypertension     Chief complaint: Intermittent \"zingers\" through his right leg. Poor sleep. Prior (baseline) level of function: Independent. Current level of function:         Current  IRF-EVA and Goals:   Occupational Therapy:    Short Term Goals  Time Frame for Short term goals: STGs=LTGs :     :                                       Eating: Eating  Assistance Needed: Independent  Comment: Pt reports Ind c meal, able to open all packages/containers  CARE Score: 6  Discharge Goal: Independent       Oral Hygiene: Oral Hygiene  Assistance Needed: Independent  Comment: Mod I  CARE Score: 6  Discharge Goal: Independent    UB/LB Bathing:      UB Dressing: Upper Body Dressing  Assistance Needed: Setup or clean-up assistance  CARE Score: 5         LB Dressing:      Donning and [place] Footwear: Toiletin [address] needed: Supervision or touching assistance  Comment: Pt reports he is able to complete perineal hygiene c someone standing next to him. CARE Score: 4  Discharge Goal: Independent      Toilet Transfers: Toilet Transfer  Assistance needed: Supervision or touching assistance  Comment: CGA c use of grab bars and 2WW.   CARE Score: 4  Discharge Goal: Independent    Physical Therapy:                Bed Mobility:   Sit to Lying  Assistance Needed: Supervision or touching assistance  Comment: supervision with cues for technique  CARE Score: 4  Discharge Goal: Independent  Roll Left and Right  Assistance Needed: Independent  CARE Score: 6  Discharge Goal: Independent  Lying to Sitting on Side of Bed  Assistance Needed: Independent  CARE Score: 6  Discharge Goal: Independent    Transfers:    Sit to Stand  Assistance Needed: Partial/moderate assistance  Comment: min assist 50% of ttime due to R knee buckle/unsteadiness  CARE Score: 3  Discharge Goal: Independent  Chair/Bed-to-Chair Transfer  Assistance Needed: Partial/moderate assistance  Comment: min assist with 2ww, control of RLE  CARE Score: 3  Discharge Goal: Independent     Car Transfer  Assistance Needed: Supervision or touching assistance  Comment: approach with 2ww  CARE Score: 4  Discharge Goal: Independent    Ambulation:    Walking Ability  Does the Patient Walk?: Yes     Walk 10 Feet  Assistance Needed: Partial/moderate assistance  Comment: min assist with 2ww, R toe drag intially then changing to steppage gait with inconsistent step pattern/width/length using 3-4 point gait, R knee intability in stance  CARE Score: 3  Discharge Goal: Independent     Walk 50 Feet with Two Turns  Assistance Needed: Partial/moderate assistance  Comment: min assist with 2ww, deviations as 10' with discontinuous sep and standing rests with increased distance  CARE Score: 3  Discharge Goal: Independent     Walk 150 Feet  Comment: 50' max distance tolerated due to LE muscle fatigue  Discharge Goal: Independent     Walking 10 Feet on Uneven Surfaces  Assistance Needed: Partial/moderate assistance  Comment: min assist with 2ww  CARE Score: 3  Discharge Goal: Independent     1 Step (Curb)  Assistance Needed: Partial/moderate assistance  Comment: min assist for control of RLE  CARE Score: 3  Discharge Goal: Independent     4 Steps  Assistance Needed: Partial/moderate assistance  Comment: min assist for control of RLE, cues or sequence  CARE Score: 3  Discharge Goal: Independent     12 Steps  Assistance Needed: Partial/moderate assistance  Comment: one instance of RLE knee buckling on descent, min assist overall  CARE Score: 3  Discharge Goal: Independent       Wheelchair:  w/c Ability: Wheelchair Ability  Uses a Wheelchair and/or Scooter?: No                Balance:        Object: Picking Up Object  Assistance Needed: Supervision or touching assistance  Comment: supervision with 2ww and reacher  CARE Score: 4  Discharge Goal: Independent    I      Exam:    Blood pressure (!) 145/86, pulse 87, temperature 98.9 °F (37.2 °C), temperature source Oral, resp. rate 18, height 6' 4\" (1.93 m), weight 242 lb 1 oz (109.8 kg), SpO2 97 %. General: Up in wheelchair and Occupational Therapy. Alert and talkative. In good spirits. HEENT: Symmetric facial expression. MMM. No JVD or adenopathy. Pulmonary: Symmetric air exchange without wheezes or rales. Cardiac: RRR. Abdomen: Patient's abdomen is soft and nondistended. Bowel sounds were present throughout. There was no rebound, guarding or masses noted. Upper extremities: Slow and deliberate with arm movements. 4 -/5 strength across the shoulders, elbows and wrist.  Fair dexterity. Lower extremities: Persistent swelling throughout the right lower limb with bruising about the groin and posterior thigh. No deep tendon reflexes. Give way with MMT. Sitting balance was fair. Standing balance was poor.     Lab Results   Component Value Date    WBC 14.1 (H) 05/02/2022    HGB 9.2 (L) 05/02/2022    HCT 30.1 (L) 05/02/2022    .1 (H) 05/02/2022     05/02/2022     Lab Results   Component Value Date    INR 1.18 04/26/2022    INR 1.10 04/22/2022    INR 1.08 04/21/2022    PROTIME 15.2 (H) 04/26/2022    PROTIME 14.2 04/22/2022    PROTIME 14.0 04/21/2022     Lab Results   Component Value Date    CREATININE 0.5 (L) 05/02/2022    BUN 11 05/02/2022     05/02/2022    K 3.7 05/02/2022    CL 99 05/02/2022    CO2 27 05/02/2022     Lab Results   Component Value Date     (H) 05/02/2022     (H) 05/02/2022    ALKPHOS 199 (H) 05/02/2022    BILITOT 8.6 (H) 05/02/2022       Expected length of stay  prior to a supervised level of function for discharge home with a walker and C OT/PT is 12 to 14 days. Recommendations:    1. Critical illness myopathy with gait disturbance: Developing the routine for his daily occupational and physical therapy. He needs adaptive equipment training, caregiver education and possibly bowel and bladder retraining. Cautiously managing his pain while providing nutritional support and monitoring him for further complications of alcohol withdrawal.  He must monitor his renal function, treat his blood pressure and provide DVT prophylaxis. Verbal cues and 25% physical assistance needed for transfers. 2. DVT prophylaxis: Lovenox 30 mg SQ twice daily. I must monitor his hemoglobin and platelet count while on this medication. Weightbearing activities are pursued daily. GI prophylaxis is available. No signs of acute blood loss. 3. Alcoholic hepatitis: The GI consultant has continued prednisolone 40 mg daily for 1 month. Tolerating the Xifaxan. Encouraging consistent oral nutrition and hydration. Abstinence from alcohol with community resources available. From the gastroenterologist:      1) severe alcoholic hepatitis with probable superimposed medication hepatotoxicity- improving                   Plan:              5) T gave him a schedule for the prednisolone:              -44 MG/D X 4 WEEKS              -84 MG/D X 1 WEEK              -52 MG/D X 1 WEEK              -88 MG/D X 1 WEEK              -5 MG/D X 1 WEEK THEN STOP              2) suggested follow up visit in my office in 1-2 months              4. Acute kidney injury with ATN: Periodic monitoring of his chemistries. Encouraging oral hydration. Avoiding nephrotoxic medications when possible. 5. Gluteal hematoma: Roxicodone and Robaxin. Avoiding acetaminophen. Cautious DVT prophylaxis. Monitoring him for signs of further bleeding into the traumatized area.   Limb elevation and cryotherapy. Progressive mobilization. Considering low-dose gabapentin. 6. Alcohol abuse with withdrawal: Treating him in a calm and consistent environment. Encouraging alcohol abstinence. Providing short acting benzodiazepines as needed. 7. Hypertension:  hypertension: Tenormin and Norvasc for blood pressure regulation. Vital signs are checked at rest and with activity. Target systolic blood pressure is 120-140. Blood pressure is just above the target range. Monitoring closely.

## 2022-05-03 NOTE — PROGRESS NOTES
Comprehensive Nutrition Assessment    Type and Reason for Visit:  Initial,Consult (oral nutrition supplement)    Nutrition Recommendations/Plan:   1. Continue current regular diet  2. Will continue to offer oral nutrition supplement to provide 350 kcal and 20 grams of protein each. 3. Encourage consistent meal intake   4. Will continue to follow up during stay     Malnutrition Assessment:  Malnutrition Status: At risk for malnutrition (Comment) (05/03/22 1217)    Context:  Acute Illness       Nutrition Assessment:    Admit to rehab unit with critical illness myopathy, hepatitis with hx COVID-19. Able to tolerate regular diet with standard oral nutrition supplement. Meal intake % at recent meals. Willing to drink ensure supplements during stay. Appetite and meal intake improving. Will follow at moderate nutrition risk at this time. Nutrition Related Findings:    varying weights during stay,  resting in bed was asleep but preparing to get up for lunch, hx alcohol abuse, LFTs elevated but decreasing, jaundiced Wound Type: None       Current Nutrition Intake & Therapies:    Average Meal Intake: %  Average Supplements Intake: % (per patient)  ADULT DIET; Regular  ADULT ORAL NUTRITION SUPPLEMENT; Breakfast, Lunch, Dinner; Standard High Calorie/High Protein Oral Supplement    Anthropometric Measures:  Height: 6' 4\" (193 cm)  Ideal Body Weight (IBW): 202 lbs (92 kg)       Current Body Weight: 242 lb 1 oz (109.8 kg), 119.8 % IBW. Weight Source: Not Specified  Current BMI (kg/m2): 29.5  Usual Body Weight: 254 lb 3.1 oz (115.3 kg) (per hx MD visit in March)  % Weight Change (Calculated): -4.8  Weight Adjustment For: No Adjustment                 BMI Categories: Overweight (BMI 25.0-29. 9)    Estimated Daily Nutrient Needs:  Energy Requirements Based On: Formula  Weight Used for Energy Requirements: Current  Energy (kcal/day): 0573-5873  Weight Used for Protein Requirements: Ideal  Protein (g/day):  (1-1.2 g/kg)  Method Used for Fluid Requirements: 1 ml/kcal  Fluid (ml/day): 2400    Nutrition Diagnosis:   · Inadequate oral intake related to other (comment) (reduced appetite in illness) as evidenced by poor intake prior to admission      Nutrition Interventions:   Food and/or Nutrient Delivery: Continue Current Diet,Continue Oral Nutrition Supplement  Nutrition Education/Counseling: No recommendation at this time  Coordination of Nutrition Care: Continue to monitor while inpatient       Goals:     Goals: PO intake 75% or greater,by next RD assessment       Nutrition Monitoring and Evaluation:   Behavioral-Environmental Outcomes: None Identified  Food/Nutrient Intake Outcomes: Food and Nutrient Intake,Supplement Intake  Physical Signs/Symptoms Outcomes: Biochemical Data,Meal Time Behavior,Skin,Weight    Discharge Planning:    Continue current diet     Cristian President, RD, LD  Contact: [phone]

## 2022-05-03 NOTE — PROGRESS NOTES
Physical Therapy  .   [facility] ARU PHYSICAL THERAPY EVALUATION    Chart Review:  Past Medical History:   Diagnosis Date    Acute hepatitis 01/2022    severe nausea w weakness, elevated LFTs noted in ED 2/2022- worsened also fr nsaids and alcohol consumption    Allergic rhinitis     Ankle pain     INACTIVE PROBLEM    Anxiety     COVID-19 02/06/2022    Epigastric abdominal pain     INACTIVE PROBLEM    Essential hypertension 01/08/2018    Family history of coronary artery disease     Herniated lumbar intervertebral disc     Insomnia     LBP (low back pain)     Marfan's syndrome with ocular manifestation     DX'D BY OPT[employer] DR CANTU AT 6Y0    Oral herpes      Past Surgical History:   Procedure Laterality Date    BACK SURGERY      herniated ruptured disc 2008    TONSILLECTOMY      UMBILICAL HERNIA REPAIR N/A 7/2/2020    OPEN HERNIA UMBILICAL REPAIR performed by Marcelo Spivey MD at [facility] History: one fall causing hematoma precipitating this admission  Social History:  Social/Functional History  Lives With: Spouse,Son (13 and 7 y/o sons and dog)  Type of Home: House  Home Layout: Two level,Bed/Bath upstairs  Home Access: Stairs to enter without rails  Entrance Stairs - Number of Steps: 2  Bathroom Shower/Tub: Tub/Shower unit  Bathroom Toilet: Standard  Bathroom Equipment: Shower chair  Bathroom Accessibility: Arlette Mcardle accessible  Has the patient had two or more falls in the past year or any fall with injury in the past year?: Yes (one fall precipitating this admission)  ADL Assistance: Independent  Homemaking Assistance: Independent  Homemaking Responsibilities: Yes  Meal Prep Responsibility: Secondary  Laundry Responsibility: Secondary  Cleaning Responsibility: Secondary  Bill Paying/Finance Responsibility: Primary  Shopping Responsibility: Secondary  Health Care Management: Primary  Ambulation Assistance: Independent  Transfer Assistance: Independent  Active : Yes  Mode of Transportation: SUV,Truck  Occupation: On disability  Additional Comments: pt sleeps in flat bed at home    Restrictions:  Restrictions/Precautions  Restrictions/Precautions: Fall Risk,Contact Precautions (Hep A positive)            Pain Level: 6  Pain Location: Leg    Objective:  Orientation  Overall Orientation Status: Within Functional Limits        Vision  Vision Exceptions: Wears glasses at all times  Hearing  Hearing: Within functional limits    ROM:   PROM RLE (degrees)  RLE PROM: WFL     PROM LLE (degrees)  LLE PROM: WFL                 RLE PROM: WFL  Strength:    Strength RLE  Comment: knee flexion/ext 3+/5, hip flexion 4-/5, hip abd 3 to 3-/5, ankle DF/PF 1/5  Strength LLE  Comment: grossly 4+/5      sensation:RLE with severe limitations in light touch and proprioception from knee to foot      Bed Mobility:   Lying to Sitting on Side of Bed  Assistance Needed: Independent  CARE Score: 6  Discharge Goal: Independent  Roll Left and Right  Assistance Needed: Independent  CARE Score: 6  Discharge Goal: Independent  Sit to Lying  Assistance Needed: Supervision or touching assistance  Comment: supervision with cues for technique  CARE Score: 4  Discharge Goal: Independent    Transfers:    Sit to Stand  Assistance Needed: Partial/moderate assistance  Comment: min assist 50% of ttime due to R knee buckle/unsteadiness  CARE Score: 3  Discharge Goal: Independent  Chair/Bed-to-Chair Transfer  Assistance Needed: Partial/moderate assistance  Comment: min assist with 2ww, control of RLE  CARE Score: 3  Discharge Goal: Independent     Car Transfer  Assistance Needed: Supervision or touching assistance  Comment: approach with 2ww  CARE Score: 4  Discharge Goal: Independent    Ambulation:   Device used PTA: none   Walking Ability  Does the Patient Walk?: Yes     Walk 10 Feet  Assistance Needed: Partial/moderate assistance  Comment: min assist with 2ww, R toe drag intially then changing to steppage gait with inconsistent step pattern/width/length using 3-4 point gait, R knee intability in stance  CARE Score: 3  Discharge Goal: Independent     Walk 50 Feet with Two Turns  Assistance Needed: Partial/moderate assistance  Comment: min assist with 2ww, deviations as 10' with discontinuous sep and standing rests with increased distance  CARE Score: 3  Discharge Goal: Independent     Walk 150 Feet  Comment: 50' max distance tolerated due to LE muscle fatigue  Discharge Goal: Independent     Walking 10 Feet on Uneven Surfaces  Assistance Needed: Partial/moderate assistance  Comment: min assist with 2ww  CARE Score: 3  Discharge Goal: Independent     1 Step (Curb)  Assistance Needed: Partial/moderate assistance  Comment: min assist for control of RLE  CARE Score: 3  Discharge Goal: Independent     4 Steps  Assistance Needed: Partial/moderate assistance  Comment: min assist for control of RLE, cues or sequence  CARE Score: 3  Discharge Goal: Independent     12 Steps  Assistance Needed: Partial/moderate assistance  Comment: one instance of RLE knee buckling on descent, min assist overall  CARE Score: 3  Discharge Goal: Independent         Wheelchair:  w/c Ability: Wheelchair Ability  Uses a Wheelchair and/or Scooter?: No                Balance:        Object: Picking Up Object  Assistance Needed: Supervision or touching assistance  Comment: supervision with 2ww and reacher  CARE Score: 4  Discharge Goal: Independent    Assessment:   The patient is a 43year old male admitted onto ARU after hospitalization 4/18 for R hip and thigh pain with edema. Pt also found to be jaundice and with elevated LFTs and large hematoma  Between R gluteus medius and naomi that developed after a fall. Pt also to have fatty liver disease, gallbladder wall thickening.   Pt through course of stay with length withdrawal from alcohol abuse, acute blood loss anemia from hematoma with 4 transfusions of PRBC, traumatic rhabdomyolysis, metabolic acidosis, KESHA, critical illness myopathy. Pt had Marfan's syndrome that mainly affects his eyes per pt. Prior to November, pt was fully independent and working full time. Pt had COVID-19 late 2021 and felt he was having long covid difficulties, causing him to not be able to go to work with \"mono-like sx\" per pt.  per chart pt also with alcohol abuse(pt did not mention anything about his alcohol withdrawal and seemed to blame sx on long covid). Pt presents today with significant decreased sensation/proprioception as well as weakness in RLE causing instability in transfers and gait needing up to min assist. Pt also presents with decreased activity tolerance limiting his gait distance and causing pt to require increased rests during session, taking extra time to complete eval. Feel that pt will benefit from ARU-PT to address deficits as noted to return home with spouse. Pt may be able to progress gait device pending return of DF RLE .        Body Structures, Functions, Activity Limitations Requiring Skilled Therapeutic Intervention: Decreased functional mobility ,Decreased strength,Decreased ADL status,Decreased endurance,Decreased sensation,Decreased balance,Increased pain,Decreased high-level IADLs,Decreased vision/visual deficit     Therapy Prognosis: Good  Decision Making: Medium Complexity  Clinical Presentation: evolving characteristics      Patient education:   ARU schedule, ARU expectations for participation, plan of care, R heel cord self-stretch and positioning of RLE with towel roll to prevent ER  Treatment Initiated:  Functional mobility training, gait training, patient education, stair training  Barriers to Improvement:  Question some inconsistencies in cognition, chronic medical issues  Discharge Recommendations:  Home with spouse  Equipment Recommendations:  2ww    Goals:  Patient Goals   Patient goals : to walk on own to return home     Long Term Goals  Time Frame for Long term goals : 7 days STG=LTG  Long term goal 1: pt will complete bed mobility with mod I  Long term goal 2: Pt will complete sit to stand, pivot and car transfers with mod I  Long term goal 3: Pt will ambulate 150' with 2ww on level surfaces and 10' on unlevel surfaces with mod I  Long term goal 4: Pt will ascend/descend curb step with 2ww and 12 steps with rail with mod I  Long term goal 5: Pt will retrieve light object from ground with 2ww and reacher with mod I  Plan:    Requires PT Follow-Up: Yes  Pt will be seen at least 60 minutes per day for a minimum of 5 days per week, plus group therapy as appropriate  Plan  Current Treatment Recommendations: Strengthening,ROM,Balance training,Functional mobility training,Transfer training,Endurance training,ADL/Self-care training,Gait training,Pain management,Cognitive reorientation,Safety education & training,Patient/Caregiver education & training,Equipment evaluation, education, & procurement,Therapeutic activities,Positioning,Modalities,Stair training,IADL training,Neuromuscular re-education,Home exercise program    PT Individual Minutes  Time In: 0830  Time Out: 1015  Minutes: 105                 PT Evaluation 30   Gait Training 30   Therapeutic Exercise    Neuro Re-Ed    Therapeutic Activity 45   Wheelchair Propulsion    Group    Other:    TOTAL 105       Electronically signed by:    Ramiro Rutherford, PT, PT   5/3/2022,

## 2022-05-04 PROCEDURE — 99232 SBSQ HOSP IP/OBS MODERATE 35: CPT | Performed by: PHYSICAL MEDICINE & REHABILITATION

## 2022-05-04 PROCEDURE — 6370000000 HC RX 637 (ALT 250 FOR IP): Performed by: INTERNAL MEDICINE

## 2022-05-04 PROCEDURE — 6360000002 HC RX W HCPCS: Performed by: INTERNAL MEDICINE

## 2022-05-04 PROCEDURE — 97110 THERAPEUTIC EXERCISES: CPT

## 2022-05-04 PROCEDURE — 94761 N-INVAS EAR/PLS OXIMETRY MLT: CPT

## 2022-05-04 PROCEDURE — 97116 GAIT TRAINING THERAPY: CPT

## 2022-05-04 PROCEDURE — 97530 THERAPEUTIC ACTIVITIES: CPT

## 2022-05-04 PROCEDURE — 94664 DEMO&/EVAL PT USE INHALER: CPT

## 2022-05-04 PROCEDURE — 94200 LUNG FUNCTION TEST (MBC/MVV): CPT

## 2022-05-04 PROCEDURE — 97535 SELF CARE MNGMENT TRAINING: CPT

## 2022-05-04 PROCEDURE — 1280000000 HC REHAB R&B

## 2022-05-04 RX ADMIN — ENOXAPARIN SODIUM 30 MG: 100 INJECTION SUBCUTANEOUS at 20:11

## 2022-05-04 RX ADMIN — ATENOLOL 50 MG: 25 TABLET ORAL at 09:35

## 2022-05-04 RX ADMIN — RIFAXIMIN 550 MG: 550 TABLET ORAL at 09:35

## 2022-05-04 RX ADMIN — OXYCODONE HYDROCHLORIDE 5 MG: 5 TABLET ORAL at 22:41

## 2022-05-04 RX ADMIN — METHOCARBAMOL TABLETS 750 MG: 750 TABLET, COATED ORAL at 09:35

## 2022-05-04 RX ADMIN — METHOCARBAMOL TABLETS 750 MG: 750 TABLET, COATED ORAL at 16:14

## 2022-05-04 RX ADMIN — ENOXAPARIN SODIUM 30 MG: 100 INJECTION SUBCUTANEOUS at 09:36

## 2022-05-04 RX ADMIN — RIFAXIMIN 550 MG: 550 TABLET ORAL at 20:11

## 2022-05-04 RX ADMIN — OXYCODONE HYDROCHLORIDE 5 MG: 5 TABLET ORAL at 16:14

## 2022-05-04 RX ADMIN — METHOCARBAMOL TABLETS 750 MG: 750 TABLET, COATED ORAL at 20:11

## 2022-05-04 RX ADMIN — OXYCODONE HYDROCHLORIDE 5 MG: 5 TABLET ORAL at 09:43

## 2022-05-04 RX ADMIN — METHOCARBAMOL TABLETS 750 MG: 750 TABLET, COATED ORAL at 13:23

## 2022-05-04 RX ADMIN — Medication 40 MG: at 09:48

## 2022-05-04 ASSESSMENT — PAIN DESCRIPTION - ONSET: ONSET: ON-GOING

## 2022-05-04 ASSESSMENT — PAIN SCALES - GENERAL
PAINLEVEL_OUTOF10: 6
PAINLEVEL_OUTOF10: 6
PAINLEVEL_OUTOF10: 7
PAINLEVEL_OUTOF10: 3
PAINLEVEL_OUTOF10: 8
PAINLEVEL_OUTOF10: 6

## 2022-05-04 ASSESSMENT — PAIN DESCRIPTION - DESCRIPTORS
DESCRIPTORS: THROBBING;DISCOMFORT
DESCRIPTORS: DISCOMFORT;THROBBING
DESCRIPTORS: THROBBING;DISCOMFORT
DESCRIPTORS: ACHING;SHARP

## 2022-05-04 ASSESSMENT — PAIN DESCRIPTION - FREQUENCY: FREQUENCY: CONTINUOUS

## 2022-05-04 ASSESSMENT — PAIN - FUNCTIONAL ASSESSMENT
PAIN_FUNCTIONAL_ASSESSMENT: PREVENTS OR INTERFERES SOME ACTIVE ACTIVITIES AND ADLS
PAIN_FUNCTIONAL_ASSESSMENT: ACTIVITIES ARE NOT PREVENTED
PAIN_FUNCTIONAL_ASSESSMENT: ACTIVITIES ARE NOT PREVENTED

## 2022-05-04 ASSESSMENT — PAIN DESCRIPTION - ORIENTATION
ORIENTATION: RIGHT

## 2022-05-04 ASSESSMENT — PAIN DESCRIPTION - LOCATION
LOCATION: LEG

## 2022-05-04 ASSESSMENT — PAIN DESCRIPTION - PAIN TYPE: TYPE: ACUTE PAIN

## 2022-05-04 NOTE — PROGRESS NOTES
Physical Therapy      [x] daily progress note       [] discharge       Patient Name:  Eyal French   :  1980 MRN: [id number]  Room:  [address] [place], [place] [address] Date of Admission: 2022  Rehabilitation Diagnosis:   Critical illness myopathy [G72.81]  Critical illness myopathy [G72.81]       Date 2022       Day of ARU Week:  3   Time IN/OUT 0623-6314   Individual Tx Minutes 60   TOTAL Tx Time Mins 60   Variance Time    Variance Time []   Refusal due to:     []   Medical hold/reason:    []   Illness   []   Off Unit for test/procedure  []   Extra time needed to complete task  []   Therapeutic need  []   Other (specify):   Restrictions Restrictions/Precautions  Restrictions/Precautions: Fall Risk,Contact Precautions (Hep A positive)      Communication with other providers: [x]   OK to see per nursing:     []   Spoke with team member regarding:      Subjective observations and cognitive status: Pt resting in bed, willing to participate     Pain level/location:  6  /10       Location: L inner thigh, HS   Discharge recommendations  Anticipated discharge date:  TBD  Destination: []home alone   []home alone with assist PRN     [] home w/ family      [] Continuous supervision  []SNF    [] Assisted living     [] Other:   Continued therapy: []HHC PT  []OUTPATIENT  PT   [] No Further PT  Equipment needs: TBD       Bed Mobility:         []   Pt received out of bed     Roll Left and Right  Assistance Needed: Independent  CARE Score: 6  Discharge Goal: Independent    Sit to Lying  Assistance Needed: Independent  Comment: with rail  CARE Score: 6  Discharge Goal: Independent    Lying to Sitting on Side of Bed  Assistance Needed: Independent  CARE Score: 6  Discharge Goal: Independent    Transfers:    Sit to Stand  Assistance Needed: Supervision or touching assistance  Comment: CGA, min cues for COG fwd, R LE positioning, increased WS to L for increased muscle recruitment  CARE Score: 4  Discharge Goal: Independent    Chair/Bed-to-Chair Transfer  Assistance Needed: Supervision or touching assistance  Comment: CGA for balance with RW  CARE Score: 4  Discharge Goal: Independent          Object: Picking Up Object  Assistance Needed: Supervision or touching assistance  Comment: supervision with 2ww and reacher  CARE Score: 4  Discharge Goal: Independent    Ambulation:    Walking Ability  Does the Patient Walk?: Yes     Walk 10 Feet  Assistance Needed: Supervision or touching assistance  Comment: CGA for balance at RW  CARE Score: 4  Discharge Goal: Independent     Walk 50 Feet with Two Turns  Assistance Needed: Partial/moderate assistance  Comment: CG-min A for balance at RW, min cues for compensation at hip for R LE clearance due to foot drop, increased difficulty wtih fatigue. 92' max distance ambulation   CARE Score: 3  Discharge Goal: Independent       4 Steps  Assistance Needed: Partial/moderate assistance  Comment: CG-Min A B rails. cues for non-recip and seq pattern, min buckling of R knee on descent, 10 steps max  CARE Score: 3  Discharge Goal: Independent      Wheelchair:  Propelled WC ~ 100' back to room using B UEs     Additional Therapeutic activities/exercises completed this date:     []   Nu-step:  Time:        Level:         #Steps:       []   Rebounder:    []  Seated     []  Standing        []   Balance training         []   Postural training    []   Supine ther ex (reps/sets):     []   Seated ther ex (reps/sets):     [x]   Standing ther ex (reps/sets): B LE with max B UE support x 10-12 reps for heel raises ( L LE only), marching, mini squats, hip abd, and hip extension with CGA for balance. Pt with max fatigue req seated rest break after each ex.  Req TCs and VCs for increased R knee ext in stance, maintaining neurtral hip  Resistive gray tband for R TKE 10 x 2 with 3\" hold; cues for decreased compensation with gluts/trunk      []   Other:                []   Other:   [] Other:      Patient/Caregiver Education and Training:   [x]   Bed Mobility/Transfer technique/safety  [x]   Gait technique/sequencing  [x]   Proper use of assistive device  [x]   Advanced mobility safety and technique  []   Reinforced patient's precautions/mobility while maintaining precautions  []   Postural awareness  []   Family training    Specific instructions for next treatment: ace wrap R ankle into DF prior to amb; gait progression, stair training, LE strengthening    Treatment/Activity Tolerance:   [x] Tolerated treatment with no adverse effects    [] Patient limited by fatigue  [] Patient limited by pain   [] Patient limited by medical complications:    [] Adverse reaction to Tx:   [] Significant change in status    Safety:       []  bed alarm set    [x]  chair alarm set    []  Pt refused alarms                []  Telesitter activated      [x]  Gait belt used during tx session      []other:         Number of Minutes/Billable Intervention  Gait Training 23   Therapeutic Exercise 20   Neuro Re-Ed    Therapeutic Activity 17   Wheelchair Propulsion    Group    Other:    TOTAL 60         Social History  Social/Functional History  Lives With: Spouse,Son (17 and 7 y/o sons and dog)  Type of Home: House  Home Layout: Two level,Bed/Bath upstairs  Home Access: Stairs to enter without rails  Entrance Stairs - Number of Steps: 2  Bathroom Shower/Tub: Tub/Shower unit  Bathroom Toilet: Standard  Bathroom Equipment: Shower chair  Bathroom Accessibility: Cristiane Ply accessible  Has the patient had two or more falls in the past year or any fall with injury in the past year?: Yes (one fall precipitating this admission)  ADL Assistance: Independent  Homemaking Assistance: Independent  Homemaking Responsibilities: Yes  Meal Prep Responsibility: Secondary  Laundry Responsibility: Secondary  Cleaning Responsibility: Secondary  Bill Paying/Finance Responsibility: Primary  Shopping Responsibility: Secondary  Health Care Management: Primary  Ambulation Assistance: Independent  Transfer Assistance: Independent  Active : Yes  Mode of Transportation: SUV,Truck  Occupation: On disability  Additional Comments: pt sleeps in flat bed at home    Objective                                                                                    Goals:  (Update in navigator)   : Long Term Goals  Time Frame for Long term goals : 7 days STG=LTG  Long term goal 1: pt will complete bed mobility with mod I  Long term goal 2: Pt will complete sit to stand, pivot and car transfers with mod I  Long term goal 3: Pt will ambulate 150' with 2ww on level surfaces and 10' on unlevel surfaces with mod I  Long term goal 4: Pt will ascend/descend curb step with 2ww and 12 steps with rail with mod I  Long term goal 5: Pt will retrieve light object from ground with 2ww and reacher with mod I:        Plan of Care                                                                              Times per week: 5 days per week for a minimum of 60 minutes/day plus group as appropriate for 60 minutes.   Treatment to include Current Treatment Recommendations: Strengthening,ROM,Balance training,Functional mobility training,Transfer training,Endurance training,ADL/Self-care training,Gait training,Pain management,Cognitive reorientation,Safety education & training,Patient/Caregiver education & training,Equipment evaluation, education, & procurement,Therapeutic activities,Positioning,Modalities,Stair training,IADL training,Neuromuscular re-education,Home exercise program    Electronically signed by   Agus Perez PTA #[id number]  5/4/2022, 4:36 PM

## 2022-05-04 NOTE — PROGRESS NOTES
Occupational Therapy    Physical Rehabilitation: OCCUPATIONAL THERAPY     [x] daily progress note       [] discharge       Patient Name:  Sara Finn   :  1980 MRN: [id number]  Room:  [address] [place], [place] [address] Date of Admission: 2022  Rehabilitation Diagnosis:   Critical illness myopathy [G72.81]  Critical illness myopathy [G72.81]       Date 2022       Day of ARU Week:  3   Time IN/OUT 1300/1400   Individual Tx Minutes 60   Group Tx Minutes    Co-Treat Minutes    Concurrent Tx Minutes    TOTAL Tx Time Mins 60   Variance Time    Variance Time []   Refusal due to:     []   Medical hold/reason:    []   Illness   []   Off Unit for test/procedure  []   Extra time needed to complete task  []   Therapeutic need  []   Other (specify):   Restrictions Restrictions/Precautions: Fall Risk,Contact Precautions (Hep A positive)         Communication with other providers: [x]   OK to see per nursing:     []   Spoke with team member regarding:      Subjective observations and cognitive status: Upon entrance, Pt lying in bed, pleasant and agreeable to therapy. Pt asking about going to restroom at night alone. OT educ Pt on safety on requiring CGA for mobility. Pain level/location:    6/10       Location: R posterior thigh   Discharge recommendations  Anticipated discharge date:  TBD  Destination: []home alone   []home alone w assist prn   [x] home w/ family    [] Continuous supervision       []SNF    [] Assisted living     [] Other:   Continued therapy: []HHC OT  []OUTPATIENT  OT   [] No Further OT  Equipment needs: None. Bed Mobility:           []   Pt received out of bed   Supine --> Sit:  Mod I  Sit --> Supine:   Mod I    Transfers:    Sit--> Stand:  One instance requiring Min A d/t LOB during transfer, however normal performance CGA  Stand --> Sit:   CGA  Stand-Pivot:   CGA  Other:    Assistive device required for transfer:   2WW      Functional Mobility:    Assistance:  ~100 ft c CGA  Device:   [x]   Rolling Rubie Mcardle     []   Standard Rubie Mcardle []   Wheelchair        []   Trudell Organ       []   4-Wheeled Rubie Mcardle         []   Cardiac Rubie Mcardle       []   Other:        Homemaking Tasks:   Provided pt with walker bag and educated on walker safety with emphasis on IADL task completion. Pt stated understanding. Additional Therapeutic activities/exercises completed this date:     []   ADL Training   [x]   Balance/Postural training     [x]   Bed/Transfer Training  Pt demonstrated one LOB requiring Min A during sit to stand transfer c Pt then requesting practice. Pt was able to follow verbal cues for slow movements, pushing up from w/c to complete c CGA. [x]   Endurance Training   []   Neuromuscular Re-ed   []   Nu-step:  Time:        Level:         #Steps:       []   Rebounder:    []  Seated     []  Standing        []   Supine Ther Ex (reps/sets):     [x]   Seated Ther Ex (reps/sets):   Pt completed 3 sets of 20 reps on rickshaw c 24lbs on each side c extended rest breaks. Performed to increase B tricep strength for increased safety/independence c sit to stand transfers. To increase BUE endurance for ADLs and transfers, pt completed 1 set x20 reps of the following exercises using purple theraband: overhead press, shoulder abduction/adduction and simultaneous shoulder horizontal abduction/adduction. []   Standing Ther Ex (reps/sets):     []   Other:      Comments: All intervention performed to increase pt's endurance, ax tolerance, balance, strength, and I c ADLs/IADLs and functional transfers/mobility. Patient/Caregiver Education and Training:   []   [employer] Equipment Use  [x]   Bed Mobility/Transfer Technique/Safety  []   Energy Conservation Tips  []   Family training  []   Postural Awareness  [x]   Safety During Functional Activities  []   Reinforced Patient's Precautions   []   Progress was updated and reviewed in Rehabtracker with patient and/or family this         date.     Treatment Plan for Next Session: Continue OT POC      Assessment: This pt demonstrated a positive response to today's treatment as evidenced by motivation to participate in BUE strengthening and Pt requesting to practice sit to stand transfers. The patient is making excellent progress toward established goals as evidenced by QI scores. Ongoing deficits are observed in the areas of functional balance/transfers/mobility and continued focus on this is recommended.        Treatment/Activity Tolerance:   [x] Tolerated treatment with no adverse effects    [] Patient limited by fatigue  [] Patient limited by pain   [] Patient limited by medical complications:    [] Adverse reaction to Tx:   [] Significant change in status    Safety:       []  bed alarm set    []  chair alarm set    []  Pt refused alarms                []  Telesitter activated      [x]  Gait belt used during tx session      []other:       Number of Minutes/Billable Intervention  Therapeutic Exercise 45   ADL Self-care    Neuro Re-Ed    Therapeutic Activity 15   Group    Other:    TOTAL 60       Social History  Social/Functional History  Lives With: Spouse,Son (17 and 5 y/o sons and dog)  Type of Home: House  Home Layout: Two level,Bed/Bath upstairs  Home Access: Stairs to enter without rails  Entrance Stairs - Number of Steps: 2  Bathroom Shower/Tub: Tub/Shower unit  Bathroom Toilet: Standard  Bathroom Equipment: Shower chair  Bathroom Accessibility: Carlos A Daniel accessible  Has the patient had two or more falls in the past year or any fall with injury in the past year?: Yes (one fall precipitating this admission)  ADL Assistance: Independent  Homemaking Assistance: Independent  Homemaking Responsibilities: Yes  Meal Prep Responsibility: Secondary  Laundry Responsibility: Secondary  Cleaning Responsibility: Secondary  Bill Paying/Finance Responsibility: Primary  Shopping Responsibility: Secondary  Health Care Management: Primary  Ambulation Assistance: Independent  Transfer Assistance: Independent  Active : Yes  Mode of Transportation: SUV,Truck  Occupation: On disability  Additional Comments: pt sleeps in flat bed at home    Objective                                                                                    Goals:  (Update in navigator)  Short Term Goals  Time Frame for Short term goals: STGs=LTGs:  Long Term Goals  Time Frame for Long term goals : 7-10 days or until d/c. Long Term Goal 1: Pt will complete grooming tasks c Ind. Long Term Goal 2: Pt will complete total body bathing c Mod I and AE PRN. Long Term Goal 3: Pt will complete UB dressing c Ind. Long Term Goal 4: Pt will complete LB dressing c Mod I and AE PRN. Long Term Goal 5: Pt will doff/don footwear c Mod I and AE PRN. Additional Goals?: Yes  Long Term Goal 6: Pt will complete toileting c Mod I.  Long Term Goal 7: Pt will perform functional transfers (bed, chair, toilet, shower) c DME PRN and Mod I.  Long Term Goal 8: Pt will perform therex/therax to facilitate an increase in edurance/ax tolerance (c emphasis on dynamic standing balance/tolerance > 10 mins) c Mod I.  Long Term Goal 9: Pt will complete home management tasks c Mod I.:        Plan of Care                                                                              Times per week: 5 days per week for a minimum of 60 minutes/day plus group as appropriate for 60 minutes.   Treatment to include Plan  Times per Day: Daily  Current Treatment Recommendations: Strengthening,Balance training,Functional mobility training,Endurance training,Neuromuscular re-education,Safety education & training,Patient/Caregiver education & training,Equipment evaluation, education, & procurement,Self-Care / ADL,Home management training    Electronically signed by   ALEC Gonzalez, OTR/L  5/4/2022, 12:55 PM

## 2022-05-04 NOTE — PATIENT CARE CONFERENCE
ACUTE REHAB TEAM CONFERENCE SUMMARY   [facility]    NAME: Avel Vivas  : 1980 ADMIT DATE: 2022    Rehab Admitting Dx:Critical illness myopathy [G72.81]  Critical illness myopathy [G72.81]  Patient Comorbid Conditions: Active Hospital Problems    Diagnosis Date Noted    Generalized weakness [R53.1]      Priority: Medium    Gait disturbance [R26.9]      Priority: Medium    Alcoholic hepatitis with ascites [K70.11]      Priority: Medium    Uncontrolled pain [R52]      Priority: Medium    Traumatic hematoma of buttock [S30. 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     Priority: Medium     Date: 2022    CASE MANAGEMENT  Current issues/needs regarding patient and family discharge status: LSW lives with wife Angi Rizo and young kids in a 2L, 2 SANTO home. Additional support includes local family. Patient reports having no DME PTA. Plan is to discharge to home with family. PHYSICAL THERAPY (Updated in QI)        Long Term Goals  Time Frame for Long term goals : 7 days STG=LTG  Long term goal 1: pt will complete bed mobility with mod I  Long term goal 2: Pt will complete sit to stand, pivot and car transfers with mod I  Long term goal 3: Pt will ambulate 150' with 2ww on level surfaces and 10' on unlevel surfaces with mod I  Long term goal 4: Pt will ascend/descend curb step with 2ww and 12 steps with rail with mod I  Long term goal 5: Pt will retrieve light object from ground with 2ww and reacher with mod I    Impairments/deficits, barriers: decreased sensation and strength RLE with 1/5 DF causing issues with balance in mobility.  Decreased activity tolerance  Body Structures, Functions, Activity Limitations Requiring Skilled Therapeutic Intervention: Decreased functional mobility ,Decreased strength,Decreased ADL status,Decreased endurance,Decreased sensation,Decreased balance,Increased pain,Decreased high-level IADLs,Decreased vision/visual deficit     Therapy Prognosis: Good  Decision Making: Medium Complexity  Clinical Presentation: evolving characteristics  Equipment needed at discharge:2ww      PT IRF-EVA scores since initial assessment  Bed Mobility:   Sit to Lying  Assistance Needed: Independent  Comment: with rail  CARE Score: 6  Discharge Goal: Independent    Roll Left and Right  Assistance Needed: Independent  CARE Score: 6  Discharge Goal: Independent    Lying to Sitting on Side of Bed  Assistance Needed: Independent  CARE Score: 6  Discharge Goal: Independent    Transfers:    Sit to Stand  Assistance Needed: Supervision or touching assistance  Comment: CGA, min cues for COG fwd, R LE positioning, increased WS to L for increased muscle recruitment  CARE Score: 4  Discharge Goal: Independent    Chair/Bed-to-Chair Transfer  Assistance Needed: Supervision or touching assistance  Comment: CGA for balance with RW  CARE Score: 4  Discharge Goal: Independent         Car Transfer  Assistance Needed: Supervision or touching assistance  Comment: approach with 2ww  CARE Score: 4  Discharge Goal: Independent    Ambulation:    Walking Ability  Does the Patient Walk?: Yes     Walk 10 Feet  Assistance Needed: Supervision or touching assistance  Comment: CGA for balance at RW  CARE Score: 4  Discharge Goal: Independent     Walk 50 Feet with Two Turns  Assistance Needed: Supervision or touching assistance  Comment: CG with 2ww and DF wrap to improve foot clearance  CARE Score: 4  Discharge Goal: Independent     Walk 150 Feet  Assistance Needed: Supervision or touching assistance  Comment: ' with 2ww and R DF wrap  CARE Score: 4  Discharge Goal: Independent     Walking 10 Feet on Uneven Surfaces  Assistance Needed: Supervision or touching assistance  Comment: CG with 2ww and R DF wrap  CARE Score: 4  Discharge Goal: Independent     1 Step (Curb)  Assistance Needed: Supervision or touching assistance  Comment: CG with 2ww and R DF wrap  CARE Score: 4  Discharge Goal: Independent     4 Steps  Assistance Needed: Supervision or touching assistance  Comment: CG with B rails and R DF wrap using step to pattern without cues for sequence today,  CARE Score: 4  Discharge Goal: Independent     12 Steps  Assistance Needed: Supervision or touching assistance  Comment: CG with B rails and R DF wrap with pt able to negotiate 15 steps today of mixed  6\" and 4\" steps  CARE Score: 4  Discharge Goal: Independent           Wheelchair:  w/c Ability: Wheelchair Ability  Uses a Wheelchair and/or Scooter?: No                        Balance:        Object: Picking Up Object  Assistance Needed: Supervision or touching assistance  Comment: supervision with 2ww and reacher  CARE Score: 4  Discharge Goal: Independent    Fall Risk: [x]  Yes  []  No    OCCUPATIONAL THERAPY  (Updated in QI)  Short Term Goals  Time Frame for Short term goals: STGs=LTGs :   Long Term Goals  Time Frame for Long term goals : 7-10 days or until d/c. Long Term Goal 1: Pt will complete grooming tasks c Ind. Long Term Goal 2: Pt will complete total body bathing c Mod I and AE PRN. Long Term Goal 3: Pt will complete UB dressing c Ind. Long Term Goal 4: Pt will complete LB dressing c Mod I and AE PRN. Long Term Goal 5: Pt will doff/don footwear c Mod I and AE PRN.   Additional Goals?: Yes  Long Term Goal 6: Pt will complete toileting c Mod I.  Long Term Goal 7: Pt will perform functional transfers (bed, chair, toilet, shower) c DME PRN and Mod I.  Long Term Goal 8: Pt will perform therex/therax to facilitate an increase in edurance/ax tolerance (c emphasis on dynamic standing balance/tolerance > 10 mins) c Mod I.  Long Term Goal 9: Pt will complete home management tasks c Mod I. : OT IRF-EVA scores and goals since initial assessment:    ADLs:    Eating: Eating  Assistance Needed: Independent  Comment: X  CARE Score: 6  Discharge Goal: Independent       Oral Hygiene: Oral Hygiene  Assistance Needed: Independent  Comment: Mod I seated at sink  CARE Score: 6  Discharge Goal: Independent    UB/LB Bathing: Shower/Bathe Self  Assistance Needed: Supervision or touching assistance  Comment: Majority of shower seated; Pt used LHS to bathe distal BLEs; Supervision in stance to bathe bottom  CARE Score: 4  Discharge Goal: Independent    UB Dressing: Upper Body Dressing  Assistance Needed: Independent  Comment: Indpendent to doff/don pullover t shirt  CARE Score: 6  Discharge Goal: Independent         LB Dressing: Lower Body Dressing  Assistance Needed: Supervision or touching assistance  Comment: Pt able to thread BLE into underwear and pants using reacher c cues; Pt able manage over hips  CARE Score: 4  Discharge Goal: Independent    Donning and [place] Footwear: Putting On/Taking Off Footwear  Assistance Needed: Supervision or touching assistance  Comment: Pt able to don L sock in figure 4 position with cues; Pt able to don R sock using sock aid c cues. CARE Score: 4  Discharge Goal: Independent      Toileting: Toileting Hygiene  Assistance needed: Independent  Comment: for urine hygeine  CARE Score: 6  Discharge Goal: Independent      Toilet Transfers: Toilet Transfer  Assistance needed: Supervision or touching assistance  Comment: CGA c grab bars and FWW  CARE Score: 4  Discharge Goal: Independent      Impairments/deficits, barriers:  Endurance, Functional transfers/balance and ax tolerance.   Assessment  Performance deficits / Impairments: Decreased functional mobility ,Decreased safe awareness,Decreased endurance,Decreased balance,Decreased high-level IADLs,Decreased ADL status,Decreased sensation  Decision Making: Medium Complexity  Equipment needed at discharge: BSC      COGNITIVE FUNCTION/SPEECH THERAPY (AS INDICATED)  LTG                                   Nursing Current Medical Status:   [x] Is continent of bowel and bladder     [] Is incontinent of bowel and bladder    [] Has had an adequate number of bowel movements   [] Urinates with no urinary retention >300ml in bladder   [] Targeting bladder protocol with pierce removal   [] Maintaining O2 SATs at 92% or greater   [] Has pain managed while on ARU         [] Has had no skin breakdown while on ARU   [] Has improved skin integrity via wound measurements   [] Has no signs/symptoms of infection at the wound site   [] Pressure wounds Stage/Location:    [] Arrived on unit with pressure wound  [] Has been free from injury during hospitalization   [] Has experienced a fall during hospitalization  [] Ongoing education with patient/family with understanding demonstrated for:  [] Receives IV Fluids  [x] Other: jaundice        NUTRITION  Weight: 271 lb 2.7 oz (123 kg) / Body mass index is 33.01 kg/m². Current diet: ADULT DIET; Regular  ADULT ORAL NUTRITION SUPPLEMENT; Breakfast, Lunch, Dinner; Standard High Calorie/High Protein Oral Supplement  Intake: Appetite and intake improving, recent meals %      Medical improvements/barriers: improved dorsiflexion, may need bracing        Team goals for next treatment period/Intervention for current barriers:   [x] Pt will increase activity tolerance for daily tasks.   [] Pt will improve bed mobility with reduced assist.  [x] Pt will improve safety in fx tasks with reduced cues/assist  [x] Pt will improve transfers with reduced assist  [x] Pt will improve toileting with reduced assist  [x] Pt will improve ADL's with use of adaptive equipment with reduced assist  [] Pt will improve pain mgmt for maximum participation in tx program  [] Pt will improve communication to get basic needs met on unit  [] Pt will improve swallowing for safe diet advancement with use of strategies  []  Plan for discharge to home. Patient Strengths: Motivated, Cooperative and Pleasant    Justification for Continued Stay  Based on my medical assessment of the patient and review of information from the interdisciplinary team as part of this weekly team conference, the patient continues to meet the following criteria for IRF level of care:   The patient requires active and ongoing intervention of multiple therapy disciplines   The patient requires and intensive rehabilitation therapy program   The patient requires continued physician supervision by a rehabilitation physician   The patient requires 24 hours rehab nursing care   The patient requires an intensive and coordinated interdisciplinary team approach to the delivery of rehabilitative care. Assessment/Plan   [x]  The patient is making good progression towards their long term goals and is actively participating in and has a reasonable expectation to continue to benefit from the intensive rehabilitation therapy program   []  The estimated discharge date has been changed from initial team conference due to:   []  The estimated discharge destination has been changed from initial team conference due to:         Ongoing tx following discharge: [facility]     [x]OUTPATIENT  PT   Orthotic referral  [] No Further Treatment     [] Family/Caregiver Training  []  Transitional Living Arrangement   [] Home Assessment (date  )     [] Family Conference   []  Therapeutic Pass       []  Other: (specify)    Estimated Discharge Date: 5/11/22    Estimated Discharge Destination: []home alone   []home alone with assist prn  []Continuous supervision [x]Return home with s/o/spouse/family   [] Assisted living    []SNF     Team members participating in today's conference.     [x] MARLYS Thomson, Medical Director  [x] Dashawn Fuchs,       [] Cat Campuzano, RN Nurse Supervisor     []  Fredrick Miranda, PT  [x]  Avery Thomas, PT       [] Krystyna Lugo OT   [x] Uziel Garcia Elizabeth Choudhary  [] Miladys Mccarthy, OT     [x]  Franklin Oviedo, SLP    []  Kyle Singh, MIKE   [] MIKE Osborne Res      [x] Young Flanagan Mgr   []Flynn Banks Mgr     [x] Francisco Stout, CECELIA   [] Merrill Barrera RN    [] Suze Craven RN    [] Diane Ortiz, CECELIA    [] Gerldine Gosselin, RN   [] Tip Chaney RN   [] Kat Mora RN Nurse Mgr     I have led this Team Conference and agree with the plan, Glory Blair MD, 5/5/2022, 1:09 PM  Goals have been updated to reflect recent status.     Team conference note transcribed this date by: Chad Duarte [place], [id number] [facility], Therapy Coordinator

## 2022-05-04 NOTE — PROGRESS NOTES
Occupational Therapy    Physical Rehabilitation: OCCUPATIONAL THERAPY     [x] daily progress note       [] discharge       Patient Name:  Karan Dye   :  1980 MRN: [id number]  Room:  [address] [place], [place] [address] Date of Admission: 2022  Rehabilitation Diagnosis:   Critical illness myopathy [G72.81]  Critical illness myopathy [G72.81]       Date 2022       Day of ARU Week:  3   Time IN/OUT 8716-0226   Individual Tx Minutes 60   Group Tx Minutes    Co-Treat Minutes    Concurrent Tx Minutes    TOTAL Tx Time Mins 60   Variance Time    Variance Time []   Refusal due to:     []   Medical hold/reason:    []   Illness   []   Off Unit for test/procedure  []   Extra time needed to complete task  []   Therapeutic need  []   Other (specify):   Restrictions Restrictions/Precautions: Fall Risk,Contact Precautions (Hep A positive)         Communication with other providers: [x]   OK to see per nursing:     []   Spoke with team member regarding:      Subjective observations and cognitive status: Pt resting in bed on approach; pleasant and agreeable to therapy session. Pain level/location:    /10       Location:    Discharge recommendations  Anticipated discharge date:  TBD  Destination: []home alone   []home alone w assist prn   [] home w/ family    [] Continuous supervision       []SNF    [] Assisted living     [] Other:   Continued therapy: []HHC OT  []OUTPATIENT  OT   [] No Further OT  Equipment needs: TBD        ADLs:    Eating: Eating  Assistance Needed: Independent  Comment: X  CARE Score: 6  Discharge Goal: Independent       Oral Hygiene: Oral Hygiene  Assistance Needed: Independent  Comment: Mod I seated at sink  CARE Score: 6  Discharge Goal: Independent    UB/LB Bathing: Shower/Bathe Self  Assistance Needed: Supervision or touching assistance  Comment: Majority of shower seated; Pt used LHS to bathe distal BLEs;  Supervision in stance to bathe bottom  CARE Score: 4  Discharge Goal: Independent    UB Dressing: Upper Body Dressing  Assistance Needed: Independent  Comment: Indpendent to doff/don pullover t shirt  CARE Score: 6  Discharge Goal: Independent         LB Dressing: Lower Body Dressing  Assistance Needed: Supervision or touching assistance  Comment: Pt able to thread BLE into underwear and pants using reacher c cues; Pt able manage over hips  CARE Score: 4  Discharge Goal: Independent    Donning and [place] Footwear: Putting On/Taking Off Footwear  Assistance Needed: Supervision or touching assistance  Comment: Pt able to don L sock in figure 4 position with cues; Pt able to don R sock using sock aid c cues.   CARE Score: 4  Discharge Goal: Independent      Toileting: declined need      Toilet Transfers:   NA   Device Used:    []   Standard Toilet         []   Grab Bars           []  Bedside Commode       []   Elevated Toilet          []   Other:        Bed Mobility:           []   Pt received out of bed   Supine --> Sit:  Mod I       Transfers:    Sit--> Stand:  SBA   Stand --> Sit:   SBA   Stand-Pivot:   CGA for shower transfer   Other:    Assistive device required for transfer:   RW       Functional Mobility:  To<>from bathroom   Assistance:  SBA   Device:   [x]   Rolling Walker     []   Standard Walker []   Wheelchair        []   [place]       []   4-Wheeled [place]         []   Cardiac Walker       []   Other:          Additional Therapeutic activities/exercises completed this date:     [x]   ADL Training   []   Balance/Postural training     []   Bed/Transfer Training   []   Endurance Training   []   Neuromuscular Re-ed   []   Nu-step:  Time:        Level:         #Steps:       []   Rebounder:    []  Seated     []  Standing        []   Supine Ther Ex (reps/sets):     []   Seated Ther Ex (reps/sets):     []   Standing Ther Ex (reps/sets):     []   Other:      Comments:      Patient/Caregiver Education and Training:   [x]   [employer] Equipment Use  []   Bed Mobility/Transfer Technique/Safety  []   Energy Conservation Tips  []   Family training  []   Postural Awareness  []   Safety During Functional Activities  []   Reinforced Patient's Precautions   []   Progress was updated and reviewed in Rehabtracker with patient and/or family this         date. Treatment Plan for Next Session: Continue OT POC       Assessment: This pt demonstrated a positive response to today's treatment as evidenced by improved dressing d/t use of adaptive equipment. The patient is making progress toward established goals as evidenced by QI scores.        Treatment/Activity Tolerance:   [x] Tolerated treatment with no adverse effects    [] Patient limited by fatigue  [] Patient limited by pain   [] Patient limited by medical complications:    [] Adverse reaction to Tx:   [] Significant change in status    Safety:       []  bed alarm set    []  chair alarm set    []  Pt refused alarms                []  Telesitter activated      [x]  Gait belt used during tx session      []other:       Number of Minutes/Billable Intervention  Therapeutic Exercise    ADL Self-care 60   Neuro Re-Ed    Therapeutic Activity    Group    Other:    TOTAL 60       Social History  Social/Functional History  Lives With: Spouse,Son (17 and 7 y/o sons and dog)  Type of Home: House  Home Layout: Two level,Bed/Bath upstairs  Home Access: Stairs to enter without rails  Entrance Stairs - Number of Steps: 2  Bathroom Shower/Tub: Tub/Shower unit  Bathroom Toilet: Standard  Bathroom Equipment: Shower chair  Bathroom Accessibility: Maebelle Cramp accessible  Has the patient had two or more falls in the past year or any fall with injury in the past year?: Yes (one fall precipitating this admission)  ADL Assistance: Independent  Homemaking Assistance: Independent  Homemaking Responsibilities: Yes  Meal Prep Responsibility: Secondary  Laundry Responsibility: Secondary  Cleaning Responsibility: Secondary  Bill Paying/Finance Responsibility: Primary  Shopping Responsibility: [address] Management: Primary  Ambulation Assistance: Independent  Transfer Assistance: Independent  Active : Yes  Mode of Transportation: SUV,Truck  Occupation: On disability  Additional Comments: pt sleeps in flat bed at home    Objective                                                                                    Goals:  (Update in navigator)  Short Term Goals  Time Frame for Short term goals: STGs=LTGs:  Long Term Goals  Time Frame for Long term goals : 7-10 days or until d/c. Long Term Goal 1: Pt will complete grooming tasks c Ind. Long Term Goal 2: Pt will complete total body bathing c Mod I and AE PRN. Long Term Goal 3: Pt will complete UB dressing c Ind. Long Term Goal 4: Pt will complete LB dressing c Mod I and AE PRN. Long Term Goal 5: Pt will doff/don footwear c Mod I and AE PRN. Additional Goals?: Yes  Long Term Goal 6: Pt will complete toileting c Mod I.  Long Term Goal 7: Pt will perform functional transfers (bed, chair, toilet, shower) c DME PRN and Mod I.  Long Term Goal 8: Pt will perform therex/therax to facilitate an increase in edurance/ax tolerance (c emphasis on dynamic standing balance/tolerance > 10 mins) c Mod I.  Long Term Goal 9: Pt will complete home management tasks c Mod I.:        Plan of Care                                                                              Times per week: 5 days per week for a minimum of 60 minutes/day plus group as appropriate for 60 minutes.   Treatment to include Plan  Times per Day: Daily  Current Treatment Recommendations: Strengthening,Balance training,Functional mobility training,Endurance training,Neuromuscular re-education,Safety education & training,Patient/Caregiver education & training,Equipment evaluation, education, & procurement,Self-Care / ADL,Home management training    Electronically signed by   LINO Jack,  5/4/2022, 12:11 PM

## 2022-05-04 NOTE — CARE COORDINATION
Case Management Admission Note      Patient:Ronan Mott      :1980  HVO:[id number]  Rehab Dx/Hx: Critical illness myopathy [G72.81]  Critical illness myopathy [G72.81]    Chief Complaint:   Past Medical History:   Diagnosis Date    Acute hepatitis 2022    severe nausea w weakness, elevated LFTs noted in ED 2022- worsened also fr nsaids and alcohol consumption    Allergic rhinitis     Ankle pain     INACTIVE PROBLEM    Anxiety     COVID-19 2022    Epigastric abdominal pain     INACTIVE PROBLEM    Essential hypertension 2018    Family history of coronary artery disease     Herniated lumbar intervertebral disc     Insomnia     LBP (low back pain)     Marfan's syndrome with ocular manifestation     DX'D BY [facility] DR CANTU AT 6Y0    Oral herpes      Past Surgical History:   Procedure Laterality Date    BACK SURGERY      herniated ruptured disc     TONSILLECTOMY      UMBILICAL HERNIA REPAIR N/A 2020    OPEN HERNIA UMBILICAL REPAIR performed by Percy Emerson MD at [facility] OR     Allergies   Allergen Reactions    No Known Allergies      Precautions: falls    Date of Admit: 2022  Room #: 8399/1571-H      Current functional status at time of admit:        Home Living/DME Available:      Type of Home: House  Home Access: Stairs to enter without rails  Bathroom Shower/Tub: Tub/Shower unit  Bathroom Toilet: Standard  Bathroom Equipment: Shower chair          IADL Hx:   Homemaking Responsibilities: Yes  Active : Yes  Mode of Transportation: SUV,Truck  Occupation: On disability          Spouse: Wife Gunnar Rosario. Family: Dad Kee Armstrong, mother. 2 sons (6, 13). Comments: LSW met with patient for admission assessment. Patient lives with wife Gunnar Neighbors and 2 sons in a 2-level home in [place]. There are 2 steps to enter without rails and steps inside. Patient reports tub/shower for bathing and bedroom is on the 1st level, bathroom is on the 2nd.   Patient has PCP, was independent in ADLs PTA, and uses [facility] in [facility] for prescriptions. Patient states he has no DME at home and no pre-existing HHC. Patient reports access to food, utilities, and shelter and feels safe in his environment. BIMS completed in initial assessment. Plan is to D/C home with family, with [facility] and DME as recommended by therapy staff. F/U to be set with Dr. Lawson Juan and family will transport home.     NICKY Jimenez, LSW, 5/4/2022, 9:39 AM

## 2022-05-05 PROCEDURE — 99233 SBSQ HOSP IP/OBS HIGH 50: CPT | Performed by: PHYSICAL MEDICINE & REHABILITATION

## 2022-05-05 PROCEDURE — 97530 THERAPEUTIC ACTIVITIES: CPT

## 2022-05-05 PROCEDURE — 6370000000 HC RX 637 (ALT 250 FOR IP): Performed by: INTERNAL MEDICINE

## 2022-05-05 PROCEDURE — 94761 N-INVAS EAR/PLS OXIMETRY MLT: CPT

## 2022-05-05 PROCEDURE — 6360000002 HC RX W HCPCS: Performed by: INTERNAL MEDICINE

## 2022-05-05 PROCEDURE — 97110 THERAPEUTIC EXERCISES: CPT

## 2022-05-05 PROCEDURE — 94150 VITAL CAPACITY TEST: CPT

## 2022-05-05 PROCEDURE — 1280000000 HC REHAB R&B

## 2022-05-05 PROCEDURE — 97535 SELF CARE MNGMENT TRAINING: CPT

## 2022-05-05 PROCEDURE — 97116 GAIT TRAINING THERAPY: CPT

## 2022-05-05 RX ADMIN — ATENOLOL 50 MG: 25 TABLET ORAL at 07:54

## 2022-05-05 RX ADMIN — METHOCARBAMOL TABLETS 750 MG: 750 TABLET, COATED ORAL at 07:54

## 2022-05-05 RX ADMIN — METHOCARBAMOL TABLETS 750 MG: 750 TABLET, COATED ORAL at 17:52

## 2022-05-05 RX ADMIN — RIFAXIMIN 550 MG: 550 TABLET ORAL at 07:54

## 2022-05-05 RX ADMIN — RIFAXIMIN 550 MG: 550 TABLET ORAL at 20:01

## 2022-05-05 RX ADMIN — OXYCODONE HYDROCHLORIDE 5 MG: 5 TABLET ORAL at 15:45

## 2022-05-05 RX ADMIN — METHOCARBAMOL TABLETS 750 MG: 750 TABLET, COATED ORAL at 14:20

## 2022-05-05 RX ADMIN — OXYCODONE HYDROCHLORIDE 5 MG: 5 TABLET ORAL at 22:09

## 2022-05-05 RX ADMIN — ENOXAPARIN SODIUM 30 MG: 100 INJECTION SUBCUTANEOUS at 20:01

## 2022-05-05 RX ADMIN — ENOXAPARIN SODIUM 30 MG: 100 INJECTION SUBCUTANEOUS at 07:54

## 2022-05-05 RX ADMIN — METHOCARBAMOL TABLETS 750 MG: 750 TABLET, COATED ORAL at 20:00

## 2022-05-05 RX ADMIN — OXYCODONE HYDROCHLORIDE 5 MG: 5 TABLET ORAL at 05:41

## 2022-05-05 RX ADMIN — Medication 40 MG: at 07:56

## 2022-05-05 ASSESSMENT — PAIN DESCRIPTION - ORIENTATION
ORIENTATION: RIGHT

## 2022-05-05 ASSESSMENT — PAIN SCALES - GENERAL
PAINLEVEL_OUTOF10: 6
PAINLEVEL_OUTOF10: 0
PAINLEVEL_OUTOF10: 7
PAINLEVEL_OUTOF10: 0
PAINLEVEL_OUTOF10: 6

## 2022-05-05 ASSESSMENT — PAIN DESCRIPTION - DESCRIPTORS
DESCRIPTORS: DISCOMFORT;THROBBING
DESCRIPTORS: DISCOMFORT;THROBBING
DESCRIPTORS: THROBBING;SHOOTING;NUMBNESS
DESCRIPTORS: THROBBING;DISCOMFORT

## 2022-05-05 ASSESSMENT — PAIN DESCRIPTION - LOCATION
LOCATION: LEG

## 2022-05-05 ASSESSMENT — PAIN SCALES - WONG BAKER: WONGBAKER_NUMERICALRESPONSE: 4

## 2022-05-05 ASSESSMENT — PAIN - FUNCTIONAL ASSESSMENT
PAIN_FUNCTIONAL_ASSESSMENT: ACTIVITIES ARE NOT PREVENTED
PAIN_FUNCTIONAL_ASSESSMENT: PREVENTS OR INTERFERES SOME ACTIVE ACTIVITIES AND ADLS

## 2022-05-05 ASSESSMENT — PAIN DESCRIPTION - ONSET: ONSET: ON-GOING

## 2022-05-05 ASSESSMENT — PAIN DESCRIPTION - FREQUENCY: FREQUENCY: CONTINUOUS

## 2022-05-05 ASSESSMENT — PAIN DESCRIPTION - PAIN TYPE: TYPE: ACUTE PAIN

## 2022-05-05 NOTE — PROGRESS NOTES
Physical Therapy  . [x] daily progress note       [x] discharge       Patient Name:  Stalin Jordan   :  1980 MRN: [id number]  Room:  [address] [place], [place] [address] Date of Admission: 2022  Rehabilitation Diagnosis:   Critical illness myopathy [G72.81]  Critical illness myopathy [G72.81]       Date 2022       Day of ARU Week:  4   Time IN//935, 1317/1415   Individual Tx Minutes 65/58   Group Tx Minutes    Co-Treat Minutes    Concurrent Tx Minutes    TOTAL Tx Time Mins 123   Variance Time +3   Variance Time []   Refusal due to:     []   Medical hold/reason:    []   Illness   []   Off Unit for test/procedure  [x]   Extra time needed to complete task  []   Therapeutic need  []   Other (specify):   Restrictions Restrictions/Precautions  Restrictions/Precautions: Fall Risk,Contact Precautions (Hep A positive)      Interdisciplinary communication [x]   Cleared for therapy per nursing     []   RN notified about issues during session  []   RN updated on pt performance  []   Spoke with   []   Spoke with OT  []   Spoke with MD  []   Other:    Subjective observations and cognitive status: Am and pm pt in bed, agreeable to therapy  Pt has on compression hose with significant decrease in edema in RLE since yesterday   Pain level/location:    /10       Location:    Discharge recommendations  Anticipated discharge date:    Destination: []home alone   []home alone with assist PRN     [x] home w/ family      [] Continuous supervision  []SNF    [] Assisted living     [] Other:  Continued therapy: []HHC PT  [x]OUTPATIENT PT   [] No Further PT  []SNF PT  Caregiver training recommended: []Yes  [] No   Equipment needs: 2ww     PT IRF-EVA scores and goals for discharge assessment:   Roll Left and Right  Assistance Needed: Independent  CARE Score: 6  Discharge Goal: Independent    Sit to Lying  Assistance Needed: Independent  Comment: with rail  CARE Score: 6  Discharge Goal: Independent    Lying to Sitting on Side of Bed  Assistance Needed: Independent  CARE Score: 6  Discharge Goal: Independent    Sit to Stand  Assistance Needed: Supervision or touching assistance  Comment: CGA, min cues for COG fwd, R LE positioning, increased WS to L for increased muscle recruitment  CARE Score: 4  Discharge Goal: Independent    Chair/Bed-to-Chair Transfer  Assistance Needed: Supervision or touching assistance  Comment: CGA for balance with RW  CARE Score: 4  Discharge Goal: Independent        Car Transfer  Assistance Needed: Supervision or touching assistance  Comment: approach with 2ww  CARE Score: 4  Discharge Goal: Independent    Walk 10 Feet?   Walk 10 Feet?: Yes    1 Step  1 Step?: Yes    Picking Up Object  Assistance Needed: Supervision or touching assistance  Comment: supervision with 2ww and reacher  CARE Score: 4  Discharge Goal: Independent    Wheelchair Ability  Uses a Wheelchair and/or Scooter?: No                        Walking Ability  Does the Patient Walk?: Yes    Walk 10 Feet  Assistance Needed: Supervision or touching assistance  Comment: CGA for balance at RW  CARE Score: 4  Discharge Goal: Independent    Walk 50 Feet with Two Turns  Assistance Needed: Supervision or touching assistance  Comment: CG with 2ww and DF wrap to improve foot clearance  CARE Score: 4  Discharge Goal: Independent    Walk 150 Feet  Assistance Needed: Supervision or touching assistance  Comment: ' with 2ww and R DF wrap  CARE Score: 4  Discharge Goal: Independent  AM and PM    Walking 10 Feet on Uneven Surfaces  Assistance Needed: Supervision or touching assistance  Comment: CG with 2ww and R DF wrap  CARE Score: 4  Discharge Goal: Independent    1 Step (Curb)  Assistance Needed: Supervision or touching assistance  Comment: CG with 2ww and R DF wrap  CARE Score: 4  Discharge Goal: Independent    4 Steps  Assistance Needed: Supervision or touching assistance  Comment: CG with B rails and R DF wrap using step to pattern without cues for sequence today,  CARE Score: 4  Discharge Goal: Independent    12 Steps  Assistance Needed: Supervision or touching assistance  Comment: CG with B rails and R DF wrap with pt able to negotiate 15 steps today of mixed  6\" and 4\" steps  CARE Score: 4  Discharge Goal: Independent      Additional Therapeutic activities/exercises completed this date:     []   Nu-step:  Time:        Level:         #Steps:       []   Rebounder:    []  Seated     []  Standing        []   Balance training         []   Postural training    [x]   Supine ther ex (reps/sets): AP, quad set, glut set, heel slide*, abduction*, SLR, bridging with adductor squeeze with min assist for * to keep alignment, 10 x1 each with max effort   [x]   Seated ther ex (reps/sets):  LAQ, marching 10 x2   []   Standing ther ex (reps/sets):     []   Other: Toileting activity completed with    []   Other:    Comments:  Recheck of strength and sensation in lower RLE with no improvements in sensation, but in pm noted slight movement in DF    Patient/Caregiver Education and Training:   []   Role of PT  [x]   Education about Dx  [x]   Use of call light for assist   []   HEP provided and explained   [x]   Treatment plan reviewed  []   Home safety  []   Wheelchair mobility/management   []   Body mechanics  [x]   Bed Mobility/Transfer technique  [x]   Gait technique/sequencing  [x]   Proper use of assistive device/adaptive equipment  [x]   Stair training/Advanced mobility safety and technique  []   Reinforced patient's precautions/mobility while maintaining precautions  []   Postural awareness  []   Family/caregiver training  []   Progress was updated and reviewed in Rehabtracker with patient and/or family this date. [x]   Other:positioning for edema mgmt of RLE using pillows and elevated foot of bed    Treatment Plan for Next Session: progression of gait to continuous steps      Assessment:   This pt demonstrated a positive response to today's treatment as evidenced by improved activity tolerance with addition of DF wrap to RLE in mobility and improved edema after use of thigh high compression stockings from yesterday's session. The patient is making progress toward established goals as evidenced by QI scores. Ongoing deficits are observed in the areas of strength and sensation and continued focus on RLE stability in mobility and adaptive strategies are recommended.        Treatment/Activity Tolerance:   [] Tolerated treatment with no adverse effects    [x] Patient limited by fatigue  [] Patient limited by pain   [] Patient limited by medical complications:    [] Adverse reaction to Tx:   [] Significant change in status    Safety:       [x]  bed alarm set    []  chair alarm set    []  Pt refused alarms                []  Telesitter activated      [x]  Gait belt used during tx session      []other:       Number of Minutes/Billable Intervention  Gait Training 45   Therapeutic Exercise 45   Neuro Re-Ed    Therapeutic Activity 33   Wheelchair Propulsion    Group    Other:    TOTAL 123     Social History  Social/Functional History  Lives With: Spouse,Son (17 and 5 y/o sons and dog)  Type of Home: House  Home Layout: Two level,Bed/Bath upstairs  Home Access: Stairs to enter without rails  Entrance Stairs - Number of Steps: 2  Bathroom Shower/Tub: Tub/Shower unit  Bathroom Toilet: Standard  Bathroom Equipment: Shower chair  Bathroom Accessibility: Kirk Mukherjee accessible  Has the patient had two or more falls in the past year or any fall with injury in the past year?: Yes (one fall precipitating this admission)  ADL Assistance: Independent  Homemaking Assistance: Independent  Homemaking Responsibilities: Yes  Meal Prep Responsibility: Secondary  Laundry Responsibility: Secondary  Cleaning Responsibility: Secondary  Bill Paying/Finance Responsibility: Primary  Shopping Responsibility: Secondary  Health Care Management: Primary  Ambulation Assistance: Independent  Transfer Assistance: Independent  Active : Yes  Mode of Transportation: SUV,Truck  Occupation: On disability  Additional Comments: pt sleeps in flat bed at home    Objective                                                                                    Goals:  (Update in navigator)   : Long Term Goals  Time Frame for Long term goals : 7 days STG=LTG  Long term goal 1: pt will complete bed mobility with mod I  Long term goal 2: Pt will complete sit to stand, pivot and car transfers with mod I  Long term goal 3: Pt will ambulate 150' with 2ww on level surfaces and 10' on unlevel surfaces with mod I  Long term goal 4: Pt will ascend/descend curb step with 2ww and 12 steps with rail with mod I  Long term goal 5: Pt will retrieve light object from ground with 2ww and reacher with mod I:        Plan of Care                                                                              Times per week: 5 days per week for a minimum of 60 minutes/day plus group as appropriate for 60 minutes.   Treatment to include Current Treatment Recommendations: Strengthening,ROM,Balance training,Functional mobility training,Transfer training,Endurance training,ADL/Self-care training,Gait training,Pain management,Cognitive reorientation,Safety education & training,Patient/Caregiver education & training,Equipment evaluation, education, & procurement,Therapeutic activities,Positioning,Modalities,Stair training,IADL training,Neuromuscular re-education,Home exercise program    Electronically signed by   Renata Solis PT,   5/5/2022, 9:34 AM

## 2022-05-05 NOTE — CARE COORDINATION
LSW met with patient following Care Conference. LSW informed patient of recommendations for RW and BSC. Patient agreeable to both. LSW then informed of recommendation for Outpatient PT and patient is agreeable. Patient verbalized understanding and would like to use [facility]. D/C Plan:  Date:  May 11th  DME:  [facility], [facility] (Agency TBD)  HHC:  PT (op) ([facility])  To:   Home with family (family will transport)

## 2022-05-05 NOTE — PROGRESS NOTES
Physical Rehabilitation: OCCUPATIONAL THERAPY     [x] daily progress note       [] discharge       Patient Name:  Karan Dye   :  1980 MRN: [id number]  Room:  [address] [place], [place] [address] Date of Admission: 2022  Rehabilitation Diagnosis:   Critical illness myopathy [G72.81]  Critical illness myopathy [G72.81]       Date 2022       Day of ARU Week:  4   Time IN/OUT [id number]   Individual Tx Minutes 60   Group Tx Minutes    Co-Treat Minutes    Concurrent Tx Minutes    TOTAL Tx Time Mins 60   Variance Time    Variance Time []   Refusal due to:     []   Medical hold/reason:    []   Illness   []   Off Unit for test/procedure  []   Extra time needed to complete task  []   Therapeutic need  []   Other (specify):   Restrictions Restrictions/Precautions: Fall Risk,Contact Precautions (Hep A positive)         Communication with other providers: [x]   OK to see per nursing:     []   Spoke with team member regarding:      Subjective observations and cognitive status: Pt sitting up in bed with family member present in room. Pt pleasant and agreeable to Ot tx.     Pain level/location:    /10       Location: denied pain   Discharge recommendations  Anticipated discharge date:  TBD  Destination: []home alone   []home alone w assist prn   [x] home w/ family    [] Continuous supervision       []SNF    [] Assisted living     [] Other:   Continued therapy: []HHC OT  []OUTPATIENT  OT   [] No Further OT  Equipment needs: none     Toileting:   IND for urine hyiege       Toilet Transfers:   CGA  Device Used:    [x]   Standard Toilet         [x]   Grab Bars           []  Bedside Commode       []   Elevated Toilet          []   Other:        Bed Mobility:           []   Pt received out of bed   Rolling R/L:  Sup  Scooting:  Sup  Supine --> Sit:  Sup  Sit --> Supine:  Sup    Transfers:    Sit--> Stand:  CGA c VCs for pacing  Stand --> Sit:   CGA  Stand-Pivot:   CGA  Other:    Assistive device required for transfer: FWW      Functional Mobility:    Assistance:  Pt amb room <> ADL suit c FWW @ CGA c w/c follow for safety requiring 2 VCs for attention to RLE placement. Device:   [x]   Rolling Walker     []   Standard [place] []   Wheelchair        []   Joenathan Vanduser       []   Mayme Flash         []   Cardiac Walker       []   Other:          Additional Therapeutic activities/exercises completed this date:     [x]   ADL Training   []   Balance/Postural training     [x]   Bed/Transfer Training: Pt completed 3 supine<>EOB<>standing c FWW transfers @ Sup-CGA to L side c bed flat, raised to home height, and all railings lowered to prepare for d/c to home. Pt edu on TTB transfers and bathroom home set up therapist making suggestions on grab bars and AE to increase safety and IND c ADLs. Pt completed 3 transfers @ CGA-SBA demoing increased safety c each transfer.      []   Endurance Training   []   Neuromuscular Re-ed   []   Nu-step:  Time:        Level:         #Steps:       []   Rebounder:    []  Seated     []  Standing        []   Supine Ther Ex (reps/sets):     []   Seated Ther Ex (reps/sets):     []   Standing Ther Ex (reps/sets):     []   Other:      Comments: All interventions completed to increase strength, safety and endurance to further IND c (I)ADLs and fxl transfers/mobility. Patient/Caregiver Education and Training:   [x]   Adaptive Equipment Use  [x]   Bed Mobility/Transfer Technique/Safety  [x]   Energy Conservation Tips  []   Family training  [x]   Postural Awareness  [x]   Safety During Functional Activities  []   Reinforced Patient's Precautions   []   Progress was updated and reviewed in Rehabtracker with patient and/or family this         date. Treatment Plan for Next Session: Cont OT POC      Assessment: This pt demonstrated a positive response to today's treatment as evidenced by fair+ safety awareness. The patient is making  progress toward established goals as evidenced by QI scores.  Ongoing deficits are observed in the areas of endurance, transfers and awareness to limitations and continued focus on this is recommended.        Treatment/Activity Tolerance:   [x] Tolerated treatment with no adverse effects    [] Patient limited by fatigue  [] Patient limited by pain   [] Patient limited by medical complications:    [] Adverse reaction to Tx:   [] Significant change in status    Safety:       [x]  bed alarm set    []  chair alarm set    []  Pt refused alarms                []  Telesitter activated      [x]  Gait belt used during tx session      []other:       Number of Minutes/Billable Intervention  Therapeutic Exercise    ADL Self-care 8   Neuro Re-Ed    Therapeutic Activity 52   Group    Other:    TOTAL 60       Social History  Social/Functional History  Lives With: Spouse,Son (17 and 5 y/o sons and dog)  Type of Home: House  Home Layout: Two level,Bed/Bath upstairs  Home Access: Stairs to enter without rails  Entrance Stairs - Number of Steps: 2  Bathroom Shower/Tub: Tub/Shower unit  Bathroom Toilet: Standard  Bathroom Equipment: Shower chair  Bathroom Accessibility: InflowControl accessible  Has the patient had two or more falls in the past year or any fall with injury in the past year?: Yes (one fall precipitating this admission)  ADL Assistance: Independent  Homemaking Assistance: Independent  Homemaking Responsibilities: Yes  Meal Prep Responsibility: Secondary  Laundry Responsibility: Secondary  Cleaning Responsibility: Secondary  Bill Paying/Finance Responsibility: Primary  Shopping Responsibility: Secondary  Health Care Management: Primary  Ambulation Assistance: Independent  Transfer Assistance: Independent  Active : Yes  Mode of Transportation: SUV,Truck  Occupation: On disability  Additional Comments: pt sleeps in flat bed at home    Objective                                                                                    Goals:  (Update in navigator)  Short Term Goals  Time Frame for Short term goals: STGs=LTGs:  Long Term Goals  Time Frame for Long term goals : 7-10 days or until d/c. Long Term Goal 1: Pt will complete grooming tasks c Ind. Long Term Goal 2: Pt will complete total body bathing c Mod I and AE PRN. Long Term Goal 3: Pt will complete UB dressing c Ind. Long Term Goal 4: Pt will complete LB dressing c Mod I and AE PRN. Long Term Goal 5: Pt will doff/don footwear c Mod I and AE PRN. Additional Goals?: Yes  Long Term Goal 6: Pt will complete toileting c Mod I.  Long Term Goal 7: Pt will perform functional transfers (bed, chair, toilet, shower) c DME PRN and Mod I.  Long Term Goal 8: Pt will perform therex/therax to facilitate an increase in edurance/ax tolerance (c emphasis on dynamic standing balance/tolerance > 10 mins) c Mod I.  Long Term Goal 9: Pt will complete home management tasks c Mod I.:        Plan of Care                                                                              Times per week: 5 days per week for a minimum of 60 minutes/day plus group as appropriate for 60 minutes.   Treatment to include Plan  Times per Day: Daily  Current Treatment Recommendations: Strengthening,Balance training,Functional mobility training,Endurance training,Neuromuscular re-education,Safety education & training,Patient/Caregiver education & training,Equipment evaluation, education, & procurement,Self-Care / ADL,Home management training    Electronically signed by   LINO Oliver,  5/5/2022, 8:29 AM

## 2022-05-05 NOTE — PROGRESS NOTES
Criss Oliveros    : 1980  Acct #: [de-identified]  MRN: [id number]              PM&R Progress Note      Admitting diagnosis: Critical illness myopathy ( [place])     Comorbid diagnoses impacting rehabilitation: Generalized weakness, gait disturbance, alcoholic hepatitis, uncontrolled pain, acute blood loss anemia, acute kidney injury with ATN, gluteal hematoma, alcohol abuse with recent withdrawal, depression with anxiety, essential hypertension    Chief complaint: He reports that he has an established allergy to Norvasc and that the reaction is \"leg swelling\". He denied any new tremors or shaking of the limbs. Prior (baseline) level of function: Independent. Current level of function:         Current  IRF-EVA and Goals:   Occupational Therapy:    Short Term Goals  Time Frame for Short term goals: STGs=LTGs :   Long Term Goals  Time Frame for Long term goals : 7-10 days or until d/c. Long Term Goal 1: Pt will complete grooming tasks c Ind. Long Term Goal 2: Pt will complete total body bathing c Mod I and AE PRN. Long Term Goal 3: Pt will complete UB dressing c Ind. Long Term Goal 4: Pt will complete LB dressing c Mod I and AE PRN. Long Term Goal 5: Pt will doff/don footwear c Mod I and AE PRN. Additional Goals?: Yes  Long Term Goal 6: Pt will complete toileting c Mod I.  Long Term Goal 7: Pt will perform functional transfers (bed, chair, toilet, shower) c DME PRN and Mod I.  Long Term Goal 8: Pt will perform therex/therax to facilitate an increase in edurance/ax tolerance (c emphasis on dynamic standing balance/tolerance > 10 mins) c Mod I.  Long Term Goal 9: Pt will complete home management tasks c Mod I. :                                       Eating: Eating  Assistance Needed: Independent  Comment: X  CARE Score: 6  Discharge Goal: Independent       Oral Hygiene: Oral Hygiene  Assistance Needed: Independent  Comment:  Mod I seated at sink  CARE Score: 6  Discharge Goal: Independent    UB/LB Bathing: Shower/Bathe Self  Assistance Needed: Supervision or touching assistance  Comment: Majority of shower seated; Pt used LHS to bathe distal BLEs; Supervision in stance to bathe bottom  CARE Score: 4  Discharge Goal: Independent    UB Dressing: Upper Body Dressing  Assistance Needed: Independent  Comment: Indpendent to doff/don pullover t shirt  CARE Score: 6  Discharge Goal: Independent         LB Dressing: Lower Body Dressing  Assistance Needed: Supervision or touching assistance  Comment: Pt able to thread BLE into underwear and pants using reacher c cues; Pt able manage over hips  CARE Score: 4  Discharge Goal: Independent    Donning and Register Footwear: Putting On/Taking Off Footwear  Assistance Needed: Supervision or touching assistance  Comment: Pt able to don L sock in figure 4 position with cues; Pt able to don R sock using sock aid c cues. CARE Score: 4  Discharge Goal: Independent      Toiletin [address] needed: Supervision or touching assistance  Comment: Pt reports he is able to complete perineal hygiene c someone standing next to him. CARE Score: 4  Discharge Goal: Independent      Toilet Transfers: Toilet Transfer  Assistance needed: Supervision or touching assistance  Comment: CGA c use of grab bars and 2WW.   CARE Score: 4  Discharge Goal: Independent    Physical Therapy:         Long Term Goals  Time Frame for Long term goals : 7 days STG=LTG  Long term goal 1: pt will complete bed mobility with mod I  Long term goal 2: Pt will complete sit to stand, pivot and car transfers with mod I  Long term goal 3: Pt will ambulate 150' with 2ww on level surfaces and 10' on unlevel surfaces with mod I  Long term goal 4: Pt will ascend/descend curb step with 2ww and 12 steps with rail with mod I  Long term goal 5: Pt will retrieve light object from ground with 2ww and reacher with mod I      Bed Mobility:   Sit to Lying  Assistance Needed: Independent  Comment: with rail  CARE Score: 6  Discharge Goal: Independent  Roll Left and Right  Assistance Needed: Independent  CARE Score: 6  Discharge Goal: Independent  Lying to Sitting on Side of Bed  Assistance Needed: Independent  CARE Score: 6  Discharge Goal: Independent    Transfers:    Sit to Stand  Assistance Needed: Supervision or touching assistance  Comment: CGA, min cues for COG fwd, R LE positioning, increased WS to L for increased muscle recruitment  CARE Score: 4  Discharge Goal: Independent  Chair/Bed-to-Chair Transfer  Assistance Needed: Supervision or touching assistance  Comment: CGA for balance with RW  CARE Score: 4  Discharge Goal: Independent     Car Transfer  Assistance Needed: Supervision or touching assistance  Comment: approach with 2ww  CARE Score: 4  Discharge Goal: Independent    Ambulation:    Walking Ability  Does the Patient Walk?: Yes     Walk 10 Feet  Assistance Needed: Supervision or touching assistance  Comment: CGA for balance at RW  CARE Score: 4  Discharge Goal: Independent     Walk 50 Feet with Two Turns  Assistance Needed: Partial/moderate assistance  Comment: CG-min A for balance at RW, min cues for compensation at hip for R LE clearance due to foot drop, increased difficulty wtih fatigue. 80' max distance ambulation   CARE Score: 3  Discharge Goal: Independent     Walk 150 Feet  Comment: 50' max distance tolerated due to LE muscle fatigue  Discharge Goal: Independent     Walking 10 Feet on Uneven Surfaces  Assistance Needed: Partial/moderate assistance  Comment: min assist with 2ww  CARE Score: 3  Discharge Goal: Independent     1 Step (Curb)  Assistance Needed: Partial/moderate assistance  Comment: min assist for control of RLE  CARE Score: 3  Discharge Goal: Independent     4 Steps  Assistance Needed: Partial/moderate assistance  Comment: CG-Min A B rails.  cues for non-recip and seq pattern, min buckling of R knee on descent, 10 steps max  CARE Score: 3  Discharge Goal: Independent     12 Steps  Assistance Needed: Partial/moderate assistance  Comment: one instance of RLE knee buckling on descent, min assist overall  CARE Score: 3  Discharge Goal: Independent       Wheelchair:  w/c Ability: Wheelchair Ability  Uses a Wheelchair and/or Scooter?: No                Balance:        Object: Picking Up Object  Assistance Needed: Supervision or touching assistance  Comment: supervision with 2ww and reacher  CARE Score: 4  Discharge Goal: Independent    I      Exam:    Blood pressure 139/80, pulse 57, temperature 99.2 °F (37.3 °C), temperature source Oral, resp. rate 16, height 6' 4\" (1.93 m), weight 274 lb 7.6 oz (124.5 kg), SpO2 95 %. General: He was observed propelling wheelchair. Alert and talkative and easily distracted. Poor reasoning and insight demonstrated. HEENT: MMM. Clear speech. Neck supple. Pulmonary: Symmetric air exchange without wheezes or rales. Cardiac: Regular rate and rhythm. Abdomen: Patient's abdomen is soft and nondistended. Bowel sounds were present throughout. There was no rebound, guarding or masses noted. Upper extremities: Using his upper limbs to move his wheelchair. Functional  strength but 4 -/5 strength proximally and distally. Lower extremities: Tender swelling throughout the right leg with bruising about the inner thigh and posterior hip. Right foot drop noted. Heels clear. No signs of DVT. His left leg is not swollen at all. Sitting balance was fair. Standing balance was poor.     Lab Results   Component Value Date    WBC 14.1 (H) 05/02/2022    HGB 9.2 (L) 05/02/2022    HCT 30.1 (L) 05/02/2022    .1 (H) 05/02/2022     05/02/2022     Lab Results   Component Value Date    INR 1.18 04/26/2022    INR 1.10 04/22/2022    INR 1.08 04/21/2022    PROTIME 15.2 (H) 04/26/2022    PROTIME 14.2 04/22/2022    PROTIME 14.0 04/21/2022     Lab Results   Component Value Date    CREATININE 0.5 (L) 05/02/2022    BUN 11 05/02/2022    NA 138 05/02/2022    K 3.7 05/02/2022    CL 99 05/02/2022    CO2 27 05/02/2022     Lab Results   Component Value Date     (H) 05/02/2022     (H) 05/02/2022    ALKPHOS 199 (H) 05/02/2022    BILITOT 8.6 (H) 05/02/2022       Expected length of stay  prior to a supervised level of function for discharge home with a walker and C OT/PT is 2 weeks. Recommendations:    1. Critical illness myopathy with gait disturbance:  He requires significant cueing to stay on task during the daily occupational and physical therapy.  Ongoing adaptive equipment training, caregiver education and monitoring of bowel and bladder function. Cautiously managing his pain while providing nutritional support. Currently no obvious complications of alcohol withdrawal.  Monitoring his renal function, treating his blood pressure and providing DVT prophylaxis. Verbal cues and 25% physical assistance needed for transfers. 2. DVT prophylaxis: Lovenox 30 mg SQ twice daily.  I must monitor his hemoglobin and platelet count while on this medication.  Weightbearing activities  are slowly improving daily.  GI prophylaxis is available. No new bruising or swelling. 3. Alcoholic hepatitis: The GI consultant has continued prednisolone 40 mg daily for 1 month. Tolerating the Xifaxan.  Encouraging consistent oral nutrition and hydration.  Abstinence from alcohol with community resources available.  From the gastroenterologist:     1) severe alcoholic hepatitis with probable superimposed medication hepatotoxicity- improving                   Plan:              9) I gave him a schedule for the prednisolone:              -18 MG/D X 4 WEEKS              -30 MG/D X 1 WEEK              -34 MG/D X 1 WEEK              -56 MG/D X 1 WEEK              -6 MG/D X 1 WEEK THEN STOP              2) suggested follow up visit in my office in 1-2 months              4.  Acute kidney injury with ATN: Periodic monitoring of his chemistries.  Encouraging oral hydration.  Avoiding nephrotoxic medications when possible. 5. Gluteal hematoma: Roxicodone and Robaxin.  Avoiding acetaminophen.  Cautious DVT prophylaxis.  Monitoring him for signs of further bleeding into the traumatized area.  Limb elevation and cryotherapy.  Progressive mobilization. Considering low-dose gabapentin. 6. Alcohol abuse with withdrawal: Treating him in a calm and consistent environment.  Encouraging alcohol abstinence.  Providing short acting benzodiazepines as needed. 7. Hypertension:  hypertension: Tenormin and Norvasc for blood pressure regulation.  Vital signs are checked at rest and with activity.  Target systolic blood pressure is 120-140. Blood pressure is within the target range. Monitoring closely. I understand he has a concern about Norvasc causing swelling but he has had 5 doses in his left leg showing no swelling. It was likely a coincidence or some other factor when he first developed his association between Norvasc and limb swelling. Regardless, he is not going to take Norvasc despite a recommendation.   I will stop it and we will monitor his blood pressure to determine if further action is needed.

## 2022-05-06 LAB — GLUCOSE BLD-MCNC: 123 MG/DL (ref 70–99)

## 2022-05-06 PROCEDURE — 1280000000 HC REHAB R&B

## 2022-05-06 PROCEDURE — 97110 THERAPEUTIC EXERCISES: CPT

## 2022-05-06 PROCEDURE — 82962 GLUCOSE BLOOD TEST: CPT

## 2022-05-06 PROCEDURE — 97530 THERAPEUTIC ACTIVITIES: CPT

## 2022-05-06 PROCEDURE — 99232 SBSQ HOSP IP/OBS MODERATE 35: CPT | Performed by: PHYSICAL MEDICINE & REHABILITATION

## 2022-05-06 PROCEDURE — 94150 VITAL CAPACITY TEST: CPT

## 2022-05-06 PROCEDURE — 94761 N-INVAS EAR/PLS OXIMETRY MLT: CPT

## 2022-05-06 PROCEDURE — 97116 GAIT TRAINING THERAPY: CPT

## 2022-05-06 PROCEDURE — 6370000000 HC RX 637 (ALT 250 FOR IP): Performed by: INTERNAL MEDICINE

## 2022-05-06 PROCEDURE — 6360000002 HC RX W HCPCS: Performed by: INTERNAL MEDICINE

## 2022-05-06 PROCEDURE — 97535 SELF CARE MNGMENT TRAINING: CPT

## 2022-05-06 RX ADMIN — METHOCARBAMOL TABLETS 750 MG: 750 TABLET, COATED ORAL at 20:35

## 2022-05-06 RX ADMIN — METHOCARBAMOL TABLETS 750 MG: 750 TABLET, COATED ORAL at 17:01

## 2022-05-06 RX ADMIN — ENOXAPARIN SODIUM 30 MG: 100 INJECTION SUBCUTANEOUS at 07:40

## 2022-05-06 RX ADMIN — OXYCODONE HYDROCHLORIDE 5 MG: 5 TABLET ORAL at 17:01

## 2022-05-06 RX ADMIN — METHOCARBAMOL TABLETS 750 MG: 750 TABLET, COATED ORAL at 12:49

## 2022-05-06 RX ADMIN — RIFAXIMIN 550 MG: 550 TABLET ORAL at 07:40

## 2022-05-06 RX ADMIN — METHOCARBAMOL TABLETS 750 MG: 750 TABLET, COATED ORAL at 07:40

## 2022-05-06 RX ADMIN — OXYCODONE HYDROCHLORIDE 5 MG: 5 TABLET ORAL at 23:04

## 2022-05-06 RX ADMIN — ENOXAPARIN SODIUM 30 MG: 100 INJECTION SUBCUTANEOUS at 20:35

## 2022-05-06 RX ADMIN — ATENOLOL 50 MG: 25 TABLET ORAL at 07:40

## 2022-05-06 RX ADMIN — Medication 40 MG: at 07:39

## 2022-05-06 RX ADMIN — OXYCODONE HYDROCHLORIDE 5 MG: 5 TABLET ORAL at 10:38

## 2022-05-06 RX ADMIN — RIFAXIMIN 550 MG: 550 TABLET ORAL at 20:35

## 2022-05-06 RX ADMIN — OXYCODONE HYDROCHLORIDE 5 MG: 5 TABLET ORAL at 04:18

## 2022-05-06 ASSESSMENT — PAIN DESCRIPTION - PAIN TYPE
TYPE: ACUTE PAIN;CHRONIC PAIN

## 2022-05-06 ASSESSMENT — PAIN DESCRIPTION - FREQUENCY
FREQUENCY: INTERMITTENT
FREQUENCY: CONTINUOUS

## 2022-05-06 ASSESSMENT — PAIN DESCRIPTION - LOCATION
LOCATION: LEG
LOCATION: BACK;LEG
LOCATION: LEG

## 2022-05-06 ASSESSMENT — PAIN DESCRIPTION - DESCRIPTORS
DESCRIPTORS: SHOOTING;THROBBING
DESCRIPTORS: SHOOTING;THROBBING
DESCRIPTORS: THROBBING;SHOOTING
DESCRIPTORS: NUMBNESS;TINGLING
DESCRIPTORS: DISCOMFORT;THROBBING
DESCRIPTORS: THROBBING

## 2022-05-06 ASSESSMENT — PAIN SCALES - GENERAL
PAINLEVEL_OUTOF10: 7
PAINLEVEL_OUTOF10: 0
PAINLEVEL_OUTOF10: 7
PAINLEVEL_OUTOF10: 7
PAINLEVEL_OUTOF10: 0
PAINLEVEL_OUTOF10: 6
PAINLEVEL_OUTOF10: 7

## 2022-05-06 ASSESSMENT — PAIN SCALES - WONG BAKER
WONGBAKER_NUMERICALRESPONSE: 2

## 2022-05-06 ASSESSMENT — PAIN DESCRIPTION - ONSET
ONSET: ON-GOING

## 2022-05-06 ASSESSMENT — PAIN - FUNCTIONAL ASSESSMENT
PAIN_FUNCTIONAL_ASSESSMENT: PREVENTS OR INTERFERES SOME ACTIVE ACTIVITIES AND ADLS
PAIN_FUNCTIONAL_ASSESSMENT: ACTIVITIES ARE NOT PREVENTED
PAIN_FUNCTIONAL_ASSESSMENT: PREVENTS OR INTERFERES SOME ACTIVE ACTIVITIES AND ADLS

## 2022-05-06 ASSESSMENT — PAIN DESCRIPTION - ORIENTATION
ORIENTATION: RIGHT
ORIENTATION: RIGHT
ORIENTATION: RIGHT;LOWER
ORIENTATION: RIGHT
ORIENTATION: RIGHT

## 2022-05-06 NOTE — PROGRESS NOTES
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 Prior (baseline) level of function: Independent. Current level of function:         Current  IRF-EVA and Goals:   Occupational Therapy:    Short Term Goals  Time Frame for Short term goals: STGs=LTGs :   Long Term Goals  Time Frame for Long term goals : 7-10 days or until d/c. Long Term Goal 1: Pt will complete grooming tasks c Ind. Long Term Goal 2: Pt will complete total body bathing c Mod I and AE PRN. Long Term Goal 3: Pt will complete UB dressing c Ind. Long Term Goal 4: Pt will complete LB dressing c Mod I and AE PRN. Long Term Goal 5: Pt will doff/don footwear c Mod I and AE PRN. Additional Goals?: Yes  Long Term Goal 6: Pt will complete toileting c Mod I.  Long Term Goal 7: Pt will perform functional transfers (bed, chair, toilet, shower) c DME PRN and Mod I.  Long Term Goal 8: Pt will perform therex/therax to facilitate an increase in edurance/ax tolerance (c emphasis on dynamic standing balance/tolerance > 10 mins) c Mod I.  Long Term Goal 9: Pt will complete home management tasks c Mod I. :                                       Eating: Eating  Assistance Needed: Independent  Comment: X  CARE Score: 6  Discharge Goal: Independent       Oral Hygiene: Oral Hygiene  Assistance Needed: Independent  Comment:  Mod I seated at sink  CARE Score: 6  Discharge Goal: Independent    UB/LB Bathing: Shower/Bathe Self  Assistance Needed: Supervision or touching assistance  Comment: Majority of shower seated; Pt used LHS to bathe distal BLEs; Supervision in stance to bathe bottom  CARE Score: 4  Discharge Goal: Independent    UB Dressing: Upper Body Dressing  Assistance Needed: Independent  Comment: Indpendent to doff/don pullover t shirt  CARE Score: 6  Discharge Goal: Independent         LB Dressing: Lower Body Dressing  Assistance Needed: Supervision or touching assistance  Comment: Pt able to thread BLE into underwear and pants using reacher c cues; Pt able manage over hips  CARE Score: 4  Discharge Goal: Independent    Donning and [place] Footwear: Putting On/Taking Off Footwear  Assistance Needed: Supervision or touching assistance  Comment: Pt able to don L sock in figure 4 position with cues; Pt able to don R sock using sock aid c cues. CARE Score: 4  Discharge Goal: Independent      Toileting: Toileting Hygiene  Assistance needed: Independent  Comment: for urine hygeine  CARE Score: 6  Discharge Goal: Independent      Toilet Transfers:   Toilet Transfer  Assistance needed: Supervision or touching assistance  Comment: CGA c grab bars and FWW  CARE Score: 4  Discharge Goal: Independent    Physical Therapy:         Long Term Goals  Time Frame for Long term goals : 7 days STG=LTG  Long term goal 1: pt will complete bed mobility with mod I  Long term goal 2: Pt will complete sit to stand, pivot and car transfers with mod I  Long term goal 3: Pt will ambulate 150' with 2ww on level surfaces and 10' on unlevel surfaces with mod I  Long term goal 4: Pt will ascend/descend curb step with 2ww and 12 steps with rail with mod I  Long term goal 5: Pt will retrieve light object from ground with 2ww and reacher with mod I      Bed Mobility:   Sit to Lying  Assistance Needed: Independent  Comment: with rail  CARE Score: 6  Discharge Goal: Independent  Roll Left and Right  Assistance Needed: Independent  CARE Score: 6  Discharge Goal: Independent  Lying to Sitting on Side of Bed  Assistance Needed: Independent  CARE Score: 6  Discharge Goal: Independent    Transfers:    Sit to Stand  Assistance Needed: Supervision or touching assistance  Comment: CGA, min cues for COG fwd, R LE positioning, increased WS to L for increased muscle recruitment  CARE Score: 4  Discharge Goal: Independent  Chair/Bed-to-Chair Transfer  Assistance Needed: Supervision or touching assistance  Comment: CGA for balance with RW  CARE Score: 4  Discharge Goal: Independent     Car Transfer  Assistance Needed: Supervision or touching assistance  Comment: approach with 2ww  CARE Score: 4  Discharge Goal: Independent    Ambulation:    Walking Ability  Does the Patient Walk?: Yes     Walk 10 Feet  Assistance Needed: Supervision or touching assistance  Comment: CGA for balance at RW  CARE Score: 4  Discharge Goal: Independent     Walk 50 Feet with Two Turns  Assistance Needed: Supervision or touching assistance  Comment: CG with 2ww and DF wrap to improve foot clearance  CARE Score: 4  Discharge Goal: Independent     Walk 150 Feet  Assistance Needed: Supervision or touching assistance  Comment: ' with 2ww and R DF wrap  CARE Score: 4  Discharge Goal: Independent     Walking 10 Feet on Uneven Surfaces  Assistance Needed: Supervision or touching assistance  Comment: CG with 2ww and R DF wrap  CARE Score: 4  Discharge Goal: Independent     1 Step (Curb)  Assistance Needed: Supervision or touching assistance  Comment: CG with 2ww and R DF wrap  CARE Score: 4  Discharge Goal: Independent     4 Steps  Assistance Needed: Supervision or touching assistance  Comment: CG with B rails and R DF wrap using step to pattern without cues for sequence today,  CARE Score: 4  Discharge Goal: Independent     12 Steps  Assistance Needed: Supervision or touching assistance  Comment: CG with B rails and R DF wrap with pt able to negotiate 15 steps today of mixed  6\" and 4\" steps  CARE Score: 4  Discharge Goal: Independent       Wheelchair:  w/c Ability: Wheelchair Ability  Uses a Wheelchair and/or Scooter?: No                Balance:        Object: Picking Up Object  Assistance Needed: Supervision or touching assistance  Comment: supervision with 2ww and reacher  CARE Score: 4  Discharge Goal: Independent    I      Exam:    Blood pressure (!) 143/87, pulse 66, temperature 98.7 °F (37.1 °C), temperature source Oral, resp. rate 18, height 6' 4\" (1.93 m), weight 271 lb 2.7 oz (123 kg), SpO2 98 %. General: Recumbent in bed. Resting quietly. Alert. HEENT: Neck supple. No JVD.  MMM. Pulmonary: No wheezing, rales or coughing. Cardiac: RRR. Abdomen: Patient's abdomen is soft and nondistended. Bowel sounds were present throughout. There was no rebound, guarding or masses noted. Upper extremities: Normal tone without tremor. 4 -/5 strength bilaterally. Fair dexterity. Lower extremities: No new bruising. Right lower limb is swollen throughout its length. Left leg is without swelling. No signs of DVT. Mature hematoma present in the inner groin and right posterior hip. Sitting balance was fair. Standing balance was poor.     Lab Results   Component Value Date    WBC 14.1 (H) 05/02/2022    HGB 9.2 (L) 05/02/2022    HCT 30.1 (L) 05/02/2022    .1 (H) 05/02/2022     05/02/2022     Lab Results   Component Value Date    INR 1.18 04/26/2022    INR 1.10 04/22/2022    INR 1.08 04/21/2022    PROTIME 15.2 (H) 04/26/2022    PROTIME 14.2 04/22/2022    PROTIME 14.0 04/21/2022     Lab Results   Component Value Date    CREATININE 0.5 (L) 05/02/2022    BUN 11 05/02/2022     05/02/2022    K 3.7 05/02/2022    CL 99 05/02/2022    CO2 27 05/02/2022     Lab Results   Component Value Date     (H) 05/02/2022     (H) 05/02/2022    ALKPHOS 199 (H) 05/02/2022    BILITOT 8.6 (H) 05/02/2022       Expected length of stay  prior to a supervised level of function for discharge home with a walker and [facility] OT/PT is 5/11/2022. Recommendations:    1. Critical illness myopathy with gait disturbance:   Significant fatigue after limited exertion during the daily occupational and physical therapy.  Ongoing adaptive equipment training, caregiver education and monitoring of bowel and bladder function.  Cautiously managing his pain while providing nutritional support. Currently no obvious complications of alcohol withdrawal.  Monitoring his renal function, treating his blood pressure and providing DVT prophylaxis.  Verbal cues and CGA-25% physical assistance needed for transfers. 2. DVT prophylaxis: Lovenox 30 mg SQ twice daily.  I must monitor his hemoglobin and platelet count while on this medication.  Weightbearing activities are slowly improving daily.  GI prophylaxis is available.  No clinical signs of blood loss. 3. Alcoholic hepatitis: The GI consultant has continued prednisolone 40 mg daily for 1 month.  Tolerating the Xifaxan.  Encouraging consistent oral nutrition and hydration.  Abstinence from alcohol with community resources available.  From the gastroenterologist:     1) severe alcoholic hepatitis with probable superimposed medication hepatotoxicity- improving                   Plan:              4) C gave him a schedule for the prednisolone:              -78 MG/D X 4 WEEKS              -31 MG/D X 1 WEEK              -12 MG/D X 1 WEEK              -02 MG/D X 1 WEEK              -3 MG/D X 1 WEEK THEN STOP              2) suggested follow up visit in my office in 1-2 months              4. Acute kidney injury with ATN: Periodic monitoring of his chemistries.  Encouraging oral hydration.  Avoiding nephrotoxic medications when possible.   5. Gluteal hematoma: Roxicodone and Robaxin.  Avoiding acetaminophen.  Cautious DVT prophylaxis.  Monitoring him for signs of further bleeding into the traumatized area.  Limb elevation and cryotherapy.  Progressive mobilization.  Currently holding off from the use of low-dose gabapentin. 6. Alcohol abuse with withdrawal: Treating him in a calm and consistent environment.  Encouraging alcohol abstinence.  Providing short acting benzodiazepines as needed. 7. Hypertension:  hypertension: Tenormin and Norvasc for blood pressure regulation.  Vital signs are checked at rest and with activity.  Target systolic blood pressure is 120-140.  Blood pressure is at the upper limits of the target range.  Monitoring closely. Monitoring to see if we need to replace the Norvasc with another agent.      Counseling and Coordination of Care: In care conference today I met with the patient's OT, PT, RN and . We discussed the patient's problems, progress and prognosis. Disposition issues were clarified and plans were established for ongoing rehabilitation efforts beyond the ARU stay. I reviewed this information with the patient during a second distinct visit with the patient.  More than half of the total time of 36 minutes spent with the patient involved counseling and coordination of care.

## 2022-05-06 NOTE — PROGRESS NOTES
Physical Therapy  . [x] daily progress note       [] discharge       Patient Name:  Flores Mathew   :  1980 MRN: [id number]  Room:  [address] [place], [place] [address] Date of Admission: 2022  Rehabilitation Diagnosis:   Critical illness myopathy [G72.81]  Critical illness myopathy [G72.81]       Date 2022       Day of ARU Week:  5   Time IN//932, 1055/1153   Individual Tx Minutes 62+58   Group Tx Minutes    Co-Treat Minutes    Concurrent Tx Minutes    TOTAL Tx Time Mins 120   Variance Time    Variance Time []   Refusal due to:     []   Medical hold/reason:    []   Illness   []   Off Unit for test/procedure  []   Extra time needed to complete task  []   Therapeutic need  []   Other (specify):   Restrictions Restrictions/Precautions  Restrictions/Precautions: Fall Risk,Contact Precautions (Hep A positive)      Interdisciplinary communication [x]   Cleared for therapy per nursing     []   RN notified about issues during session  []   RN updated on pt performance  []   Spoke with   []   Spoke with OT  []   Spoke with MD  []   Other:    Subjective observations and cognitive status: Pt in bathroom for seated ADL with aide. Agreeable to therapy. Pt needed compression stockings donned and DF ace wrap applied prior to mobility for improved RLE function. Pain level/location:  2  /10       Location: RLE   Discharge recommendations  Anticipated discharge date:    Destination: []home alone   []home alone with assist PRN     [x] home w/ family      [] Continuous supervision  []SNF    [] Assisted living     [] Other:  Continued therapy: []HHC PT  [x]OUTPATIENT  PT   [] No Further PT  []SNF PT  Caregiver training recommended: [x]Yes  [] No   Equipment needs: 2ww     Bed Mobility:           []   Pt received out of bed   Rolling R/L: indep  Scooting:  indep  Lying --> Sit:  indep  Sit --> lying:  indep  Bed features used: [] Yes  [x] No       Transfers:    Sit--> Stand:   At start of first session pt CG due to R knee buckling slightly, but as session progressed, transfers supervision with no buckling  Stand --> Sit:   Supervision with occ cue for hand placement  Chair-->Bed/Bed --> Chair:   Supervision with 2ww, unsteadiness on turns at times with R knee  Toilet Transfer (if applicable): transfer mod I as well as hygeine, but pants management supervision due to unsteadiness  Other:    Assistive device required for transfer:   2ww    Gait:    Distance:  1st session: 12'x2(bathroom), 75'x1, 175'   2nd session:75'x2(to/from gym, submax distance)             Assistance:  SBA to CG due to intermittent either R knee buckling or decreased R toe clearance(this happens more toward end of session with DF or at any time without DF wrap)  Device:  2ww, R DF wrap  Gait Quality: focus in longer distance for pt to equalize step length and keep continuous forward motion with 2ww. This caused increased R knee instability and pt needed tactile and verbal cues for R quad and glut engagement in all phases of stance      Stairs   # Completed:  4(6\")  Assistance:  SBA  Supportive Device:  B rails  Attempted one step up with L rail only (as at home) with R hip and knee instability in SLS causing need for min assist to complete step up      Additional Therapeutic activities/exercises completed this date:     []   Nu-step:  Time:        Level:         #Steps:       []   Rebounder:    []  Seated     []  Standing        []   Balance training         []   Postural training    [x]   Supine ther ex (reps/sets): briding with pt self assisting positioning of RLE 10 x1, adductor squeezes 10 x1    [x]   Seated ther ex (reps/sets): use of glider for knee flexion with max assist of PT. Instructed in self assisted ROM.      [x]   Standing ther ex (reps/sets): LLE tap ups to 6\" step with focus on R hip and knee extension with tactile, verbal and visual feedback(mirror) 10 x2, then tap ups with RLE focused on smooth co-contraction of RLE and engagement of DF as able(had DF wrap on) with decreased coordination and difficulty clearing toe as fatigue increased, 10 x2   Standing ex in // bars with B UE support of marching, abduction, hip extension, PF, mini squats, knee flexion 10 x1 each. Cues for RLE stability in stance for each. Noted that in marching RLE was limited in knee flexion and hip flexion with pt reporting \"tightness\" in gluts. Pt needed cues in abduction to decrease compensatory lean to L to elevate R LE, very small ROM when performed correctly. Hip extension RLE ROM very small. Pt could not elevate foot in R knee flexion and needed max assist from PT.    []   Picking up object from floor (standing):                   []   Reacher used   [x]   Other: in staggered stance: R LE forward with focus on stance phase control as pt took step forward and back with LLE with tactile, verbal and visual feedback(mirror) 10 x1  Also, with LLE forward, performed R late stance strengthening with blue t-band at back of knee for knee and hip flexion and extension with good control   []   Other:    Comments:      Patient/Caregiver Education and Training:   []   Role of PT  [x]   Education about Dx  []   Use of call light for assist   []   Wheelchair mobility/management  [x]   HEP provided and explained : added hamstring sets and seated AAROM knee flexion to HEP  []   Treatment plan reviewed  []   Home safety  []   Body mechanics  [x]   Positioning  [x]   Bed Mobility/Transfer technique  [x]   Gait technique/sequencing  [x]   Proper use of assistive device/adaptive equipment: pt had bariatric walker for his height requirement due to equipment shortage on his admission.  Switched to regular tall walker today with pt noting no change in how it affected his walking  [x]   Stair training/Advanced mobility safety and technique  []   Reinforced patient's precautions/mobility while maintaining precautions  [x]   Postural awareness  []   Family/caregiver training  []   Progress was updated and reviewed in Rehabtracker with patient and/or family this date. []   Other:      Treatment Plan for Next Session: use of ace wrap for DF assist.  LE ex with emphasis on hamstring and DF/PF , standing balance tasks, gait for improved quality of movement for longer distances, p/u with reacher, car transfer, stairs with L rail (needs increased assist)    Assessment:   Assessment: This pt demonstrated a positive   response to today's treatment as evidenced by good tolerance to increased ex today after increase in activity yesterday. The patient is making  progress toward established goals as evidenced by QI scores. Ongoing deficits are observed in the areas of hamstring strength noted today(likely not new, just masked by compensation of hip flexion in supine), R toe clearance in gait and continued focus on safety and awareness in gait is recommended.       Treatment/Activity Tolerance:   [] Tolerated treatment with no adverse effects    [x] Patient limited by fatigue  [] Patient limited by pain   [] Patient limited by medical complications:    [] Adverse reaction to Tx:   [] Significant change in status    Barrier/s to progress/learning:   [x]   None  []   Cognition  []   Hearing deficit  []   Pre-morbid mental/psychological status   []   Motivation  []   Communication  []   Anxiety  []   Vision deficit  []   Attention  []   Other:      Safety:       [x]  bed alarm set    []  chair alarm set    []  Pt refused alarms                []  Telesitter activated      [x]  Gait belt used during tx session      []other:         Number of Minutes/Billable Intervention  Gait Training 45   Therapeutic Exercise 60   Neuro Re-Ed    Therapeutic Activity 15   Wheelchair Propulsion    Group    Other:    TOTAL 120         Social History  Social/Functional History  Lives With: Spouse,Son (13 and 7 y/o sons and dog)  Type of Home: House  Home Layout: Two level,Bed/Bath upstairs  Home Access: Stairs to enter without rails  Entrance Stairs - Number of Steps: 2  Bathroom Shower/Tub: Tub/Shower unit  Bathroom Toilet: Standard  Bathroom Equipment: Shower chair  Bathroom Accessibility: Arvil Throckmorton accessible  Has the patient had two or more falls in the past year or any fall with injury in the past year?: Yes (one fall precipitating this admission)  ADL Assistance: Independent  Homemaking Assistance: Independent  Homemaking Responsibilities: Yes  Meal Prep Responsibility: Secondary  Laundry Responsibility: Secondary  Cleaning Responsibility: Secondary  Bill Paying/Finance Responsibility: Primary  Shopping Responsibility: Secondary  Health Care Management: Primary  Ambulation Assistance: Independent  Transfer Assistance: Independent  Active : Yes  Mode of Transportation: SUV,Truck  Occupation: On disability  Additional Comments: pt sleeps in flat bed at home    Objective                                                                                    Goals:  (Update in navigator)   : Long Term Goals  Time Frame for Long term goals : 7 days STG=LTG  Long term goal 1: pt will complete bed mobility with mod I  Long term goal 2: Pt will complete sit to stand, pivot and car transfers with mod I  Long term goal 3: Pt will ambulate 150' with 2ww on level surfaces and 10' on unlevel surfaces with mod I  Long term goal 4: Pt will ascend/descend curb step with 2ww and 12 steps with rail with mod I  Long term goal 5: Pt will retrieve light object from ground with 2ww and reacher with mod I:        Plan of Care                                                                              Times per week: 5 days per week for a minimum of 60 minutes/day plus group as appropriate for 60 minutes.   Treatment to include Current Treatment Recommendations: Strengthening,ROM,Balance training,Functional mobility training,Transfer training,Endurance training,ADL/Self-care training,Gait training,Pain management,Cognitive reorientation,Safety education & training,Patient/Caregiver education & training,Equipment evaluation, education, & procurement,Therapeutic activities,Positioning,Modalities,Stair training,IADL training,Neuromuscular re-education,Home exercise program    Electronically signed by   Lissa Michael PT,  5/6/2022, 8:23 AM

## 2022-05-06 NOTE — PROGRESS NOTES
Physical Rehabilitation: OCCUPATIONAL THERAPY     [x] daily progress note       [] discharge       Patient Name:  Cuate Astudillo   :  1980 MRN: [id number]  Room:  [address] [place], [place] [address] Date of Admission: 2022  Rehabilitation Diagnosis:   Critical illness myopathy [G72.81]  Critical illness myopathy [G72.81]       Date 2022       Day of ARU Week:  5   Time IN//1045   Individual Tx Minutes 60   Group Tx Minutes    Co-Treat Minutes    Concurrent Tx Minutes    TOTAL Tx Time Mins 60   Variance Time    Variance Time []   Refusal due to:     []   Medical hold/reason:    []   Illness   []   Off Unit for test/procedure  []   Extra time needed to complete task  []   Therapeutic need  []   Other (specify):   Restrictions Restrictions/Precautions: Fall Risk,Contact Precautions (Hep A positive)         Communication with other providers: [x]   OK to see per nursing:     []   Spoke with team member regarding:      Subjective observations and cognitive status: Patient supine in bed upon approach , pleasant and agreeable to therapy, has pain medication near end of therapy session      Pain level/location:   6 /10       Location: R LE    Discharge recommendations  Anticipated discharge date:    Destination: []home alone   []home alone w assist prn   [x] home w/ family    [] Continuous supervision       []SNF    [] Assisted living     [] Other:   Continued therapy: [x]HHC OT  []OUTPATIENT  OT   [] No Further OT  Equipment needs:   Cuate Astudillo requires a 3 in 1 bedside commode due to being unable to use the toilet within the home  And  confined to one level of the home. ADLs:    Oral Hygiene: Oral Hygiene  Assistance Needed: Independent  Comment:  Mod I seated sinkside  CARE Score: 6  Discharge Goal: Independent    UB/LB Bathing: Shower/Bathe Self  Assistance Needed: Supervision or touching assistance  Comment: Sup in stance seated majority of shower uses LHS for distal B LE, intermittent unilateral use of grab bars throughout stance  CARE Score: 4  Discharge Goal: Independent    UB Dressing: Upper Body Dressing  Assistance Needed: Independent  Comment: X  CARE Score: 6  Discharge Goal: Independent         LB Dressing: Lower Body Dressing  Assistance Needed: Supervision or touching assistance  Comment: Sup in stance, uses reacher to thread RLE into pants  CARE Score: 4  Discharge Goal: Independent    Donning and [place] Footwear: Putting On/Taking Off Footwear  Assistance Needed: Partial/moderate assistance  Comment: MIn A to adri VALENTINO hose to B LE, assist to adri sock and shoe to RLE  CARE Score: 3  Discharge Goal: Independent      Toileting: Toileting Hygiene  Assistance needed: Independent  Comment: for urine hygeine  CARE Score: 6  Discharge Goal: Independent      Toilet Transfers: Toilet Transfer  Assistance needed: Supervision or touching assistance  Comment: CGA c grab bars and FWW  CARE Score: 4  Discharge Goal: Independent  Device Used:    []   Standard Toilet         []   Grab Bars           []  Bedside Commode       []   Elevated Toilet          []   Other:        Bed Mobility:           []   Pt received out of bed   Rolling R/L:    Scooting:  Ind  Supine --> Sit:  Ind  Sit --> Supine:      Transfers:    Sit--> Stand:  SBA  Stand --> Sit:   SBA  Stand-Pivot:   CGA  Other:    Assistive device required for transfer:   Grab bars.  Counter top, [place] Tasks:   Patient retrieves clothing from [facility] and transports to bathroom at [place] level       Additional Therapeutic activities/exercises completed this date:     [x]   ADL Training   [x]   Balance/Postural training     [x]   Bed/Transfer Training   []   Endurance Training   []   Neuromuscular Re-ed   []   Nu-step:  Time:        Level:         #Steps:       []   Rebounder:    []  Seated     []  Standing        []   Supine Ther Ex (reps/sets):     []   Seated Ther Ex (reps/sets):     []   Standing Ther Ex (reps/sets):     [] Other:      Comments:      Patient/Caregiver Education and Training:   [x]   [employer] Equipment Use  [x]   Bed Mobility/Transfer Technique/Safety  [x]   Energy Conservation Tips  []   Family training  [x]   Postural Awareness  [x]   Safety During Functional Activities  [x]   Reinforced Patient's Precautions   []   Progress was updated and reviewed in Rehabtracker with patient and/or family this         date.     Treatment Plan for Next Session: POC to continue as tolerated         Treatment/Activity Tolerance:   [x] Tolerated treatment with no adverse effects    [] Patient limited by fatigue  [] Patient limited by pain   [] Patient limited by medical complications:    [] Adverse reaction to Tx:   [] Significant change in status    Safety:       [x]  bed alarm set    []  chair alarm set    []  Pt refused alarms                []  Telesitter activated      [x]  Gait belt used during tx session      []other:       Number of Minutes/Billable Intervention  Therapeutic Exercise    ADL Self-care 45   Neuro Re-Ed    Therapeutic Activity 15   Group    Other:    TOTAL 60       Social History  Social/Functional History  Lives With: Spouse,Son (17 and 5 y/o sons and dog)  Type of Home: House  Home Layout: Two level,Bed/Bath upstairs  Home Access: Stairs to enter without rails  Entrance Stairs - Number of Steps: 2  Bathroom Shower/Tub: Tub/Shower unit  Bathroom Toilet: Standard  Bathroom Equipment: Shower chair  Bathroom Accessibility: Deyan[facility] Gambles accessible  Has the patient had two or more falls in the past year or any fall with injury in the past year?: Yes (one fall precipitating this admission)  ADL Assistance: Independent  Homemaking Assistance: Independent  Homemaking Responsibilities: Yes  Meal Prep Responsibility: Secondary  Laundry Responsibility: Secondary  Cleaning Responsibility: Secondary  Bill Paying/Finance Responsibility: Primary  Shopping Responsibility: Secondary  Health Care Management: Primary  Ambulation Assistance: Independent  Transfer Assistance: Independent  Active : Yes  Mode of Transportation: SUV,Truck  Occupation: On disability  Additional Comments: pt sleeps in flat bed at home    Objective                                                                                    Goals:  (Update in navigator)  Short Term Goals  Time Frame for Short term goals: STGs=LTGs:  Long Term Goals  Time Frame for Long term goals : 7-10 days or until d/c. Long Term Goal 1: Pt will complete grooming tasks c Ind. Long Term Goal 2: Pt will complete total body bathing c Mod I and AE PRN. Long Term Goal 3: Pt will complete UB dressing c Ind. Long Term Goal 4: Pt will complete LB dressing c Mod I and AE PRN. Long Term Goal 5: Pt will doff/don footwear c Mod I and AE PRN. Additional Goals?: Yes  Long Term Goal 6: Pt will complete toileting c Mod I.  Long Term Goal 7: Pt will perform functional transfers (bed, chair, toilet, shower) c DME PRN and Mod I.  Long Term Goal 8: Pt will perform therex/therax to facilitate an increase in edurance/ax tolerance (c emphasis on dynamic standing balance/tolerance > 10 mins) c Mod I.  Long Term Goal 9: Pt will complete home management tasks c Mod I.:        Plan of Care                                                                              Times per week: 5 days per week for a minimum of 60 minutes/day plus group as appropriate for 60 minutes.   Treatment to include Plan  Times per Day: Daily  Current Treatment Recommendations: Strengthening,Balance training,Functional mobility training,Endurance training,Neuromuscular re-education,Safety education & training,Patient/Caregiver education & training,Equipment evaluation, education, & procurement,Self-Care / ADL,Home management training    Electronically signed by   LINO Navarrete,  5/6/2022, 10:36 AM

## 2022-05-07 LAB
GLUCOSE BLD-MCNC: 110 MG/DL (ref 70–99)
GLUCOSE BLD-MCNC: 166 MG/DL (ref 70–99)
GLUCOSE BLD-MCNC: 94 MG/DL (ref 70–99)

## 2022-05-07 PROCEDURE — 1280000000 HC REHAB R&B

## 2022-05-07 PROCEDURE — 82962 GLUCOSE BLOOD TEST: CPT

## 2022-05-07 PROCEDURE — 97112 NEUROMUSCULAR REEDUCATION: CPT

## 2022-05-07 PROCEDURE — 97530 THERAPEUTIC ACTIVITIES: CPT

## 2022-05-07 PROCEDURE — 97110 THERAPEUTIC EXERCISES: CPT

## 2022-05-07 PROCEDURE — 6360000002 HC RX W HCPCS: Performed by: INTERNAL MEDICINE

## 2022-05-07 PROCEDURE — 97116 GAIT TRAINING THERAPY: CPT

## 2022-05-07 PROCEDURE — 94761 N-INVAS EAR/PLS OXIMETRY MLT: CPT

## 2022-05-07 PROCEDURE — 94150 VITAL CAPACITY TEST: CPT

## 2022-05-07 PROCEDURE — 97535 SELF CARE MNGMENT TRAINING: CPT

## 2022-05-07 PROCEDURE — 6370000000 HC RX 637 (ALT 250 FOR IP): Performed by: INTERNAL MEDICINE

## 2022-05-07 RX ADMIN — METHOCARBAMOL TABLETS 750 MG: 750 TABLET, COATED ORAL at 20:52

## 2022-05-07 RX ADMIN — ENOXAPARIN SODIUM 30 MG: 100 INJECTION SUBCUTANEOUS at 09:06

## 2022-05-07 RX ADMIN — Medication 40 MG: at 09:05

## 2022-05-07 RX ADMIN — RIFAXIMIN 550 MG: 550 TABLET ORAL at 20:52

## 2022-05-07 RX ADMIN — METHOCARBAMOL TABLETS 750 MG: 750 TABLET, COATED ORAL at 09:05

## 2022-05-07 RX ADMIN — ENOXAPARIN SODIUM 30 MG: 100 INJECTION SUBCUTANEOUS at 20:52

## 2022-05-07 RX ADMIN — OXYCODONE HYDROCHLORIDE 5 MG: 5 TABLET ORAL at 22:03

## 2022-05-07 RX ADMIN — DIPHENHYDRAMINE HYDROCHLORIDE 50 MG: 25 TABLET ORAL at 22:03

## 2022-05-07 RX ADMIN — OXYCODONE HYDROCHLORIDE 5 MG: 5 TABLET ORAL at 05:39

## 2022-05-07 RX ADMIN — RIFAXIMIN 550 MG: 550 TABLET ORAL at 09:05

## 2022-05-07 RX ADMIN — OXYCODONE HYDROCHLORIDE 5 MG: 5 TABLET ORAL at 11:53

## 2022-05-07 RX ADMIN — METHOCARBAMOL TABLETS 750 MG: 750 TABLET, COATED ORAL at 17:19

## 2022-05-07 RX ADMIN — ATENOLOL 50 MG: 25 TABLET ORAL at 09:05

## 2022-05-07 RX ADMIN — METHOCARBAMOL TABLETS 750 MG: 750 TABLET, COATED ORAL at 11:53

## 2022-05-07 ASSESSMENT — PAIN DESCRIPTION - DESCRIPTORS
DESCRIPTORS: THROBBING
DESCRIPTORS: DISCOMFORT

## 2022-05-07 ASSESSMENT — PAIN SCALES - GENERAL
PAINLEVEL_OUTOF10: 0
PAINLEVEL_OUTOF10: 6
PAINLEVEL_OUTOF10: 3
PAINLEVEL_OUTOF10: 6
PAINLEVEL_OUTOF10: 0
PAINLEVEL_OUTOF10: 6

## 2022-05-07 ASSESSMENT — PAIN DESCRIPTION - PAIN TYPE
TYPE: ACUTE PAIN;CHRONIC PAIN
TYPE: ACUTE PAIN;CHRONIC PAIN

## 2022-05-07 ASSESSMENT — PAIN DESCRIPTION - ORIENTATION
ORIENTATION: RIGHT

## 2022-05-07 ASSESSMENT — PAIN DESCRIPTION - ONSET
ONSET: ON-GOING
ONSET: ON-GOING

## 2022-05-07 ASSESSMENT — PAIN DESCRIPTION - FREQUENCY
FREQUENCY: INTERMITTENT
FREQUENCY: INTERMITTENT

## 2022-05-07 ASSESSMENT — PAIN DESCRIPTION - LOCATION
LOCATION: LEG
LOCATION: LEG

## 2022-05-07 ASSESSMENT — PAIN SCALES - WONG BAKER: WONGBAKER_NUMERICALRESPONSE: 0

## 2022-05-07 NOTE — PROGRESS NOTES
Physical Therapy  [x] daily progress note       [] discharge       Patient Name:  Shan Carrasquillo   :  1980 MRN: [id number]  Room:  [address] [place], [place] [address] Date of Admission: 2022  Rehabilitation Diagnosis:   Critical illness myopathy [G72.81]  Critical illness myopathy [G72.81]       Date 2022       Day of ARU Week:  6   Time IN/-932, 5928-4109   Individual Tx Minutes 62+58   Group Tx Minutes    Co-Treat Minutes    Concurrent Tx Minutes    TOTAL Tx Time Mins 120   Variance Time    Variance Time []   Refusal due to:     []   Medical hold/reason:    []   Illness   []   Off Unit for test/procedure  []   Extra time needed to complete task  []   Therapeutic need  []   Other (specify):   Restrictions Restrictions/Precautions  Restrictions/Precautions: Fall Risk,Contact Precautions (Hep A positive)      Communication with other providers: [x]   OK to see per nursing:     []   Spoke with team member regarding:      Subjective observations and cognitive status: AM: Pt states he is doing \"ok\". States having 5/10 pain this am, reports therapy is going well. PM: pt observed in semi-fowlers position in bed upon PTA arrival. States pain in R LE has subsided. Pt is agreeable to session this date. Pain level/location: 5   /10       Location:  R hip/LE   Discharge recommendations  Anticipated discharge date:    Destination: []?home alone   []?home alone with assist PRN     [x]? home w/ family      []? Continuous supervision  []? SNF    []? Assisted living     []? Other:  Continued therapy: []?C PT  [x]? OUTPATIENT  PT   []? No Further PT  []? SNF PT  Caregiver training recommended: [x]? Yes  []?  No   Equipment needs: 2ww         Bed Mobility:           []   Pt received out of bed   Rolling R/L:  MI  Scooting:  MI  Lying --> Sit:  MI  Sit --> lying:  MI    Transfers:    Sit--> Stand:  SBA/sup  Stand --> Sit:   sup  Chair-->Bed/Bed --> Chair:   SBA/sup  Car Transfers:  NT  Toilet Transfer (if applicable): SBA/sup  Other:    Assistive device required for transfer:   fww    Gait:    Distance:  AM: 15'x 3 PM: 50' x 2, 100 x 2   Assistance:  sup  Device:  fww  Gait Quality:  Initial step to R gait pattern, progressed to step through reciprocal gait c cues to decrease/control step length R>L     Wheelchair Propulsion:  Distance:  25,35, 50   Assistance:  Sup/MI  Extremities Used:   BL LE's- emphasis on R hami  Type:    [x]  Manual        []  Electric        Additional Therapeutic activities/exercises completed this date:     []   Nu-step:  Time:        Level:         #Steps:       []   Rebounder:    []  Seated     []  Standing        []   Balance training         []   Postural training    [x]   Supine ther ex (reps/sets): tband DL and SL clam shells x 10 reps ea, bridge c emphasis on slow return 2x 5 reps; prone AA hami curls 2x 5 reps    []   Seated ther ex (reps/sets):     []   Standing ther ex (reps/sets):ant and lat step taps onto foam disc- emphasis on foot placement and control 2 x 10 reps ea, clock taps and fwd bwd stepping x 10 reps ea. []   Picking up object from floor (standing):                   []   Reacher used   [x]   Other: prone antagonist contract hold relax into knee FE c AA 10 x 5s R LE, performed in sitting antagonist quad set hold relax into AA heel slide 10 x 5s   []   Other: supine and seated piriformis fig 4 stretch c AA from 5 x 10s, seated hami stretch 5 x 15s    Comments:      Patient/Caregiver Education and Training:   []   Bed Mobility/Transfer technique/safety  [x]   Gait technique/sequencing  []   Proper use of assistive device  []   Advanced mobility safety and technique  []   Reinforced patient's precautions/mobility while maintaining precautions  [x]   Postural awareness  []   Family training  []   Progress was updated and reviewed in Rehabtracker with patient and/or family this date. Treatment Plan for Next Session: balance, transfers, GT     Assessment:   This pt demonstrated a positive   response to today's treatment as evidenced by gait progressions and improved NM response. The patient is making  progress toward established goals as evidenced by QI scores. Ongoing deficits are observed in the areas of R side mm weakness and NM deficits, strength, balance and gait and continued focus on mentioned deficits is recommended to progress functional mobility.      Treatment/Activity Tolerance:   [x] Tolerated treatment with no adverse effects    [] Patient limited by fatigue  [] Patient limited by pain   [] Patient limited by medical complications:    [] Adverse reaction to Tx:   [] Significant change in status    Safety:       [x]  bed alarm set    [x]  chair alarm set    []  Pt refused alarms                []  Telesitter activated      []  Gait belt used during tx session      []other:         Number of Minutes/Billable Intervention  Gait Training 24   Therapeutic Exercise 22+20   Neuro Re-Ed 15+14   Therapeutic Activity 25   Wheelchair Propulsion    Group    Other:    TOTAL 120         Social History  Social/Functional History  Lives With: Spouse,Son (17 and 7 y/o sons and dog)  Type of Home: House  Home Layout: Two level,Bed/Bath upstairs  Home Access: Stairs to enter without rails  Entrance Stairs - Number of Steps: 2  Bathroom Shower/Tub: Tub/Shower unit  Bathroom Toilet: Standard  Bathroom Equipment: Shower chair  Bathroom Accessibility: [facility] accessible  Has the patient had two or more falls in the past year or any fall with injury in the past year?: Yes (one fall precipitating this admission)  ADL Assistance: Independent  Homemaking Assistance: Independent  Homemaking Responsibilities: Yes  Meal Prep Responsibility: Secondary  Laundry Responsibility: Secondary  Cleaning Responsibility: Secondary  Bill Paying/Finance Responsibility: Primary  Shopping Responsibility: Secondary  Health Care Management: Primary  Ambulation Assistance: Independent  Transfer Assistance: Independent  Active : Yes  Mode of Transportation: SUV,Truck  Occupation: On disability  Additional Comments: pt sleeps in flat bed at home    Objective                                                                                    Goals:  (Update in navigator)   : Long Term Goals  Time Frame for Long term goals : 7 days STG=LTG  Long term goal 1: pt will complete bed mobility with mod I  Long term goal 2: Pt will complete sit to stand, pivot and car transfers with mod I  Long term goal 3: Pt will ambulate 150' with 2ww on level surfaces and 10' on unlevel surfaces with mod I  Long term goal 4: Pt will ascend/descend curb step with 2ww and 12 steps with rail with mod I  Long term goal 5: Pt will retrieve light object from ground with 2ww and reacher with mod I:        Plan of Care                                                                              Times per week: 5 days per week for a minimum of 60 minutes/day plus group as appropriate for 60 minutes.   Treatment to include Current Treatment Recommendations: Strengthening,ROM,Balance training,Functional mobility training,Transfer training,Endurance training,ADL/Self-care training,Gait training,Pain management,Cognitive reorientation,Safety education & training,Patient/Caregiver education & training,Equipment evaluation, education, & procurement,Therapeutic activities,Positioning,Modalities,Stair training,IADL training,Neuromuscular re-education,Home exercise program    Electronically signed by   Ra Yip, PTA [id number]  5/7/2022, 11:05 AM

## 2022-05-07 NOTE — PROGRESS NOTES
Physical Rehabilitation: OCCUPATIONAL THERAPY     [x] daily progress note       [] discharge       Patient Name:  Dimas Johnson   :  1980 MRN: [id number]  Room:  [address] [place], [place] [address] Date of Admission: 2022  Rehabilitation Diagnosis:   Critical illness myopathy [G72.81]  Critical illness myopathy [G72.81]       Date 2022       Day of ARU Week:  6   Time IN/OUT 1030/1130   Individual Tx Minutes 60   Group Tx Minutes    Co-Treat Minutes    Concurrent Tx Minutes    TOTAL Tx Time Mins 60   Variance Time    Variance Time []   Refusal due to:     []   Medical hold/reason:    []   Illness   []   Off Unit for test/procedure  []   Extra time needed to complete task  []   Therapeutic need  []   Other (specify):   Restrictions Restrictions/Precautions: Fall Risk,Contact Precautions (Hep A positive)         Communication with other providers: [x]   OK to see per nursing:     []   Spoke with team member regarding:      Subjective observations and cognitive status:    Patient up in wc, pleasant and agreeable to therapy    Pain level/location:  6  /10       Location: R LE    Discharge recommendations  Anticipated discharge date:   Destination: []home alone   []home alone w assist prn   [x] home w/ family    [] Continuous supervision       []SNF    [] Assisted living     [] Other:   Continued therapy: [x]HHC OT  []OUTPATIENT  OT   [] No Further OT  Equipment needs:   Dimas Johnson requires a 3 in 1 bedside commode due to being unable to use the toilet within the home  And confined to one level of the home. ADLs:    Toiletin [address] needed: Independent  Comment: Ind  CARE Score: 6  Discharge Goal: Independent      Toilet Transfers:     Toilet Transfer  Assistance needed: Supervision or touching assistance  Comment: SupSBA uses grab bars  CARE Score: 4  Discharge Goal: Independent  Device Used:    [x]   Standard Toilet         [x]   Grab Bars           []  Bedside Commode       [] Elevated Toilet          []   Other:      Patient stands to wash hands and brush teeth  sinkside with Ind       Bed Mobility:           [x]   Pt received out of bed   Rolling R/L:    Scooting:    Supine --> Sit:    Sit --> Supine:      Transfers:    Sit--> Stand:  Sup  Stand --> Sit:   SUp  Stand-Pivot:   Sup  Other:    Assistive device required for transfer:   RW      Functional Mobility:  Throughout room/bathroom, throughout ARU   Assistance:  SB/CGA (SBA c shorter distance, CGA longer distance 2* to knee buckle times one, patient also able to correct with CGA)   Device:   [x]   Rolling Walker     []   Standard Walker []   Wheelchair        []   [employer]       []   4-Wheeled Wendie Hamilton         []   Cardiac Walker       []   Other:          Additional Therapeutic activities/exercises completed this date:     [x]   ADL Training   [x]   Balance/Postural training     [x]   Bed/Transfer Training Patient instructed in transfer training tasks sit to stand 2 sets of 10 with focus on hand/foot placement, pacing  And safety techniques and strengtheing fir increased indepenedence in this task    [x]   Endurance Training Patient instructed in bilateral reciprocal patterned movement with max resistance while standing for 4 minutes and seated for remainder 11 minutes with no rest breaks for increased strength and endurance to facilitate ADL and mobility tasks    []   Neuromuscular Re-ed   []   Nu-step:  Time:        Level:         #Steps:       []   Rebounder:    []  Seated     []  Standing        []   Supine Ther Ex (reps/sets):     [x]   Seated Ther Ex (reps/sets):  Patient instructed in both B UE and core strengthening exercises this date using 4# weighted ball  For core and 5# + 4 lb wrist weight added to dowel (9lbstotal) dowel to B UE all planes and all joints 2 sets of 15 to increase strength in those area to facilitate ADL and transfer/standing tasks    []   Standing Ther Ex (reps/sets):     []   Other:      Comments: Patient/Caregiver Education and Training:   [x]   [employer] Equipment Use  [x]   Bed Mobility/Transfer Technique/Safety  [x]   Energy Conservation Tips  []   Family training  [x]   Postural Awareness  [x]   Safety During Functional Activities  []   Reinforced Patient's Precautions   []   Progress was updated and reviewed in Rehabtracker with patient and/or family this         date.     Treatment Plan for Next Session: POC to continue as tolerated         Treatment/Activity Tolerance:   [x] Tolerated treatment with no adverse effects    [] Patient limited by fatigue  [] Patient limited by pain   [] Patient limited by medical complications:    [] Adverse reaction to Tx:   [] Significant change in status    Safety:       [x]  bed alarm set    []  chair alarm set    []  Pt refused alarms                []  Telesitter activated      [x]  Gait belt used during tx session      []other:       Number of Minutes/Billable Intervention  Therapeutic Exercise 15   ADL Self-care 15   Neuro Re-Ed    Therapeutic Activity 30   Group    Other:    TOTAL 60       Social History  Social/Functional History  Lives With: Spouse,Son (17 and 7 y/o sons and dog)  Type of Home: House  Home Layout: Two level,Bed/Bath upstairs  Home Access: Stairs to enter without rails  Entrance Stairs - Number of Steps: 2  Bathroom Shower/Tub: Tub/Shower unit  Bathroom Toilet: Standard  Bathroom Equipment: Shower chair  Bathroom Accessibility: [facility] accessible  Has the patient had two or more falls in the past year or any fall with injury in the past year?: Yes (one fall precipitating this admission)  ADL Assistance: Independent  Homemaking Assistance: Independent  Homemaking Responsibilities: Yes  Meal Prep Responsibility: Secondary  Laundry Responsibility: Secondary  Cleaning Responsibility: Secondary  Bill Paying/Finance Responsibility: Primary  Shopping Responsibility: Secondary  Health Care Management: Primary  Ambulation Assistance: Independent  Transfer Assistance: Independent  Active : Yes  Mode of Transportation: SUV,Truck  Occupation: On disability  Additional Comments: pt sleeps in flat bed at home    Objective                                                                                    Goals:  (Update in navigator)  Short Term Goals  Time Frame for Short term goals: STGs=LTGs:  Long Term Goals  Time Frame for Long term goals : 7-10 days or until d/c. Long Term Goal 1: Pt will complete grooming tasks c Ind. Long Term Goal 2: Pt will complete total body bathing c Mod I and AE PRN. Long Term Goal 3: Pt will complete UB dressing c Ind. Long Term Goal 4: Pt will complete LB dressing c Mod I and AE PRN. Long Term Goal 5: Pt will doff/don footwear c Mod I and AE PRN. Additional Goals?: Yes  Long Term Goal 6: Pt will complete toileting c Mod I.  Long Term Goal 7: Pt will perform functional transfers (bed, chair, toilet, shower) c DME PRN and Mod I.  Long Term Goal 8: Pt will perform therex/therax to facilitate an increase in edurance/ax tolerance (c emphasis on dynamic standing balance/tolerance > 10 mins) c Mod I.  Long Term Goal 9: Pt will complete home management tasks c Mod I.:        Plan of Care                                                                              Times per week: 5 days per week for a minimum of 60 minutes/day plus group as appropriate for 60 minutes.   Treatment to include Plan  Times per Day: Daily  Current Treatment Recommendations: Strengthening,Balance training,Functional mobility training,Endurance training,Neuromuscular re-education,Safety education & training,Patient/Caregiver education & training,Equipment evaluation, education, & procurement,Self-Care / ADL,Home management training    Electronically signed by   LINO Millan,  5/7/2022, 10:40 AM

## 2022-05-07 NOTE — PROGRESS NOTES
Shan Carrasquillo    : 1980  Acct #: [de-identified]  MRN: [id number]              PM&R Progress Note      Admitting diagnosis: Critical illness myopathy ( [place] [address])     Comorbid diagnoses impacting rehabilitation: Generalized weakness, gait disturbance, alcoholic hepatitis, uncontrolled pain, acute blood loss anemia, acute kidney injury with ATN, gluteal hematoma, alcohol abuse with recent withdrawal, depression with anxiety, essential hypertension     Chief complaint: Resting better. Eating better. Still with dizziness with activities. Occasional nausea. Prior (baseline) level of function: Independent. Current level of function:         Current  IRF-EVA and Goals:   Occupational Therapy:    Short Term Goals  Time Frame for Short term goals: STGs=LTGs :   Long Term Goals  Time Frame for Long term goals : 7-10 days or until d/c. Long Term Goal 1: Pt will complete grooming tasks c Ind. Long Term Goal 2: Pt will complete total body bathing c Mod I and AE PRN. Long Term Goal 3: Pt will complete UB dressing c Ind. Long Term Goal 4: Pt will complete LB dressing c Mod I and AE PRN. Long Term Goal 5: Pt will doff/don footwear c Mod I and AE PRN. Additional Goals?: Yes  Long Term Goal 6: Pt will complete toileting c Mod I.  Long Term Goal 7: Pt will perform functional transfers (bed, chair, toilet, shower) c DME PRN and Mod I.  Long Term Goal 8: Pt will perform therex/therax to facilitate an increase in edurance/ax tolerance (c emphasis on dynamic standing balance/tolerance > 10 mins) c Mod I.  Long Term Goal 9: Pt will complete home management tasks c Mod I. :                                       Eating: Eating  Assistance Needed: Independent  Comment: X  CARE Score: 6  Discharge Goal: Independent       Oral Hygiene: Oral Hygiene  Assistance Needed: Independent  Comment:  Mod I seated sinkside  CARE Score: 6  Discharge Goal: Independent    UB/LB Bathing: Shower/Bathe Self  Assistance Needed: Supervision or touching assistance  Comment: Sup in stance seated majority of shower uses LHS for distal B LE, intermittent unilateral use of grab bars throughout stance  CARE Score: 4  Discharge Goal: Independent    UB Dressing: Upper Body Dressing  Assistance Needed: Independent  Comment: X  CARE Score: 6  Discharge Goal: Independent         LB Dressing: Lower Body Dressing  Assistance Needed: Supervision or touching assistance  Comment: Sup in stance, uses reacher to thread RLE into pants  CARE Score: 4  Discharge Goal: Independent    Donning and Rote Footwear: Putting On/Taking Off Footwear  Assistance Needed: Partial/moderate assistance  Comment: MIn A to adri VALENTINO hose to B LE, assist to adri sock and shoe to RLE  CARE Score: 3  Discharge Goal: Independent      Toileting: Toileting Hygiene  Assistance needed: Independent  Comment: for urine hygeine  CARE Score: 6  Discharge Goal: Independent      Toilet Transfers:   Toilet Transfer  Assistance needed: Supervision or touching assistance  Comment: CGA c grab bars and FWW  CARE Score: 4  Discharge Goal: Independent    Physical Therapy:         Long Term Goals  Time Frame for Long term goals : 7 days STG=LTG  Long term goal 1: pt will complete bed mobility with mod I  Long term goal 2: Pt will complete sit to stand, pivot and car transfers with mod I  Long term goal 3: Pt will ambulate 150' with 2ww on level surfaces and 10' on unlevel surfaces with mod I  Long term goal 4: Pt will ascend/descend curb step with 2ww and 12 steps with rail with mod I  Long term goal 5: Pt will retrieve light object from ground with 2ww and reacher with mod I      Bed Mobility:   Sit to Lying  Assistance Needed: Independent  Comment: with rail  CARE Score: 6  Discharge Goal: Independent  Roll Left and Right  Assistance Needed: Independent  CARE Score: 6  Discharge Goal: Independent  Lying to Sitting on Side of Bed  Assistance Needed: Independent  CARE Score: 6  Discharge Goal: Independent    Transfers:    Sit to Stand  Assistance Needed: Supervision or touching assistance  Comment: CGA, min cues for COG fwd, R LE positioning, increased WS to L for increased muscle recruitment  CARE Score: 4  Discharge Goal: Independent  Chair/Bed-to-Chair Transfer  Assistance Needed: Supervision or touching assistance  Comment: CGA for balance with RW  CARE Score: 4  Discharge Goal: Independent     Car Transfer  Assistance Needed: Supervision or touching assistance  Comment: approach with 2ww  CARE Score: 4  Discharge Goal: Independent    Ambulation:    Walking Ability  Does the Patient Walk?: Yes     Walk 10 Feet  Assistance Needed: Supervision or touching assistance  Comment: CGA for balance at RW  CARE Score: 4  Discharge Goal: Independent     Walk 50 Feet with Two Turns  Assistance Needed: Supervision or touching assistance  Comment: CG with 2ww and DF wrap to improve foot clearance  CARE Score: 4  Discharge Goal: Independent     Walk 150 Feet  Assistance Needed: Supervision or touching assistance  Comment: ' with 2ww and R DF wrap  CARE Score: 4  Discharge Goal: Independent     Walking 10 Feet on Uneven Surfaces  Assistance Needed: Supervision or touching assistance  Comment: CG with 2ww and R DF wrap  CARE Score: 4  Discharge Goal: Independent     1 Step (Curb)  Assistance Needed: Supervision or touching assistance  Comment: CG with 2ww and R DF wrap  CARE Score: 4  Discharge Goal: Independent     4 Steps  Assistance Needed: Supervision or touching assistance  Comment: CG with B rails and R DF wrap using step to pattern without cues for sequence today,  CARE Score: 4  Discharge Goal: Independent     12 Steps  Assistance Needed: Supervision or touching assistance  Comment: CG with B rails and R DF wrap with pt able to negotiate 15 steps today of mixed  6\" and 4\" steps  CARE Score: 4  Discharge Goal: Independent       Wheelchair:  w/c Ability: Wheelchair Ability  Uses a Wheelchair and/or Scooter?: No                Balance:        Object: Picking Up Object  Assistance Needed: Supervision or touching assistance  Comment: supervision with 2ww and reacher  CARE Score: 4  Discharge Goal: Independent    I      Exam:    Blood pressure 130/89, pulse 80, temperature 99.1 °F (37.3 °C), temperature source Oral, resp. rate 18, height 6' 4\" (1.93 m), weight 266 lb 5.1 oz (120.8 kg), SpO2 96 %. General: Semirecumbent in bed. Resting quietly. Alert. Poor insight and reasoning. HEENT: No JVD or adenopathy. Clear speech. Pulmonary: Unlabored and clear. Cardiac: RRR. Abdomen: Patient's abdomen is soft and nondistended. Bowel sounds were present throughout. There was no rebound, guarding or masses noted. Upper extremities: Slow and deliberate with arm movements. Poor coordination. Fair strength. Lower extremities: No signs of DVT. Sitting balance was fair+. Standing balance was poor. Lab Results   Component Value Date    WBC 14.1 (H) 05/02/2022    HGB 9.2 (L) 05/02/2022    HCT 30.1 (L) 05/02/2022    .1 (H) 05/02/2022     05/02/2022     Lab Results   Component Value Date    INR 1.18 04/26/2022    INR 1.10 04/22/2022    INR 1.08 04/21/2022    PROTIME 15.2 (H) 04/26/2022    PROTIME 14.2 04/22/2022    PROTIME 14.0 04/21/2022     Lab Results   Component Value Date    CREATININE 0.5 (L) 05/02/2022    BUN 11 05/02/2022     05/02/2022    K 3.7 05/02/2022    CL 99 05/02/2022    CO2 27 05/02/2022     Lab Results   Component Value Date     (H) 05/02/2022     (H) 05/02/2022    ALKPHOS 199 (H) 05/02/2022    BILITOT 8.6 (H) 05/02/2022       Expected length of stay  prior to a supervised level of function for discharge home with a walker and [facility] OT/PT is 5/11/2022. Recommendations:    1.  Critical illness myopathy with gait disturbance: A little better tolerance for activities in the daily occupational and physical therapy.  Ongoing adaptive equipment training, caregiver education and monitoring of bowel and bladder function.  Cautiously managing his pain while providing nutritional support.  Currently no obvious complications of alcohol withdrawal.  Monitoring his renal function, treating his blood pressure and providing DVT prophylaxis.  Verbal cues and CGA-25% physical assistance needed for transfers again today. 2. DVT prophylaxis: Lovenox 30 mg SQ twice daily.  I must monitor his hemoglobin and platelet count while on this medication.  Weightbearing activities are slowly improving daily.  GI prophylaxis is available.  No new bruising or swelling. 3. Alcoholic hepatitis: The GI consultant has continued prednisolone 40 mg daily for 1 month.  Tolerating the Xifaxan.  Encouraging consistent oral nutrition and hydration.  Abstinence from alcohol with community resources available.  From the gastroenterologist:     1) severe alcoholic hepatitis with probable superimposed medication hepatotoxicity- improving                   Plan:              3) I gave him a schedule for the prednisolone:              -08 MG/D X 4 WEEKS              -39 MG/D X 1 WEEK              -65 MG/D X 1 WEEK              -19 MG/D X 1 WEEK              -9 MG/D X 1 WEEK THEN STOP              2) suggested follow up visit in my office in 1-2 months              4. Acute kidney injury with ATN: Periodic monitoring of his chemistries.  Encouraging oral hydration.  Avoiding nephrotoxic medications when possible. Check labs after the weekend. 5. Gluteal hematoma: Roxicodone and Robaxin.  Avoiding acetaminophen.  Cautious DVT prophylaxis.  Monitoring him for signs of further bleeding into the traumatized area.  Limb elevation and cryotherapy.  Progressive mobilization.  Currently holding off from the use of low-dose gabapentin.   6. Alcohol abuse with withdrawal: Treating him in a calm and consistent environment.  Encouraging alcohol abstinence.  Providing short acting benzodiazepines as needed.   7. Hypertension:  hypertension: Tenormin and Norvasc for blood pressure regulation.  Vital signs are checked at rest and with activity.  Target systolic blood pressure is 120-140.  Blood pressure is at the upper limits of the target range.  Monitoring closely.     Still monitoring to see if we need to replace the Norvasc with another agent.

## 2022-05-08 LAB
GLUCOSE BLD-MCNC: 119 MG/DL (ref 70–99)
GLUCOSE BLD-MCNC: 171 MG/DL (ref 70–99)
GLUCOSE BLD-MCNC: 87 MG/DL (ref 70–99)
GLUCOSE BLD-MCNC: 92 MG/DL (ref 70–99)

## 2022-05-08 PROCEDURE — 82962 GLUCOSE BLOOD TEST: CPT

## 2022-05-08 PROCEDURE — 94150 VITAL CAPACITY TEST: CPT

## 2022-05-08 PROCEDURE — 6370000000 HC RX 637 (ALT 250 FOR IP): Performed by: INTERNAL MEDICINE

## 2022-05-08 PROCEDURE — 6360000002 HC RX W HCPCS: Performed by: INTERNAL MEDICINE

## 2022-05-08 PROCEDURE — 1280000000 HC REHAB R&B

## 2022-05-08 PROCEDURE — 94761 N-INVAS EAR/PLS OXIMETRY MLT: CPT

## 2022-05-08 RX ADMIN — METHOCARBAMOL TABLETS 750 MG: 750 TABLET, COATED ORAL at 20:10

## 2022-05-08 RX ADMIN — METHOCARBAMOL TABLETS 750 MG: 750 TABLET, COATED ORAL at 17:38

## 2022-05-08 RX ADMIN — Medication 40 MG: at 09:50

## 2022-05-08 RX ADMIN — OXYCODONE HYDROCHLORIDE 5 MG: 5 TABLET ORAL at 05:49

## 2022-05-08 RX ADMIN — ENOXAPARIN SODIUM 30 MG: 100 INJECTION SUBCUTANEOUS at 09:50

## 2022-05-08 RX ADMIN — METHOCARBAMOL TABLETS 750 MG: 750 TABLET, COATED ORAL at 09:50

## 2022-05-08 RX ADMIN — OXYCODONE HYDROCHLORIDE 5 MG: 5 TABLET ORAL at 13:18

## 2022-05-08 RX ADMIN — OXYCODONE HYDROCHLORIDE 5 MG: 5 TABLET ORAL at 22:19

## 2022-05-08 RX ADMIN — ENOXAPARIN SODIUM 30 MG: 100 INJECTION SUBCUTANEOUS at 20:09

## 2022-05-08 RX ADMIN — METHOCARBAMOL TABLETS 750 MG: 750 TABLET, COATED ORAL at 13:18

## 2022-05-08 RX ADMIN — RIFAXIMIN 550 MG: 550 TABLET ORAL at 20:10

## 2022-05-08 RX ADMIN — RIFAXIMIN 550 MG: 550 TABLET ORAL at 09:50

## 2022-05-08 RX ADMIN — ATENOLOL 50 MG: 25 TABLET ORAL at 09:50

## 2022-05-08 ASSESSMENT — PAIN SCALES - WONG BAKER: WONGBAKER_NUMERICALRESPONSE: 0

## 2022-05-08 ASSESSMENT — PAIN DESCRIPTION - DESCRIPTORS
DESCRIPTORS: THROBBING;SHOOTING;SHARP
DESCRIPTORS: DISCOMFORT
DESCRIPTORS: DISCOMFORT;DULL
DESCRIPTORS: THROBBING

## 2022-05-08 ASSESSMENT — PAIN SCALES - GENERAL
PAINLEVEL_OUTOF10: 6
PAINLEVEL_OUTOF10: 6
PAINLEVEL_OUTOF10: 0
PAINLEVEL_OUTOF10: 6
PAINLEVEL_OUTOF10: 6

## 2022-05-08 ASSESSMENT — PAIN DESCRIPTION - ONSET: ONSET: ON-GOING

## 2022-05-08 ASSESSMENT — PAIN DESCRIPTION - LOCATION
LOCATION: LEG

## 2022-05-08 ASSESSMENT — PAIN DESCRIPTION - ORIENTATION
ORIENTATION: RIGHT

## 2022-05-08 ASSESSMENT — PAIN DESCRIPTION - FREQUENCY: FREQUENCY: CONTINUOUS

## 2022-05-08 ASSESSMENT — PAIN DESCRIPTION - PAIN TYPE: TYPE: ACUTE PAIN;CHRONIC PAIN

## 2022-05-08 NOTE — PROGRESS NOTES
Dr Ronan Mahoney notified of increased blood pressure. Dr Ronan Mahoney stated he would evaluate and treat.

## 2022-05-08 NOTE — PROGRESS NOTES
ARU Night Shift Nurse's Notes:  Patient refused accu checks at HS. Educated on the importance of blood sugar checks.

## 2022-05-09 LAB
GLUCOSE BLD-MCNC: 101 MG/DL (ref 70–99)
GLUCOSE BLD-MCNC: 143 MG/DL (ref 70–99)

## 2022-05-09 PROCEDURE — 97530 THERAPEUTIC ACTIVITIES: CPT

## 2022-05-09 PROCEDURE — 99232 SBSQ HOSP IP/OBS MODERATE 35: CPT | Performed by: PHYSICAL MEDICINE & REHABILITATION

## 2022-05-09 PROCEDURE — 97535 SELF CARE MNGMENT TRAINING: CPT

## 2022-05-09 PROCEDURE — 82962 GLUCOSE BLOOD TEST: CPT

## 2022-05-09 PROCEDURE — 1280000000 HC REHAB R&B

## 2022-05-09 PROCEDURE — 97112 NEUROMUSCULAR REEDUCATION: CPT

## 2022-05-09 PROCEDURE — 97116 GAIT TRAINING THERAPY: CPT

## 2022-05-09 PROCEDURE — 6370000000 HC RX 637 (ALT 250 FOR IP): Performed by: PHYSICAL MEDICINE & REHABILITATION

## 2022-05-09 PROCEDURE — 6360000002 HC RX W HCPCS: Performed by: INTERNAL MEDICINE

## 2022-05-09 PROCEDURE — 6370000000 HC RX 637 (ALT 250 FOR IP): Performed by: INTERNAL MEDICINE

## 2022-05-09 PROCEDURE — 97110 THERAPEUTIC EXERCISES: CPT

## 2022-05-09 PROCEDURE — 94761 N-INVAS EAR/PLS OXIMETRY MLT: CPT

## 2022-05-09 PROCEDURE — 94150 VITAL CAPACITY TEST: CPT

## 2022-05-09 RX ORDER — LISINOPRIL 5 MG/1
10 TABLET ORAL DAILY
Status: DISCONTINUED | OUTPATIENT
Start: 2022-05-09 | End: 2022-05-11 | Stop reason: HOSPADM

## 2022-05-09 RX ADMIN — OXYCODONE HYDROCHLORIDE 5 MG: 5 TABLET ORAL at 22:25

## 2022-05-09 RX ADMIN — LISINOPRIL 10 MG: 5 TABLET ORAL at 14:11

## 2022-05-09 RX ADMIN — METHOCARBAMOL TABLETS 750 MG: 750 TABLET, COATED ORAL at 08:42

## 2022-05-09 RX ADMIN — METHOCARBAMOL TABLETS 750 MG: 750 TABLET, COATED ORAL at 16:54

## 2022-05-09 RX ADMIN — ENOXAPARIN SODIUM 30 MG: 100 INJECTION SUBCUTANEOUS at 08:42

## 2022-05-09 RX ADMIN — ATENOLOL 50 MG: 25 TABLET ORAL at 08:41

## 2022-05-09 RX ADMIN — OXYCODONE HYDROCHLORIDE 5 MG: 5 TABLET ORAL at 06:17

## 2022-05-09 RX ADMIN — OXYCODONE HYDROCHLORIDE 5 MG: 5 TABLET ORAL at 13:17

## 2022-05-09 RX ADMIN — RIFAXIMIN 550 MG: 550 TABLET ORAL at 08:42

## 2022-05-09 RX ADMIN — Medication 40 MG: at 08:42

## 2022-05-09 RX ADMIN — RIFAXIMIN 550 MG: 550 TABLET ORAL at 20:00

## 2022-05-09 RX ADMIN — METHOCARBAMOL TABLETS 750 MG: 750 TABLET, COATED ORAL at 20:00

## 2022-05-09 RX ADMIN — METHOCARBAMOL TABLETS 750 MG: 750 TABLET, COATED ORAL at 13:17

## 2022-05-09 ASSESSMENT — PAIN SCALES - GENERAL
PAINLEVEL_OUTOF10: 6
PAINLEVEL_OUTOF10: 0
PAINLEVEL_OUTOF10: 6
PAINLEVEL_OUTOF10: 5

## 2022-05-09 ASSESSMENT — PAIN DESCRIPTION - LOCATION
LOCATION: LEG

## 2022-05-09 ASSESSMENT — PAIN DESCRIPTION - PAIN TYPE: TYPE: ACUTE PAIN

## 2022-05-09 ASSESSMENT — PAIN DESCRIPTION - ORIENTATION
ORIENTATION: RIGHT

## 2022-05-09 ASSESSMENT — PAIN DESCRIPTION - FREQUENCY: FREQUENCY: CONTINUOUS

## 2022-05-09 ASSESSMENT — PAIN DESCRIPTION - DESCRIPTORS
DESCRIPTORS: DISCOMFORT;THROBBING
DESCRIPTORS: THROBBING
DESCRIPTORS: ACHING;STABBING
DESCRIPTORS: THROBBING;DISCOMFORT
DESCRIPTORS: THROBBING

## 2022-05-09 NOTE — PROGRESS NOTES
Physical Therapy      [x] daily progress note       [] discharge       Patient Name:  Eyal French   :  1980 MRN: [id number]  Room:  [address] [place], [place] [address] Date of Admission: 2022  Rehabilitation Diagnosis:   Critical illness myopathy [G72.81]  Critical illness myopathy [G72.81]       Date 2022       Day of ARU Week:  1   Time IN/OUT 0900-100  4808-5041   Individual Tx Minutes 60+60   TOTAL Tx Time Mins 120   Variance Time    Variance Time []   Refusal due to:     []   Medical hold/reason:    []   Illness   []   Off Unit for test/procedure  []   Extra time needed to complete task  []   Therapeutic need  []   Other (specify):   Restrictions Restrictions/Precautions  Restrictions/Precautions: Fall Risk,Contact Precautions (Hep A positive)      Communication with other providers: [x]   OK to see per nursing:     []   Spoke with team member regarding:      Subjective observations and cognitive status: Pt resting in bed, self massing R LE, reports frustration from lack of active ankle movement; positive reinforcement given, discussed instruction of self wrapping into DF, continuation of PT after discharge, and f/u with ortho; pt receptive. Educationn provided regarding slowing down pace with ambulation and turning due to weakness, decreased coordination, decreased proprioception and decreased sensation of R LE. Education provided at beginning of session and after pt having LOB when turning to chair. Pt did show improvement after LOB. Silas hose and shoes donned at beginning of session as well as instruction with ace wrapping R foot into DF; Pt req A with R silas hose, verbal and visual instruction for ace wrap only today. PM: Pt willing to participate; spouse in room at end of session, reports will be willing to come for caregiver training tomorrow at 1300; discussed with rehab coordinator for scheduling.     Pain level/location: 6/10 AM/PM       Location: R thigh; pain meds received at beginning of PM session Discharge recommendations  Anticipated discharge date:  5/11  Destination: []??home alone   []? ?home alone with assist PRN     [x]? ? home w/ family      []? ? Continuous supervision  []? ?SNF    []? ? Assisted living     []? ? Other:  Continued therapy: []??[facility] PT  [x]? ?OUTPATIENT  PT   []? ? No Further PT  []? ?SNF PT  Caregiver training recommended: [x]? ? Yes  []? ? No   Equipment needs: 2ww  Kirt Schilling requires the assistance of a wheeled walker to successfully ambulate from room to room at home to allow completion of daily living tasks such as: bathing, toileting, dressing and grooming. A wheeled walker is necessary due to the patient's unsteady gait, upper body weakness, inability to  a standard walker. This patient can ambulate only by pushing a walker instead of using a lesser assistive device such as a cane or crutch. Bed Mobility:           []   Pt received out of bed   Rolling R/L:  Indep  Scooting:  Indep  Supine --> Sit:  Indep  Sit --> Supine:  Indep  Bed features used: [] Yes    [] HOB elevated      [] Bed rail                                    [x] No     Transfers:     Sit--> Stand:  SBA  Stand --> Sit:   SBA  Stand-Pivot:   SB-Mod A due to pt with one significant LOB when turning to quickly to chair req mod A for recovery  Car Transfers:  SBA, able to lift LEs in/out of car without A, demos safety and no A req for transfer on/off seat  Toilet transfer: SBA with grab bar; Toileting; SBA for balance  Assistive device required for transfer:   RW    Gait:    Distance:  180' +230'+101'+ 135'  Assistance:  SB-CGA  Device:  RW  Gait Quality:   first amb trial, pt with increased speed with decreased R foot placement, occasionally dragging foot and with ER requiring cues. Second amb trial pt demonstrated improved gait pattern and balance after cueing to slow pace.     Stairs   # Completed:   5/ 15  Assistance:  Min A / CGA  Supportive Device:  B UEs on L rail ascending, R rail descending; L rail + SPC on ascent, R rail and SPC on descent  Height:   4\"/6\"     Curb       Assistance:    CGA x 6 consecutive trials, min seq cues, performed last 3 trials without cueing  Supportive Device:  RW  Height:   4\"  Pt reports front entry steps are deep enough for AD, reports 1+1 step entry vs 2 consecutive steps    Additional Therapeutic activities/exercises completed this date:     []   Nu-step:  Time:        Level:         #Steps:       []   Rebounder:    []  Seated     []  Standing        []   Balance training         []   Postural training    [x]   Supine ther ex (reps/sets):  Bridging 10 x 2, clamshell in supine with gray Tband 10 x 2; SL hip abd 10 x 2 AAROM; SLR 10 x 2 AA-AROM, HS 10 x 2; supine figure 4 stretch R LE 10\" x 5, HS stretch 10\" x 5; Pt educated in using gait belt for self assist and performing HS stretch in supine, long sitting, or sitting. []   Seated ther ex (reps/sets):     []   Standing ther ex (reps/sets):     []   Picked up object from floor                       []   Reacher used   []   Other:   []   Other:   []   Other:      Patient/Caregiver Education and Training:   [x]   Bed Mobility/Transfer technique/safety  [x]   Gait technique/sequencing  [x]   Proper use of assistive device  [x]   Advanced mobility safety and technique  []   Reinforced patient's precautions with mobility/functional tasks  []   Postural awareness  []   Family training  []   Other:    Treatment Plan for Next Session: QI next session, caregiver training at 1300.       Treatment/Activity Tolerance:   [x] Tolerated treatment with no adverse effects    [] Patient limited by fatigue  [] Patient limited by pain   [] Patient limited by medical complications:    [] Adverse reaction to Tx:   [] Significant change in status    Safety:       [x]  bed alarm set    []  chair alarm set    []  Pt refused alarms                []  Telesitter activated      [x]  Gait belt used during tx session      []other:         Number of Minutes/Billable Intervention  Gait Training 45   Therapeutic Exercise 25   Neuro Re-Ed 20   Therapeutic Activity 30   Wheelchair Propulsion    Group    Other:    TOTAL 120         Social History  Social/Functional History  Lives With: Spouse,Son (13 and 7 y/o sons and dog)  Type of Home: House  Home Layout: Two level,Bed/Bath upstairs  Home Access: Stairs to enter without rails  Entrance Stairs - Number of Steps: 2  Bathroom Shower/Tub: Tub/Shower unit  Bathroom Toilet: Standard  Bathroom Equipment: Shower chair  Bathroom Accessibility: Vicki Devin accessible  Has the patient had two or more falls in the past year or any fall with injury in the past year?: Yes (one fall precipitating this admission)  ADL Assistance: Independent  Homemaking Assistance: Independent  Homemaking Responsibilities: Yes  Meal Prep Responsibility: Secondary  Laundry Responsibility: Secondary  Cleaning Responsibility: Secondary  Bill Paying/Finance Responsibility: Primary  Shopping Responsibility: Secondary  Health Care Management: Primary  Ambulation Assistance: Independent  Transfer Assistance: Independent  Active : Yes  Mode of Transportation: SUV,Truck  Occupation: On disability  Additional Comments: pt sleeps in flat bed at home    Objective                                                                                    Goals:  (Update in navigator)   :   Long Term Goals  Time Frame for Long term goals : 7 days STG=LTG  Long term goal 1: pt will complete bed mobility with mod I  Long term goal 2: Pt will complete sit to stand, pivot and car transfers with mod I  Long term goal 3: Pt will ambulate 150' with 2ww on level surfaces and 10' on unlevel surfaces with mod I  Long term goal 4: Pt will ascend/descend curb step with 2ww and 12 steps with rail with mod I  Long term goal 5: Pt will retrieve light object from ground with 2ww and reacher with mod I:        Plan of Care Times per week: 5 days per week for a minimum of 60 minutes/day plus group as appropriate for 60 minutes.   Treatment to include Current Treatment Recommendations: Strengthening,ROM,Balance training,Functional mobility training,Transfer training,Endurance training,ADL/Self-care training,Gait training,Pain management,Cognitive reorientation,Safety education & training,Patient/Caregiver education & training,Equipment evaluation, education, & procurement,Therapeutic activities,Positioning,Modalities,Stair training,IADL training,Neuromuscular re-education,Home exercise program    Electronically signed by   Sheldon Malhotra PTA #[id number]  5/9/2022, 8:50 AM

## 2022-05-09 NOTE — PROGRESS NOTES
Comprehensive Nutrition Assessment    Type and Reason for Visit:  Reassess    Nutrition Recommendations/Plan:   1. Continue regular diet per order  2. Will continue to offer oral nutrition supplement during stay  3. Will continue to follow up during stay      Malnutrition Assessment:  Malnutrition Status: At risk for malnutrition (Comment) (05/03/22 1217)    Context:  Acute Illness       Nutrition Assessment:    Remains on regular diet with meal intake % at most meals durign rehab stay. Continues to receive standard oral nutrition supplement at this time. Improved intake, now low nutrition risk. Nutrition Related Findings:    up in bed eating lunch, ? contact isolation for Hep A Wound Type: None       Current Nutrition Intake & Therapies:    Average Meal Intake: %  Average Supplements Intake: % (per patient)  ADULT DIET; Regular  ADULT ORAL NUTRITION SUPPLEMENT; Breakfast, Lunch, Dinner; Standard High Calorie/High Protein Oral Supplement    Anthropometric Measures:  Height: 6' 4\" (193 cm)  Ideal Body Weight (IBW): 202 lbs (92 kg)       Current Body Weight: 233 lb 7.5 oz (105.9 kg), 119.8 % IBW. Weight Source: Bed Scale  Current BMI (kg/m2): 28.4  Usual Body Weight: 254 lb 3.1 oz (115.3 kg) (per hx MD visit in March)  % Weight Change (Calculated): -4.8  Weight Adjustment For: No Adjustment                 BMI Categories: Overweight (BMI 25.0-29. 9)    Estimated Daily Nutrient Needs:  Energy Requirements Based On: Formula  Weight Used for Energy Requirements: Current  Energy (kcal/day): 5958-8793  Weight Used for Protein Requirements: Ideal  Protein (g/day):  (1-1.2 g/kg)  Method Used for Fluid Requirements: 1 ml/kcal  Fluid (ml/day): 2400    Nutrition Diagnosis:   · Inadequate oral intake related to other (comment) (reduced appetite in illness) as evidenced by poor intake prior to admission      Nutrition Interventions:   Food and/or Nutrient Delivery: Continue Current Diet,Continue Oral Nutrition Supplement  Nutrition Education/Counseling: No recommendation at this time  Coordination of Nutrition Care: Continue to monitor while inpatient       Goals:  Previous Goal Met: Progressing toward Goal(s)  Goals: PO intake 75% or greater,by next RD assessment       Nutrition Monitoring and Evaluation:   Behavioral-Environmental Outcomes: None Identified  Food/Nutrient Intake Outcomes: Diet Advancement/Tolerance,Food and Nutrient Intake,Supplement Intake  Physical Signs/Symptoms Outcomes: Biochemical Data,Meal Time Behavior,Skin,Weight    Discharge Planning:    Continue current diet     Tatiana Knapp RD, LD  Contact: [phone]

## 2022-05-09 NOTE — CARE COORDINATION
LSW called [employer]el[employer] Aguayo ([phone]) about DME referral.  [employer] Aguayo is in network with patient's [employer]. LSW to fax to them at [phone] when DME order available. 2:50 PM  DME order for  and [facility] sent via fax to Aracel[employer] Aguayo at this time. 3:00 PM  LSW received call back from [employer] ([phone]) and they are in contract with [facility]. Outpatient PT referral sent via fax to [phone].

## 2022-05-09 NOTE — PROGRESS NOTES
Avel Vivas    : 1980  Acct #: [de-identified]  MRN: [id number]              PM&R Progress Note      Admitting diagnosis: Critical illness myopathy ( [address])     Comorbid diagnoses impacting rehabilitation: Generalized weakness, gait disturbance, alcoholic hepatitis, uncontrolled pain, acute blood loss anemia, acute kidney injury with ATN, gluteal hematoma, alcohol abuse with recent withdrawal, depression with anxiety, essential hypertension     Chief complaint: No headache or change in vision with elevated blood pressure. Agreeable to try a new blood pressure agent. Prior (baseline) level of function: Independent. Current level of function:         Current  IRF-EVA and Goals:   Occupational Therapy:    Short Term Goals  Time Frame for Short term goals: STGs=LTGs :   Long Term Goals  Time Frame for Long term goals : 7-10 days or until d/c. Long Term Goal 1: Pt will complete grooming tasks c Ind. Long Term Goal 2: Pt will complete total body bathing c Mod I and AE PRN. Long Term Goal 3: Pt will complete UB dressing c Ind. Long Term Goal 4: Pt will complete LB dressing c Mod I and AE PRN. Long Term Goal 5: Pt will doff/don footwear c Mod I and AE PRN. Additional Goals?: Yes  Long Term Goal 6: Pt will complete toileting c Mod I.  Long Term Goal 7: Pt will perform functional transfers (bed, chair, toilet, shower) c DME PRN and Mod I.  Long Term Goal 8: Pt will perform therex/therax to facilitate an increase in edurance/ax tolerance (c emphasis on dynamic standing balance/tolerance > 10 mins) c Mod I.  Long Term Goal 9: Pt will complete home management tasks c Mod I. :                                       Eating: Eating  Assistance Needed: Independent  Comment: Pt able to open all containers/packages  CARE Score: 6  Discharge Goal: Independent       Oral Hygiene: Oral Hygiene  Assistance Needed: Independent  Comment:  Mod I seated at sink  CARE Score: 6  Discharge Goal: Independent    UB/LB Bathing: Shower/Bathe Self  Assistance Needed: Independent  Comment: MOd I; Majority of shower in seated base; Pt able to use LHS for distal BLEs; intermittent unilateral use of grab bars in stance to bathe perineal area  CARE Score: 6  Discharge Goal: Independent    UB Dressing: Upper Body Dressing  Assistance Needed: Independent  Comment: Pt able to doff/don shirt independently  CARE Score: 6  Discharge Goal: Independent         LB Dressing: Lower Body Dressing  Assistance Needed: Independent  Comment: pt able to thread BLEs into underwear and pants using reacher; Mod I in stance  CARE Score: 6  Discharge Goal: Independent    Donning and [place] Footwear: Putting On/Taking Off Footwear  Assistance Needed: Partial/moderate assistance  Comment: Assist to don TEB hose on BLEs; Pt able to don L sock; pt able to use sock aid to don R sock  CARE Score: 3  Discharge Goal: Independent      Toiletin [address] needed: Independent  Comment: Ind  CARE Score: 6  Discharge Goal: Independent      Toilet Transfers:   Toilet Transfer  Assistance needed: Supervision or touching assistance  Comment: Evert uses grab bars  CARE Score: 4  Discharge Goal: Independent    Physical Therapy:         Long Term Goals  Time Frame for Long term goals : 7 days STG=LTG  Long term goal 1: pt will complete bed mobility with mod I  Long term goal 2: Pt will complete sit to stand, pivot and car transfers with mod I  Long term goal 3: Pt will ambulate 150' with 2ww on level surfaces and 10' on unlevel surfaces with mod I  Long term goal 4: Pt will ascend/descend curb step with 2ww and 12 steps with rail with mod I  Long term goal 5: Pt will retrieve light object from ground with 2ww and reacher with mod I      Bed Mobility:   Sit to Lying  Assistance Needed: Independent  Comment: with rail  CARE Score: 6  Discharge Goal: Independent  Roll Left and Right  Assistance Needed: Independent  CARE Score: 6  Discharge Goal: Independent  Lying to Sitting on Side of Bed  Assistance Needed: Independent  CARE Score: 6  Discharge Goal: Independent    Transfers:    Sit to Stand  Assistance Needed: Supervision or touching assistance  Comment: CGA, min cues for COG fwd, R LE positioning, increased WS to L for increased muscle recruitment  CARE Score: 4  Discharge Goal: Independent  Chair/Bed-to-Chair Transfer  Assistance Needed: Supervision or touching assistance  Comment: CGA for balance with RW  CARE Score: 4  Discharge Goal: Independent  Toilet Transfer  Assistance needed: Supervision or touching assistance  CARE Score: 4  Car Transfer  Assistance Needed: Supervision or touching assistance  Comment: approach with 2ww  CARE Score: 4  Discharge Goal: Independent    Ambulation:    Walking Ability  Does the Patient Walk?: Yes     Walk 10 Feet  Assistance Needed: Supervision or touching assistance  Comment: 3 ( deemed usual performance per team huddle)  CARE Score: 4  Discharge Goal: Independent     Walk 50 Feet with Two Turns  Assistance Needed: Supervision or touching assistance  Comment: CG with 2ww and DF wrap to improve foot clearance  CARE Score: 4  Discharge Goal: Independent     Walk 150 Feet  Assistance Needed: Supervision or touching assistance  Comment: ' with 2ww and R DF wrap  CARE Score: 4  Discharge Goal: Independent     Walking 10 Feet on Uneven Surfaces  Assistance Needed: Supervision or touching assistance  Comment: CG with 2ww and R DF wrap  CARE Score: 4  Discharge Goal: Independent     1 Step (Curb)  Assistance Needed: Supervision or touching assistance  Comment: CG with 2ww and R DF wrap  CARE Score: 4  Discharge Goal: Independent     4 Steps  Assistance Needed: Supervision or touching assistance  Comment: CG with B rails and R DF wrap using step to pattern without cues for sequence today,  CARE Score: 4  Discharge Goal: Independent     12 Steps  Assistance Needed: Supervision or touching assistance  Comment: CG with B rails and R DF wrap with pt able to negotiate 15 steps today of mixed  6\" and 4\" steps  CARE Score: 4  Discharge Goal: Independent       Wheelchair:  w/c Ability: Wheelchair Ability  Uses a Wheelchair and/or Scooter?: No                Balance:        Object: Picking Up Object  Assistance Needed: Supervision or touching assistance  Comment: supervision with 2ww and reacher  CARE Score: 4  Discharge Goal: Independent    I      Exam:    Blood pressure 133/83, pulse 77, temperature 99.3 °F (37.4 °C), temperature source Oral, resp. rate 18, height 6' 4\" (1.93 m), weight 233 lb 6.4 oz (105.9 kg), SpO2 95 %. General: Sitting up in bed after eating. Alert. In no distress. Gazing right and left. HEENT: Clear speech. Neck supple. Pulmonary: No wheezes, rales or rhonchi. Cardiac: RRR. Abdomen: Patient's abdomen is soft and nondistended. Bowel sounds were present throughout. There was no rebound, guarding or masses noted. No ascites or distention. Upper extremities: Fair coordination and better strength. No new bruising. Lower extremities: No signs of DVT. Heels clear. Repositions his leg spontaneously. Sitting balance was good.   Standing balance was fair-.    Lab Results   Component Value Date    WBC 14.1 (H) 05/02/2022    HGB 9.2 (L) 05/02/2022    HCT 30.1 (L) 05/02/2022    .1 (H) 05/02/2022     05/02/2022     Lab Results   Component Value Date    INR 1.18 04/26/2022    INR 1.10 04/22/2022    INR 1.08 04/21/2022    PROTIME 15.2 (H) 04/26/2022    PROTIME 14.2 04/22/2022    PROTIME 14.0 04/21/2022     Lab Results   Component Value Date    CREATININE 0.5 (L) 05/02/2022    BUN 11 05/02/2022     05/02/2022    K 3.7 05/02/2022    CL 99 05/02/2022    CO2 27 05/02/2022     Lab Results   Component Value Date     (H) 05/02/2022     (H) 05/02/2022    ALKPHOS 199 (H) 05/02/2022    BILITOT 8.6 (H) 05/02/2022       Expected length of stay  prior to a supervised level of function for discharge home with a walker and Marnie Clayton OT/PT is 5/11/2022. Recommendations:    1. Critical illness myopathy with gait disturbance: Demonstrating some benefit from participating in the daily occupational and physical therapy.  Ongoing adaptive equipment training, caregiver education and monitoring of bowel and bladder function.  Cautiously managing his pain while providing nutritional support.  Currently no obvious complications of alcohol withdrawal.  Monitoring his renal function, treating his blood pressure and providing DVT prophylaxis.  Verbal cues and CGA needed for transfers again today. 2. DVT prophylaxis: Lovenox 30 mg SQ twice daily.  I must monitor his hemoglobin and platelet count while on this medication.  Weightbearing activities are slowly improving daily.  GI prophylaxis is available.  No new bruising or swelling.   3. Alcoholic hepatitis: The GI consultant has continued prednisolone 40 mg daily for 1 month.  Tolerating the Xifaxan.  Encouraging consistent oral nutrition and hydration.  Abstinence from alcohol with community resources available.  From the gastroenterologist:     1) severe alcoholic hepatitis with probable superimposed medication hepatotoxicity- improving                   Plan:              2) K gave him a schedule for the prednisolone:              -13 MG/D X 4 WEEKS              -73 MG/D X 1 WEEK              -01 MG/D X 1 WEEK              -15 MG/D X 1 WEEK              -5 MG/D X 1 WEEK THEN STOP              2) suggested follow up visit in my office in 1-2 months              4. Acute kidney injury with ATN: Periodic monitoring of his chemistries.  Encouraging oral hydration.  Avoiding nephrotoxic medications when possible. Check labs after the weekend. 5. Gluteal hematoma: Roxicodone and Robaxin.  Avoiding acetaminophen.  Cautious DVT prophylaxis. No current signs of further bleeding into the traumatized area.  Limb elevation and cryotherapy.  Progressive mobilization.  Currently holding off from the use of low-dose gabapentin. 6. Alcohol abuse with withdrawal: Treating him in a calm and consistent environment.  Encouraging alcohol abstinence.  Providing short acting benzodiazepines as needed. 7. Hypertension: Diastolic blood pressure is trending up significantly.   I will initiate lisinopril to go along with his beta-blocker.   Vital signs are checked at rest and with activity.  Target systolic blood pressure is 120-140.

## 2022-05-09 NOTE — PROGRESS NOTES
Occupational Therapy    Physical Rehabilitation: OCCUPATIONAL THERAPY     [x] daily progress note       [] discharge       Patient Name:  Feliciano Mckay   :  1980 MRN: [id number]  Room:  [address] [place], [place] [address] Date of Admission: 2022  Rehabilitation Diagnosis:   Critical illness myopathy [G72.81]  Critical illness myopathy [G72.81]       Date 2022       Day of ARU Week:  1   Time IN/OUT 1435-0445   Individual Tx Minutes 60   Group Tx Minutes    Co-Treat Minutes    Concurrent Tx Minutes    TOTAL Tx Time Mins 60   Variance Time    Variance Time []   Refusal due to:     []   Medical hold/reason:    []   Illness   []   Off Unit for test/procedure  []   Extra time needed to complete task  []   Therapeutic need  []   Other (specify):   Restrictions Restrictions/Precautions: Fall Risk,Contact Precautions (Hep A positive)         Communication with other providers: [x]   OK to see per nursing:     []   Spoke with team member regarding:      Subjective observations and cognitive status: Pt resting in bed on approach; pleasant and agreeable to therapy session. Pain level/location:    /10       Location:    Discharge recommendations  Anticipated discharge date:   Destination: []?home alone   []?home alone w assist prn   [x]? home w/ family    []? Continuous supervision       []? SNF    []? Assisted living     []? Other:   Continued therapy: [x]? Marnie Arnold OT  []? OUTPATIENT  OT   []? No Further OT  Equipment needs:   Feliciano Mckay requires a 3 in 1 bedside commode due to being unable to use the toilet within the home  And confined to one level of the home. ADLs:    Eating: Eating  Assistance Needed: Independent  Comment: Pt able to open all containers/packages  CARE Score: 6  Discharge Goal: Independent       Oral Hygiene: Oral Hygiene  Assistance Needed: Independent  Comment:  Mod I seated at sink  CARE Score: 6  Discharge Goal: Independent    UB/LB Bathing: Shower/Bathe Self  Assistance Needed: Independent  Comment: MOd I; Majority of shower in seated base; Pt able to use LHS for distal BLEs; intermittent unilateral use of grab bars in stance to bathe perineal area  CARE Score: 6  Discharge Goal: Independent    UB Dressing: Upper Body Dressing  Assistance Needed: Independent  Comment: Pt able to doff/don shirt independently  CARE Score: 6  Discharge Goal: Independent         LB Dressing: Lower Body Dressing  Assistance Needed: Independent  Comment: pt able to thread BLEs into underwear and pants using reacher; Mod I in stance  CARE Score: 6  Discharge Goal: Independent    Donning and [place] Footwear: Putting On/Taking Off Footwear  Assistance Needed: Partial/moderate assistance  Comment: Assist to don TEB hose on BLEs; Pt able to don L sock; pt able to use sock aid to don R sock  CARE Score: 3  Discharge Goal: Independent      Toileting: declined need       Toilet Transfers:  NA   Device Used:    []   Standard Toilet         []   Grab Bars           []  Bedside Commode       []   Elevated Toilet          []   Other:        Bed Mobility:           []   Pt received out of bed   Supine --> Sit:  Mod I   Sit --> Supine:   Mod I     Transfers:    Sit--> Stand:  Supervision  Stand --> Sit:   Supervision   Stand-Pivot:   Supervision   Other:    Assistive device required for transfer:   RW       Functional Mobility:  To<>bathroom   Assistance:  SBA   Device:   [x]   Rolling Walker     []   Standard Walker []   Wheelchair        []   [employer]       []   4-Wheeled Lauren Flores         []   Cardiac Walker       []   Other:          Additional Therapeutic activities/exercises completed this date:     [x]   ADL Training   [x]   Balance/Postural training     [x]   Bed/Transfer Training   []   Endurance Training   []   Neuromuscular Re-ed   []   Nu-step:  Time:        Level:         #Steps:       []   Rebounder:    []  Seated     []  Standing        []   Supine Ther Ex (reps/sets):     []   Seated Ther Ex (reps/sets):     [] Standing Ther Ex (reps/sets):     []   Other:      Comments:      Patient/Caregiver Education and Training:   []   Adaptive Equipment Use  []   Bed Mobility/Transfer Technique/Safety  []   Energy Conservation Tips  []   Family training  []   Postural Awareness  []   Safety During Functional Activities  []   Reinforced Patient's Precautions   []   Progress was updated and reviewed in Rehabtracker with patient and/or family this         date.     Treatment Plan for Next Session: Continue OT POC           Treatment/Activity Tolerance:   [x] Tolerated treatment with no adverse effects    [] Patient limited by fatigue  [] Patient limited by pain   [] Patient limited by medical complications:    [] Adverse reaction to Tx:   [] Significant change in status    Safety:       []  bed alarm set    [x]  chair alarm set    []  Pt refused alarms                []  Telesitter activated      [x]  Gait belt used during tx session      []other:       Number of Minutes/Billable Intervention  Therapeutic Exercise    ADL Self-care 60   Neuro Re-Ed    Therapeutic Activity    Group    Other:    TOTAL 60       Social History  Social/Functional History  Lives With: Spouse,Son (17 and 7 y/o sons and dog)  Type of Home: House  Home Layout: Two level,Bed/Bath upstairs  Home Access: Stairs to enter without rails  Entrance Stairs - Number of Steps: 2  Bathroom Shower/Tub: Tub/Shower unit  Bathroom Toilet: Standard  Bathroom Equipment: Shower chair  Bathroom Accessibility: [employer] accessible  Has the patient had two or more falls in the past year or any fall with injury in the past year?: Yes (one fall precipitating this admission)  ADL Assistance: Independent  Homemaking Assistance: Independent  Homemaking Responsibilities: Yes  Meal Prep Responsibility: Secondary  Laundry Responsibility: Secondary  Cleaning Responsibility: Secondary  Bill Paying/Finance Responsibility: Primary  Shopping Responsibility: Secondary  Health Care Management: Primary  Ambulation Assistance: Independent  Transfer Assistance: Independent  Active : Yes  Mode of Transportation: SUV,Truck  Occupation: On disability  Additional Comments: pt sleeps in flat bed at home    Objective                                                                                    Goals:  (Update in navigator)  Short Term Goals  Time Frame for Short term goals: STGs=LTGs:  Long Term Goals  Time Frame for Long term goals : 7-10 days or until d/c. Long Term Goal 1: Pt will complete grooming tasks c Ind. Long Term Goal 2: Pt will complete total body bathing c Mod I and AE PRN. Long Term Goal 3: Pt will complete UB dressing c Ind. Long Term Goal 4: Pt will complete LB dressing c Mod I and AE PRN. Long Term Goal 5: Pt will doff/don footwear c Mod I and AE PRN. Additional Goals?: Yes  Long Term Goal 6: Pt will complete toileting c Mod I.  Long Term Goal 7: Pt will perform functional transfers (bed, chair, toilet, shower) c DME PRN and Mod I.  Long Term Goal 8: Pt will perform therex/therax to facilitate an increase in edurance/ax tolerance (c emphasis on dynamic standing balance/tolerance > 10 mins) c Mod I.  Long Term Goal 9: Pt will complete home management tasks c Mod I.:        Plan of Care                                                                              Times per week: 5 days per week for a minimum of 60 minutes/day plus group as appropriate for 60 minutes.   Treatment to include Plan  Times per Day: Daily  Current Treatment Recommendations: Strengthening,Balance training,Functional mobility training,Endurance training,Neuromuscular re-education,Safety education & training,Patient/Caregiver education & training,Equipment evaluation, education, & procurement,Self-Care / ADL,Home management training    Electronically signed by   LINO Calderon,  5/9/2022, 1:02 PM

## 2022-05-10 VITALS
RESPIRATION RATE: 15 BRPM | OXYGEN SATURATION: 95 % | DIASTOLIC BLOOD PRESSURE: 85 MMHG | HEIGHT: 76 IN | SYSTOLIC BLOOD PRESSURE: 143 MMHG | WEIGHT: 283.95 LBS | TEMPERATURE: 98.9 F | HEART RATE: 64 BPM | BODY MASS INDEX: 34.58 KG/M2

## 2022-05-10 PROCEDURE — 1280000000 HC REHAB R&B

## 2022-05-10 PROCEDURE — 6370000000 HC RX 637 (ALT 250 FOR IP): Performed by: INTERNAL MEDICINE

## 2022-05-10 PROCEDURE — 6370000000 HC RX 637 (ALT 250 FOR IP): Performed by: PHYSICAL MEDICINE & REHABILITATION

## 2022-05-10 PROCEDURE — 94761 N-INVAS EAR/PLS OXIMETRY MLT: CPT

## 2022-05-10 PROCEDURE — 97110 THERAPEUTIC EXERCISES: CPT

## 2022-05-10 PROCEDURE — 97535 SELF CARE MNGMENT TRAINING: CPT

## 2022-05-10 PROCEDURE — 97530 THERAPEUTIC ACTIVITIES: CPT

## 2022-05-10 PROCEDURE — 99232 SBSQ HOSP IP/OBS MODERATE 35: CPT | Performed by: PHYSICAL MEDICINE & REHABILITATION

## 2022-05-10 PROCEDURE — 97116 GAIT TRAINING THERAPY: CPT

## 2022-05-10 RX ORDER — OXYCODONE HYDROCHLORIDE 5 MG/1
5 TABLET ORAL EVERY 8 HOURS PRN
Qty: 15 TABLET | Refills: 0 | Status: SHIPPED | OUTPATIENT
Start: 2022-05-10 | End: 2022-05-13 | Stop reason: SDUPTHER

## 2022-05-10 RX ORDER — LISINOPRIL 10 MG/1
10 TABLET ORAL DAILY
Qty: 30 TABLET | Refills: 0 | Status: SHIPPED | OUTPATIENT
Start: 2022-05-11 | End: 2022-06-13 | Stop reason: SDUPTHER

## 2022-05-10 RX ORDER — PREDNISOLONE 15 MG/5 ML
40 SOLUTION, ORAL ORAL DAILY
Qty: 400 ML | Refills: 0 | Status: SHIPPED | OUTPATIENT
Start: 2022-05-11 | End: 2022-05-12

## 2022-05-10 RX ORDER — METHOCARBAMOL 500 MG/1
500 TABLET, FILM COATED ORAL 3 TIMES DAILY
Qty: 30 TABLET | Refills: 0 | Status: SHIPPED | OUTPATIENT
Start: 2022-05-10 | End: 2022-05-13 | Stop reason: SDUPTHER

## 2022-05-10 RX ADMIN — RIFAXIMIN 550 MG: 550 TABLET ORAL at 21:07

## 2022-05-10 RX ADMIN — OXYCODONE HYDROCHLORIDE 5 MG: 5 TABLET ORAL at 13:06

## 2022-05-10 RX ADMIN — Medication 40 MG: at 07:50

## 2022-05-10 RX ADMIN — METHOCARBAMOL TABLETS 750 MG: 750 TABLET, COATED ORAL at 17:40

## 2022-05-10 RX ADMIN — OXYCODONE HYDROCHLORIDE 5 MG: 5 TABLET ORAL at 05:24

## 2022-05-10 RX ADMIN — METHOCARBAMOL TABLETS 750 MG: 750 TABLET, COATED ORAL at 07:50

## 2022-05-10 RX ADMIN — METHOCARBAMOL TABLETS 750 MG: 750 TABLET, COATED ORAL at 13:05

## 2022-05-10 RX ADMIN — OXYCODONE HYDROCHLORIDE 5 MG: 5 TABLET ORAL at 22:22

## 2022-05-10 RX ADMIN — RIFAXIMIN 550 MG: 550 TABLET ORAL at 07:49

## 2022-05-10 RX ADMIN — METHOCARBAMOL TABLETS 750 MG: 750 TABLET, COATED ORAL at 21:07

## 2022-05-10 RX ADMIN — LISINOPRIL 10 MG: 5 TABLET ORAL at 07:50

## 2022-05-10 RX ADMIN — ATENOLOL 50 MG: 25 TABLET ORAL at 07:49

## 2022-05-10 ASSESSMENT — PAIN DESCRIPTION - ORIENTATION
ORIENTATION: RIGHT

## 2022-05-10 ASSESSMENT — PAIN SCALES - WONG BAKER
WONGBAKER_NUMERICALRESPONSE: 0

## 2022-05-10 ASSESSMENT — PAIN SCALES - GENERAL
PAINLEVEL_OUTOF10: 7
PAINLEVEL_OUTOF10: 6
PAINLEVEL_OUTOF10: 6
PAINLEVEL_OUTOF10: 0
PAINLEVEL_OUTOF10: 6
PAINLEVEL_OUTOF10: 7
PAINLEVEL_OUTOF10: 6
PAINLEVEL_OUTOF10: 5
PAINLEVEL_OUTOF10: 7
PAINLEVEL_OUTOF10: 6

## 2022-05-10 ASSESSMENT — PAIN DESCRIPTION - DESCRIPTORS
DESCRIPTORS: THROBBING
DESCRIPTORS: THROBBING
DESCRIPTORS: NUMBNESS;SHARP
DESCRIPTORS: THROBBING;SHOOTING

## 2022-05-10 ASSESSMENT — PAIN DESCRIPTION - PAIN TYPE
TYPE: ACUTE PAIN
TYPE: ACUTE PAIN

## 2022-05-10 ASSESSMENT — PAIN DESCRIPTION - LOCATION
LOCATION: LEG

## 2022-05-10 ASSESSMENT — PAIN DESCRIPTION - ONSET
ONSET: ON-GOING
ONSET: ON-GOING

## 2022-05-10 ASSESSMENT — PAIN DESCRIPTION - FREQUENCY
FREQUENCY: CONTINUOUS
FREQUENCY: CONTINUOUS

## 2022-05-10 NOTE — PROGRESS NOTES
Occupational Therapy    Physical Rehabilitation: OCCUPATIONAL THERAPY     [x] daily progress note       [] discharge       Patient Name:  Brinda Schwarz   :  1980 MRN: [id number]  Room:  [address] [place], [place] [address] Date of Admission: 2022  Rehabilitation Diagnosis:   Critical illness myopathy [G72.81]  Critical illness myopathy [G72.81]       Date 5/10/2022       Day of ARU Week:  2   Time IN/OUT 8623-6396   Individual Tx Minutes 60   Group Tx Minutes    Co-Treat Minutes    Concurrent Tx Minutes    TOTAL Tx Time Mins 60   Variance Time    Variance Time []   Refusal due to:     []   Medical hold/reason:    []   Illness   []   Off Unit for test/procedure  []   Extra time needed to complete task  []   Therapeutic need  []   Other (specify):   Restrictions Restrictions/Precautions: Fall Risk,Contact Precautions (Hep A positive)         Communication with other providers: [x]   OK to see per nursing:     []   Spoke with team member regarding:      Subjective observations and cognitive status: Pt on toilet on approach c nsg tech in room; pt pleasant and agreeable to therapy session. Pain level/location:    /10       Location: none    Discharge recommendations  Anticipated discharge date:   Destination: []??home alone   []? ?home alone w assist prn   [x]? ? home w/ family    []? ? Continuous supervision       []? ?SNF    []? ? Assisted living     []? ? Other:   Continued therapy: [x]? ?Marnie Arnold OT  []??OUTPATIENT  OT   []?? No Further OT  Equipment needs:   Roland Hardina a 3 in 1 bedside commode due to being unable to use the toilet within the home  And confined to one level of the home. ADLs:    Eating: Eating  Assistance Needed: Independent  Comment: Pt able to open all containers/packages  CARE Score: 6  Discharge Goal: Independent       Oral Hygiene: Oral Hygiene  Assistance Needed: Independent  Comment:  Mod I seated at sink  CARE Score: 6  Discharge Goal: Independent    UB/LB Bathing: Shower/Bathe Self  Assistance Needed: Independent  Comment: MOd I; Majority of shower in seated base; Pt able to use LHS for distal BLEs; intermittent unilateral use of grab bars in stance to bathe perineal area  CARE Score: 6  Discharge Goal: Independent    UB Dressing: Upper Body Dressing  Assistance Needed: Independent  Comment: Pt able to doff/don shirt independently  CARE Score: 6  Discharge Goal: Independent         LB Dressing: Lower Body Dressing  Assistance Needed: Independent  Comment: pt able to thread BLEs into underwear and pants using reacher; Mod I in stance  CARE Score: 6  Discharge Goal: Independent    Donning and [place] Footwear: Putting On/Taking Off Footwear  Assistance Needed: Partial/moderate assistance  Comment: Assist to don TEB hose on BLEs; Pt able to don L sock; pt able to use sock aid to don R sock  CARE Score: 3  Discharge Goal: Independent      Toiletin [address] needed: Independent  Comment: Ind in CM and BM hygiene  CARE Score: 6  Discharge Goal: Independent      Toilet Transfers:   Supervision  Toilet Transfer  Assistance needed: Supervision or touching assistance  Comment: as per nsg Javier tomas, pt required Supervision c use of grab bars  CARE Score: 4  Discharge Goal: Independent  Device Used:    [x]   Standard Toilet         [x]   Grab Bars           []  Bedside Commode       []   Elevated Toilet          []   Other:        Bed Mobility:           [x]   Pt received out of bed   Supine --> Sit:  Mod I   Sit --> Supine: Mod I     Transfers:    Sit--> Stand:  Supervision   Stand --> Sit:   Supervision  Stand-Pivot:   Supervision  Other:    Assistive device required for transfer:   RW       Functional Mobility: To increase BUE strength and endurance for functional transfers and ADLs, Pt self propelled WC 52 ft x 2   Assistance:   Mod I   Device:   []   Rolling Walker     []   Standard Walker [x]   Wheelchair        []   [employer]       []   Alexa Alex         [] Cardiac Walker       []   Other:          Additional Therapeutic activities/exercises completed this date:     [x]   ADL Training: Pt completed tub transfer training c use of Tub Transfer Bench and completed x2 transfers c 1rest breaks and 0 v/c for safety/compensatory techniques SB assist. Pt completed education/training for compensatory strategies to increase tub/shower transfer skills required for d/c.    []   Balance/Postural training     []   Bed/Transfer Training   []   Endurance Training   []   Neuromuscular Re-ed   []   Nu-step:  Time:        Level:         #Steps:       []   Rebounder:    []  Seated     []  Standing        []   Supine Ther Ex (reps/sets):     [x]   Seated Ther Ex (reps/sets): To increase UB strength for functional transfers and ADLs, Pt completed 3 sets of 15 reps c 60# on rickshaw. To increase BUE endurance for ADLs and transfers, pt completed 1 set x10 reps of the following therex, alternating UEs, c a 10# weight:   Shoulder presses  Chest presses  Shoulder abduction/adduction  Bicep curls  Tricep curls   []   Standing Ther Ex (reps/sets):     []   Other:      Comments:      Patient/Caregiver Education and Training:   []   Adaptive Equipment Use  []   Bed Mobility/Transfer Technique/Safety  []   Energy Conservation Tips  [x]   Family training: CGT c wife regarding donning VALENTINO hose with pt.   []   Postural Awareness  []   Safety During Functional Activities  []   Reinforced Patient's Precautions   []   Progress was updated and reviewed in Rehabtracker with patient and/or family this         date.     Treatment Plan for Next Session: D/C 5/11/22          Treatment/Activity Tolerance:   [x] Tolerated treatment with no adverse effects    [] Patient limited by fatigue  [] Patient limited by pain   [] Patient limited by medical complications:    [] Adverse reaction to Tx:   [] Significant change in status    Safety:       []  bed alarm set    []  chair alarm set    []  Pt refused alarms []  Telesitter activated      [x]  Gait belt used during tx session      []other:       Number of Minutes/Billable Intervention  Therapeutic Exercise 15   ADL Self-care 30   Neuro Re-Ed    Therapeutic Activity 15   Group    Other:    TOTAL 60       Social History  Social/Functional History  Lives With: Spouse,Son (17 and 7 y/o sons and dog)  Type of Home: House  Home Layout: Two level,Bed/Bath upstairs  Home Access: Stairs to enter without rails  Entrance Stairs - Number of Steps: 2  Bathroom Shower/Tub: Tub/Shower unit  Bathroom Toilet: Standard  Bathroom Equipment: Shower chair  Bathroom Accessibility: Cristiane Ply accessible  Has the patient had two or more falls in the past year or any fall with injury in the past year?: Yes (one fall precipitating this admission)  ADL Assistance: Independent  Homemaking Assistance: Independent  Homemaking Responsibilities: Yes  Meal Prep Responsibility: Secondary  Laundry Responsibility: Secondary  Cleaning Responsibility: Secondary  Bill Paying/Finance Responsibility: Primary  Shopping Responsibility: Secondary  Health Care Management: Primary  Ambulation Assistance: Independent  Transfer Assistance: Independent  Active : Yes  Mode of Transportation: SUV,Truck  Occupation: On disability  Additional Comments: pt sleeps in flat bed at home    Objective                                                                                    Goals:  (Update in navigator)  Short Term Goals  Time Frame for Short term goals: STGs=LTGs:  Long Term Goals  Time Frame for Long term goals : 7-10 days or until d/c. Long Term Goal 1: Pt will complete grooming tasks c Ind. Long Term Goal 2: Pt will complete total body bathing c Mod I and AE PRN. Long Term Goal 3: Pt will complete UB dressing c Ind. Long Term Goal 4: Pt will complete LB dressing c Mod I and AE PRN. Long Term Goal 5: Pt will doff/don footwear c Mod I and AE PRN.   Additional Goals?: Yes  Long Term Goal 6: Pt will complete toileting c Mod I.  Long Term Goal 7: Pt will perform functional transfers (bed, chair, toilet, shower) c DME PRN and Mod I.  Long Term Goal 8: Pt will perform therex/therax to facilitate an increase in edurance/ax tolerance (c emphasis on dynamic standing balance/tolerance > 10 mins) c Mod I.  Long Term Goal 9: Pt will complete home management tasks c Mod I.:        Plan of Care                                                                              Times per week: 5 days per week for a minimum of 60 minutes/day plus group as appropriate for 60 minutes.   Treatment to include Plan  Times per Day: Daily  Current Treatment Recommendations: Strengthening,Balance training,Functional mobility training,Endurance training,Neuromuscular re-education,Safety education & training,Patient/Caregiver education & training,Equipment evaluation, education, & procurement,Self-Care / ADL,Home management training    Electronically signed by   LINO Delgado,  5/10/2022, 10:23 AM

## 2022-05-10 NOTE — PROGRESS NOTES
Physical Therapy      [] daily progress note       [x] discharge       Patient Name:  Asher Bhat   :  1980 MRN: [id number]  Room:  [address] [place], [place] [address] Date of Admission: 2022  Rehabilitation Diagnosis:   Critical illness myopathy [G72.81]  Critical illness myopathy [G72.81]        Date 5/10/2022       Day of ARU Week:  2   Time IN/OUT 1291-0929  2151-5782   Individual Tx Minutes 65+60   TOTAL Tx Time Mins 125   Variance Time +5   Variance Time []   Refusal due to:     []   Medical hold/reason:    []   Illness   []   Off Unit for test/procedure  [x]   Extra time needed to complete task for caregiver training  []   Therapeutic need  []   Other (specify):   Restrictions Restrictions/Precautions  Restrictions/Precautions: Fall Risk,Contact Precautions (Hep A positive)      Communication with other providers: [x]   OK to see per nursing:     []   Spoke with team member regarding:      Subjective observations and cognitive status: Pt up in [facility], willing to participate, was happy to be able to don R shoe and tie without A this AM; req A with Daniel cowan for R LE  PM: Spouse present for caregiver training. Spouse present and receptive, pt with good participation. Pain level/location:  6  /10       Location: R thigh, HS   Discharge recommendations  Anticipated discharge date:    Destination: []home alone   []home alone with assist PRN     [x] home w/ family      [] Continuous supervision  []SNF    [] Assisted living     [] Other:  Continued therapy: []HHC PT  [x]OUTPATIENT  PT   [] No Further PT  []SNF PT  Caregiver training recommended: [x]Yes  [] No   Equipment needs: 2ww  Asher Bhat requires the assistance of a wheeled walker to successfully ambulate from room to room at home to allow completion of daily living tasks such as: bathing, toileting, dressing and grooming. A wheeled walker is necessary due to the patient's unsteady gait, upper body weakness, inability to  a standard walker.   This patient can ambulate only by pushing a walker instead of using a lesser assistive device such as a cane or crutch.       PT QI     Bed Mobility:     Roll Left and Right  Assistance Needed: Independent  Comment: no bed features  CARE Score: 6  Discharge Goal: Independent  met  Sit to Lying  Assistance Needed: Independent  Comment: no bed features  CARE Score: 6  Discharge Goal: Independent  met  Lying to Sitting on Side of Bed  Assistance Needed: Independent  Comment: no bed features  CARE Score: 6  Discharge Goal: Independent  met  Transfers:    Sit to Stand  Assistance Needed: Independent  Comment: demonstrates safety and balance with transffer  CARE Score: 6  Discharge Goal: Independent  met  Chair/Bed-to-Chair Transfer  Assistance Needed: Independent  Comment: with RW, demonstrates safety and balance bed > WC using RW  CARE Score: 6  Discharge Goal: Independent  met  Car Transfer  Assistance Needed: Independent  Comment: demos safety and balance with RW to/from car seat, lifts LE's in/out of car without A  CARE Score: 6  Discharge Goal: Independent   met      Object: Picking Up Object  Assistance Needed: Independent  Comment: demos safety at RW with use of reacher  CARE Score: 6  Discharge Goal: Independent  met  Ambulation:    Walking Ability  Does the Patient Walk?: Yes     Walk 10 Feet  Assistance Needed: Supervision or touching assistance  Comment: SBA for balance with RW, mildly unsteady and cues for R LE awareness due to lack of sensation and coordination, tendency to hit back leg of RW due to excessive ER  CARE Score: 4  Discharge Goal: Independent  Part met due to poor DF strength and decreased proprioception causing balance issues   Walk 50 Feet with Two Turns  Assistance Needed: Supervision or touching assistance  Comment: SBA with RW, deficits as 10' walk  CARE Score: 4  Discharge Goal: Independent  Part met due to poor DF strength and decreased proprioception causing balance issues   Walk 150 Feet  Assistance Needed: Supervision or touching assistance  Comment: SBA with RW, deficits as 10' walk  CARE Score: 4  Discharge Goal: Independent  Part met due to poor DF strength and decreased proprioception causing balance issues   Walking 10 Feet on Uneven Surfaces  Assistance Needed: Supervision or touching assistance  Comment: SBA with RW for balance, one episode of catching R foot on obstacle but corrected balance  CARE Score: 4  Discharge Goal: Independent  Part met due to poor DF strength and decreased proprioception causing balance issues   1 Step (Curb)  Assistance Needed: Supervision or touching assistance  Comment: SBA with RW for balance x 2 attempts, one episode of catching R foot on edge of step on ascent but self corrected.    CARE Score: 4  Discharge Goal: Independent  Part met due to poor DF strength and decreased proprioception causing balance issues   4 Steps  Assistance Needed: Supervision or touching assistance  Comment: SBA for balance with B rails, demos correct seq  CARE Score: 4  Discharge Goal: Independent  Part met due to poor DF strength and decreased proprioception causing balance issues   12 Steps  Assistance Needed: Supervision or touching assistance  Comment: SBA for balance with B rails, demos correct sequencing  CARE Score: 4  Discharge Goal: Independent  Part met due to poor DF strength and decreased proprioception causing balance issues    Additional Therapeutic activities/exercises completed this date:     []   Nu-step:  Time:        Level:         #Steps:       []   Rebounder:    []  Seated     []  Standing        []   Balance training         []   Postural training    []   Supine ther ex (reps/sets):     []   Seated ther ex (reps/sets):     []   Standing ther ex (reps/sets):     []   Picking up object from floor (standing):                   []   Reacher used   [x]   Other: Pt amb up/down 12 steps using one rail and SPC in step to pattern with SB-CGA for balance, pt shun correct seq. Pt reports feels most steady when placing cane first on next step due to height, able to maintain contact with cane on step when stepping up. Spouse A pt up/down 12 6\" steps using L rail ascending, R descending + SPC, pt req CG-SBA, able to demonstrate correct sequencing; spouse able to provide safe guarding technique   []   Other:      Patient/Caregiver Education and Training:   [x]   Bed Mobility/Transfer technique/safety  [x]   Gait technique/sequencing  [x]   Proper use of assistive device  [x]   Advanced mobility safety and technique  []   Reinforced patient's precautions/mobility while maintaining precautions  []   Postural awareness  [x]   Family training: spouse [facility] educated on pt's current mobility status/ALISSA required for bed mobility, transfers, ambulation, car transfers, and community barriers, stairs, and entry into home. Education provided to caregiver for safety cues required for pt to perform tasks. Pre-mobility education provided including application and use of gait belt, safe guarding technique, patient's mobility precautions, and proper body mechanics. [facility] voiced understanding and able to demonstrate safety with guarding pt during mobility and transfers after initial cues and demonstration. Spouse was able to apply ace wrap to R ankle into DF after demonstration provided. Treatment Plan for Next Session: Pt to be discharged to home with support of spouse,  OP PT, use of RW for ambulation, and SPC for stairs recommended. Pt met or nearly met all goals during this rehab admission. Slight gains were seen in strength, but DF, PF and hamstring strength are still severely limited and pt needs DF wrap to clear toe safety in gait. Pt has consult for orthotic from physician after discharge. Pt is recommended to have supervision for mobility with 2ww and DF wrap and to continue with OP PT to improve strength and mobility.  JS  Treatment/Activity Tolerance:   [x] Tolerated treatment with no adverse effects    [] Patient limited by fatigue  [] Patient limited by pain   [] Patient limited by medical complications:    [] Adverse reaction to Tx:   [] Significant change in status    Safety:       [x]  bed alarm set    []  chair alarm set    []  Pt refused alarms                []  Telesitter activated      [x]  Gait belt used during tx session      []other:         Number of Minutes/Billable Intervention  Gait Training 75   Therapeutic Exercise    Neuro Re-Ed    Therapeutic Activity 50   Wheelchair Propulsion    Group    Other:    TOTAL 125         Social History  Social/Functional History  Lives With: Spouse,Son (17 and 7 y/o sons and dog)  Type of Home: House  Home Layout: Two level,Bed/Bath upstairs  Home Access: Stairs to enter without rails  Entrance Stairs - Number of Steps: 2  Bathroom Shower/Tub: Tub/Shower unit  Bathroom Toilet: Standard  Bathroom Equipment: Shower chair  Bathroom Accessibility: PolySpot accessible  Has the patient had two or more falls in the past year or any fall with injury in the past year?: Yes (one fall precipitating this admission)  ADL Assistance: Independent  Homemaking Assistance: Independent  Homemaking Responsibilities: Yes  Meal Prep Responsibility: Secondary  Laundry Responsibility: Secondary  Cleaning Responsibility: Secondary  Bill Paying/Finance Responsibility: Primary  Shopping Responsibility: Secondary  Health Care Management: Primary  Ambulation Assistance: Independent  Transfer Assistance: Independent  Active : Yes  Mode of Transportation: SUV,Truck  Occupation: On disability  Additional Comments: pt sleeps in flat bed at home    Objective                                                                                    Goals:  (Update in navigator)   :   Long Term Goals  Time Frame for Long term goals : 7 days STG=LTG  Long term goal 1: pt will complete bed mobility with mod I  Long term goal 2: Pt will complete sit to stand, pivot and car transfers with mod I  Long term goal 3: Pt will ambulate 150' with 2ww on level surfaces and 10' on unlevel surfaces with mod I  Long term goal 4: Pt will ascend/descend curb step with 2ww and 12 steps with rail with mod I  Long term goal 5: Pt will retrieve light object from ground with 2ww and reacher with mod I:        Plan of Care                                                                              Times per week: 5 days per week for a minimum of 60 minutes/day plus group as appropriate for 60 minutes.   Treatment to include Current Treatment Recommendations: Strengthening,ROM,Balance training,Functional mobility training,Transfer training,Endurance training,ADL/Self-care training,Gait training,Pain management,Cognitive reorientation,Safety education & training,Patient/Caregiver education & training,Equipment evaluation, education, & procurement,Therapeutic activities,Positioning,Modalities,Stair training,IADL training,Neuromuscular re-education,Home exercise program    Electronically signed by   Savage Ochoa, PTA #[id number]  5/10/2022, 12:20 PM

## 2022-05-10 NOTE — PROGRESS NOTES
Physician Progress Note      Kota Jordan  CSN #:                  [id number]  :                       1980  ADMIT DATE:       2022 12:25 AM  [facility] DATE:        2022 5:07 PM  RESPONDING  PROVIDER #:        Pérez Cheng TEXT:    Patient admitted with intramuscular hematoma. Documentation reflects sepsis in   H&P on . No subsequent documentation and not included in discharge   summary. If possible, please document in the progress notes and discharge   summary if sepsis was: The medical record reflects the following:  Risk Factors: alcoholic hepatitis  Clinical Indicators: KESHA. Acute hepatic encephalopathy. WBC 19.8, lactic acid   9.1, procal 0.833, afebrile, HR 98. Treatment: IVF, IV Maxipime, IV Vancomycin, IV Flagyl    LANDON CurtisN, RN, [facility]  Clinical   [phone]  Options provided:  -- Sepsis, POA, confirmed after study  -- Sepsis ruled out after study  -- Other - I will add my own diagnosis  -- Disagree - Not applicable / Not valid  -- Disagree - Clinically unable to determine / Unknown  -- Refer to Clinical Documentation Reviewer    PROVIDER RESPONSE TEXT:    Provider disagreed with this query.     Query created by: Erlinda Saeed on 5/10/2022 8:19 AM      Electronically signed by:  Argenis Escobar 5/10/2022 11:28 AM

## 2022-05-10 NOTE — CARE COORDINATION
Hospital follow-up set with Dr. Derick Cardoza ([phone]) set for 05/12/22 at 11:30 AM.  Gastroenterology follow-up set with Dr. Moisés Crockett ([phone]) for 07/01/22 at 10:30 AM.  Message left at office of Dr. Sourav Cleaning ([phone]) requesting follow-up. 3:05 PM  Concurrent review sent to [facility] via routed fax to [phone]. 3:15 PM  LSW approached by Dr. Marlena Vanessa asking if a call could be made to [employer]. Patient informed Dr. Marlena Vanessa that office had called, but was trying to schedule for 2 weeks post discharge. Dr. Marlena Vanessa would like patient seen sooner if possible. LSW called [employer] ([phone]) to see if this would be possible. LSW was informed that the schedule is very full currently, but they will see if they can get patient into the first available. Office is to call patient to set.

## 2022-05-10 NOTE — PROGRESS NOTES
Wan Lepe    : 1980  Acct #: [de-identified]  MRN: [id number]              PM&R Progress Note      Admitting diagnosis: Critical illness myopathy ( [place] [address])     Comorbid diagnoses impacting rehabilitation: Generalized weakness, gait disturbance, alcoholic hepatitis, uncontrolled pain, acute blood loss anemia, acute kidney injury with ATN, gluteal hematoma, alcohol abuse with recent withdrawal, depression with anxiety, essential hypertension    Chief complaint: No new dizziness, nausea or lightheadedness with the initiation of lisinopril therapy. Prior (baseline) level of function: Independent. Current level of function:         Current  IRF-EVA and Goals:   Occupational Therapy:    Short Term Goals  Time Frame for Short term goals: STGs=LTGs :   Long Term Goals  Time Frame for Long term goals : 7-10 days or until d/c. Long Term Goal 1: Pt will complete grooming tasks c Ind. Long Term Goal 2: Pt will complete total body bathing c Mod I and AE PRN. Long Term Goal 3: Pt will complete UB dressing c Ind. Long Term Goal 4: Pt will complete LB dressing c Mod I and AE PRN. Long Term Goal 5: Pt will doff/don footwear c Mod I and AE PRN. Additional Goals?: Yes  Long Term Goal 6: Pt will complete toileting c Mod I.  Long Term Goal 7: Pt will perform functional transfers (bed, chair, toilet, shower) c DME PRN and Mod I.  Long Term Goal 8: Pt will perform therex/therax to facilitate an increase in edurance/ax tolerance (c emphasis on dynamic standing balance/tolerance > 10 mins) c Mod I.  Long Term Goal 9: Pt will complete home management tasks c Mod I. :                                       Eating: Eating  Assistance Needed: Independent  Comment: Pt able to open all containers/packages  CARE Score: 6  Discharge Goal: Independent       Oral Hygiene: Oral Hygiene  Assistance Needed: Independent  Comment:  Mod I seated at sink  CARE Score: 6  Discharge Goal: Independent    UB/LB Bathing: Shower/Bathe Self  Assistance Needed: Independent  Comment: MOd I; Majority of shower in seated base; Pt able to use LHS for distal BLEs; intermittent unilateral use of grab bars in stance to bathe perineal area  CARE Score: 6  Discharge Goal: Independent    UB Dressing: Upper Body Dressing  Assistance Needed: Independent  Comment: Pt able to doff/don shirt independently  CARE Score: 6  Discharge Goal: Independent         LB Dressing: Lower Body Dressing  Assistance Needed: Independent  Comment: pt able to thread BLEs into underwear and pants using reacher; Mod I in stance  CARE Score: 6  Discharge Goal: Independent    Donning and [place] Footwear: Putting On/Taking Off Footwear  Assistance Needed: Partial/moderate assistance  Comment: Assist to don TEB hose on BLEs; Pt able to don L sock; pt able to use sock aid to don R sock  CARE Score: 3  Discharge Goal: Independent      Toiletin [address] needed: Independent  Comment: Ind in CM and BM hygiene  CARE Score: 6  Discharge Goal: Independent      Toilet Transfers:   Toilet Transfer  Assistance needed: Supervision or touching assistance  Comment: as per Tyrone palmer, pt required Supervision c use of grab bars  CARE Score: 4  Discharge Goal: Independent    Physical Therapy:         Long Term Goals  Time Frame for Long term goals : 7 days STG=LTG  Long term goal 1: pt will complete bed mobility with mod I  Long term goal 2: Pt will complete sit to stand, pivot and car transfers with mod I  Long term goal 3: Pt will ambulate 150' with 2ww on level surfaces and 10' on unlevel surfaces with mod I  Long term goal 4: Pt will ascend/descend curb step with 2ww and 12 steps with rail with mod I  Long term goal 5: Pt will retrieve light object from ground with 2ww and reacher with mod I      Bed Mobility:   Sit to Lying  Assistance Needed: Independent  Comment: no bed features  CARE Score: 6  Discharge Goal: Independent  Roll Left and Right  Assistance Needed: Independent  Comment: no bed features  CARE Score: 6  Discharge Goal: Independent  Lying to Sitting on Side of Bed  Assistance Needed: Independent  Comment: no bed features  CARE Score: 6  Discharge Goal: Independent    Transfers:    Sit to Stand  Assistance Needed: Independent  Comment: demonstrates safety and balance with transffer  CARE Score: 6  Discharge Goal: Independent  Chair/Bed-to-Chair Transfer  Assistance Needed: Independent  Comment: with RW, demonstrates safety and balance bed > WC using RW  CARE Score: 6  Discharge Goal: Independent  Toilet Transfer  Assistance needed: Supervision or touching assistance  CARE Score: 4  Car Transfer  Assistance Needed: Independent  Comment: demos safety and balance with RW to/from car seat, lifts LE's in/out of car without A  CARE Score: 6  Discharge Goal: Independent    Ambulation:    Walking Ability  Does the Patient Walk?: Yes     Walk 10 Feet  Assistance Needed: Supervision or touching assistance  Comment: SBA for balance with RW, mildly unsteady and cues for R LE awareness due to lack of sensation and coordination, tendency to hit back leg of RW due to excessive ER  CARE Score: 4  Discharge Goal: Independent     Walk 50 Feet with Two Turns  Assistance Needed: Supervision or touching assistance  Comment: SBA with RW, deficits as 10' walk  CARE Score: 4  Discharge Goal: Independent     Walk 150 Feet  Assistance Needed: Supervision or touching assistance  Comment: SBA with RW, deficits as 10' wak  CARE Score: 4  Discharge Goal: Independent     Walking 10 Feet on Uneven Surfaces  Assistance Needed: Supervision or touching assistance  Comment: SBA with RW for balance, one episode of catching R foot on obstacle but corrected balance  CARE Score: 4  Discharge Goal: Independent     1 Step (Curb)  Assistance Needed: Supervision or touching assistance  Comment: SBA with RW for balance x 2 attempts, one episode of catching R foot on edge of step on ascent but self corrected. CARE Score: 4  Discharge Goal: Independent     4 Steps  Assistance Needed: Supervision or touching assistance  Comment: SBA for balance with B rails, demos correct seq  CARE Score: 4  Discharge Goal: Independent     12 Steps  Assistance Needed: Supervision or touching assistance  Comment: SBA for balance with B rails, demos correct sequencing  CARE Score: 4  Discharge Goal: Independent       Wheelchair:  w/c Ability: Wheelchair Ability  Uses a Wheelchair and/or Scooter?: No  Wheel 50 Feet with Two Turns  Assistance Needed: Independent  Comment: B UEs  CARE Score: 6  Wheel 150 Feet  Assistance Needed: Independent  Comment: B UEs  CARE Score: 6          Balance:        Object: Picking Up Object  Assistance Needed: Independent  Comment: demos safety at [employer] with use of reacher  CARE Score: 6  Discharge Goal: Independent    I      Exam:    Blood pressure 125/78, pulse 84, temperature 98.2 °F (36.8 °C), temperature source Oral, resp. rate 14, height 6' 4\" (1.93 m), weight 233 lb 11 oz (106 kg), SpO2 96 %. General: Semirecumbent in bed. Alert and talkative. Fair-insight and reasoning. HEENT: Gazing right and left. MMM. Speech clear. Neck supple. Pulmonary: No wheezes, rales or rhonchi. Cardiac: Regular rate and rhythm. Abdomen: Patient's abdomen is soft and nondistended. Bowel sounds were present throughout. There was no rebound, guarding or masses noted. Upper extremities: Able to bring both hands together in the midline. Fair dexterity and coordination. No new bruising. Lower extremities: Swelling throughout the right lower limb. Mature bruising in the posterior and inner right thigh. Heels are clear. Give way with MMT across right knee and ankle. Sitting balance was good.   Standing balance was fair-.    Lab Results   Component Value Date    WBC 14.1 (H) 05/02/2022    HGB 9.2 (L) 05/02/2022    HCT 30.1 (L) 05/02/2022    .1 (H) 05/02/2022     05/02/2022     Lab Results   Component Value Date    INR 1.18 04/26/2022    INR 1.10 04/22/2022    INR 1.08 04/21/2022    PROTIME 15.2 (H) 04/26/2022    PROTIME 14.2 04/22/2022    PROTIME 14.0 04/21/2022     Lab Results   Component Value Date    CREATININE 0.5 (L) 05/02/2022    BUN 11 05/02/2022     05/02/2022    K 3.7 05/02/2022    CL 99 05/02/2022    CO2 27 05/02/2022     Lab Results   Component Value Date     (H) 05/02/2022     (H) 05/02/2022    ALKPHOS 199 (H) 05/02/2022    BILITOT 8.6 (H) 05/02/2022       Expected length of stay  prior to a supervised level of function for discharge home with a walker and [facility] OT/PT is 5/11/2022. Recommendations:    1. Critical illness myopathy with gait disturbance: Transitioning to outpatient therapies tomorrow after discharge. Cautiously managing his pain while providing nutritional support.  Currently no obvious complications of alcohol withdrawal.  Monitoring his renal function, treating his blood pressure and providing DVT prophylaxis.  Verbal cues and CGA needed for transfers again today. 2. DVT prophylaxis: Lovenox 30 mg SQ twice daily has been stopped as he is walking distances or protection enough now.  I must monitor his hemoglobin and platelet count while on this medication.  Weightbearing activities are slowly improving daily.  GI prophylaxis is available.  No local signs of blood loss.     3.  Alcoholic hepatitis: The GI consultant has continued prednisolone 40 mg daily for 1 month.  Tolerating the Xifaxan.  Encouraging consistent oral nutrition and hydration.  Abstinence from alcohol with community resources available.  From the gastroenterologist:     1) severe alcoholic hepatitis with probable superimposed medication hepatotoxicity- improving                   Plan:              9) B gave him a schedule for the prednisolone:              -80 MG/D X 4 WEEKS              -30 MG/D X 1 WEEK              -42 MG/D X 1 WEEK              -28 MG/D X 1 WEEK              -5 MG/D X 1 WEEK THEN STOP              2) suggested follow up visit in my office in 1-2 months        Outpatient follow-up with gastroenterology.     4. Acute kidney injury with ATN: Periodic monitoring of his chemistries.  Encouraging oral hydration.  Avoiding nephrotoxic medications when possible.  Renal function stabilizing. 5. Gluteal hematoma: Roxicodone and Robaxin. Robaxin dose will be decreased as he is discharged.  Avoiding acetaminophen.  Cautious DVT prophylaxis. No current signs of further bleeding into the traumatized area.  Limb elevation and cryotherapy.  Progressive mobilization.  No need for the use of low-dose gabapentin. 6. Alcohol abuse with withdrawal: Treating him in a calm and consistent environment.  Encouraging alcohol abstinence. He declines any outpatient resources for drinking counseling. 7. Hypertension: Diastolic blood pressure had been trending up. Lisinopril was started and after 2 doses his numbers look much better.  Vital signs are checked at rest and with activity.  Target systolic blood pressure is 120-140.  Outpatient follow-up with his PCP.

## 2022-05-11 ENCOUNTER — TELEPHONE (OUTPATIENT)
Dept: GASTROENTEROLOGY | Age: 42
End: 2022-05-11

## 2022-05-11 VITALS
WEIGHT: 232.59 LBS | DIASTOLIC BLOOD PRESSURE: 81 MMHG | SYSTOLIC BLOOD PRESSURE: 138 MMHG | TEMPERATURE: 98.1 F | OXYGEN SATURATION: 97 % | HEART RATE: 90 BPM | BODY MASS INDEX: 28.32 KG/M2 | RESPIRATION RATE: 18 BRPM | HEIGHT: 76 IN

## 2022-05-11 PROCEDURE — 6370000000 HC RX 637 (ALT 250 FOR IP): Performed by: PHYSICAL MEDICINE & REHABILITATION

## 2022-05-11 PROCEDURE — 6370000000 HC RX 637 (ALT 250 FOR IP): Performed by: INTERNAL MEDICINE

## 2022-05-11 PROCEDURE — 99239 HOSP IP/OBS DSCHRG MGMT >30: CPT | Performed by: PHYSICAL MEDICINE & REHABILITATION

## 2022-05-11 PROCEDURE — 94761 N-INVAS EAR/PLS OXIMETRY MLT: CPT

## 2022-05-11 PROCEDURE — 94150 VITAL CAPACITY TEST: CPT

## 2022-05-11 RX ORDER — LACTULOSE 10 G/15ML
20 SOLUTION ORAL 2 TIMES DAILY
Qty: 1800 ML | Refills: 3 | Status: SHIPPED | OUTPATIENT
Start: 2022-05-11 | End: 2022-05-12

## 2022-05-11 RX ADMIN — RIFAXIMIN 550 MG: 550 TABLET ORAL at 08:23

## 2022-05-11 RX ADMIN — LISINOPRIL 10 MG: 5 TABLET ORAL at 08:23

## 2022-05-11 RX ADMIN — METHOCARBAMOL TABLETS 750 MG: 750 TABLET, COATED ORAL at 08:23

## 2022-05-11 RX ADMIN — ATENOLOL 50 MG: 25 TABLET ORAL at 08:23

## 2022-05-11 RX ADMIN — OXYCODONE HYDROCHLORIDE 5 MG: 5 TABLET ORAL at 04:25

## 2022-05-11 RX ADMIN — Medication 40 MG: at 08:23

## 2022-05-11 ASSESSMENT — PAIN SCALES - GENERAL
PAINLEVEL_OUTOF10: 6
PAINLEVEL_OUTOF10: 7
PAINLEVEL_OUTOF10: 4
PAINLEVEL_OUTOF10: 6

## 2022-05-11 ASSESSMENT — PAIN DESCRIPTION - PAIN TYPE: TYPE: ACUTE PAIN

## 2022-05-11 ASSESSMENT — PAIN DESCRIPTION - ORIENTATION
ORIENTATION: RIGHT
ORIENTATION: RIGHT

## 2022-05-11 ASSESSMENT — PAIN DESCRIPTION - LOCATION
LOCATION: LEG
LOCATION: LEG

## 2022-05-11 ASSESSMENT — PAIN DESCRIPTION - DESCRIPTORS
DESCRIPTORS: SHARP;NUMBNESS
DESCRIPTORS: SHARP

## 2022-05-11 ASSESSMENT — PAIN SCALES - WONG BAKER
WONGBAKER_NUMERICALRESPONSE: 0
WONGBAKER_NUMERICALRESPONSE: 0

## 2022-05-11 ASSESSMENT — PAIN - FUNCTIONAL ASSESSMENT: PAIN_FUNCTIONAL_ASSESSMENT: ACTIVITIES ARE NOT PREVENTED

## 2022-05-11 ASSESSMENT — PAIN DESCRIPTION - FREQUENCY: FREQUENCY: CONTINUOUS

## 2022-05-11 ASSESSMENT — PAIN DESCRIPTION - ONSET: ONSET: ON-GOING

## 2022-05-11 NOTE — TELEPHONE ENCOUNTER
Spoke to wife-- was discharged on Xifaxan but has not been on lactulose--- Xifaxan was $2000 at the pharmacy and will not be able to get pre-auth unless refractory to lactulose  I therefore ordered lactulose 30 cc BIDand will see how he does

## 2022-05-11 NOTE — DISCHARGE SUMMARY
Patient Name: Amarilys Pugh  Patient :  1980  Patient MRN:   [id number]      Admission Date:  2022  Discharge Date: 2022    Admitting diagnosis: Critical illness myopathy ( [place] [address])     Comorbid diagnoses impacting rehabilitation: Generalized weakness, gait disturbance, alcoholic hepatitis, uncontrolled pain, acute blood loss anemia, acute kidney injury with ATN, gluteal hematoma, alcohol abuse with recent withdrawal, depression with anxiety, essential hypertension    Discharging diagnosis: Critical illness myopathy ( [place] [address]8)     Comorbid diagnoses impacting rehabilitation: Generalized weakness, gait disturbance, alcoholic hepatitis, uncontrolled pain, acute blood loss anemia, acute kidney injury with ATN, gluteal hematoma, alcohol abuse with recent withdrawal, depression with anxiety, essential hypertension     History of present illness: The patient is a 22-UKRH-JACE alcoholic male who presented to our ED on 2022 after suffering a fall at home. The etiology was unclear but he had significant pain and trauma to his right buttock and thigh. He was found to have a large soft tissue hematoma in the right buttock with swelling extended down into the thigh. He had elevated transaminases and poorly controlled pain. He went through alcohol withdrawal and was found to have alcoholic hepatitis. Gastroenterology placed him on prednisolone for 1 month. He had fluctuating blood pressures. Prior (baseline) level of function: Independent. Current level of function:         Current  IRF-EVA and Goals:   Occupational Therapy:    Short Term Goals  Time Frame for Short term goals: STGs=LTGs :   Long Term Goals  Time Frame for Long term goals : 7-10 days or until d/c. Long Term Goal 1: Pt will complete grooming tasks c Ind. Long Term Goal 2: Pt will complete total body bathing c Mod I and AE PRN. Long Term Goal 3: Pt will complete UB dressing c Ind.   Long Term Goal 4: Pt will complete LB dressing c Mod I and AE PRN. Long Term Goal 5: Pt will doff/don footwear c Mod I and AE PRN. Additional Goals?: Yes  Long Term Goal 6: Pt will complete toileting c Mod I.  Long Term Goal 7: Pt will perform functional transfers (bed, chair, toilet, shower) c DME PRN and Mod I.  Long Term Goal 8: Pt will perform therex/therax to facilitate an increase in edurance/ax tolerance (c emphasis on dynamic standing balance/tolerance > 10 mins) c Mod I.  Long Term Goal 9: Pt will complete home management tasks c Mod I. :                                       Eating: Eating  Assistance Needed: Independent  Comment: Pt able to open all containers/packages  CARE Score: 6  Discharge Goal: Independent       Oral Hygiene: Oral Hygiene  Assistance Needed: Independent  Comment: Mod I seated at sink  CARE Score: 6  Discharge Goal: Independent    UB/LB Bathing: Shower/Bathe Self  Assistance Needed: Independent  Comment: MOd I; Majority of shower in seated base; Pt able to use LHS for distal BLEs; intermittent unilateral use of grab bars in stance to bathe perineal area  CARE Score: 6  Discharge Goal: Independent    UB Dressing: Upper Body Dressing  Assistance Needed: Independent  Comment: Pt able to doff/don shirt independently  CARE Score: 6  Discharge Goal: Independent         LB Dressing: Lower Body Dressing  Assistance Needed: Independent  Comment: pt able to thread BLEs into underwear and pants using reacher; Mod I in stance  CARE Score: 6  Discharge Goal: Independent    Donning and [place] Footwear: Putting On/Taking Off Footwear  Assistance Needed: Partial/moderate assistance  Comment: Assist to don TEB hose on BLEs; Pt able to don L sock; pt able to use sock aid to don R sock  CARE Score: 3  Discharge Goal: Independent      Toiletin [address] needed: Independent  Comment: Ind in CM and BM hygiene  CARE Score: 6  Discharge Goal: Independent      Toilet Transfers:   Toilet Transfer  Assistance needed: Supervision or touching assistance  Comment: as per nsg Madhuri tomas, pt required Supervision c use of grab bars  CARE Score: 4  Discharge Goal: Independent    Physical Therapy:         Long Term Goals  Time Frame for Long term goals : 7 days STG=LTG  Long term goal 1: pt will complete bed mobility with mod I  Long term goal 2: Pt will complete sit to stand, pivot and car transfers with mod I  Long term goal 3: Pt will ambulate 150' with 2ww on level surfaces and 10' on unlevel surfaces with mod I  Long term goal 4: Pt will ascend/descend curb step with 2ww and 12 steps with rail with mod I  Long term goal 5: Pt will retrieve light object from ground with 2ww and reacher with mod I      Bed Mobility:   Sit to Lying  Assistance Needed: Independent  Comment: no bed features  CARE Score: 6  Discharge Goal: Independent  Roll Left and Right  Assistance Needed: Independent  Comment: no bed features  CARE Score: 6  Discharge Goal: Independent  Lying to Sitting on Side of Bed  Assistance Needed: Independent  Comment: no bed features  CARE Score: 6  Discharge Goal: Independent    Transfers:    Sit to Stand  Assistance Needed: Independent  Comment: demonstrates safety and balance with transffer  CARE Score: 6  Discharge Goal: Independent  Chair/Bed-to-Chair Transfer  Assistance Needed: Independent  Comment: with RW, demonstrates safety and balance bed > WC using RW  CARE Score: 6  Discharge Goal: Independent  Toilet Transfer  Assistance needed: Supervision or touching assistance  CARE Score: 4  Car Transfer  Assistance Needed: Independent  Comment: demos safety and balance with RW to/from car seat, lifts LE's in/out of car without A  CARE Score: 6  Discharge Goal: Independent    Ambulation:    Walking Ability  Does the Patient Walk?: Yes     Walk 10 Feet  Assistance Needed: Supervision or touching assistance  Comment: SBA for balance with RW, mildly unsteady and cues for R LE awareness due to lack of sensation and coordination, tendency to hit back leg of RW due to excessive ER  CARE Score: 4  Discharge Goal: Independent     Walk 50 Feet with Two Turns  Assistance Needed: Supervision or touching assistance  Comment: SBA with RW, deficits as 10' walk  CARE Score: 4  Discharge Goal: Independent     Walk 150 Feet  Assistance Needed: Supervision or touching assistance  Comment: SBA with RW, deficits as 10' wak  CARE Score: 4  Discharge Goal: Independent     Walking 10 Feet on Uneven Surfaces  Assistance Needed: Supervision or touching assistance  Comment: SBA with RW for balance, one episode of catching R foot on obstacle but corrected balance  CARE Score: 4  Discharge Goal: Independent     1 Step (Curb)  Assistance Needed: Supervision or touching assistance  Comment: SBA with RW for balance x 2 attempts, one episode of catching R foot on edge of step on ascent but self corrected. CARE Score: 4  Discharge Goal: Independent     4 Steps  Assistance Needed: Supervision or touching assistance  Comment: SBA for balance with B rails, demos correct seq  CARE Score: 4  Discharge Goal: Independent     12 Steps  Assistance Needed: Supervision or touching assistance  Comment: SBA for balance with B rails, demos correct sequencing  CARE Score: 4  Discharge Goal: Independent       Wheelchair:  w/c Ability: Wheelchair Ability  Uses a Wheelchair and/or Scooter?: No  Wheel 50 Feet with Two Turns  Assistance Needed: Independent  Comment: B UEs  CARE Score: 6  Wheel 150 Feet  Assistance Needed: Independent  Comment: B UEs  CARE Score: 6          Balance:        Object: Picking Up Object  Assistance Needed: Independent  Comment: demos safety at [address] [place] [address] with use of reacher  CARE Score: 6  Discharge Goal: Independent    I      Exam:    Blood pressure 138/81, pulse 90, temperature 98.1 °F (36.7 °C), temperature source Oral, resp. rate 18, height 6' 4\" (1.93 m), weight 232 lb 9.4 oz (105.5 kg), SpO2 97 %. General: Sitting up in bed.   Oriented x3.  In no distress. Fair-insight and reasoning. HEENT: No adenopathy. Gazing right and left. MMM. Speech clear. Neck supple. Pulmonary: Clear without wheezes, rales or rhonchi. Cardiac: Regular rate and rhythm. Abdomen: Patient's abdomen is soft and nondistended. Bowel sounds were present throughout. There was no rebound, guarding or masses noted. Upper extremities: Able to bring both hands together in the midline. Fair dexterity and coordination. No swelling or discoloration. Lower extremities: Softer swelling throughout the right lower limb. Mature bruising in the posterior and inner right thigh. Heels are clear. Give way with MMT across right knee and ankle. No signs of DVT. Sitting balance was good. Standing balance was fair-.    Lab Results   Component Value Date    WBC 14.1 (H) 05/02/2022    HGB 9.2 (L) 05/02/2022    HCT 30.1 (L) 05/02/2022    .1 (H) 05/02/2022     05/02/2022     Lab Results   Component Value Date    INR 1.18 04/26/2022    INR 1.10 04/22/2022    INR 1.08 04/21/2022    PROTIME 15.2 (H) 04/26/2022    PROTIME 14.2 04/22/2022    PROTIME 14.0 04/21/2022     Lab Results   Component Value Date    CREATININE 0.5 (L) 05/02/2022    BUN 11 05/02/2022     05/02/2022    K 3.7 05/02/2022    CL 99 05/02/2022    CO2 27 05/02/2022     Lab Results   Component Value Date     (H) 05/02/2022     (H) 05/02/2022    ALKPHOS 199 (H) 05/02/2022    BILITOT 8.6 (H) 05/02/2022           The patient presented to the ARU with the above history requiring a multidisciplinary treatment plan including close medical supervision by the El[facility] Johnson Director. The patient participated in the prescribed therapy treatment plan with reasonable compliance and progressive tolerance. They avoided significant medical complications.     By the time of discharge the patient had become safer with adaptive equipment to transfer and toilet with a FWW with the supervision of family/caregivers. The patient was tolerating an oral diet without choking/coughing and was back to their baseline with regards to bowel and bladder control. Discharge instructions were reviewed with the patient and family. The patient is to follow up with the PCP, gastroenterology and physical therapy in 1 week. No driving. He declined community resources regarding alcohol counseling. Medication List      START taking these medications    lisinopril 10 MG tablet  Commonly known as: PRINIVIL;ZESTRIL  Take 1 tablet by mouth daily     methocarbamol 500 MG tablet  Commonly known as: ROBAXIN  Take 1 tablet by mouth 3 times daily for 10 days     oxyCODONE 5 MG immediate release tablet  Commonly known as: ROXICODONE  Take 1 tablet by mouth every 8 hours as needed for Pain for up to 7 days. prednisoLONE 15 MG/5ML syrup  Commonly known as: PRELONE  Take 13.3 mLs by mouth daily     rifAXIMin 550 MG tablet  Commonly known as: XIFAXAN  Take 1 tablet by mouth 2 times daily        CONTINUE taking these medications    atenolol 50 MG tablet  Commonly known as: Tenormin  Take 1 tablet by mouth daily           Where to Get Your Medications      You can get these medications from any pharmacy    Bring a paper prescription for each of these medications  · lisinopril 10 MG tablet  · methocarbamol 500 MG tablet  · oxyCODONE 5 MG immediate release tablet  · prednisoLONE 15 MG/5ML syrup  · rifAXIMin 550 MG tablet         CONDITION ON DISCHARGE: Stable. The prognosis is fair for further improvements in ADL's and safety with adapted gait/transfers. Record review, patient exam, discharge instructions, medication reconciliation and summary for this discharge visit took more than 30 minutes.

## 2022-05-11 NOTE — CARE COORDINATION
RW and BSC picked up from [facility] and delivered to patient at this time. 11:30 AM  Patient follow-ups set. DME order sent to [facility] and RW and BSC have been delivered to patient room. Outpatient therapy order sent to [facility] for PT. Caregiver training was not recommended prior to discharge. Patient to discharge to home with wife. Patient's spouse to provide transport home and Nursing is aware. Patient is set for discharge from case management standpoint. 11:50 AM  Discharge information sent to [facility] via routed fax to [phone].

## 2022-05-11 NOTE — PROGRESS NOTES
Patient and wife verbalized understanding of discharge instructions. Handed instructions with paper prescriptions including one for Oxycodone. Pt is in possession of prescribed DME. Departed via wheelchair accompanied by wife.

## 2022-05-12 ENCOUNTER — OFFICE VISIT (OUTPATIENT)
Dept: INTERNAL MEDICINE CLINIC | Age: 42
End: 2022-05-12
Payer: COMMERCIAL

## 2022-05-12 ENCOUNTER — HOSPITAL ENCOUNTER (OUTPATIENT)
Dept: PHYSICAL THERAPY | Age: 42
Setting detail: THERAPIES SERIES
Discharge: HOME | End: 2022-05-12
Payer: COMMERCIAL

## 2022-05-12 ENCOUNTER — HOSPITAL ENCOUNTER (OUTPATIENT)
Age: 42
Discharge: HOME | End: 2022-05-12
Payer: COMMERCIAL

## 2022-05-12 VITALS
SYSTOLIC BLOOD PRESSURE: 126 MMHG | WEIGHT: 232 LBS | OXYGEN SATURATION: 95 % | HEIGHT: 76 IN | BODY MASS INDEX: 28.25 KG/M2 | DIASTOLIC BLOOD PRESSURE: 70 MMHG | RESPIRATION RATE: 16 BRPM | HEART RATE: 83 BPM

## 2022-05-12 DIAGNOSIS — G72.81 CRITICAL ILLNESS MYOPATHY: ICD-10-CM

## 2022-05-12 DIAGNOSIS — S30.0XXD TRAUMATIC HEMATOMA OF BUTTOCK, SUBSEQUENT ENCOUNTER: ICD-10-CM

## 2022-05-12 DIAGNOSIS — K70.11 ALCOHOLIC HEPATITIS WITH ASCITES: ICD-10-CM

## 2022-05-12 DIAGNOSIS — K70.11 ALCOHOLIC HEPATITIS WITH ASCITES: Primary | ICD-10-CM

## 2022-05-12 DIAGNOSIS — S30.0XXA TRAUMATIC HEMATOMA OF BUTTOCK, INITIAL ENCOUNTER: ICD-10-CM

## 2022-05-12 LAB
ALBUMIN SERPL-MCNC: 4 GM/DL (ref 3.4–5)
ALP BLD-CCNC: 142 IU/L (ref 40–128)
ALT SERPL-CCNC: 66 U/L (ref 10–40)
AMMONIA: 24 UMOL/L (ref 16–60)
ANION GAP SERPL CALCULATED.3IONS-SCNC: 11 MMOL/L (ref 4–16)
AST SERPL-CCNC: 41 IU/L (ref 15–37)
BASOPHILS ABSOLUTE: 0.1 K/CU MM
BASOPHILS RELATIVE PERCENT: 0.6 % (ref 0–1)
BILIRUB SERPL-MCNC: 3.1 MG/DL (ref 0–1)
BUN BLDV-MCNC: 14 MG/DL (ref 6–23)
CALCIUM SERPL-MCNC: 10.3 MG/DL (ref 8.3–10.6)
CHLORIDE BLD-SCNC: 100 MMOL/L (ref 99–110)
CO2: 28 MMOL/L (ref 21–32)
CREAT SERPL-MCNC: 0.7 MG/DL (ref 0.9–1.3)
DIFFERENTIAL TYPE: ABNORMAL
EOSINOPHILS ABSOLUTE: 0.2 K/CU MM
EOSINOPHILS RELATIVE PERCENT: 1.8 % (ref 0–3)
GFR AFRICAN AMERICAN: >60 ML/MIN/1.73M2
GFR NON-AFRICAN AMERICAN: >60 ML/MIN/1.73M2
GLUCOSE BLD-MCNC: 106 MG/DL (ref 70–99)
HCT VFR BLD CALC: 42.3 % (ref 42–52)
HEMOGLOBIN: 13 GM/DL (ref 13.5–18)
IMMATURE NEUTROPHIL %: 0.6 % (ref 0–0.43)
LYMPHOCYTES ABSOLUTE: 1.8 K/CU MM
LYMPHOCYTES RELATIVE PERCENT: 17.4 % (ref 24–44)
MCH RBC QN AUTO: 32.7 PG (ref 27–31)
MCHC RBC AUTO-ENTMCNC: 30.7 % (ref 32–36)
MCV RBC AUTO: 106.3 FL (ref 78–100)
MONOCYTES ABSOLUTE: 1.1 K/CU MM
MONOCYTES RELATIVE PERCENT: 10.5 % (ref 0–4)
NUCLEATED RBC %: 0 %
PDW BLD-RTO: 18.2 % (ref 11.7–14.9)
PLATELET # BLD: 399 K/CU MM (ref 140–440)
PMV BLD AUTO: 10.4 FL (ref 7.5–11.1)
POTASSIUM SERPL-SCNC: 4.6 MMOL/L (ref 3.5–5.1)
RBC # BLD: 3.98 M/CU MM (ref 4.6–6.2)
SEGMENTED NEUTROPHILS ABSOLUTE COUNT: 7.2 K/CU MM
SEGMENTED NEUTROPHILS RELATIVE PERCENT: 69.1 % (ref 36–66)
SODIUM BLD-SCNC: 139 MMOL/L (ref 135–145)
TOTAL IMMATURE NEUTOROPHIL: 0.06 K/CU MM
TOTAL NUCLEATED RBC: 0 K/CU MM
TOTAL PROTEIN: 5.8 GM/DL (ref 6.4–8.2)
WBC # BLD: 10.5 K/CU MM (ref 4–10.5)

## 2022-05-12 PROCEDURE — 97163 PT EVAL HIGH COMPLEX 45 MIN: CPT

## 2022-05-12 PROCEDURE — 82140 ASSAY OF AMMONIA: CPT

## 2022-05-12 PROCEDURE — 85025 COMPLETE CBC W/AUTO DIFF WBC: CPT

## 2022-05-12 PROCEDURE — 36415 COLL VENOUS BLD VENIPUNCTURE: CPT

## 2022-05-12 PROCEDURE — 97110 THERAPEUTIC EXERCISES: CPT

## 2022-05-12 PROCEDURE — 80053 COMPREHEN METABOLIC PANEL: CPT

## 2022-05-12 PROCEDURE — 99214 OFFICE O/P EST MOD 30 MIN: CPT | Performed by: INTERNAL MEDICINE

## 2022-05-12 RX ORDER — LACTULOSE 10 G/15ML
SOLUTION ORAL
OUTPATIENT
Start: 2022-05-12

## 2022-05-12 RX ORDER — PREDNISONE 20 MG/1
20 TABLET ORAL 2 TIMES DAILY
Qty: 60 TABLET | Refills: 1 | Status: SHIPPED | OUTPATIENT
Start: 2022-05-12 | End: 2022-06-11

## 2022-05-12 ASSESSMENT — PAIN SCALES - GENERAL: PAINLEVEL_OUTOF10: 5

## 2022-05-12 NOTE — PROGRESS NOTES
Elena Smith  1980 05/12/22    SUBJECTIVE:  Seen for f/u of alc hepatitis and also crit care myopathy, markedly elevated LFTs. Also w R leg hematoma. Dx summary as noted. 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  The etiology was unclear but he had significant pain and trauma to his right buttock and thigh.  He was found to have a large soft tissue hematoma in the right buttock with swelling extended down into the thigh.  He had elevated transaminases and poorly controlled pain.  He went through alcohol withdrawal and was found to have alcoholic hepatitis.  Gastroenterology placed him on prednisolone for 1 month.  He had fluctuating blood pressures.     --  --  Hospital dc 5/2:  Elena Smith is a 39 y.o.  male COVID-19 recovered, alcohol abuse who presented to the ED on 4/18/2027 account of poor energy, poor appetite, poor balance, and fall leading to Intramuscular hematoma  On arrival to the hospital, patient had abnormal LFTs and was admitted for impending alcohol withdrawal.  Per H&P,At presentation patient was noted to have BP 113/81, HR 84, RR 20, temp 97.8, saturating 98% on room air.  Lab work significant for sodium 131, CO2 18, anion gap 20, lactic acid 9.1, random glucose 189, albumin 2.6, , ALT 43, , total bilirubin 19, lipase 45, CK 37.  PT 15.6, INR 1.21.  Hepatitis panel negative.  CT abdomen/pelvis-hematoma measuring 11.1 x 4.2 x 16.7 cm between the right gluteus medius and naomi muscle.  Diffuse enlargement of the adductor muscles of the right lower extremity related to edema, severe diffuse fatty infiltration of the liver, gallbladder wall thickening.      Alcohol withdrawal and gait imbalance was most likely the etiology for fall. Patient was managed for alcohol withdrawal on Precedex gtt. This is now resolved. Patient was also managed for acute severe alcohol hepatitis with a high Madrey's discriminant function. T bili peaked at 22.4 and is trended down to 8.6 today on prednisolone 40 mg daily. He will continue this for at least 1 month. He will follow-up closely with GI. Patient had acute blood loss anemia in the right thigh, right gluteus medius and naomi as seen on CT. Other issues managed include high anion gap metabolic acidosis, traumatic rhabdomyolysis (CK peaked at 3119), hyperbilirubinemia, transaminasemia, uncontrolled hypertension, KESHA, critical illness myopathy. Due to critical illness myopathy, ongoing ambulatory dysfunction, PT/OT recommends acute rehab at discharge. MRI thoracolumbar spine ruled out cord compression or myelopathy. The patient/family expressed appropriate understanding of and agreement with the discharge recommendations, medications, and plan.    --  --  REMAINS ABSTINENT FR ALCOHOL. REMAINS WEAK W LEGS AND CRIT CARE NEUROPATHY. IS CONT W HOME PT.  IN WHEELCHAIR IN EXAM ROOM TODAY. They requested referral to alternative GI, Dr Lianne Rangel. HAS NUMBNESS OF R LEG BELOW KNEE.   THIGH SENSATION AND STRENGTH HAS IMPROVED    Fmla/SHORT TERM DISABILITY- FIRST DAY OFF WAS 2/6/22 LAST YR. THERAPIST STATES LIKELY CAN RETURN TO AMBULATION IN 6-8 WEEKS. SO [address] MID July. OBJECTIVE:    /70   Pulse 83   Resp 16   Ht 6' 4\" (1.93 m)   Wt 232 lb (105.2 kg)   SpO2 95%   BMI 28.24 kg/m²     Physical Exam  Vitals reviewed. Constitutional:       General: He is not in acute distress. Appearance: He is well-developed. HENT:      Head: Normocephalic and atraumatic. Eyes:      General: No scleral icterus. Right eye: No discharge. Left eye: No discharge. Conjunctiva/sclera: Conjunctivae normal.      Pupils: Pupils are equal, round, and reactive to light. Neck:      Thyroid: No thyromegaly. Vascular: No JVD. Trachea: No tracheal deviation. Cardiovascular:      Rate and Rhythm: Normal rate and regular rhythm. Heart sounds: Normal heart sounds. No murmur heard. No friction rub. No gallop. Pulmonary:      Effort: Pulmonary effort is normal. No respiratory distress. Breath sounds: Normal breath sounds. No wheezing or rales. Abdominal:      General: Bowel sounds are normal. There is no distension. Palpations: Abdomen is soft. There is no mass. Tenderness: There is no abdominal tenderness. There is no guarding or rebound. Musculoskeletal:         General: No tenderness. Cervical back: Normal range of motion and neck supple. Lymphadenopathy:      Cervical: No cervical adenopathy. Skin:     General: Skin is warm and dry. Neurological:      Mental Status: He is alert. Sensory: Sensory deficit (R lower leg w subj numbness) present. Motor: Weakness (bilat legs, in wheelchair) present. Psychiatric:         Mood and Affect: Mood normal.         ASSESSMENT:    1. Alcoholic hepatitis with ascites    2. Critical illness myopathy    3.  Traumatic hematoma of buttock, subsequent encounter        PLAN:    Aleksey Mcdowell was seen today for follow-up from hospital and discuss medications. Diagnoses and all orders for this visit:    Alcoholic hepatitis with ascites- abstinent fr alcohol, meds reviewed, f/u lab now and again in 1 month. For GI consultation and f/u as well. -     Comprehensive Metabolic Panel; Future  -     CBC with Auto Differential; Future  -     Amb External Referral To Gastroenterology  -     Comprehensive Metabolic Panel; Future  -     CBC with Auto Differential; Future  -     Ammonia; Future  -     Ammonia; Future    Critical illness myopathy- cont PT, pt states was told by therapist should have good ambulation established in 6-8 weeks so the temporary disability will be tentatively till mid July 7/18- which is Monday, RTW.  w his leg weakness and R lower leg numbness, may need further assessment for prognosis w neurology and will refer to [address] [facility]  -     Comprehensive Metabolic Panel; Future  -     CBC with Auto Differential; Future  -     Ruthann Cordova MD, Neurology, [facility]  -     Comprehensive Metabolic Panel; Future  -     CBC with Auto Differential; Future  -     Ammonia; Future    Traumatic hematoma of buttock, subsequent encounter- resolving and also f/U CBC  -     Comprehensive Metabolic Panel; Future  -     CBC with Auto Differential; Future  -     Comprehensive Metabolic Panel; Future  -     CBC with Auto Differential; Future    Other orders- we called pharmacy for switch to less expensive oral steroid and they rec instead of methylprednisolone syrup can do equivalent dose prednisone 40mg. -- we'll then consider tapering at f/u appt one month  -     predniSONE (DELTASONE) 20 MG tablet; Take 1 tablet by mouth 2 times daily    JAMES CALLED BACK 5/13 REQ RF OF ROBAXIN AND ALSO OXYCODONE FOR HEMATOMA AND LEG PAIN.   WE WILL TAPER PAIN MEDS FR TID TO BID W TWO WEEKS GIVEN #30 AND 90NR OF ROBAXIN

## 2022-05-12 NOTE — PROGRESS NOTES
Physical Therapy: Initial Evaluation    Patient: Kirt Schilling (32 y.o. male)   Examination Date:   Plan of Care Certification Period: 2022 to        :  1980 ;    Confirmed: Yes MRN: [id number]  CSN: [id number]   Insurance: Payor: Leatha Angelo / Plan: [employer] PPO / Product Type: *No Product type* /   Insurance ID: [id number] - (Commercial) Secondary Insurance (if applicable):    Referring Physician: Shola Goins MD     PCP: Jennifer Orantes MD Visits to Date/Visits Approved:   /      No Show/Cancelled Appts:   /       Medical Diagnosis: Critical illness myopathy [G72.81] Debility, critical illness, myopathy, unsteady gait. Treatment Diagnosis: debility, weakness, gait disturbance, decreased balance. PERTINENT MEDICAL HISTORY           Medical History:     Past Medical History:   Diagnosis Date    Acute hepatitis 2022    severe nausea w weakness, elevated LFTs noted in ED 2022- worsened also fr nsaids and alcohol consumption    Allergic rhinitis     Ankle pain     INACTIVE PROBLEM    Anxiety     COVID-19 2022    Epigastric abdominal pain     INACTIVE PROBLEM    Essential hypertension 2018    Family history of coronary artery disease     Herniated lumbar intervertebral disc     Insomnia     LBP (low back pain)     Marfan's syndrome with ocular manifestation     DX'D BY OPTOM DR CANTU AT 6Y0    Oral herpes      Surgical History:   Past Surgical History:   Procedure Laterality Date    BACK SURGERY      herniated ruptured disc     TONSILLECTOMY      UMBILICAL HERNIA REPAIR N/A 2020    OPEN HERNIA UMBILICAL REPAIR performed by Nigel Watson [facility] at [facility]       Medications:   Current Outpatient Medications:     lactulose (CHRONULAC) 10 GM/15ML solution, Take 30 mLs by mouth 2 times daily, Disp: 1800 mL, Rfl: 3    oxyCODONE (ROXICODONE) 5 MG immediate release tablet, Take 1 tablet by mouth every 8 hours as needed for Pain for up to 7 days. , Disp: 15 tablet, Rfl: 0    lisinopril (PRINIVIL;ZESTRIL) 10 MG tablet, Take 1 tablet by mouth daily, Disp: 30 tablet, Rfl: 0    prednisoLONE (PRELONE) 15 MG/5ML syrup, Take 13.3 mLs by mouth daily, Disp: 400 mL, Rfl: 0    rifAXIMin (XIFAXAN) 550 MG tablet, Take 1 tablet by mouth 2 times daily, Disp: 60 tablet, Rfl: 0    methocarbamol (ROBAXIN) 500 MG tablet, Take 1 tablet by mouth 3 times daily for 10 days, Disp: 30 tablet, Rfl: 0    atenolol (TENORMIN) 50 MG tablet, Take 1 tablet by mouth daily, Disp: 90 tablet, Rfl: 1  Allergies: Amlodipine      SUBJECTIVE EXAMINATION      ,           Subjective History:    Subjective: R hip pain, tightness. Additional Pertinent Hx (if applicable): Hospitalized since Easter Sunday. 5/11/22 d/c from in rehab. History of L5S1 surgery. Prior diagnostic testing[de-identified] MRI      Learning/Language: Learning  Does the patient/guardian have any barriers to learning?: No barriers  Will there be a co-learner?: No  Is an  required?: No  How does the patient/guardian prefer to learn new concepts?: Listening,Reading,Demonstration,Pictures/Videos     Pain Screening   Pain Screening  Patient Currently in Pain: Yes  Pain Assessment: 0-10  Pain Level: 5    Functional Status         Social History:  Social History  Lives With: Spouse  Type of Home: House  Home Layout: Two level    Occupation/Interests:  Type of Occupation: off work currently. Works in [employer]    Prior Level of Function:  independent, hx of long covid withsome dizziness and lethargy. Working in [employer] - some ladder climbing, working in new construction. Current Level of Function:  limited walking, uses walker, w/c for distances.       Receives Help From: Family  ADL Assistance: Independent  Homemaking Assistance: Independent  Homemaking Responsibilities: Yes  Ambulation Assistance: Independent  Transfer Assistance: Independent  Active : Yes (not  currenlty)    OBJECTIVE EXAMINATION   Restrictions: Review of Systems:  Vision: Within Functional Limits  Hearing: Within functional limits  Overall Orientation Status: Within Functional Limits  Observations:  General Observations  Description: silas cowan thigh high on R, knee high on L.  in w/c on arrival.    Ambulation/Gait (if applicable):  Ambulation  Surface: level tile,carpet  Device: Rolling Walker  Assistance: Independent  Quality of Gait: toe drag and steppage gait w R. Balance Screen:  Balance  Tandem Stance R Leg: unable  Tandem Stance L Leg: unable  Single Stance R Leg: unable    Left AROM  Right AROM                    Left PROM  Right PROM       limited hip IR     limited SLR relative to L. Limited hip IR        Left Strength  Right Strength               0/5 df, EV, INv, PF  4/5 hip flex, 4+ knee ext, knee flex 3-      ASSESSMENT     Impression: Assessment: Pt presents to PT with complaints of hip tightness, pain and LE weakness following a fall and prolonged hospitalization. He was discharged 5/11/22 and presents to outpatient therapy today. He has loss of motor function of his foot and ankle, loss of sensation at leg, altered sensations at R thigh, weakness at RLE. Gait is with FWW, toe drag and steppage pattern. Statement of Medical Necessity: Physical Therapy is both indicated and medically necessary as outlined in the POC to increase the likelihood of meeting the functionally related goals stated below. Patient's Activity Tolerance:        Patient's rehabilitation potential/prognosis is considered to be:  good    Factors which may impact rehabilitation potential include:          GOALS   Patient Goal(s): return to work, stand/walk without  Short Term Goals Completed by   Goal Status   consult for AFO     demo trace muscle activity or better at foot/ankle. complete 6 min walk test                     Long Term Goals Completed by 6 weeks Goal Status   I in home program.     pain 0-2 with transfers, bed mobility, gait. amb 30mins with approp assistive device. up/down steps with reciprocal pattern                TREATMENT PLAN            Pt. actively involved in establishing Plan of Care and Goals: Yes        Frequency / Duration:  Patient to be seen2   for6   weeks      Eval Complexity:    Decision Making: High Complexity     Therapist Signature: Raheel Barron, PT    Date: 2/33/3437     I certify that the above Therapy Services are being furnished while the patient is under my care. I agree with the treatment plan and certify that this therapy is necessary. Physician's Signature:  ___________________________   Date:_______                                                                   Misael Springer MD        Physician Comments: _______________________________________________    Please sign and return to   [facility]. Please fax to the location listed below.  Gerald Wall for this referral!    [address], [address] [address]  Dept: [phone]  Loc: [phone]

## 2022-05-12 NOTE — PLAN OF CARE
Outpatient Physical Therapy                  [x] Phone: [phone]   Fax: [phone]    Pediatric Therapy                                    [] Phone: [phone]   Fax: [phone]  Pediatric [facility]                                      [] Phone: [phone]   Fax: [phone]      To:  Dr. Romaine Avalos    From: Randy Villa, PT, PT     Patient: Gabi Daniel       : 1980  Diagnosis: Debility, critical illness, myopathy, unsteady gait. Treatment Diagnosis: debility, weakness, gait disturbance, decreased balance. Date: 2022    Physical Therapy Certification/Re-Certification Form  Dear Dr. Romaine Avalos  The following patient has been evaluated for physical therapy services and for therapy to continue, Please review the attached evaluation and/or summary of the patient's plan of care, and verify that you agree therapy should continue by signing the attached document and sending it back to our office. Patient is a  40 yo male who presents with RLE weakness, pain, altered sensation which impacts on adls;patient's goal is to walk without assistive device, regain strength, motion, balance ;patient reports that stiffness, weakness  limits activities including standing, walking, working, driving; PT to address patient's goals, impairments and activity limitations with skilled interventions checked in plan of care;patient's level of function prior to onset of symptoms was independent; did not observe any barriers to learning during PT eval; learning preferences include demonstration, practice, and handouts; patient expressed understanding of HEP; patient appears to be motivated to participate in an active PT program and to be compliant with HEP expectations;patient assisted in developing treatment plan and goals; no DME is currently being used;          Assessment:  Assessment: Pt presents to PT with complaints of hip tightness, pain and LE weakness following a fall and prolonged hospitalization. He was discharged 5/11/22 and presents to outpatient therapy today. He has loss of motor function of his foot and ankle, loss of sensation at leg, altered sensations at R thigh, weakness at RLE. Gait is with FWW, toe drag and steppage pattern. Plan of Care/Treatment to date:  [x] Therapeutic Exercise    [] Aquatics:  [x] Therapeutic Activity    [] Ultrasound  [x] Elec Stimulation  [x] Gait Training     [] Cervical Traction [] Lumbar Traction  [x] Neuromuscular Re-education [] Cold/hotpack [] Iontophoresis   [x] Instruction in HEP       [x] Manual Therapy     [] vasopneumatic            [] Self care home management        []Dry needling trigger point point/pain management          Frequency/Duration:  # Days per week: [] 1 day # Weeks: [] 1 week [] 5 weeks     [x] 2 days   [] 2 weeks [x] 6 weeks     [] 3 days   [] 3 weeks [] 7 weeks     [] 4 days   [] 4 weeks [] 8 weeks    Rehab Potential/Progress: [] Excellent [x] Good [x] Fair  [] Poor     Goals:    Short Term Goals  Short term goal 1: consult for AFO  Short term goal 2: demo trace muscle activity or better at foot/ankle. Short term goal 3: complete 6 min walk test  Long Term Goals  Time Frame for Long term goals : 6 weeks  Long term goal 1: I in home program.  Long term goal 2: pain 0-2 with transfers, bed mobility, gait. Long term goal 3: amb 30mins with approp assistive device. Long term goal 4: up/down steps with reciprocal pattern     Electronically signed by:  Gregg Romo PT,5/12/2022, 11:49 AM              If you have any questions or concerns, please don't hesitate to call.   Thank you for your referral.      Physician Signature:_________________Date:____________Time: ________  By signing above, therapists plan is approved by physician

## 2022-05-12 NOTE — FLOWSHEET NOTE
Outpatient Physical Therapy  [place]           [x] Phone: [phone]   Fax: [phone]  [place]           [] Phone: [phone]   Fax: [phone]        Physical Therapy Daily Treatment Note  Date:  2022    Patient Name:  Rekha Galeas    :  1980  MRN: [id number]  Restrictions/Precautions: Restrictions/Precautions: Fall Risk; Contact Precautions (Hep A positive)        Diagnosis:   Critical illness myopathy [G72.81] Diagnosis: Debility, critical illness, myopathy, unsteady gait. Date of Injury/Surgery:   Treatment Diagnosis:  debility, weakness, gait disturbance, decreased balance. Insurance/Certification information: formerly [facility]  Referring Physician:  Herb Lopez MD     PCP: Italo Cedillo MD  Next Doctor Visit:    Plan of care signed (Y/N):  n  Outcome Measure:   Visit# / total visits:   Pain level: 5/10   Goals:     Patient goals: return to work, stand/walk without  Short term goals  Time Frame for Short term goals:    consult for AFO  demo trace muscle activity or better at foot/ankle. complete 6 min walk test        Long Term Goals  Time Frame for Long Term Goals: 6 weeks  I in home program.  pain 0-2 with transfers, bed mobility, gait. amb 30mins with approp assistive device. up/down steps with reciprocal pattern         Summary of Evaluation:  Assessment: Pt presents to PT with complaints of hip tightness, pain and LE weakness following a fall and prolonged hospitalization. He was discharged 22 and presents to outpatient therapy today. He has loss of motor function of his foot and ankle, loss of sensation at leg, altered sensations at R thigh, weakness at RLE. Gait is with FWW, toe drag and steppage pattern. Subjective:  See eval         Any changes in Ambulatory Summary Sheet?   None        Objective:  See eval   COVID screening questions were asked and patient attested that there had been no contact or symptoms        Exercises: (No more than 4 columns)   Exercise/Equipment Date 5/12/22 Date Date           WARM UP         Nu step      bike      TABLE      Hip stretching Gluteals/piriformis fig 4 and knee to opp shld     bridge x10     SLR x10                    STANDING      Sit/stands      Hip abd, extension      Step taps      Side stepping                             PROPRIOCEPTION      Wt shifting, lat, ant/post                              MODALITIES      E stim                Other Therapeutic Activities/Education: To talk w doctor about AFO/consult      Home Exercise Program:    Access Code: [email or web address]  URL: [email or web address]  Date: 05/12/2022  Prepared by: Lorraine Luna    Exercises  Supine Bridge - 1 x daily - 7 x weekly - 3 sets - 10 reps  Supine Figure 4 Piriformis Stretch - 1 x daily - 7 x weekly - 1 sets - 2 reps - 45 hold  Supine Piriformis Stretch with Leg Straight - 1 x daily - 7 x weekly - 1 sets - 2 reps - 45 hold  Active Straight Leg Raise with Quad Set - 1 x daily - 7 x weekly - 3 sets - 10 reps  Supine Ankle Pumps - 1 x daily - 7 x weekly - 3 sets - 10 reps  Supine Ankle Inversion Eversion AROM - 1 x daily - 7 x weekly - 3 sets - 10 reps      Manual Treatments:        Modalities:        Communication with other providers:        Assessment:  (Response towards treatment session) (Pain Rating)  Assessment: Pt presents to PT with complaints of hip tightness, pain and LE weakness following a fall and prolonged hospitalization. He was discharged 5/11/22 and presents to outpatient therapy today. He has loss of motor function of his foot and ankle, loss of sensation at leg, altered sensations at R thigh, weakness at RLE. Gait is with FWW, toe drag and steppage pattern.       Plan for Next Session:        Time In / Time Out:   1399/9781        Timed Code/Total Treatment Minutes:   15/55      Next Progress Note due:        Plan of Care Interventions:  [x] Therapeutic Exercise  [] Modalities:  [x] Therapeutic Activity     [] Ultrasound  [x] Estim  [x] Gait Training      [] Cervical Traction [] Lumbar Traction  [x] Neuromuscular Re-education    [] Cold/hotpack [] Iontophoresis   [x] Instruction in HEP      [] Vasopneumatic   [] Dry Needling    [x] Manual Therapy               [] Aquatic Therapy              Electronically signed by:  Renaldo Pina PT, 5/12/2022, 11:42 AM

## 2022-05-13 RX ORDER — OXYCODONE HYDROCHLORIDE 5 MG/1
5 TABLET ORAL 2 TIMES DAILY PRN
Qty: 30 TABLET | Refills: 0 | Status: SHIPPED | OUTPATIENT
Start: 2022-05-13 | End: 2022-05-18

## 2022-05-13 RX ORDER — METHOCARBAMOL 500 MG/1
500 TABLET, FILM COATED ORAL 3 TIMES DAILY
Qty: 90 TABLET | Refills: 0 | Status: SHIPPED | OUTPATIENT
Start: 2022-05-13 | End: 2022-06-12

## 2022-05-16 ENCOUNTER — HOSPITAL ENCOUNTER (OUTPATIENT)
Dept: PHYSICAL THERAPY | Age: 42
Setting detail: THERAPIES SERIES
Discharge: HOME | End: 2022-05-16
Payer: COMMERCIAL

## 2022-05-16 PROCEDURE — 97110 THERAPEUTIC EXERCISES: CPT

## 2022-05-16 PROCEDURE — 97140 MANUAL THERAPY 1/> REGIONS: CPT

## 2022-05-16 NOTE — FLOWSHEET NOTE
Outpatient Physical Therapy  [place]           [x] Phone: [phone]   Fax: [phone]  Rylee Eng           [] Phone: [phone]   Fax: [phone]        Physical Therapy Daily Treatment Note  Date:  2022    Patient Name:  Brinda Schwarz    :  1980  MRN: [id number]  Restrictions/Precautions: Restrictions/Precautions: Fall Risk; Contact Precautions (Hep A positive)        Diagnosis:   Critical illness myopathy [G72.81] Diagnosis: Debility, critical illness, myopathy, unsteady gait. Date of Injury/Surgery:   Treatment Diagnosis:  debility, weakness, gait disturbance, decreased balance. [facility]  Referring Physician:  Dejuan Caal MD     PCP: Brisa Jeffries MD  Next Doctor Visit:    Plan of care signed (Y/N):  n  Outcome Measure:   Visit# / total visits:   Pain level: 5/10   Goals:     Patient goals: return to work, stand/walk without  Short term goals  Time Frame for Short term goals:    consult for AFO  demo trace muscle activity or better at foot/ankle. complete 6 min walk test        Long Term Goals  Time Frame for Long Term Goals: 6 weeks  I in home program.  pain 0-2 with transfers, bed mobility, gait. amb 30mins with approp assistive device. up/down steps with reciprocal pattern         Summary of Evaluation:  Assessment: Pt presents to PT with complaints of hip tightness, pain and LE weakness following a fall and prolonged hospitalization. He was discharged 22 and presents to outpatient therapy today. He has loss of motor function of his foot and ankle, loss of sensation at leg, altered sensations at R thigh, weakness at RLE. Gait is with FWW, toe drag and steppage pattern. Subjective:    Hamstrings feel really tight, especially close to the knee. Any changes in Ambulatory Summary Sheet?   None        Objective:    COVID screening questions were asked and patient attested that there had been no contact or symptoms    Unable to perform open chain hip ext, knee flex/HS curl. Amb with FWW. Exercises: (No more than 4 columns)   Exercise/Equipment Date 5/12/22 5/16/22 Date           WARM UP         Nu step      bike      TABLE      Hip stretching Gluteals/piriformis fig 4 and knee to opp shld     bridge x10     SLR x10 2x10    clamshells  Cues for trunk positioning, 2x8             STANDING      Sit/stands      Hip abd, extension  2x10 RLE    Step taps      Side stepping      TKE   Green band 3x15 BUE support. Fwd and retro walk in \\  Unable to clear foot. Drags foot with retro               PROPRIOCEPTION      Wt shifting, lat, ant/post                              MODALITIES      E stim                Other Therapeutic Activities/Education: To talk w doctor about AFO/consult      Home Exercise Program:    Access Code: [email or web address]  URL: [email or web address]  Date: 05/12/2022  Prepared by: Rigo Lynnt    Exercises  Supine Bridge - 1 x daily - 7 x weekly - 3 sets - 10 reps  Supine Figure 4 Piriformis Stretch - 1 x daily - 7 x weekly - 1 sets - 2 reps - 45 hold  Supine Piriformis Stretch with Leg Straight - 1 x daily - 7 x weekly - 1 sets - 2 reps - 45 hold  Active Straight Leg Raise with Quad Set - 1 x daily - 7 x weekly - 3 sets - 10 reps  Supine Ankle Pumps - 1 x daily - 7 x weekly - 3 sets - 10 reps  Supine Ankle Inversion Eversion AROM - 1 x daily - 7 x weekly - 3 sets - 10 reps      Manual Treatments:  Roller and STM to post leg, roller to lat and post hip. Modalities:        Communication with other providers:        Assessment:  (Response towards treatment session) (Pain Rating)   Pt notes good decrease in tightness at hamstrings. RLE weakness, especially distally.         Plan for Next Session:        Time In / Time Out:   0950/1030       Timed Code/Total Treatment Minutes:   40/40      Next Progress Note due:        Plan of Care Interventions:  [x] Therapeutic Exercise  [] Modalities:  [x] Therapeutic Activity     [] Ultrasound  [x] Estim  [x] Gait Training      [] Cervical Traction [] Lumbar Traction  [x] Neuromuscular Re-education    [] Cold/hotpack [] Iontophoresis   [x] Instruction in HEP      [] Vasopneumatic   [] Dry Needling    [x] Manual Therapy               [] Aquatic Therapy              Electronically signed by:  Patricia Spence PT, 5/16/2022, 9:51 AM

## 2022-05-17 ENCOUNTER — TELEPHONE (OUTPATIENT)
Dept: ONCOLOGY | Age: 42
End: 2022-05-17

## 2022-05-18 ENCOUNTER — TELEPHONE (OUTPATIENT)
Dept: INTERNAL MEDICINE CLINIC | Age: 42
End: 2022-05-18

## 2022-05-18 DIAGNOSIS — S30.0XXD TRAUMATIC HEMATOMA OF BUTTOCK, SUBSEQUENT ENCOUNTER: ICD-10-CM

## 2022-05-18 RX ORDER — OXYCODONE HYDROCHLORIDE 5 MG/1
5 TABLET ORAL 2 TIMES DAILY PRN
Qty: 30 TABLET | Refills: 0 | Status: SHIPPED | OUTPATIENT
Start: 2022-05-18 | End: 2022-06-02

## 2022-05-18 NOTE — TELEPHONE ENCOUNTER
Patient called to report the oxycodone RX that was sent in to [facility] on 5/13/22 needs to be transferred to [facility] on [address] [facility]. Per the  on RA [place], the pharmacy is closed until further notice due to the pharmacy staff all being out sick. Because the Oxycodone is a controlled substance they cannot transfer the RX. A provider must send in a new RX to [facility] on [facility]. Are you able to send in the RX for the patient? Please advise.

## 2022-05-20 ENCOUNTER — HOSPITAL ENCOUNTER (OUTPATIENT)
Dept: PHYSICAL THERAPY | Age: 42
Setting detail: THERAPIES SERIES
Discharge: HOME | End: 2022-05-20
Payer: COMMERCIAL

## 2022-05-20 PROCEDURE — 97110 THERAPEUTIC EXERCISES: CPT

## 2022-05-20 PROCEDURE — 97140 MANUAL THERAPY 1/> REGIONS: CPT

## 2022-05-20 NOTE — FLOWSHEET NOTE
Outpatient Physical Therapy  [place]           [x] Phone: [phone]   Fax: [phone]  Kevin Ledbetter           [] Phone: [phone]   Fax: [phone]        Physical Therapy Daily Treatment Note  Date:  2022    Patient Name:  Calvin Suarez    :  1980  MRN: [id number]  Restrictions/Precautions: Restrictions/Precautions: Fall Risk; Contact Precautions (Hep A positive)        Diagnosis:   Critical illness myopathy [G72.81] Diagnosis: Debility, critical illness, myopathy, unsteady gait. Date of Injury/Surgery:   Treatment Diagnosis:  debility, weakness, gait disturbance, decreased balance. [facility]Certification [facility]  Referring Physician:  Misael Springer MD     PCP: Stefani Patton MD  Next Doctor Visit:    Plan of care signed (Y/N):  n  Outcome Measure:   Visit# / total visits:   Pain level: 4/10   Goals:     Patient goals: return to work, stand/walk without  Short term goals  Time Frame for Short term goals:    consult for AFO  demo trace muscle activity or better at foot/ankle. complete 6 min walk test        Long Term Goals  Time Frame for Long Term Goals: 6 weeks  I in home program.  pain 0-2 with transfers, bed mobility, gait. amb 30mins with approp assistive device. up/down steps with reciprocal pattern         Summary of Evaluation:  Assessment: Pt presents to PT with complaints of hip tightness, pain and LE weakness following a fall and prolonged hospitalization. He was discharged 22 and presents to outpatient therapy today. He has loss of motor function of his foot and ankle, loss of sensation at leg, altered sensations at R thigh, weakness at RLE. Gait is with FWW, toe drag and steppage pattern. Subjective:  Patient states pain is always in thigh; tightness behind knee into hamstrings. Been using massager on this tight area with some short term relief.  Reports that he has had some \"sharp\" pain in lower leg below knee into tibia area.        Any changes in Ambulatory Summary Sheet? None        Objective:  Ambulates in/out of therapy using walker; consistent R foot drop and noticeable hip hike to clear RLE thru swing in gait. Patient wearing VALENTINO hose. COVID screening questions were asked and patient attested that there had been no contact or symptoms    Unable to perform open chain hip ext, knee flex/HS curl. Amb with FWW. Exercises: (No more than 4 columns)   Exercise/Equipment Date 5/12/22 5/16/22 Date 5/20/2022            WARM UP         Nu step   LV5 x 6 min  (seat 15) no arms   bike      TABLE      Hip stretching Gluteals/piriformis fig 4 and knee to opp shld  Manual stretching supine and sidelying for gluts; ITB; hamstrings   bridge x10     SLR x10 2x10    clamshells  Cues for trunk positioning, 2x8 R clamshells in sidelying x 8 (pillow between knees)         Seated Rockerboard fwd/bwd 20x   STANDING      Sit/stands      Hip abd, extension  2x10 RLE    Step taps      Side stepping      TKE   Green band 3x15 BUE support. Fwd and retro walk in \\  Unable to clear foot. Drags foot with retro               PROPRIOCEPTION      Wt shifting, lat, ant/post                              MODALITIES      E stim                Other Therapeutic Activities/Education: To talk w doctor about AFO/consult  5/20/2022 visit talked with patient about possible mild compression stockings vs VALENTINO hose. .... Home Exercise Program:    Access Code: [email or web address]  URL: [email or web address]  Date: 05/12/2022  Prepared by: Jenny Billy    Exercises  Supine Bridge - 1 x daily - 7 x weekly - 3 sets - 10 reps  Supine Figure 4 Piriformis Stretch - 1 x daily - 7 x weekly - 1 sets - 2 reps - 45 hold  Supine Piriformis Stretch with Leg Straight - 1 x daily - 7 x weekly - 1 sets - 2 reps - 45 hold  Active Straight Leg Raise with Quad Set - 1 x daily - 7 x weekly - 3 sets - 10 reps  Supine Ankle Pumps - 1 x daily - 7 x weekly - 3 sets - 10 reps  Supine Ankle Inversion Eversion AROM - 1 x daily - 7 x weekly - 3 sets - 10 reps      Manual Treatments:   STM to gluts; ITB; hamstrings with stretching      Modalities: x       Communication with other providers:        Assessment:  (Response towards treatment session) (Pain Rating)   Pt with minimal discomfort during manual STM and stretching; mild decrease with HS tightness; no change in pain level.       Plan for Next Session:        Time In / Time Out:   2154-2298       Timed Code/Total Treatment Minutes:   45       Next Progress Note due:        Plan of Care Interventions:  [x] Therapeutic Exercise  [] Modalities:  [x] Therapeutic Activity     [] Ultrasound  [x] Estim  [x] Gait Training      [] Cervical Traction [] Lumbar Traction  [x] Neuromuscular Re-education    [] Cold/hotpack [] Iontophoresis   [x] Instruction in HEP      [] Vasopneumatic   [] Dry Needling    [x] Manual Therapy               [] Aquatic Therapy              Electronically signed by:  Tej Howell PTA, 5/20/2022, 2:17 PM

## 2022-05-25 ENCOUNTER — HOSPITAL ENCOUNTER (OUTPATIENT)
Dept: PHYSICAL THERAPY | Age: 42
Setting detail: THERAPIES SERIES
Discharge: HOME | End: 2022-05-25
Payer: COMMERCIAL

## 2022-05-25 PROCEDURE — 97112 NEUROMUSCULAR REEDUCATION: CPT

## 2022-05-25 PROCEDURE — 97110 THERAPEUTIC EXERCISES: CPT

## 2022-05-25 NOTE — FLOWSHEET NOTE
Outpatient Physical Therapy  [place]           [x] Phone: [phone]   Fax: [phone]  [facility]           [] Phone: [phone]   Fax: [phone]        Physical Therapy Daily Treatment Note  Date:  2022    Patient Name:  Gabi Daniel    :  1980  MRN: [id number]  Restrictions/Precautions: Restrictions/Precautions: Fall Risk; Contact Precautions (Hep A positive)        Diagnosis:   Critical illness myopathy [G72.81] Diagnosis: Debility, critical illness, myopathy, unsteady gait. Date of Injury/Surgery:   Treatment Diagnosis:  debility, weakness, gait disturbance, decreased balance. [facility]Certification [facility]: [facility]  Referring Physician:  Yarelis Hankins MD     PCP: Chapin Grande MD  Next Doctor Visit:    Plan of care signed (Y/N):  n  Outcome Measure:   Visit# / total visits:   Pain level: 4/10 sharp intermittent pain down chin.; general soreness in the thigh  Goals:     Patient goals: return to work, stand/walk without  Short term goals  Time Frame for Short term goals:    consult for AFO  demo trace muscle activity or better at foot/ankle. complete 6 min walk test        Long Term Goals  Time Frame for Long Term Goals: 6 weeks  I in home program.  pain 0-2 with transfers, bed mobility, gait. amb 30mins with approp assistive device. up/down steps with reciprocal pattern         Summary of Evaluation:  Assessment: Pt presents to PT with complaints of hip tightness, pain and LE weakness following a fall and prolonged hospitalization. He was discharged 22 and presents to outpatient therapy today. He has loss of motor function of his foot and ankle, loss of sensation at leg, altered sensations at R thigh, weakness at RLE. Gait is with FWW, toe drag and steppage pattern. Subjective:  Patient states pain is the same. Still using massager. Pt stated his doctor said to try PT for a few weeks before exploring the AFO.  He doesn't have a doctor follow up appointment. Any changes in Ambulatory Summary Sheet? None         Objective:  Ambulates in/out of therapy using walker; consistent R foot drop and noticeable hip hike to clear RLE thru swing in gait. Patient wearing VALENTINO hose and knee soft brace. COVID screening questions were asked and patient attested that there had been no contact or symptoms    Unable to perform open chain hip ext, knee flex/HS curl. Amb with FWW. Snap into TKE with TB exercises  Cued for technique  No DF muscle response with [place] estim    Exercises: (No more than 4 columns)   Exercise/Equipment Date 5/12/22 5/16/22 Date 5/20/2022 5/25/22 #4            WARM UP          Nu step   LV5 x 6 min  (seat 15) no arms LV5 x 6 min  (seat 15) no arms   bike       TABLE       Hip stretching Gluteals/piriformis fig 4 and knee to opp shld  Manual stretching supine and sidelying for gluts; ITB; hamstrings    bridge x10   X 10   SLR x10 2x10  2 x10   clamshells  Cues for trunk positioning, 2x8 R clamshells in sidelying x 8 (pillow between knees) R clamshells in sidelying x 10 (pillow between knees)         Seated Rockerboard fwd/bwd 20x Seated Rockerboard fwd/bwd 20x   STANDING       Sit/stands       Hip abd, extension  2x10 RLE     Step taps       Side stepping       TKE   Green band 3x15 BUE support. Green band 3x15   Fwd and retro walk in \\  Unable to clear foot. Drags foot with retro                 PROPRIOCEPTION       Wt shifting, lat, ant/post, staggerred    30 s ea. direction                               MODALITIES       E stim    Russian 8'  0/5               Other Therapeutic Activities/Education: To talk w doctor about AFO/consult  5/20/2022 visit talked with patient about possible mild compression stockings vs VALENTINO hose. .... Home Exercise Program:    Access Code: [email or web address]  URL: [email or web address]/  Date: 05/12/2022  Prepared by: Padmini Shock    Exercises  Supine Bridge - 1 x daily - 7 x weekly - 3 sets - 10 reps  Supine Figure 4 Piriformis Stretch - 1 x daily - 7 x weekly - 1 sets - 2 reps - 45 hold  Supine Piriformis Stretch with Leg Straight - 1 x daily - 7 x weekly - 1 sets - 2 reps - 45 hold  Active Straight Leg Raise with Quad Set - 1 x daily - 7 x weekly - 3 sets - 10 reps  Supine Ankle Pumps - 1 x daily - 7 x weekly - 3 sets - 10 reps  Supine Ankle Inversion Eversion AROM - 1 x daily - 7 x weekly - 3 sets - 10 reps      Manual Treatments:         Modalities: [place] estim 8'  trial      Communication with other providers:        Assessment:  (Response towards treatment session) (Pain Rating)    Pt demonstrated GOOD tolerance to tx today with added exercises. No trace noted with [place] estim today. Pt would continue to benefit from skilled therapy interventions to address remaining impairments, improve mobility and strength and progress toward goal completion while reducing risk for re-injury or further decline.       Plan for Next Session: DF/PF trace NR, hip knee strengthening, gait mechanics, AFO       Time In / Time Out:   1630/1715       Timed Code/Total Treatment Minutes:   45'/45' 2 TE 1 NR      Next Progress Note due:        Plan of Care Interventions:  [x] Therapeutic Exercise  [] Modalities:  [x] Therapeutic Activity     [] Ultrasound  [x] Estim  [x] Gait Training      [] Cervical Traction [] Lumbar Traction  [x] Neuromuscular Re-education    [] Cold/hotpack [] Iontophoresis   [x] Instruction in HEP      [] Vasopneumatic   [] Dry Needling    [x] Manual Therapy               [] Aquatic Therapy              Electronically signed by:  Jose Luis Purcell PTA, 5/25/2022, 4:31 PM

## 2022-05-31 ENCOUNTER — HOSPITAL ENCOUNTER (OUTPATIENT)
Dept: PHYSICAL THERAPY | Age: 42
Setting detail: THERAPIES SERIES
Discharge: HOME | End: 2022-05-31
Payer: COMMERCIAL

## 2022-05-31 PROCEDURE — 97140 MANUAL THERAPY 1/> REGIONS: CPT

## 2022-05-31 PROCEDURE — 97110 THERAPEUTIC EXERCISES: CPT

## 2022-05-31 NOTE — FLOWSHEET NOTE
Outpatient Physical Therapy  [place]           [x] Phone: [phone]   Fax: [phone]  [facility]           [] Phone: [phone]   Fax: [phone]        Physical Therapy Daily Treatment Note  Date:  2022    Patient Name:  Dimas Johnson    :  1980  MRN: [id number]  Restrictions/Precautions: Restrictions/Precautions: Fall Risk; Contact Precautions (Hep A positive)        Diagnosis:   Critical illness myopathy [G72.81] Diagnosis: Debility, critical illness, myopathy, unsteady gait. Date of Injury/Surgery:   Treatment Diagnosis:  debility, weakness, gait disturbance, decreased balance. Insurance/Certification information: [facility]  Referring Physician:  Steven Rose MD     PCP: Nolan Martini MD  Next Doctor Visit:    Plan of care signed (Y/N):  n  Outcome Measure:   Visit# / total visits: 3 /12  Pain level: 4/10 intermittent sharp pains. Goals:     Patient goals: return to work, stand/walk without  Short term goals  Time Frame for Short term goals:    consult for AFO  demo trace muscle activity or better at foot/ankle. complete 6 min walk test        Long Term Goals  Time Frame for Long Term Goals: 6 weeks  I in home program.  pain 0-2 with transfers, bed mobility, gait. amb 30mins with approp assistive device. up/down steps with reciprocal pattern         Summary of Evaluation:  Assessment: Pt presents to PT with complaints of hip tightness, pain and LE weakness following a fall and prolonged hospitalization. He was discharged 22 and presents to outpatient therapy today. He has loss of motor function of his foot and ankle, loss of sensation at leg, altered sensations at R thigh, weakness at RLE. Gait is with FWW, toe drag and steppage pattern. Subjective:    Feeling stronger in upper leg, still nothing in leg/foot/ankle. Getting some sensations at proximal leg. Any changes in Ambulatory Summary Sheet?   None         Objective:    COVID screening questions were asked and patient attested that there had been no contact or symptoms        Exercises: (No more than 4 columns)   Exercise/Equipment Date 5/20/2022 5/25/22 #4 5/31/22           WARM UP         Nu step LV5 x 6 min  (seat 15) no arms LV5 x 6 min  (seat 15) no arms LV7 x 5:30  (seat 15) no arms   bike      TABLE      Hip stretching Manual stretching supine and sidelying for gluts; ITB; hamstrings  Gluteals, adductors, HS   bridge  X 10 x8 cross leg. 2x10 staggered feet. SLR  2 x10 Cues for QS and no lag. clamshells R clamshells in sidelying x 8 (pillow between knees) R clamshells in sidelying x 10 (pillow between knees)        Seated Rockerboard fwd/bwd 20x Seated Rockerboard fwd/bwd 20x    STANDING      Sit/stands      Hip abd, extension      Step taps      Side stepping      TKE   Green band 3x15    Fwd and retro walk in [employer] shifting, lat, ant/post, staggerred  30 s ea. direction                            MODALITIES      E stim  Russian 8'  0/5               Other Therapeutic Activities/Education: To talk w doctor about AFO/consult  5/20/2022 visit talked with patient about possible mild compression stockings vs VALENTINO hose. .... 5/31/22 trial AFO - greatly improved gait, no toe drag. Pt states he feels better, safer and more normal.        Home Exercise Program:    Access Code: [email or web address]  URL: [email or web address]  Date: 05/12/2022  Prepared by: Opal Prieto    Exercises  Supine Bridge - 1 x daily - 7 x weekly - 3 sets - 10 reps  Supine Figure 4 Piriformis Stretch - 1 x daily - 7 x weekly - 1 sets - 2 reps - 45 hold  Supine Piriformis Stretch with Leg Straight - 1 x daily - 7 x weekly - 1 sets - 2 reps - 45 hold  Active Straight Leg Raise with Quad Set - 1 x daily - 7 x weekly - 3 sets - 10 reps  Supine Ankle Pumps - 1 x daily - 7 x weekly - 3 sets - 10 reps  Supine Ankle Inversion Eversion AROM - 1 x daily - 7 x weekly - 3 sets - 10 reps      Manual Treatments:   STM to gluts; ITB; hamstrings with stretching      Modalities:       Communication with other providers:        Assessment:  (Response towards treatment session) (Pain Rating)    Pt demonstrated GOOD tolerance to tx today with added exercises. No trace noted with [place] estim today. Pt would continue to benefit from skilled therapy interventions to address remaining impairments, improve mobility and strength and progress toward goal completion while reducing risk for re-injury or further decline.       Plan for Next Session:        Time In / Time Out:   1114/1159       Timed Code/Total Treatment Minutes:   45/45      Next Progress Note due:        Plan of Care Interventions:  [x] Therapeutic Exercise  [] Modalities:  [x] Therapeutic Activity     [] Ultrasound  [x] Estim  [x] Gait Training      [] Cervical Traction [] Lumbar Traction  [x] Neuromuscular Re-education    [] Cold/hotpack [] Iontophoresis   [x] Instruction in HEP      [] Vasopneumatic   [] Dry Needling    [x] Manual Therapy               [] Aquatic Therapy              Electronically signed by:  Tony Quevedo PT, 5/31/2022, 11:12 AM

## 2022-06-03 ENCOUNTER — HOSPITAL ENCOUNTER (OUTPATIENT)
Dept: PHYSICAL THERAPY | Age: 42
Setting detail: THERAPIES SERIES
Discharge: HOME | End: 2022-06-03
Payer: COMMERCIAL

## 2022-06-03 PROCEDURE — 97110 THERAPEUTIC EXERCISES: CPT

## 2022-06-03 PROCEDURE — 97112 NEUROMUSCULAR REEDUCATION: CPT

## 2022-06-03 PROCEDURE — 97140 MANUAL THERAPY 1/> REGIONS: CPT

## 2022-06-03 NOTE — FLOWSHEET NOTE
Outpatient Physical Therapy  [place]           [x] Phone: [phone]   Fax: [phone]  [place]           [] Phone: [phone]   Fax: [phone]        Physical Therapy Daily Treatment Note  Date:  6/3/2022    Patient Name:  Sara Finn    :  1980  MRN: [id number]  Restrictions/Precautions: Restrictions/Precautions: Fall Risk; Contact Precautions (Hep A positive)        Diagnosis:   Critical illness myopathy [G72.81] Diagnosis: Debility, critical illness, myopathy, unsteady gait. Date of Injury/Surgery:   Treatment Diagnosis:  debility, weakness, gait disturbance, decreased balance. Insurance/Certification information: [facility]  Referring Physician:  Vinod Ellison MD     PCP: Iris Brown MD  Next Doctor Visit:    Plan of care signed (Y/N):  Y  Outcome Measure:   Visit# / total visits:  recount done 6/3  Pain level: --      Goals:     Patient goals: return to work, stand/walk without  Short term goals  Time Frame for Short term goals:   consult for AFO  demo trace muscle activity or better at foot/ankle. complete 6 min walk test  Long Term Goals  Time Frame for Long Term Goals: 6 weeks  I in home program.  pain 0-2 with transfers, bed mobility, gait. amb 30mins with approp assistive device. up/down steps with reciprocal pattern        Summary of Evaluation:  Assessment: Pt presents to PT with complaints of hip tightness, pain and LE weakness following a fall and prolonged hospitalization. He was discharged 22 and presents to outpatient therapy today. He has loss of motor function of his foot and ankle, loss of sensation at leg, altered sensations at R thigh, weakness at RLE. Gait is with FWW, toe drag and steppage pattern. Subjective:  Rehan Camacho arrives to therapy stating that he is feeling okay overall. Still gets a knot in the HS but can massage it out easily. Still no ability to DF the foot.  Bought himself an AFO on  [address] and thinks it is really helping him a lot! Any changes in Ambulatory Summary Sheet? None         Objective:  COVID screening questions were asked and patient attested that there had been no contact or symptoms    Ambulates into clinic with AFO, with significant improvements in gait. Exercises: (No more than 4 columns)   Exercise/Equipment 5/25/22 #4 5/31/22 6/3/2022 #6           WARM UP         Nu step LV5 x 6 min  (seat 15) no arms LV7 x 5:30  (seat 15) no arms L7 S15 no arms    bike      TABLE      Hip stretching  Gluteals, adductors, HS HS's and gastroc/soleus complex    bridge X 10 x8 cross leg. 2x10 staggered feet. Staggered (R further back) 2*10   SLR 2 x10 Cues for QS and no lag. 10* slight lag   clamshells R clamshells in sidelying x 10 (pillow between knees)         Seated Rockerboard fwd/bwd 20x     S/L hip abduction   Mod A for lift, tapping to glut med for facilitation, assist to decrease hip flexor compensation 2*10   Prone hip extension knee straight, knee bent    Mod A for lift, tapping of glut for facilitation 10* ea   Prone HS curl    AAROM 10*  Lowering from 90° position 2*5  Mod A for lift, tapping at HS's for facilitation. Unable to completely let go of lower leg during eccentric portion of HS curl               STANDING      Sit/stands   2*10 with R foot back    LAQ   2*10 hold at the top, ecc lower    Hip abd, extension      Step taps      Side stepping      TKE  Green band 3x15     Fwd and retro walk in [employer] shifting, lat, ant/post, staggerred 30 s ea. direction                             MODALITIES      E stim Russian 8'  0/5                Other Therapeutic Activities/Education: To talk w doctor about AFO/consult  5/20/2022 visit talked with patient about possible mild compression stockings vs VALENTINO hose. .... 5/31/22 trial AFO - greatly improved gait, no toe drag.   Pt states he feels better, safer and more normal.        Home Exercise Program:    Access Code: [email or web address]  URL: [email or web address]  Date: 05/12/2022  Prepared by: Adrian Ponce    Exercises  Supine Bridge - 1 x daily - 7 x weekly - 3 sets - 10 reps  Supine Figure 4 Piriformis Stretch - 1 x daily - 7 x weekly - 1 sets - 2 reps - 45 hold  Supine Piriformis Stretch with Leg Straight - 1 x daily - 7 x weekly - 1 sets - 2 reps - 45 hold  Active Straight Leg Raise with Quad Set - 1 x daily - 7 x weekly - 3 sets - 10 reps  Supine Ankle Pumps - 1 x daily - 7 x weekly - 3 sets - 10 reps  Supine Ankle Inversion Eversion AROM - 1 x daily - 7 x weekly - 3 sets - 10 reps      Manual Treatments:   Gastroc and HS stretching, gentle sciatic nerve glides       Modalities: none       Communication with other providers:  None       Assessment: Juan tolerated today's session well. He does not have a lot of pain and is motivated to work hard during sessions. He lacks good muscle strength in gluts with ability to contract the muscle but minimal ability to perform movement independently. Unable to activate anterior tib for even trace amount of muscle contraction.  End session pain: 0/10      Plan for Next Session:       Time In / Time Out:   1300/1343       Timed Code/Total Treatment Minutes:  37' 1 man 10' 1 NR 10' 1 TE 23'      Next Progress Note due:        Plan of Care Interventions:  [x] Therapeutic Exercise  [] Modalities:  [x] Therapeutic Activity     [] Ultrasound  [x] Estim  [x] Gait Training      [] Cervical Traction [] Lumbar Traction  [x] Neuromuscular Re-education    [] Cold/hotpack [] Iontophoresis   [x] Instruction in HEP      [] Vasopneumatic   [] Dry Needling    [x] Manual Therapy               [] Aquatic Therapy              Electronically signed by:  Saloni Caruso     6/3/2022, 6:47 AM

## 2022-06-07 ENCOUNTER — HOSPITAL ENCOUNTER (OUTPATIENT)
Dept: PHYSICAL THERAPY | Age: 42
Setting detail: THERAPIES SERIES
Discharge: HOME | End: 2022-06-07
Payer: COMMERCIAL

## 2022-06-07 PROCEDURE — 97110 THERAPEUTIC EXERCISES: CPT

## 2022-06-07 PROCEDURE — 97140 MANUAL THERAPY 1/> REGIONS: CPT

## 2022-06-07 NOTE — FLOWSHEET NOTE
Outpatient Physical Therapy  [place]           [x] Phone: [phone]   Fax: [phone]  [place]           [] Phone: [phone]   Fax: [phone]        Physical Therapy Daily Treatment Note  Date:  2022    Patient Name:  Lindsey Casper    :  1980  MRN: [id number]  Restrictions/Precautions: Restrictions/Precautions: Fall Risk; Contact Precautions (Hep A positive)        Diagnosis:   Critical illness myopathy [G72.81] Diagnosis: Debility, critical illness, myopathy, unsteady gait. Date of Injury/Surgery:   Treatment Diagnosis:  debility, weakness, gait disturbance, decreased balance. Insurance/Certification information: [facility]  Referring Physician:  Eric Bradley MD     PCP: Hetal Kumar MD  Next Doctor Visit:    Plan of care signed (Y/N):  Y  Outcome Measure:   Visit# / total visits:  recount done 6/3  Pain level: --      Goals:     Patient goals: return to work, stand/walk without  Short term goals  Time Frame for Short term goals:   consult for AFO  demo trace muscle activity or better at foot/ankle. complete 6 min walk test  Long Term Goals  Time Frame for Long Term Goals: 6 weeks  I in home program.  pain 0-2 with transfers, bed mobility, gait. amb 30mins with approp assistive device. up/down steps with reciprocal pattern        Summary of Evaluation:  Assessment: Pt presents to PT with complaints of hip tightness, pain and LE weakness following a fall and prolonged hospitalization. He was discharged 22 and presents to outpatient therapy today. He has loss of motor function of his foot and ankle, loss of sensation at leg, altered sensations at R thigh, weakness at RLE. Gait is with FWW, toe drag and steppage pattern. Subjective:  Theo Guevara arrives to therapy stating that he is feeling okay overall. Still gets a knot in the HS but can massage it out easily. Still no ability to DF the foot.  Bought himself an AFO on [address] and thinks it is really helping him a lot! Any changes in Ambulatory Summary Sheet? None         Objective:  COVID screening questions were asked and patient attested that there had been no contact or symptoms    Ambulates into clinic with AFO, B axillary crutches  with significant improvements in gait. Exercises: (No more than 4 columns)   Exercise/Equipment 5/25/22 #4 5/31/22 6/3/2022 #6 #7 6/7/22            WARM UP          Nu step LV5 x 6 min  (seat 15) no arms LV7 x 5:30  (seat 15) no arms L7 S15 no arms  L7 S15 no arms    bike       TABLE       Hip stretching  Gluteals, adductors, HS HS's and gastroc/soleus complex  Gluteals, adductors, HS   bridge X 10 x8 cross leg. 2x10 staggered feet. Staggered (R further back) 2*10 2x10 staggered  1x 5 cross   SLR 2 x10 Cues for QS and no lag. 10* slight lag    clamshells R clamshells in sidelying x 10 (pillow between knees)   Clam and rev clamshells 2x10       Seated Rockerboard fwd/bwd 20x   Hip IR stretching prone. S/L hip abduction   Mod A for lift, tapping to glut med for facilitation, assist to decrease hip flexor compensation 2*10    Prone hip extension knee straight, knee bent    Mod A for lift, tapping of glut for facilitation 10* ea Mod assist to perform   Prone HS curl    AAROM 10*  Lowering from 90° position 2*5  Mod A for lift, tapping at HS's for facilitation. Unable to completely let go of lower leg during eccentric portion of HS curl Isometric holds w assist.     SB HS curl    2x10, assist to help keep foot in neutral           STANDING       Sit/stands   2*10 with R foot back     LAQ   2*10 hold at the top, ecc lower     Hip abd, extension       Step taps       Side stepping       TKE  Green band 3x15      Fwd and retro walk in [employer] shifting, lat, ant/post, staggerred 30 s ea.  direction                                  MODALITIES       E stim Russian 8'  0/5                  Other Therapeutic Activities/Education: To talk w doctor about AFO/consult  5/20/2022 visit talked with patient about possible mild compression stockings vs VALENTINO hose. .... 5/31/22 trial AFO - greatly improved gait, no toe drag. Pt states he feels better, safer and more normal.        Home Exercise Program:    Access Code: [email or web address]  URL: [email or web address]  Date: 05/12/2022  Prepared by: Maria Del Rosario Chavis    Exercises  Supine Bridge - 1 x daily - 7 x weekly - 3 sets - 10 reps  Supine Figure 4 Piriformis Stretch - 1 x daily - 7 x weekly - 1 sets - 2 reps - 45 hold  Supine Piriformis Stretch with Leg Straight - 1 x daily - 7 x weekly - 1 sets - 2 reps - 45 hold  Active Straight Leg Raise with Quad Set - 1 x daily - 7 x weekly - 3 sets - 10 reps  Supine Ankle Pumps - 1 x daily - 7 x weekly - 3 sets - 10 reps  Supine Ankle Inversion Eversion AROM - 1 x daily - 7 x weekly - 3 sets - 10 reps      Manual Treatments:   HS stretching, TPR to HS gluteals. Modalities: none       Communication with other providers:  None       Assessment: Juan tolerated today's session well. Much improved gait and safety with crutches and AFO.  End session pain: 0/10      Plan for Next Session:       Time In / Time Out:   2987/1033       Timed Code/Total Treatment Minutes:  46/46      Next Progress Note due:        Plan of Care Interventions:  [x] Therapeutic Exercise  [] Modalities:  [x] Therapeutic Activity     [] Ultrasound  [x] Estim  [x] Gait Training      [] Cervical Traction [] Lumbar Traction  [x] Neuromuscular Re-education    [] Cold/hotpack [] Iontophoresis   [x] Instruction in HEP      [] Vasopneumatic   [] Dry Needling    [x] Manual Therapy               [] Aquatic Therapy              Electronically signed by:  Telma Woods PT,     6/7/2022, 9:44 AM

## 2022-06-09 ENCOUNTER — HOSPITAL ENCOUNTER (OUTPATIENT)
Dept: PHYSICAL THERAPY | Age: 42
Setting detail: THERAPIES SERIES
Discharge: HOME | End: 2022-06-09
Payer: COMMERCIAL

## 2022-06-09 PROCEDURE — 97112 NEUROMUSCULAR REEDUCATION: CPT

## 2022-06-09 PROCEDURE — 97110 THERAPEUTIC EXERCISES: CPT

## 2022-06-09 NOTE — PROGRESS NOTES
Outpatient Physical Therapy        [x] Phone: [phone]   Fax: [phone]  Physician:  Dr. Paty Christiansen        From: Renaldo Pina, PT,    Patient: Frdedy Sim                    : 1980    Diagnosis:   Critical illness myopathy [G72.81] Diagnosis: Debility, critical illness, myopathy, unsteady gait. Date of Injury/Surgery:   Treatment Diagnosis:  debility, weakness, gait disturbance, decreased balance. Date: 2022    []  Progress Note                []  Discharge Note    Total Visits to date:  8     Cancels/No-shows to date:   0    Subjective:    Denies pain. Relates he has some sensation just distal to the knee, but still nothing through the remainder of R leg. Prominent Objective Findings:    Pt has acquired an AFO, and now amb with it and B axillary crutches. Much safer gait pattern. Pt able to don/doff socks/shoes I with min pain now. States he can roll in bed and tolerates ~30mins on his sides now.    0 muscle activity seen at R foot/ankle. Twitching intermittent contraction at hamstrings. Assessment:    Time Frame for Short term goals:   consult for AFO      MET  demo trace muscle activity or better at foot/ankle. Not met  complete 6 min walk test     Not tested  Long Term Goals  Time Frame for Long Term Goals: 6 weeks  I in home program.      Met for current program.  pain 0-2 with transfers, bed mobility, gait. Met most days  amb 30mins with approp assistive device.   Not attempted  up/down steps with reciprocal pattern   Not met    Goal Status:  [] Achieved [x] Partially Achieved  [] Not Achieved         Planned Services:  [x] Therapeutic Exercise    [] Modalities:  [x] Therapeutic Activity     [] Ultrasound  [] Electric Stimulation  [x] Gait Training      [] Cervical Traction    [] Lumbar Traction  [x] Neuromuscular Re-education  [] Cold/hotpack [] Iontophoresis  [x] Instruction in HEP      Other:  [x] Manual Therapy       []  Vasopneumatic  [] Self care management                           [] Dry needling trigger point/pain management                ? Plan of Care Date Range:     Frequency/Duration:  # Days per week: [] 1 day # Weeks: [] 1 week [] 4 weeks      [x] 2 days? [] 2 weeks [x] 5 weeks      [] 3 days   [] 3 weeks [] 6 weeks     Rehab Potential: [] Excellent [x] Good [] Fair  [] Poor     Patient Status: [x] Continue per initial Plan of Care     [] Patient now discharged     [x] Additional visits requested, Please re-certify for additional visits:          Electronically signed by:  David Jacinto PT, , 6/9/2022, 11:26 AM    If you have any questions or concerns, please don't hesitate to call.   Thank you for your referral.    Physician Signature:______________________ Date:______ Time: ________  By signing above, therapists plan is approved by physician

## 2022-06-09 NOTE — FLOWSHEET NOTE
Outpatient Physical Therapy  [place]           [x] Phone: [phone]   Fax: [phone]  [place]           [] Phone: [phone]   Fax: [phone]        Physical Therapy Daily Treatment Note  Date:  2022    Patient Name:  Tamir Ch    :  1980  MRN: [id number]  Restrictions/Precautions: Restrictions/Precautions: Fall Risk; Contact Precautions (Hep A positive)        Diagnosis:   Critical illness myopathy [G72.81] Diagnosis: Debility, critical illness, myopathy, unsteady gait. Date of Injury/Surgery:   Treatment Diagnosis:  debility, weakness, gait disturbance, decreased balance. Insurance/Certification information: [facility]  Referring Physician:  Tish Nieto MD     PCP: Criss Holloway MD  Next Doctor Visit:    Plan of care signed (Y/N):  Y  Outcome Measure:   Visit# / total visits:    recount done 6/3  Pain level: --      Goals:     Patient goals: return to work, stand/walk without  Short term goals  Time Frame for Short term goals:   consult for AFO  demo trace muscle activity or better at foot/ankle. complete 6 min walk test  Long Term Goals  Time Frame for Long Term Goals: 6 weeks  I in home program.  pain 0-2 with transfers, bed mobility, gait. amb 30mins with approp assistive device. up/down steps with reciprocal pattern        Summary of Evaluation:  Assessment: Pt presents to PT with complaints of hip tightness, pain and LE weakness following a fall and prolonged hospitalization. He was discharged 22 and presents to outpatient therapy today. He has loss of motor function of his foot and ankle, loss of sensation at leg, altered sensations at R thigh, weakness at RLE. Gait is with FWW, toe drag and steppage pattern. Subjective:  R leg was swollen last night,  Iced it with some benefit. Likely overdid it with ankle stretching and exercises. Any changes in Ambulatory Summary Sheet?   None         Objective:  COVID screening questions were asked and patient attested that there had been no contact or symptoms    Ambulates into clinic with CONNIEO B axillary crutches      Exercises: (No more than 4 columns)   Exercise/Equipment 5/25/22 #4 5/31/22 6/3/2022 #6 #7 6/7/22 6/9/22 #8             WARM UP           Nu step LV5 x 6 min  (seat 15) no arms LV7 x 5:30  (seat 15) no arms L7 S15 no arms  L7 S15 no arms     bike        TABLE        Hip stretching  Gluteals, adductors, HS HS's and gastroc/soleus complex  Gluteals, adductors, HS Gluteals, adductors, HS   bridge X 10 x8 cross leg. 2x10 staggered feet. Staggered (R further back) 2*10 2x10 staggered  1x 5 cross 2x10 staggered  1x 5 cross   SLR 2 x10 Cues for QS and no lag. 10* slight lag     clamshells R clamshells in sidelying x 10 (pillow between knees)   Clam and rev clamshells 2x10        Seated Rockerboard fwd/bwd 20x   Hip IR stretching prone. S/L hip abduction   Mod A for lift, tapping to glut med for facilitation, assist to decrease hip flexor compensation 2*10     Prone hip extension knee straight, knee bent    Mod A for lift, tapping of glut for facilitation 10* ea Mod assist to perform Mod assist to perform 2x10   Prone HS curl    AAROM 10*  Lowering from 90° position 2*5  Mod A for lift, tapping at HS's for facilitation. Unable to completely let go of lower leg during eccentric portion of HS curl Isometric holds w assist.   Isometric holds assist required   SB HS curl    2x10, assist to help keep foot in neutral             STANDING        Sit/stands   2*10 with R foot back   X 15 stagger stand   LAQ   2*10 hold at the top, ecc lower      Hip abd, extension        Step taps        Side stepping        TKE  Green band 3x15       Fwd and retro walk in \\                     PROPRIOCEPTION        Wt shifting, lat, ant/post, staggerred 30 s ea.  direction       Tall kneeling on pad     Holds and weight shifts. & From short to tall kneeling   Stand on foam     Cues for more equal weight bearing. MODALITIES        E stim Russian 8'  0/5                    Other Therapeutic Activities/Education: To talk w doctor about AFO/consult  5/20/2022 visit talked with patient about possible mild compression stockings vs VALENTINO hose. .... 5/31/22 trial AFO - greatly improved gait, no toe drag. Pt states he feels better, safer and more normal.        Home Exercise Program:    Access Code: [email or web address]  URL: [email or web address]  Date: 05/12/2022  Prepared by: Jenny Billy    Exercises  Supine Bridge - 1 x daily - 7 x weekly - 3 sets - 10 reps  Supine Figure 4 Piriformis Stretch - 1 x daily - 7 x weekly - 1 sets - 2 reps - 45 hold  Supine Piriformis Stretch with Leg Straight - 1 x daily - 7 x weekly - 1 sets - 2 reps - 45 hold  Active Straight Leg Raise with Quad Set - 1 x daily - 7 x weekly - 3 sets - 10 reps  Supine Ankle Pumps - 1 x daily - 7 x weekly - 3 sets - 10 reps  Supine Ankle Inversion Eversion AROM - 1 x daily - 7 x weekly - 3 sets - 10 reps      Manual Treatments:   HS stretching, TPR to HS gluteals. Modalities: none       Communication with other providers:  None       Assessment: Juan tolerated today's session well. Much improved gait and safety with crutches and AFO. Relates fatigue  Following session today. End session pain: 0/10 Pt would benefit from additional therapy to improve strength, balance and functional activities.       Plan for Next Session:       Time In / Time Out:   1016/1100       Timed Code/Total Treatment Minutes:  86/01      Next Progress Note due:        Plan of Care Interventions:  [x] Therapeutic Exercise  [] Modalities:  [x] Therapeutic Activity     [] Ultrasound  [x] Estim  [x] Gait Training      [] Cervical Traction [] Lumbar Traction  [x] Neuromuscular Re-education    [] Cold/hotpack [] Iontophoresis   [x] Instruction in HEP      [] Vasopneumatic   [] Dry Needling    [x] Manual Therapy               [] Aquatic Therapy Electronically signed by:  Miya Carlos, PT,     6/9/2022, 10:18 AM

## 2022-06-13 RX ORDER — LISINOPRIL 10 MG/1
10 TABLET ORAL DAILY
Qty: 90 TABLET | Refills: 1 | Status: SHIPPED | OUTPATIENT
Start: 2022-06-13 | End: 2022-06-29 | Stop reason: SDUPTHER

## 2022-06-14 ENCOUNTER — HOSPITAL ENCOUNTER (OUTPATIENT)
Dept: PHYSICAL THERAPY | Age: 42
Setting detail: THERAPIES SERIES
Discharge: HOME | End: 2022-06-14
Payer: COMMERCIAL

## 2022-06-14 PROCEDURE — 97112 NEUROMUSCULAR REEDUCATION: CPT

## 2022-06-14 NOTE — FLOWSHEET NOTE
COVID screening questions were asked and patient attested that there had been no contact or symptoms    Ambulates into clinic with AFO, B axillary crutches      ModA<>MaxA concentric and MaxA<>TotalA eccentric control with gluteal and HS    Cued for foot placement for safety throughout tx. Exercises: (No more than 4 columns)   Exercise/Equipment 6/3/2022 #6 #7 6/7/22 6/9/22 #8 6/14/22 #9            WARM UP          Nu step L7 S15 no arms  L7 S15 no arms   L7 S15 no arms scifit   bike       TABLE       Hip stretching HS's and gastroc/soleus complex  Gluteals, adductors, HS Gluteals, adductors, HS Gluteals, adductors, HS   bridge Staggered (R further back) 2*10 2x10 staggered  1x 5 cross 2x10 staggered  1x 5 cross 2x10 staggered  2x10 cross   SLR 10* slight lag      clamshells  Clam and rev clamshells 2x10  Clam 2x10  rev clamshells x 10 AAROM        Hip IR stretching prone. S/L hip abduction Mod A for lift, tapping to glut med for facilitation, assist to decrease hip flexor compensation 2*10   Mod A for lift, tapping to glut med for facilitation, assist to decrease hip flexor compensation *10   Prone hip extension knee straight, knee bent  Mod A for lift, tapping of glut for facilitation 10* ea Mod assist to perform Mod assist to perform 2x10 Mod A bent to perform 2x10   MaxA knee ext   Prone HS curl  AAROM 10*  Lowering from 90° position 2*5  Mod A for lift, tapping at HS's for facilitation. Unable to completely let go of lower leg during eccentric portion of HS curl Isometric holds w assist.   Isometric holds assist required AAROM 10*  Lowering from 90° position 2*5  Mod A for lift, tapping at HS's for facilitation.  Unable to completely let go of lower leg during eccentric portion of HS curl   SB HS curl  2x10, assist to help keep foot in neutral             STANDING       Sit/stands 2*10 with R foot back   X 15 stagger stand 2 x 15 stagger stand    LAQ 2*10 hold at the top, ecc lower       Hip abd, extension       Step taps       Side stepping       TKE        Fwd and retro walk in \\                   PROPRIOCEPTION       Wt shifting, lat, ant/post, staggerred       Tall kneeling on pad   Holds and weight shifts. & From short to tall kneeling Holds and weight shifts. & From short to tall kneeling   Stand on foam   Cues for more equal weight bearing. Cues for more equal weight bearing. Low<>Tall kneeling on pad     X 10          MODALITIES       E stim                  Other Therapeutic Activities/Education: To talk w doctor about AFO/consult  5/20/2022 visit talked with patient about possible mild compression stockings vs VALENTINO hose. .... 5/31/22 trial AFO - greatly improved gait, no toe drag. Pt states he feels better, safer and more normal.        Home Exercise Program:    Access Code: [email or web address]  URL: [email or web address]  Date: 05/12/2022  Prepared by: Paul Riser    Exercises  Supine Bridge - 1 x daily - 7 x weekly - 3 sets - 10 reps  Supine Figure 4 Piriformis Stretch - 1 x daily - 7 x weekly - 1 sets - 2 reps - 45 hold  Supine Piriformis Stretch with Leg Straight - 1 x daily - 7 x weekly - 1 sets - 2 reps - 45 hold  Active Straight Leg Raise with Quad Set - 1 x daily - 7 x weekly - 3 sets - 10 reps  Supine Ankle Pumps - 1 x daily - 7 x weekly - 3 sets - 10 reps  Supine Ankle Inversion Eversion AROM - 1 x daily - 7 x weekly - 3 sets - 10 reps      Manual Treatments:  x      Modalities: none       Communication with other providers:  None       Assessment:   Pt demonstrated no pain and moderate R LE fatigue post tx with increased reps and added exercise. Slowly improving gluteal strength with repetition. Reports no sensation below mid calf yet. Pt would continue to benefit from skilled therapy interventions to address remaining impairments, improve mobility and strength and progress toward goal completion while reducing risk for re-injury or further decline.   End session pain: 0/10     Plan for Next Session: R LE strengthening and neuro,       Time In / Time Out:   5300/6805       Timed Code/Total Treatment Minutes:  42'/42' 3 NR      Next Progress Note due:        Plan of Care Interventions:  [x] Therapeutic Exercise  [] Modalities:  [x] Therapeutic Activity     [] Ultrasound  [x] Estim  [x] Gait Training      [] Cervical Traction [] Lumbar Traction  [x] Neuromuscular Re-education    [] Cold/hotpack [] Iontophoresis   [x] Instruction in HEP      [] Vasopneumatic   [] Dry Needling    [x] Manual Therapy               [] Aquatic Therapy              Electronically signed by:  Kedar Monreal PTA, CLT  6/14/2022, 7:57 AM

## 2022-06-16 ENCOUNTER — HOSPITAL ENCOUNTER (OUTPATIENT)
Dept: PHYSICAL THERAPY | Age: 42
Discharge: HOME | End: 2022-06-16

## 2022-06-16 NOTE — FLOWSHEET NOTE
Physical Therapy  Cancellation/No-show Note  Patient Name:  Cuate Astudillo  :  1980   Date:  2022  Cancelled visits to date: 1  No-shows to date: 1  For today's appointment patient:  [x]  Cancelled  []  Rescheduled appointment  []  No-show     Reason given by patient:  []  Patient ill  []  Conflicting appointment  []  No transportation    []  Conflict with work  [x]  No reason given  []  Other:     Comments:       Electronically signed by:  Alexys Guadalupe PTA, CLT 2022, 8:10 AM

## 2022-06-21 ENCOUNTER — HOSPITAL ENCOUNTER (OUTPATIENT)
Dept: PHYSICAL THERAPY | Age: 42
Setting detail: THERAPIES SERIES
Discharge: HOME | End: 2022-06-21
Payer: COMMERCIAL

## 2022-06-21 PROCEDURE — 97110 THERAPEUTIC EXERCISES: CPT

## 2022-06-21 NOTE — FLOWSHEET NOTE
questions were asked and patient attested that there had been no contact or symptoms        Exercises: (No more than 4 columns)   Exercise/Equipment 6/3/2022 #6 #7 6/7/22 6/9/22 #8 6/14/22 #9 6/21/22 #10             WARM UP           Nu step L7 S15 no arms  L7 S15 no arms   L7 S15 no arms scifit    bike        TABLE        Hip stretching HS's and gastroc/soleus complex  Gluteals, adductors, HS Gluteals, adductors, HS Gluteals, adductors, HS Prone ir er stretch   bridge Staggered (R further back) 2*10 2x10 staggered  1x 5 cross 2x10 staggered  1x 5 cross 2x10 staggered  2x10 cross 2x10 cross   SLR 10* slight lag    3# 2x10   clamshells  Clam and rev clamshells 2x10  Clam 2x10  rev clamshells x 10 AAROM 2x10 clamshells  Tactile cues for tech. Hip IR stretching prone. S/L hip abduction Mod A for lift, tapping to glut med for facilitation, assist to decrease hip flexor compensation 2*10   Mod A for lift, tapping to glut med for facilitation, assist to decrease hip flexor compensation *10    Prone hip extension knee straight, knee bent  Mod A for lift, tapping of glut for facilitation 10* ea Mod assist to perform Mod assist to perform 2x10 Mod A bent to perform 2x10   MaxA knee ext Mod assist to perform 2x10   Prone HS curl  AAROM 10*  Lowering from 90° position 2*5  Mod A for lift, tapping at HS's for facilitation. Unable to completely let go of lower leg during eccentric portion of HS curl Isometric holds w assist.   Isometric holds assist required AAROM 10*  Lowering from 90° position 2*5  Mod A for lift, tapping at HS's for facilitation. Unable to completely let go of lower leg during eccentric portion of HS curl Knee flexion AAROM, MRE knee exten 2x10   SB HS curl  2x10, assist to help keep foot in neutral    2x10, improved control.             STANDING        Sit/stands 2*10 with R foot back   X 15 stagger stand 2 x 15 stagger stand     LAQ 2*10 hold at the top, ecc lower        Hip abd, extension Step taps        Side stepping        TKE         Fwd and retro walk in \\                     PROPRIOCEPTION        Wt shifting, lat, ant/post, staggerred        Tall kneeling on pad   Holds and weight shifts. & From short to tall kneeling Holds and weight shifts. & From short to tall kneeling    Stand on foam   Cues for more equal weight bearing. Cues for more equal weight bearing. Low<>Tall kneeling on pad     X 10    Rocker board     F/b, s/s balance and rock 30 sec x 2 each   MODALITIES        E stim                    Other Therapeutic Activities/Education: To talk w doctor about AFO/consult  5/20/2022 visit talked with patient about possible mild compression stockings vs VALENTINO hose. .... 5/31/22 trial AFO - greatly improved gait, no toe drag. Pt states he feels better, safer and more normal.        Home Exercise Program:    Access Code: [email or web address]  URL: [employer]/  Date: 05/12/2022  Prepared by: Deniz Baker    Exercises  Supine Bridge - 1 x daily - 7 x weekly - 3 sets - 10 reps  Supine Figure 4 Piriformis Stretch - 1 x daily - 7 x weekly - 1 sets - 2 reps - 45 hold  Supine Piriformis Stretch with Leg Straight - 1 x daily - 7 x weekly - 1 sets - 2 reps - 45 hold  Active Straight Leg Raise with Quad Set - 1 x daily - 7 x weekly - 3 sets - 10 reps  Supine Ankle Pumps - 1 x daily - 7 x weekly - 3 sets - 10 reps  Supine Ankle Inversion Eversion AROM - 1 x daily - 7 x weekly - 3 sets - 10 reps      Manual Treatments:  x      Modalities: none       Communication with other providers:  None       Assessment:   Pt demonstrated no pain and R LE fatigue post tx with increased reps and added exercise. Improving reports of sensations below knee. Improved control with supine SB work.    Pt would continue to benefit from skilled therapy interventions to address remaining impairments, improve mobility and strength and progress toward goal completion while reducing risk for re-injury or further decline.   End session pain: 0/10     Plan for Next Session: R LE strengthening and neuro,       Time In / Time Out:   0845/0930       Timed Code/Total Treatment Minutes:  45/45      Next Progress Note due:        Plan of Care Interventions:  [x] Therapeutic Exercise  [] Modalities:  [x] Therapeutic Activity     [] Ultrasound  [x] Estim  [x] Gait Training      [] Cervical Traction [] Lumbar Traction  [x] Neuromuscular Re-education    [] Cold/hotpack [] Iontophoresis   [x] Instruction in HEP      [] Vasopneumatic   [] Dry Needling    [x] Manual Therapy               [] Aquatic Therapy              Electronically signed by:  Myla Runner, PT, CLT  6/21/2022, 8:47 AM

## 2022-06-24 ENCOUNTER — HOSPITAL ENCOUNTER (OUTPATIENT)
Dept: PHYSICAL THERAPY | Age: 42
Setting detail: THERAPIES SERIES
Discharge: HOME | End: 2022-06-24
Payer: COMMERCIAL

## 2022-06-24 PROCEDURE — 97110 THERAPEUTIC EXERCISES: CPT

## 2022-06-24 PROCEDURE — 97112 NEUROMUSCULAR REEDUCATION: CPT

## 2022-06-24 NOTE — FLOWSHEET NOTE
Outpatient Physical Therapy  [place]           [x] Phone: [phone]   Fax: [phone]  [facility]           [] Phone: [phone]   Fax: [phone]        Physical Therapy Daily Treatment Note  Date:  2022    Patient Name:  Sara Finn    :  1980  MRN: [id number]  Restrictions/Precautions: Restrictions/Precautions: Fall Risk; Contact Precautions (Hep A positive)        Diagnosis:   Critical illness myopathy [G72.81] Diagnosis: Debility, critical illness, myopathy, unsteady gait. Date of Injury/Surgery:   Treatment Diagnosis:  debility, weakness, gait disturbance, decreased balance. Insurance/Certification information: [facility]  Referring Physician:  Vinod Ellison MD     PCP: Iris Brown MD  Next Doctor Visit:    Plan of care signed (Y/N):  Y  Outcome Measure:   Visit# / total visits:    recount done 6/3  Pain level: --      Goals:     Patient goals: return to work, stand/walk without  Short term goals  Time Frame for Short term goals:   consult for AFO  demo trace muscle activity or better at foot/ankle. complete 6 min walk test  Long Term Goals  Time Frame for Long Term Goals: 6 weeks  I in home program.  pain 0-2 with transfers, bed mobility, gait. amb 30mins with approp assistive device. up/down steps with reciprocal pattern        Summary of Evaluation:  Assessment: Pt presents to PT with complaints of hip tightness, pain and LE weakness following a fall and prolonged hospitalization. He was discharged 22 and presents to outpatient therapy today. He has loss of motor function of his foot and ankle, loss of sensation at leg, altered sensations at R thigh, weakness at RLE. Gait is with FWW, toe drag and steppage pattern. Subjective: Ariella Pérez comes in staing that he has had some inconsistant pain but it doesn't last long. He states that his leg feels \"stiff. \" Patient states that he has been feeling tingling in his foot.           Any changes in Ambulatory Summary Sheet? None         Objective:  COVID screening questions were asked and patient attested that there had been no contact or symptoms    Patient took frequent rest breaks due to muscle fatigue  SB assist for balance activity  Active muscle contraction with HS curls  More equal weightbearing for STS    Exercises: (No more than 4 columns)   Exercise/Equipment 6/14/22 #9 6/21/22 #10 6/24/22 #11           WARM UP         Nu step L7 S15 no arms scifit  L7 5' no arms scifit   bike      TABLE      Hip stretching Gluteals, adductors, HS Prone ir er stretch Prone ir er stretch   bridge 2x10 staggered  2x10 cross 2x10 cross 2x10 cross   SLR  3# 2x10 3# 2x10   clamshells Clam 2x10  rev clamshells x 10 AAROM 2x10 clamshells  Tactile cues for tech. 1x10 clamshells    1x15 ea leg RTB              S/L hip abduction Mod A for lift, tapping to glut med for facilitation, assist to decrease hip flexor compensation *10     Prone hip extension knee straight, knee bent  Mod A bent to perform 2x10   MaxA knee ext Mod assist to perform 2x10 Mod assist to perform 2x10   Prone HS curl  AAROM 10*  Lowering from 90° position 2*5  Mod A for lift, tapping at HS's for facilitation. Unable to completely let go of lower leg during eccentric portion of HS curl Knee flexion AAROM, MRE knee exten 2x10 Knee flexion AAROM, MRE knee exten 2x10  Max A   SB HS curl  2x10, improved control. 2x10, improved control. STANDING      Sit/stands 2 x 15 stagger stand   2 x 15 stagger stand    LAQ      Hip abd, extension      Step taps      Side stepping      TKE       Fwd and retro walk in \\                 PROPRIOCEPTION      Wt shifting, lat, ant/post, staggerred      Tall kneeling on pad Holds and weight shifts. & From short to tall kneeling     Stand on foam Cues for more equal weight bearing.        Low<>Tall kneeling on pad  X 10     Rocker board  F/b, s/s balance and rock 30 sec x 2 each F/b, s/s balance and rock 30 sec x 2 each MODALITIES      E stim                Other Therapeutic Activities/Education: To talk w doctor about AFO/consult  5/20/2022 visit talked with patient about possible mild compression stockings vs VALENTINO hose. .... 5/31/22 trial AFO - greatly improved gait, no toe drag. Pt states he feels better, safer and more normal.        Home Exercise Program:    Access Code: [email or web address]  URL: [employer]/  Date: 05/12/2022  Prepared by: Opal Lent    Exercises  Supine Bridge - 1 x daily - 7 x weekly - 3 sets - 10 reps  Supine Figure 4 Piriformis Stretch - 1 x daily - 7 x weekly - 1 sets - 2 reps - 45 hold  Supine Piriformis Stretch with Leg Straight - 1 x daily - 7 x weekly - 1 sets - 2 reps - 45 hold  Active Straight Leg Raise with Quad Set - 1 x daily - 7 x weekly - 3 sets - 10 reps  Supine Ankle Pumps - 1 x daily - 7 x weekly - 3 sets - 10 reps  Supine Ankle Inversion Eversion AROM - 1 x daily - 7 x weekly - 3 sets - 10 reps      Manual Treatments:  x      Modalities: none       Communication with other providers:  None       Assessment: Nicanor Cárdenas will continue to benefit from PT to help him regain his muscle strength and balance. Strength and muscle are improving.         End session pain: same    Plan for Next Session: R ARIANA strengthening and neuro,       Time In / Time Out:   9909/195       Timed Code/Total Treatment Minutes: 50' 2 NR 1 TE    Next Progress Note due:        Plan of Care Interventions:  [x] Therapeutic Exercise  [] Modalities:  [x] Therapeutic Activity     [] Ultrasound  [x] Estim  [x] Gait Training      [] Cervical Traction [] Lumbar Traction  [x] Neuromuscular Re-education    [] Cold/hotpack [] Iontophoresis   [x] Instruction in HEP      [] Vasopneumatic   [] Dry Needling    [x] Manual Therapy               [] Aquatic Therapy              Electronically signed by:  ELIANE Ojeda  6/24/2022, 6:41 AM     Reynold Craft PTA

## 2022-06-28 ENCOUNTER — HOSPITAL ENCOUNTER (OUTPATIENT)
Dept: PHYSICAL THERAPY | Age: 42
Discharge: HOME | End: 2022-06-28

## 2022-06-28 NOTE — FLOWSHEET NOTE
Physical Therapy  Cancellation/No-show Note  Patient Name:  Jaimie Cordova  :  1980   Date:  2022  Cancelled visits to date: 2  No-shows to date: 1  For today's appointment patient:  [x]  Cancelled  []  Rescheduled appointment  []  No-show     Reason given by patient:  []  Patient ill  []  Conflicting appointment  [x]  No transportation    []  Conflict with work  []  No reason given  []  Other:     Comments:   Flat tire  Electronically signed by:  Lanny Cuevas PT, BLESSINGT 2022, 8:03 AM

## 2022-06-29 ENCOUNTER — OFFICE VISIT (OUTPATIENT)
Dept: INTERNAL MEDICINE CLINIC | Age: 42
End: 2022-06-29
Payer: COMMERCIAL

## 2022-06-29 VITALS
OXYGEN SATURATION: 98 % | SYSTOLIC BLOOD PRESSURE: 152 MMHG | RESPIRATION RATE: 14 BRPM | HEART RATE: 73 BPM | DIASTOLIC BLOOD PRESSURE: 86 MMHG

## 2022-06-29 DIAGNOSIS — G72.81 CRITICAL ILLNESS MYOPATHY: Primary | ICD-10-CM

## 2022-06-29 DIAGNOSIS — I10 ESSENTIAL HYPERTENSION: ICD-10-CM

## 2022-06-29 DIAGNOSIS — F41.9 ANXIETY: ICD-10-CM

## 2022-06-29 DIAGNOSIS — L65.9 ALOPECIA: ICD-10-CM

## 2022-06-29 DIAGNOSIS — K70.11 ALCOHOLIC HEPATITIS WITH ASCITES: ICD-10-CM

## 2022-06-29 PROCEDURE — 99214 OFFICE O/P EST MOD 30 MIN: CPT | Performed by: INTERNAL MEDICINE

## 2022-06-29 RX ORDER — ALPRAZOLAM 0.25 MG/1
0.25 TABLET ORAL DAILY PRN
Qty: 10 TABLET | Refills: 0 | Status: SHIPPED | OUTPATIENT
Start: 2022-06-29 | End: 2022-09-21 | Stop reason: SDUPTHER

## 2022-06-29 RX ORDER — PREDNISONE 20 MG/1
TABLET ORAL
Qty: 45 TABLET | Refills: 1 | Status: SHIPPED | OUTPATIENT
Start: 2022-06-29

## 2022-06-29 RX ORDER — LISINOPRIL 10 MG/1
10 TABLET ORAL DAILY
Qty: 90 TABLET | Refills: 1 | Status: SHIPPED | OUTPATIENT
Start: 2022-06-29

## 2022-06-29 RX ORDER — PREDNISONE 20 MG/1
20 TABLET ORAL 2 TIMES DAILY
COMMUNITY
End: 2022-06-29 | Stop reason: SDUPTHER

## 2022-06-29 NOTE — PATIENT INSTRUCTIONS
Decrease prednisone to 20mg am and 10mg pm for one month, then 10mg twice/day till next appt in two months.     Do lab again prior to next appt

## 2022-06-30 ENCOUNTER — HOSPITAL ENCOUNTER (OUTPATIENT)
Dept: PHYSICAL THERAPY | Age: 42
Setting detail: THERAPIES SERIES
Discharge: HOME | End: 2022-06-30
Payer: COMMERCIAL

## 2022-06-30 PROCEDURE — 97140 MANUAL THERAPY 1/> REGIONS: CPT

## 2022-06-30 PROCEDURE — 97110 THERAPEUTIC EXERCISES: CPT

## 2022-06-30 NOTE — FLOWSHEET NOTE
and patient attested that there had been no contact or symptoms        Exercises: (No more than 4 columns)   Exercise/Equipment 6/14/22 #9 6/21/22 #10 6/24/22 #11 #12 6/30/22            WARM UP          Nu step L7 S15 no arms scifit  L7 5' no arms scifit    bike       TABLE       Hip stretching Gluteals, adductors, HS Prone ir er stretch Prone ir er stretch Gluteals, piriformis   bridge 2x10 staggered  2x10 cross 2x10 cross 2x10 cross 2x10 cross   SLR  3# 2x10 3# 2x10    clamshells Clam 2x10  rev clamshells x 10 AAROM 2x10 clamshells  Tactile cues for tech. 1x10 clamshells    1x15 ea leg RTB                S/L hip abduction Mod A for lift, tapping to glut med for facilitation, assist to decrease hip flexor compensation *10   Supine cues to minimize ER with hip abd   Prone hip extension knee straight, knee bent  Mod A bent to perform 2x10   MaxA knee ext Mod assist to perform 2x10 Mod assist to perform 2x10    Prone HS curl  AAROM 10*  Lowering from 90° position 2*5  Mod A for lift, tapping at HS's for facilitation. Unable to completely let go of lower leg during eccentric portion of HS curl Knee flexion AAROM, MRE knee exten 2x10 Knee flexion AAROM, MRE knee exten 2x10  Max A    SB HS curl  2x10, improved control. 2x10, improved control. Supine heel slide w yellow band 2x10   Seated hip IR, ER    3 ct holds 2 x 10    STANDING       Sit/stands 2 x 15 stagger stand   2 x 15 stagger stand     LAQ       Hip abd, extension       Step taps       Side stepping       TKE        Fwd and retro walk in \\                   PROPRIOCEPTION       Wt shifting, lat, ant/post, staggerred       Tall kneeling on pad Holds and weight shifts. & From short to tall kneeling      Stand on foam Cues for more equal weight bearing.         Low<>Tall kneeling on pad  X 10      Rocker board  F/b, s/s balance and rock 30 sec x 2 each F/b, s/s balance and rock 30 sec x 2 each    MODALITIES       E stim                  Other Therapeutic Activities/Education: To talk w doctor about AFO/consult  5/20/2022 visit talked with patient about possible mild compression stockings vs VALENTINO hose. .... 5/31/22 trial AFO - greatly improved gait, no toe drag. Pt states he feels better, safer and more normal.        Home Exercise Program:    Access Code: [email or web address]  URL: "[email or web address]  Date: 05/12/2022  Prepared by: Zulema Jara    Exercises  Supine Bridge - 1 x daily - 7 x weekly - 3 sets - 10 reps  Supine Figure 4 Piriformis Stretch - 1 x daily - 7 x weekly - 1 sets - 2 reps - 45 hold  Supine Piriformis Stretch with Leg Straight - 1 x daily - 7 x weekly - 1 sets - 2 reps - 45 hold  Active Straight Leg Raise with Quad Set - 1 x daily - 7 x weekly - 3 sets - 10 reps  Supine Ankle Pumps - 1 x daily - 7 x weekly - 3 sets - 10 reps  Supine Ankle Inversion Eversion AROM - 1 x daily - 7 x weekly - 3 sets - 10 reps      Manual Treatments:  TPR to hamstrings with good releases. Modalities: none       Communication with other providers:  None       Assessment: Aston Shelton will continue to benefit from PT to help him regain his muscle strength and balance. Strength and muscle are improving, improved endurance at hip and HS, but still fatigues quickly. .        End session pain: same    Plan for Next Session: R LE strengthening and neuro,       Time In / Time Out:   1030/1115       Timed Code/Total Treatment Minutes: 45/45    Next Progress Note due:        Plan of Care Interventions:  [x] Therapeutic Exercise  [] Modalities:  [x] Therapeutic Activity     [] Ultrasound  [x] Estim  [x] Gait Training      [] Cervical Traction [] Lumbar Traction  [x] Neuromuscular Re-education    [] Cold/hotpack [] Iontophoresis   [x] Instruction in HEP      [] Vasopneumatic   [] Dry Needling    [x] Manual Therapy               [] Aquatic Therapy              Electronically signed by:  Janet Ceja PT,   6/30/2022, 10:21 AM

## 2022-07-05 ENCOUNTER — HOSPITAL ENCOUNTER (OUTPATIENT)
Dept: PHYSICAL THERAPY | Age: 42
Setting detail: THERAPIES SERIES
Discharge: HOME | End: 2022-07-05
Payer: COMMERCIAL

## 2022-07-05 PROCEDURE — 97140 MANUAL THERAPY 1/> REGIONS: CPT

## 2022-07-05 PROCEDURE — 97110 THERAPEUTIC EXERCISES: CPT

## 2022-07-05 NOTE — FLOWSHEET NOTE
attested that there had been no contact or symptoms    Initiated amb w spc and afo. Unable to SLS on RLE    Exercises: (No more than 4 columns)   Exercise/Equipment 6/14/22 #9 6/21/22 #10 6/24/22 #11 #12 6/30/22 #13 7/5/22             WARM UP           Nu step L7 S15 no arms scifit  L7 5' no arms scifit     bike        TABLE        Hip stretching Gluteals, adductors, HS Prone ir er stretch Prone ir er stretch Gluteals, piriformis Gluteals, piriformis   bridge 2x10 staggered  2x10 cross 2x10 cross 2x10 cross 2x10 cross On SB 2x10   SLR  3# 2x10 3# 2x10  0# no contact w table, cues to keep knee straight/locked w QS.  3*10   clamshells Clam 2x10  rev clamshells x 10 AAROM 2x10 clamshells  Tactile cues for tech. 1x10 clamshells    1x15 ea leg RTB    2x10              S/L hip abduction Mod A for lift, tapping to glut med for facilitation, assist to decrease hip flexor compensation *10   Supine cues to minimize ER with hip abd    Prone hip extension knee straight, knee bent  Mod A bent to perform 2x10   MaxA knee ext Mod assist to perform 2x10 Mod assist to perform 2x10     Prone HS curl  AAROM 10*  Lowering from 90° position 2*5  Mod A for lift, tapping at HS's for facilitation. Unable to completely let go of lower leg during eccentric portion of HS curl Knee flexion AAROM, MRE knee exten 2x10 Knee flexion AAROM, MRE knee exten 2x10  Max A     SB HS curl  2x10, improved control. 2x10, improved control. Supine heel slide w yellow band 2x10 Supine heel slide w yellow band 2x10   Seated hip IR, ER    3 ct holds 2 x 10     STANDING        Sit/stands 2 x 15 stagger stand   2 x 15 stagger stand      LAQ        Hip abd, extension     Yellow loop 2x10 abd.   Red single 2x10   Step taps        Side stepping        TKE         Fwd and retro walk in \\                     PROPRIOCEPTION        Wt shifting, lat, ant/post, staggerred        Tall kneeling on pad Holds and weight shifts. & From short to tall kneeling       Stand on foam Cues for more equal weight bearing. Low<>Tall kneeling on pad  X 10       Rocker board  F/b, s/s balance and rock 30 sec x 2 each F/b, s/s balance and rock 30 sec x 2 each     MODALITIES        E stim                    Other Therapeutic Activities/Education: To talk w doctor about AFO/consult  5/20/2022 visit talked with patient about possible mild compression stockings vs VALENTINO hose. .... 5/31/22 trial AFO - greatly improved gait, no toe drag. Pt states he feels better, safer and more normal.        Home Exercise Program:    Access Code: [email or web address]  URL: [email or web address]  Date: 05/12/2022  Prepared by: Danny Chase    Exercises  Supine Bridge - 1 x daily - 7 x weekly - 3 sets - 10 reps  Supine Figure 4 Piriformis Stretch - 1 x daily - 7 x weekly - 1 sets - 2 reps - 45 hold  Supine Piriformis Stretch with Leg Straight - 1 x daily - 7 x weekly - 1 sets - 2 reps - 45 hold  Active Straight Leg Raise with Quad Set - 1 x daily - 7 x weekly - 3 sets - 10 reps  Supine Ankle Pumps - 1 x daily - 7 x weekly - 3 sets - 10 reps  Supine Ankle Inversion Eversion AROM - 1 x daily - 7 x weekly - 3 sets - 10 reps      Manual Treatments:  TPR to hamstrings with good releases. Modalities: none       Communication with other providers:  None       Assessment: Lacey Shukla will continue to benefit from PT to help him regain his muscle strength and balance. Safe amb with spc, did have some increased steppage with spc. Relates fatigue with ex's today.        End session pain: same    Plan for Next Session: R LE strengthening and neuro,       Time In / Time Out:   0800/840       Timed Code/Total Treatment Minutes:  40/40    Next Progress Note due:        Plan of Care Interventions:  [x] Therapeutic Exercise  [] Modalities:  [x] Therapeutic Activity     [] Ultrasound  [x] Estim  [x] Gait Training      [] Cervical Traction [] Lumbar Traction  [x] Neuromuscular Re-education    [] Cold/hotpack [] Iontophoresis   [x] Instruction in HEP      [] Vasopneumatic   [] Dry Needling    [x] Manual Therapy               [] Aquatic Therapy              Electronically signed by:  Romana Ben, PT,   7/5/2022, 8:04 AM

## 2022-07-07 ENCOUNTER — HOSPITAL ENCOUNTER (OUTPATIENT)
Dept: PHYSICAL THERAPY | Age: 42
Discharge: HOME | End: 2022-07-07

## 2022-07-07 NOTE — FLOWSHEET NOTE
Physical Therapy  Cancellation/No-show Note  Patient Name:  [facility] Room  :  1980   Date:  2022  Cancelled visits to date: 3  No-shows to date: 1  For today's appointment patient:  [x]  Cancelled  []  Rescheduled appointment  []  No-show     Reason given by patient:  [x]  Patient ill  []  Conflicting appointment  []  No transportation    []  Conflict with work  []  No reason given  []  Other:     Comments:   Electronically signed by:  Kaity Hein PT, CLT 2022, 8:47 AM

## 2022-07-12 ENCOUNTER — HOSPITAL ENCOUNTER (OUTPATIENT)
Dept: PHYSICAL THERAPY | Age: 42
Discharge: HOME | End: 2022-07-12

## 2022-07-12 NOTE — FLOWSHEET NOTE
Physical Therapy  Cancellation/No-show Note  Patient Name:  Ministerio Chavez  :  1980   Date:  2022  Cancelled visits to date: 4  No-shows to date: 1  For today's appointment patient:  [x]  Cancelled  []  Rescheduled appointment  []  No-show     Reason given by patient:  []  Patient ill  []  Conflicting appointment  [x]  No transportation    []  Conflict with work  []  No reason given  []  Other:     Comments:   Electronically signed by:  Monica Yoon PT, CLT 2022, 8:30 AM

## 2022-07-14 ENCOUNTER — HOSPITAL ENCOUNTER (OUTPATIENT)
Dept: PHYSICAL THERAPY | Age: 42
Setting detail: THERAPIES SERIES
Discharge: HOME | End: 2022-07-14
Payer: COMMERCIAL

## 2022-07-14 PROCEDURE — 97110 THERAPEUTIC EXERCISES: CPT

## 2022-07-14 PROCEDURE — 97140 MANUAL THERAPY 1/> REGIONS: CPT

## 2022-07-14 NOTE — FLOWSHEET NOTE
Outpatient Physical Therapy  [facility]           [x] Phone: [phone]   Fax: [phone]  [facility]           [] Phone: [phone]   Fax: [phone]        Physical Therapy Daily Treatment Note  Date:  2022    Patient Name:  Elinor Saul    :  1980  MRN: [id number]  Restrictions/Precautions: Restrictions/Precautions: Fall Risk; Contact Precautions (Hep A positive)        Diagnosis:   Critical illness myopathy [G72.81] Diagnosis: Debility, critical illness, myopathy, unsteady gait. Date of Injury/Surgery:   Treatment Diagnosis:  debility, weakness, gait disturbance, decreased balance. Insurance[facility]Certification information: [facility]  Referring Physician:  Poly Casey MD     PCP: Yoli Bailey MD  Next Doctor Visit:    Plan of care signed (Y/N):  Y  Outcome Measure:   Visit# / total visits:    recount done 6/3  Pain level: --      Goals:     Patient goals: return to work, stand/walk without  Short term goals  Time Frame for Short term goals:   consult for AFO  demo trace muscle activity or better at foot/ankle. complete 6 min walk test  Long Term Goals  Time Frame for Long Term Goals: 6 weeks  I in home program.  pain 0-2 with transfers, bed mobility, gait. amb 30mins with approp assistive device. up/down steps with reciprocal pattern        Summary of Evaluation:  Assessment: Pt presents to PT with complaints of hip tightness, pain and LE weakness following a fall and prolonged hospitalization. He was discharged 22 and presents to outpatient therapy today. He has loss of motor function of his foot and ankle, loss of sensation at leg, altered sensations at R thigh, weakness at RLE. Gait is with FWW, toe drag and steppage pattern. Subjective:  Saw Neuro - ordered EMG and MRI. Trying to wean from crutches    Any changes in Ambulatory Summary Sheet?   None         Objective:  COVID screening questions were asked and patient attested that there had been no contact or symptoms    Exercises: (No more than 4 columns)   Exercise/Equipment 6/14/22 #9 6/21/22 #10 6/24/22 #11 #12 6/30/22 #13 7/5/22 #14 7/14/22              WARM UP            Nu step L7 S15 no arms scifit  L7 5' no arms scifit   lvl 7 x 5 mins. bike         TABLE         Hip stretching Gluteals, adductors, HS Prone ir er stretch Prone ir er stretch Gluteals, piriformis Gluteals, piriformis Gluteals, piriformis, HS   bridge 2x10 staggered  2x10 cross 2x10 cross 2x10 cross 2x10 cross On SB 2x10 On SB 2x10   SLR  3# 2x10 3# 2x10  0# no contact w table, cues to keep knee straight/locked w QS.  3*10 0# no contact w table, cues to keep knee straight/locked w QS.  3*10   clamshells Clam 2x10  rev clamshells x 10 AAROM 2x10 clamshells  Tactile cues for tech. 1x10 clamshells    1x15 ea leg RTB    2x10                S/L hip abduction Mod A for lift, tapping to glut med for facilitation, assist to decrease hip flexor compensation *10   Supine cues to minimize ER with hip abd  Small motions, cues for hip/trunk positioning. Prone hip extension knee straight, knee bent  Mod A bent to perform 2x10   MaxA knee ext Mod assist to perform 2x10 Mod assist to perform 2x10   Prone gluteal activation 3x10   Prone HS curl  AAROM 10*  Lowering from 90° position 2*5  Mod A for lift, tapping at HS's for facilitation. Unable to completely let go of lower leg during eccentric portion of HS curl Knee flexion AAROM, MRE knee exten 2x10 Knee flexion AAROM, MRE knee exten 2x10  Max A      SB HS curl  2x10, improved control. 2x10, improved control. Supine heel slide w yellow band 2x10 Supine heel slide w yellow band 2x10    Seated hip IR, ER    3 ct holds 2 x 10      STANDING         Sit/stands 2 x 15 stagger stand   2 x 15 stagger stand    Stagger stand 22\" x12   LAQ         Hip abd, extension     Yellow loop 2x10 abd.   Red single 2x10    Step taps         Side stepping         TKE          Fwd and retro walk in \\ PROPRIOCEPTION         Wt shifting, lat, ant/post, staggerred         Tall kneeling on pad Holds and weight shifts. & From short to tall kneeling        Stand on foam Cues for more equal weight bearing. Low<>Tall kneeling on pad  X 10        Rocker board  F/b, s/s balance and rock 30 sec x 2 each F/b, s/s balance and rock 30 sec x 2 each      MODALITIES         E stim                      Other Therapeutic Activities/Education: To talk w doctor about AFO/consult  5/20/2022 visit talked with patient about possible mild compression stockings vs VALENTINO hose. .... 5/31/22 trial AFO - greatly improved gait, no toe drag. Pt states he feels better, safer and more normal.        Home Exercise Program:    Access Code: [email or web address]  URL: [email or web address]  Date: 05/12/2022  Prepared by: Betzy Sims    Exercises  Supine Bridge - 1 x daily - 7 x weekly - 3 sets - 10 reps  Supine Figure 4 Piriformis Stretch - 1 x daily - 7 x weekly - 1 sets - 2 reps - 45 hold  Supine Piriformis Stretch with Leg Straight - 1 x daily - 7 x weekly - 1 sets - 2 reps - 45 hold  Active Straight Leg Raise with Quad Set - 1 x daily - 7 x weekly - 3 sets - 10 reps  Supine Ankle Pumps - 1 x daily - 7 x weekly - 3 sets - 10 reps  Supine Ankle Inversion Eversion AROM - 1 x daily - 7 x weekly - 3 sets - 10 reps      Manual Treatments:  TPR to hamstrings with good releases. Modalities: none       Communication with other providers:  None       Assessment: Robb Seay will continue to benefit from PT to help him regain his muscle strength and balance. Safe amb with spc, did have some increased steppage with spc.   Some improved       End session pain: same 0/10    Plan for Next Session: R LE strengthening and neuro,        Time In / Time Out:  1030/1113       Timed Code/Total Treatment Minutes:  43/43  Next Progress Note due:        Plan of Care Interventions:  [x] Therapeutic Exercise  [] Modalities:  [x] Therapeutic Activity     [] Ultrasound  [x] Estim  [x] Gait Training      [] Cervical Traction [] Lumbar Traction  [x] Neuromuscular Re-education    [] Cold/hotpack [] Iontophoresis   [x] Instruction in HEP      [] Vasopneumatic   [] Dry Needling    [x] Manual Therapy               [] Aquatic Therapy              Electronically signed by:  Fer Underwood PT,   7/14/2022, 10:30 AM

## 2022-07-29 ENCOUNTER — HOSPITAL ENCOUNTER (OUTPATIENT)
Dept: PHYSICAL THERAPY | Age: 42
Discharge: HOME | End: 2022-07-29

## 2022-07-29 NOTE — FLOWSHEET NOTE
Physical Therapy  Cancellation/No-show Note  Patient Name:  Sandra Gibson  :  1980   Date:  2022  Cancelled visits to date: 5  No-shows to date: 1  For today's appointment patient:  [x]  Cancelled  []  Rescheduled appointment  []  No-show     Reason given by patient:  [x]  Patient ill  []  Conflicting appointment  []  No transportation    []  Conflict with work  []  No reason given  []  Other:     Comments:   Electronically signed by:  Sherol Osgood II, PTA [id number]       2022, 10:45 AM

## 2022-08-09 ENCOUNTER — HOSPITAL ENCOUNTER (OUTPATIENT)
Dept: PHYSICAL THERAPY | Age: 42
Discharge: HOME | End: 2022-08-09

## 2022-08-31 ENCOUNTER — TELEMEDICINE (OUTPATIENT)
Dept: INTERNAL MEDICINE CLINIC | Age: 42
End: 2022-08-31
Payer: COMMERCIAL

## 2022-08-31 DIAGNOSIS — S01.512D LACERATION OF TONGUE, SUBSEQUENT ENCOUNTER: ICD-10-CM

## 2022-08-31 DIAGNOSIS — R56.9 ALCOHOL WITHDRAWAL SEIZURE WITH COMPLICATION (HCC): Primary | ICD-10-CM

## 2022-08-31 DIAGNOSIS — F10.939 ALCOHOL WITHDRAWAL SEIZURE WITH COMPLICATION (HCC): Primary | ICD-10-CM

## 2022-08-31 DIAGNOSIS — G72.81 CRITICAL ILLNESS MYOPATHY: ICD-10-CM

## 2022-08-31 PROCEDURE — 99214 OFFICE O/P EST MOD 30 MIN: CPT | Performed by: INTERNAL MEDICINE

## 2022-08-31 RX ORDER — HYDROCODONE BITARTRATE AND ACETAMINOPHEN 5; 325 MG/1; MG/1
1 TABLET ORAL 2 TIMES DAILY PRN
Qty: 10 TABLET | Refills: 0 | Status: SHIPPED | OUTPATIENT
Start: 2022-08-31 | End: 2022-09-05

## 2022-08-31 RX ORDER — CYCLOBENZAPRINE HCL 5 MG
5 TABLET ORAL 2 TIMES DAILY PRN
Qty: 40 TABLET | Refills: 0 | Status: SHIPPED | OUTPATIENT
Start: 2022-08-31 | End: 2022-09-21 | Stop reason: SDUPTHER

## 2022-08-31 RX ORDER — LIDOCAINE HYDROCHLORIDE 20 MG/ML
5 SOLUTION OROPHARYNGEAL 4 TIMES DAILY
Qty: 100 ML | Refills: 0 | Status: SHIPPED | OUTPATIENT
Start: 2022-08-31 | End: 2022-09-06 | Stop reason: SDUPTHER

## 2022-08-31 NOTE — PROGRESS NOTES
2022    TELEHEALTH EVALUATION -- Audio/Visual (During SXRPO-27 public health emergency)    HPI:    Elinor Saul (:  1980) has requested an audio/video evaluation for the following concern(s):    SEEN FOR F/U ADMIT TO [facility], HAD SEIZURE W APPARENT ALC WITHDRAWAL SO SEEMS HAS HAD A RELAPSE OF ALC ABUSE. ADMIT /-. HE STATES  HAD TWO BOURBON/COKES AND NYQUIL THE DAY PRIOR TO ADMISSION AND TWO GLASSES OF WINE 2D PRIOR TO ADMIT, THEN   FELL IN SHOWER W SEIZURE, BIT TONGUE TOO. [facility] CALLED, TAKEN TO ED, ADMITTED AS BELOW. HE STATES W FALL HAS ALSO SPRAINED L LEG.  XRAY NEG FX  BUT SOME SOFT TISSUE SWELLING IS NOTED. IS CONT TAPERING PREDNISONE, WAS ON 40MG/DAY AND NOW 20MG/DAY, OR 1/2 BID. DC SUMMARY:   Discharge Physical Exam:  General: A&O x3, in no acute distress  Time: Left lateral laceration due to tongue bite. CV: RRR, nl s1 s2 no m/r/g  Lungs: CTA, no w/r/r  Abd: S/NT/ND, nl bs x 4 no rebound or guarding  Ext: 2+ pulses, no edema, no cyanosis or clubbing  Skin: Left shoulder bruising. Right lower extremity bruising    Hospital Course:   Queen Carmelina is a 39 y.o. 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 Patient was in acute alcohol withdrawal symptoms due to chronic alcohol dependency. Initially he was loaded with IV Keppra and Ativan. EEG was within normal limits and no focal lateralizing or epileptiform discharge was seen. Neurology did not recommend initiation of AEDs. Patient was admitted to the hospital for alcohol detox. He required Precedex drip due to delirium. His symptoms gradually resolved and was deemed stable to be discharged with appropriate follow-up as an outpatient.  He did sustain a tongue bite during his seizure and was recommended to continue with Augmentin and continue with Magic mouthwash as an outpatient. Patient has been seen by behavioral health assessment team and provided with outpatient resources for alcohol cessation. He has an upcoming appointment with neurology and scheduled for EMG on Monday, 8/29/2022 due to known right lower extremity myopathy/neuropathy thought secondary to UIVUB-38 complications. Patient to follow-up accordingly. He will follow-up with PCP as needed. At the time of discharge from the hospital, patient was hemodynamically stable. The remainder of the patient's chronic medical issues remained stable and appropriately treated with home regimens throughout this admission. On the date of discharge, the patient was found to not be in any acute distress, with vital signs within normal limits, and no new abnormalities on physical examination. Further, the patient expressed appropriate understanding of, and agreement with, the discharge recommendations, medications, and plan. Discharge Medications:  Discharge Medication List as of 8/26/2022 1:15 PM       START taking these medications   Details   amoxicillin-clavulanate (AUGMENTIN) 875-125 mg tablet Take 1 tablet by mouth 2 times a day for 7 days. Disp-14 tablet, R-0, Normal     folic acid (FOLVITE) 1 mg tablet Take 1 tablet (1 mg total) by mouth daily. Disp-30 tablet, R-0, Normal     lidocaine 2 % 3.3333 mL, diphenhydrAMINE 12.5 mg/ 5 mL 3.3333 mL, aluminum-magnesium hydroxide 200-200 mg/5 mL 3.3333 mL mouthwash Swish and spit 10 mLs 3 times a day. Disp-900 mL, R-0, Print     thiamine, B-1, 100 mg tablet Take 1 tablet (100 mg total) by mouth daily. Disp-30 tablet, R-0, Normal         CONTINUE these medications which have CHANGED   Details   lisinopriL (PRINIVIL) 20 mg tablet Take 1 tablet (20 mg total) by mouth daily.  Indications: high blood pressureDisp-30 tablet, R-0, Normal         CONTINUE these medications which have NOT CHANGED   Details   atenoloL (TENORMIN) 50 mg tablet Take 50 mg by mouth daily. Historical Med     cyclobenzaprine (FLEXERIL) 10 mg tablet Take 1 tablet by mouth 2 times daily as needed for Muscle spasms. Disp-15 tablet, R-0, Print     hydrocodone-acetaminophen (VICODIN) 5-325 mg per tablet Take 1 tablet by mouth every 6 hours as needed for Pain. 1 tablet, Historical Med     predniSONE (DELTASONE) 10 mg tablet Take 10 mg by mouth daily. Taper dose down10 mg, Historical Med     UNABLE TO FIND Various oils: lavender, peppermint, etcHistorical Med         CONDITION ON DISCHARGE  Stable   Home: [facility] [phone]    Review of Systems    Prior to Visit Medications    Medication Sig Taking? Authorizing Provider   lisinopril (PRINIVIL;ZESTRIL) 10 MG tablet Take 1 tablet by mouth daily  Reynaldo Erickson MD   predniSONE (DELTASONE) 20 MG tablet 1 tab in am and 1/2 in pm  Reynaldo Erickson MD   atenolol (TENORMIN) 50 MG tablet Take 1 tablet by mouth daily  Reynaldo Erickson MD       Social History     Tobacco Use    Smoking status: Never    Smokeless tobacco: Never   Substance Use Topics    Alcohol use: Yes     Alcohol/week: 3.0 - 4.0 standard drinks     Types: 3 - 4 Shots of liquor per week     Comment: 3-4 drinks per night on weekend    Drug use: Never          PHYSICAL EXAMINATION:  [ INSTRUCTIONS:  \"[x]\" Indicates a positive item  \"[]\" Indicates a negative item  -- DELETE ALL ITEMS NOT EXAMINED]  Vital Signs: (As obtained by patient/caregiver or practitioner observation)    Blood pressure-  Heart rate-    Respiratory rate-    Temperature-  Pulse oximetry-     Constitutional: [x] Appears well-developed and well-nourished [] No apparent distress      [] Abnormal-   Mental status  [] Alert and awake  [] Oriented to person/place/time []Able to follow commands      Eyes:  EOM    []  Normal  [] Abnormal-  Sclera  []  Normal  [] Abnormal -         Discharge []  None visible  [] Abnormal -    HENT:   [] Normocephalic, atraumatic.   [] Abnormal   [] Mouth/Throat: Mucous membranes are moist.     External Ears [] Normal  [] Abnormal-     Neck: [] No visualized mass     Pulmonary/Chest: [x] Respiratory effort normal.  [] No visualized signs of difficulty breathing or respiratory distress        [] Abnormal-      Musculoskeletal:   [] Normal gait with no signs of ataxia         [] Normal range of motion of neck        [] Abnormal-       Neurological:        [] No Facial Asymmetry (Cranial nerve 7 motor function) (limited exam to video visit)          [] No gaze palsy        [] Abnormal-         Skin:        [] No significant exanthematous lesions or discoloration noted on facial skin         [] Abnormal-            Psychiatric:       [] Normal Affect [] No Hallucinations        [] Abnormal-     Other pertinent observable physical exam findings-     ASSESSMENT/PLAN:  1. Alcohol withdrawal seizure with complication ([address]  FORTUNATELY DOES NOT APPEAR TO HAVE ANY SEVERE OUTCOMES AFTER HIS SEIZURE AND NEEDS TO NOW AVOID ALL ALCOHOL COMPLETELY, ANY AMOUNT COULD BRING ABOUT ANOTHER SEIZURE. HE IS ALSO TO CONT F/U W NEUROLOGY DR MIRELES  - Comprehensive Metabolic Panel; Future  - CBC with Auto Differential; Future    2. Laceration of tongue, subsequent encounter  HEALING, TO FINISH AUGMETIN AND ADD LIDOCAINE, ONE MORE SCRIPT NORCO GIVEN FOR HIS SEVERE PAIN  - lidocaine viscous hcl (XYLOCAINE) 2 % SOLN solution; Take 5 mLs by mouth 4 times daily for 5 days  Dispense: 100 mL; Refill: 0  - HYDROcodone-acetaminophen (NORCO) 5-325 MG per tablet; Take 1 tablet by mouth 2 times daily as needed for Pain for up to 5 days. Intended supply: 5 days. Take lowest dose possible to manage pain  Dispense: 10 tablet; Refill: 0    3. Critical illness myopathy  CONT PRN FLEXERIL AND PT HUMZA WILLIS PLANS TO CONT ADV ACTIVITY AND WE ARE STILL ON COURSE FOR PLANNED RTW BY EARLY OCT. I'LL REASSESS IN THREE WEEKS  - cyclobenzaprine (FLEXERIL) 5 MG tablet;  Take 1 tablet by mouth 2 times daily as needed for Muscle

## 2022-09-06 DIAGNOSIS — S01.512D LACERATION OF TONGUE, SUBSEQUENT ENCOUNTER: ICD-10-CM

## 2022-09-06 DIAGNOSIS — G72.81 CRITICAL ILLNESS MYOPATHY: Primary | ICD-10-CM

## 2022-09-06 DIAGNOSIS — F10.939 ALCOHOL WITHDRAWAL SEIZURE WITH COMPLICATION (HCC): ICD-10-CM

## 2022-09-06 DIAGNOSIS — R56.9 ALCOHOL WITHDRAWAL SEIZURE WITH COMPLICATION (HCC): ICD-10-CM

## 2022-09-06 RX ORDER — LIDOCAINE HYDROCHLORIDE 20 MG/ML
5 SOLUTION OROPHARYNGEAL 4 TIMES DAILY
Qty: 200 ML | Refills: 0 | Status: SHIPPED | OUTPATIENT
Start: 2022-09-06 | End: 2022-09-16

## 2022-09-08 ENCOUNTER — HOSPITAL ENCOUNTER (OUTPATIENT)
Dept: PHYSICAL THERAPY | Age: 42
Discharge: HOME | End: 2022-09-08

## 2022-09-08 NOTE — FLOWSHEET NOTE
Physical Therapy  Cancellation/No-show Note  Patient Name:  Sandra Gibson  :  1980   Date:  2022  Cancelled visits to date: 7  No-shows to date: 2  For today's appointment patient:  [x]  Cancelled  []  Rescheduled appointment  []  No-show     Reason given by patient:  []  Patient ill  []  Conflicting appointment  []  No transportation    []  Conflict with work  []  No reason given  [x]  Other:  seizure and fell, injured his \"good\" leg. On hold pending additional consultations.     Comments:   Electronically signed by:  Sara Ortiz PT, CLT 2022, 11:28 AM

## 2022-09-08 NOTE — FLOWSHEET NOTE
Pt's wife called to say Casandra Mccarthy had a seizure last week and hurt his \"good\" leg. They will wait to hear from the neurologist before deciding on next steps.

## 2022-09-14 NOTE — PROGRESS NOTES
Surgery:    [email or web address]                      Arrival time: 1000  Nothing to eat or drink after midnight unless instructed to take certain medications by the doctor or the nurse the am of surgery  Arrive at the front information desk -1st floor /[facility] [place] is on [address] [facility]  Please leave money and all other valuables at home. Wear comfortable clothing. If you wear contacts please bring a case. No make up. You may be asked to remove rings or body piercing. Please bring insurance cards and picture ID am of procedure. Please bring any consent or paper work from your doctor. If you become ill,such as a cold, sore throat or fever contact your doctor. Please bathe or shower am of procedure. Medications to take AM of procedure:     Any questions call [facility] [place]  [phone]      Pt had COVID test done 6/29/20 in process and pt was not told to self quarantine. No Repair - Repaired With Adjacent Surgical Defect Text (Leave Blank If You Do Not Want): After obtaining clear surgical margins the defect was repaired concurrently with another surgical defect which was in close approximation.

## 2022-09-15 ENCOUNTER — HOSPITAL ENCOUNTER (OUTPATIENT)
Age: 42
Discharge: HOME | End: 2022-09-15
Payer: COMMERCIAL

## 2022-09-15 DIAGNOSIS — F10.939 ALCOHOL WITHDRAWAL SEIZURE WITH COMPLICATION (HCC): ICD-10-CM

## 2022-09-15 DIAGNOSIS — K70.11 ALCOHOLIC HEPATITIS WITH ASCITES: ICD-10-CM

## 2022-09-15 DIAGNOSIS — S30.0XXD TRAUMATIC HEMATOMA OF BUTTOCK, SUBSEQUENT ENCOUNTER: ICD-10-CM

## 2022-09-15 DIAGNOSIS — G72.81 CRITICAL ILLNESS MYOPATHY: ICD-10-CM

## 2022-09-15 DIAGNOSIS — R56.9 ALCOHOL WITHDRAWAL SEIZURE WITH COMPLICATION (HCC): ICD-10-CM

## 2022-09-15 LAB
ALBUMIN SERPL-MCNC: 4.5 GM/DL (ref 3.4–5)
ALP BLD-CCNC: 75 IU/L (ref 40–128)
ALT SERPL-CCNC: 24 U/L (ref 10–40)
AMMONIA: 15 UMOL/L (ref 16–60)
ANION GAP SERPL CALCULATED.3IONS-SCNC: 12 MMOL/L (ref 4–16)
AST SERPL-CCNC: 22 IU/L (ref 15–37)
BASOPHILS ABSOLUTE: 0.1 K/CU MM
BASOPHILS RELATIVE PERCENT: 0.5 % (ref 0–1)
BILIRUB SERPL-MCNC: 0.6 MG/DL (ref 0–1)
BUN BLDV-MCNC: 13 MG/DL (ref 6–23)
CALCIUM SERPL-MCNC: 9.5 MG/DL (ref 8.3–10.6)
CHLORIDE BLD-SCNC: 103 MMOL/L (ref 99–110)
CO2: 26 MMOL/L (ref 21–32)
CREAT SERPL-MCNC: 0.7 MG/DL (ref 0.9–1.3)
DIFFERENTIAL TYPE: ABNORMAL
EOSINOPHILS ABSOLUTE: 0 K/CU MM
EOSINOPHILS RELATIVE PERCENT: 0.2 % (ref 0–3)
GFR AFRICAN AMERICAN: >60 ML/MIN/1.73M2
GFR NON-AFRICAN AMERICAN: >60 ML/MIN/1.73M2
GLUCOSE BLD-MCNC: 93 MG/DL (ref 70–99)
HCT VFR BLD CALC: 43.1 % (ref 42–52)
HEMOGLOBIN: 13.2 GM/DL (ref 13.5–18)
IMMATURE NEUTROPHIL %: 0.5 % (ref 0–0.43)
LYMPHOCYTES ABSOLUTE: 0.9 K/CU MM
LYMPHOCYTES RELATIVE PERCENT: 9.5 % (ref 24–44)
MCH RBC QN AUTO: 31.2 PG (ref 27–31)
MCHC RBC AUTO-ENTMCNC: 30.6 % (ref 32–36)
MCV RBC AUTO: 101.9 FL (ref 78–100)
MONOCYTES ABSOLUTE: 0.6 K/CU MM
MONOCYTES RELATIVE PERCENT: 6.3 % (ref 0–4)
NUCLEATED RBC %: 0 %
PDW BLD-RTO: 13.9 % (ref 11.7–14.9)
PLATELET # BLD: 293 K/CU MM (ref 140–440)
PMV BLD AUTO: 11 FL (ref 7.5–11.1)
POTASSIUM SERPL-SCNC: 4.3 MMOL/L (ref 3.5–5.1)
RBC # BLD: 4.23 M/CU MM (ref 4.6–6.2)
SEGMENTED NEUTROPHILS ABSOLUTE COUNT: 8 K/CU MM
SEGMENTED NEUTROPHILS RELATIVE PERCENT: 83 % (ref 36–66)
SODIUM BLD-SCNC: 141 MMOL/L (ref 135–145)
TOTAL IMMATURE NEUTOROPHIL: 0.05 K/CU MM
TOTAL NUCLEATED RBC: 0 K/CU MM
TOTAL PROTEIN: 6.2 GM/DL (ref 6.4–8.2)
WBC # BLD: 9.7 K/CU MM (ref 4–10.5)

## 2022-09-15 PROCEDURE — 80053 COMPREHEN METABOLIC PANEL: CPT

## 2022-09-15 PROCEDURE — 36415 COLL VENOUS BLD VENIPUNCTURE: CPT

## 2022-09-15 PROCEDURE — 82140 ASSAY OF AMMONIA: CPT

## 2022-09-15 PROCEDURE — 85025 COMPLETE CBC W/AUTO DIFF WBC: CPT

## 2022-09-16 NOTE — RESULT ENCOUNTER NOTE
Please call pt, his lab has improved now w normalization of liver fxn tests staying off all alcohol.

## 2022-09-20 ENCOUNTER — TELEPHONE (OUTPATIENT)
Dept: INTERNAL MEDICINE CLINIC | Age: 42
End: 2022-09-20

## 2022-09-21 ENCOUNTER — OFFICE VISIT (OUTPATIENT)
Dept: INTERNAL MEDICINE CLINIC | Age: 42
End: 2022-09-21
Payer: COMMERCIAL

## 2022-09-21 VITALS
OXYGEN SATURATION: 98 % | RESPIRATION RATE: 16 BRPM | WEIGHT: 237 LBS | SYSTOLIC BLOOD PRESSURE: 139 MMHG | DIASTOLIC BLOOD PRESSURE: 70 MMHG | HEART RATE: 65 BPM | BODY MASS INDEX: 28.85 KG/M2

## 2022-09-21 DIAGNOSIS — F41.9 ANXIETY: ICD-10-CM

## 2022-09-21 DIAGNOSIS — K70.11 ALCOHOLIC HEPATITIS WITH ASCITES: ICD-10-CM

## 2022-09-21 DIAGNOSIS — G72.81 CRITICAL ILLNESS MYOPATHY: Primary | ICD-10-CM

## 2022-09-21 PROCEDURE — 99214 OFFICE O/P EST MOD 30 MIN: CPT | Performed by: INTERNAL MEDICINE

## 2022-09-21 RX ORDER — CYCLOBENZAPRINE HCL 5 MG
5 TABLET ORAL 2 TIMES DAILY PRN
Qty: 40 TABLET | Refills: 0 | Status: SHIPPED | OUTPATIENT
Start: 2022-09-21 | End: 2022-10-11

## 2022-09-21 RX ORDER — ALPRAZOLAM 0.25 MG/1
0.25 TABLET ORAL DAILY PRN
Qty: 10 TABLET | Refills: 0 | Status: SHIPPED | OUTPATIENT
Start: 2022-09-21 | End: 2022-10-21

## 2022-09-21 NOTE — PROGRESS NOTES
Epifanio Martini  1980 09/21/22    SUBJECTIVE:    His lfts have normalized now off alcohol. Denies abd pain. Walking better now and has been back to the gym. Some R foot drop but using foot orthotic. Planned for RTW for Oct 1  W some leg weakness does not feel safe climbing ladders at this point. Some anxiety w stress and for RTW, requested rf limited prn xanax, Controlled substances monitoring: possible medication side effects, risk of tolerance and/or dependence, and alternative treatments discussed, no signs of potential drug abuse or diversion identified and OARRS report reviewed today- activity consistent with treatment plan. OBJECTIVE:    /70   Pulse 65   Resp 16   Wt 237 lb (107.5 kg)   SpO2 98%   BMI 28.85 kg/m²     Physical Exam  Constitutional:       Appearance: Normal appearance. Cardiovascular:      Rate and Rhythm: Normal rate and regular rhythm. Heart sounds: No murmur heard. No gallop. Pulmonary:      Effort: No respiratory distress. Breath sounds: No wheezing. Abdominal:      General: Abdomen is flat. Bowel sounds are normal. There is no distension. Palpations: Abdomen is soft. There is no mass. Tenderness: There is no abdominal tenderness. Hernia: No hernia is present. Musculoskeletal:      Right lower leg: No edema. Left lower leg: No edema. Neurological:      Mental Status: He is alert. Psychiatric:         Mood and Affect: Mood normal.       ASSESSMENT:    1. Critical illness myopathy    2. Alcoholic hepatitis with ascites    3. Anxiety        PLAN:    Tobias Núñez was seen today for circulatory problem. Diagnoses and all orders for this visit:    Critical illness myopathy- medically stable now to return to work but w restriction to avoid ladders till later cleared to do so by PT. Cont prn flexeril  -     cyclobenzaprine (FLEXERIL) 5 MG tablet;  Take 1 tablet by mouth 2 times daily as needed for Muscle spasms  - [facility] - [place]    Alcoholic hepatitis with ascites- also now abstinent fr alcohol and hepatitis has resolved. Anxiety- w ongoing stress and RTW planned, rf limited script prn xanax as below. -     ALPRAZolam (XANAX) 0.25 MG tablet; Take 1 tablet by mouth daily as needed for Anxiety for up to 30 days.

## 2022-09-21 NOTE — LETTER
[facility]  [address] [facility] [address]  Phone: [phone]  Fax: [phone]    Yoli Bailey MD        September 21, 2022     Patient: Elinor Saul   YOB: 1980   Date of Visit: 9/21/2022       To Whom it May Concern:    Queen Carmelina was seen in my clinic on 9/21/2022. He may return to work on 10/3/2022. Retractions are no climbing ladders until cleared by physical therapy. If you have any questions or concerns, please don't hesitate to call.     Sincerely,         Yoli Bailey MD

## 2022-09-21 NOTE — LETTER
[facility]  [address] [place] [address] [address] [address]  Phone: [phone]  Fax: [phone]    Lauro Scott MD        September 21, 2022     Patient: Daly Husbands   YOB: 1980   Date of Visit: 9/21/2022       To Whom it May Concern:    Lauren Allen was seen in my clinic on 9/21/2022. He may return to work on 10/3/22 pending physical therapy assessment. Restrictions are no climbing ladders until cleared by physical therapy. If you have any questions or concerns, please don't hesitate to call.     Sincerely,         Lauro Scott MD

## 2022-09-21 NOTE — LETTER
[facility]  [address] [facility] [address] [address]  Phone: [phone]  Fax: [phone]    Delfino Aldridge MD        September 21, 2022     Patient: Jaimie Cordova   YOB: 1980   Date of Visit: 9/21/2022       To Whom it May Concern:    Jenna Meza was seen in my clinic on 9/21/2022. He may return to work  10/3/2022. Restrictions are no climbing ladders until cleared by physical therapy. If you have any questions or concerns, please don't hesitate to call.     Sincerely,         Delfino Aldridge MD

## 2022-09-29 ENCOUNTER — HOSPITAL ENCOUNTER (OUTPATIENT)
Dept: PHYSICAL THERAPY | Age: 42
Setting detail: THERAPIES SERIES
Discharge: HOME | End: 2022-09-29
Payer: MEDICAID

## 2022-09-29 PROCEDURE — 97530 THERAPEUTIC ACTIVITIES: CPT

## 2022-09-29 NOTE — FLOWSHEET NOTE
Sheet? 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 Usually just carrying his backpack with laptop and simple hand tools. States he will have help/intern/apprentice to assist him with any tasks. States his employer is very eager to have him back and willing to accommodate any restrictions. Use  ankle brace - lace up. No assistive device. Some steppage pattern present. No loss of balance on smooth level ground, including turns. Pt unable to SLS on RLE, still with weakness in LE, decreased strength at ankle and knee. Pt able to perform simple lifts up to 25lbs, some decreased control noted after that for floor to waist and waist to [place]. He demonstrates some loss of balance when attempting to carry weight across smooth/level ground. Exercises: (No more than 4 columns)   Exercise/Equipment 6/14/22 #9 6/21/22 #10 6/24/22 #11 #12 6/30/22 #13 7/5/22 #14 7/14/22 #15 9/29/22               WARM UP             Nu step L7 S15 no arms scifit  L7 5' no arms scifit   lvl 7 x 5 mins. bike          TABLE          Hip stretching Gluteals, adductors, HS Prone ir er stretch Prone ir er stretch Gluteals, piriformis Gluteals, piriformis Gluteals, piriformis, HS    bridge 2x10 staggered  2x10 cross 2x10 cross 2x10 cross 2x10 cross On SB 2x10 On SB 2x10    SLR  3# 2x10 3# 2x10  0# no contact w table, cues to keep knee straight/locked w QS.  3*10 0# no contact w table, cues to keep knee straight/locked w QS.  3*10    clamshells Clam 2x10  rev clamshells x 10 AAROM 2x10 clamshells  Tactile cues for tech.  1x10 clamshells    1x15 ea leg RTB    2x10                  S/L hip abduction Mod A for lift, tapping to glut med for facilitation, assist to decrease hip flexor compensation *10   Supine cues to minimize ER with hip abd  Small motions, cues for hip/trunk positioning. Prone hip extension knee straight, knee bent  Mod A bent to perform 2x10   MaxA knee ext Mod assist to perform 2x10 Mod assist to perform 2x10   Prone gluteal activation 3x10    Prone HS curl  AAROM 10*  Lowering from 90° position 2*5  Mod A for lift, tapping at HS's for facilitation. Unable to completely let go of lower leg during eccentric portion of HS curl Knee flexion AAROM, MRE knee exten 2x10 Knee flexion AAROM, MRE knee exten 2x10  Max A       SB HS curl  2x10, improved control. 2x10, improved control. Supine heel slide w yellow band 2x10 Supine heel slide w yellow band 2x10     Seated hip IR, ER    3 ct holds 2 x 10       STANDING          Sit/stands 2 x 15 stagger stand   2 x 15 stagger stand    Stagger stand 22\" x12    LAQ          Hip abd, extension     Yellow loop 2x10 abd. Red single 2x10     Step taps          Side stepping          TKE           Fwd and retro walk in \\                         PROPRIOCEPTION          Wt shifting, lat, ant/post, staggerred          Tall kneeling on pad Holds and weight shifts. & From short to tall kneeling         Stand on foam Cues for more equal weight bearing. Low<>Tall kneeling on pad  X 10         Rocker board  F/b, s/s balance and rock 30 sec x 2 each F/b, s/s balance and rock 30 sec x 2 each       MODALITIES          E stim                        Other Therapeutic Activities/Education: To talk w doctor about AFO/consult  5/20/2022 visit talked with patient about possible mild compression stockings vs VALENTINO hose. .... 5/31/22 trial AFO - greatly improved gait, no toe drag. Pt states he feels better, safer and more normal.        Home Exercise Program:    Access Code: [email or web address]  URL: [email or web address]  Date: 05/12/2022  Prepared by: Zulema Jara    Exercises  Supine Bridge - 1 x daily - 7 x weekly - 3 sets - 10 reps  Supine Figure 4 Piriformis Stretch - 1 x daily - 7 x weekly - 1 sets - 2 reps - 45 hold  Supine Piriformis Stretch with Leg Straight - 1 x daily - 7 x weekly - 1 sets - 2 reps - 45 hold  Active Straight Leg Raise with Quad Set - 1 x daily - 7 x weekly - 3 sets - 10 reps  Supine Ankle Pumps - 1 x daily - 7 x weekly - 3 sets - 10 reps  Supine Ankle Inversion Eversion AROM - 1 x daily - 7 x weekly - 3 sets - 10 reps      Manual Treatments:  TPR to hamstrings with good releases. Modalities: none       Communication with other providers:  None       Assessment: Crissy Tejada will continue to benefit from PT to help him regain his muscle strength and balance. Safe amb with spc, did have some increased steppage with spc. Some improved   Completed paperwork to return to work, up to 20lb lifting. Avoid carrying, pushing, pulling, balancing activities, avoid ladders, unrestrained heights, uneven/unlevel grounds due to balance and strength deficits in RLE. Utilize stairs with handrail. Limit standing and walking initially due to how fast his RLE fatigues and gait alters. Take breaks as needed. Recommend he start first week with 25 hrs first week and then progressing as tolerated. He notes he is able to work several hours a week from home.         End session pain: same 0/10    Plan for Next Session: Hold      Time In / Time Out:  1430/1515       Timed Code/Total Treatment Minutes:    Next Progress Note due:        Plan of Care Interventions:  [x] Therapeutic Exercise  [] Modalities:  [x] Therapeutic Activity     [] Ultrasound  [x] Estim  [x] Gait Training      [] Cervical Traction [] Lumbar Traction  [x] Neuromuscular Re-education    [] Cold/hotpack [] Iontophoresis   [x] Instruction in HEP      [] Vasopneumatic   [] Dry Needling    [x] Manual Therapy               [] Aquatic Therapy              Electronically signed by:  George Sloan PT,   9/29/2022, 2:36 PM

## 2022-10-05 ENCOUNTER — TELEPHONE (OUTPATIENT)
Dept: INTERNAL MEDICINE CLINIC | Age: 42
End: 2022-10-05

## 2022-10-05 NOTE — TELEPHONE ENCOUNTER
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 Joey Alexandre. Also stated that even though [facility] had a MRI scheduled on 10/4/22, that is not a reason for [facility] to had not returned to work as originally planned, for 10/3/22, by Dr. Joey Alexandre . According to Lilia Luis has not yet returned to work. She stated he \"may try\" to go back to work on the 10th. I explained to Jono Arvizu that Dr Joey Alexandre cannot fill out any additional paperwork or extend anymore time off of work for [facility]. Dr. Joey Alexandre, Dr. Serenity Frederick and PT has all agreed for [facility] to return to work. Patient has stated that he is attending the gym regularly. I also explained to Jono Arvizu that Dr Joey Alexandre faxed back the paperwork in June, stating [facility] was to return to work on 10/3/22. I told her I would re-fax the paperwork again from June. She agreed and if she needed anything further she would let us know.

## 2022-10-08 DIAGNOSIS — F41.9 ANXIETY: ICD-10-CM

## 2022-10-10 RX ORDER — ALPRAZOLAM 0.25 MG/1
TABLET ORAL
Qty: 10 TABLET | OUTPATIENT
Start: 2022-10-10

## 2022-12-13 NOTE — PROGRESS NOTES
Outpatient Physical Therapy        [x] Phone: [phone]   Fax: [phone]   Physician: Deyanira Carter MD        From: Codi Blanc PT,     Patient: Tia Ramírez                  : 1980  Diagnosis:  Critical illness myopathy [G72.81] Diagnosis: Debility, critical illness, myopathy, unsteady gait. Date: 2022  Treatment Diagnosis:   debility, weakness, gait disturbance, decreased balance. []  Progress Note                [x]  Discharge Note    Total Visits to date:   15 Cancels/No-shows to date:  7    Subjective:  PLaced on hold following last visit 22 with request to complete return to work paperwork. Plan of Care/Treatment to date:  [x] Therapeutic Exercise    [] Modalities:  [x] Therapeutic Activity     [] Ultrasound  [] Electric Stimulation  [x] Gait Training      [] Cervical Traction    [] Lumbar Traction  [x] Neuromuscular Re-education  [] Cold/hotpack [] Iontophoresis  [x] Instruction in HEP      Other:  [x] Manual Therapy       []  Vasopneumatic  [] Self care management                           [] Dry needling trigger point/pain management                      Objective/Significant Findings At Last Visit/Comments:      Placed on hold after last attended visit 22. He has not returned. Pt had missed therapy prior to that due to a seizure and fell, injured his \"good\" leg. Patient Status: [] Continue per initial plan of Care     [x] Patient now discharged     [] Additional visits requested, Please re-certify for additional visits:    Electronically signed by:  Codi Blanc PT, 2022, 9:37 AM    If you have any questions or concerns, please don't hesitate to call.   Thank you for your referral.

## 2023-02-07 DIAGNOSIS — I10 ESSENTIAL HYPERTENSION: ICD-10-CM

## 2023-02-07 PROBLEM — R56.9 ALCOHOL WITHDRAWAL SEIZURE (HCC): Status: ACTIVE | Noted: 2022-08-01

## 2023-02-07 PROBLEM — F10.939 ALCOHOL WITHDRAWAL SEIZURE (HCC): Status: ACTIVE | Noted: 2022-08-01

## 2023-02-07 RX ORDER — ATENOLOL 50 MG/1
50 TABLET ORAL DAILY
Qty: 30 TABLET | Refills: 0 | Status: SHIPPED | OUTPATIENT
Start: 2023-02-07

## 2023-02-07 RX ORDER — ATENOLOL 50 MG/1
50 TABLET ORAL DAILY
Qty: 90 TABLET | OUTPATIENT
Start: 2023-02-07

## 2023-03-05 DIAGNOSIS — I10 ESSENTIAL HYPERTENSION: ICD-10-CM

## 2023-03-06 RX ORDER — ATENOLOL 50 MG/1
50 TABLET ORAL DAILY
Qty: 30 TABLET | Refills: 0 | OUTPATIENT
Start: 2023-03-06

## 2023-09-13 DIAGNOSIS — I10 ESSENTIAL HYPERTENSION: ICD-10-CM

## 2023-09-13 RX ORDER — LISINOPRIL 10 MG/1
10 TABLET ORAL DAILY
Qty: 90 TABLET | Refills: 1 | OUTPATIENT
Start: 2023-09-13

## 2024-01-22 ENCOUNTER — TELEPHONE (OUTPATIENT)
Dept: WOUND CARE | Age: 44
End: 2024-01-22

## 2024-06-13 NOTE — PROGRESS NOTES
-- DO NOT REPLY / DO NOT REPLY ALL --  -- Message is from [employer] Center [employer] ([facility]) --    Patient Name: Erna Pappas    Specialist or PCP Name: Tawnya Nelson APNP      Symptoms: patient states she is having white yellowish discharge, urgency x 1 month    Pregnant (females aged 13-60. If Yes, how long?) : NA    Call Back # :[phone]     Which State are you currently located in?: [place]    Name of Clinic Site / Acct# : [facility]    Use following scripting for patients waiting for a callback:   “Nurse callback times vary based on call volumes; please be aware the return phone call may come from an unidentified or out of state phone number. If your symptoms worsen or become life threatening while waiting, you should seek immediate assistance by calling 911 or going to the ER for evaluation.”   Hematology/Oncology  Progress Note    HISTORY OF PRESENT ILLNESS:      The patient is a 39 y.o. male with significant past medical history of EtOH abuse who presents with above on 4/18/2022. He was jaundiced and found to have intramuscular hematoma of the right gluteus muscles. CT abdomen and pelvis 4/18/2022:   1. There is a hematoma measuring 11.1 x 4.2 x 16.7 cm between the right gluteus medius and naomi muscles.  There is diffuse enlargement of the adductor muscles of the right lower extremity, likely related to edema. 2. Severe diffuse fatty infiltration of the liver.  Gallbladder wall thickening is seen.  This could be related to chronic liver disease.  If there is concern for acute cholecystitis, a right upper quadrant ultrasound  can be obtained. CT right femur 4/18/2022:  Ill-defined hypodensity in the right gluteus medius muscle and ill-defined  hyperdensity in the right gluteus naomi muscle.  These may represent hematomas in different phases of evolution.  However simple and complex abscesses cannot be excluded.  Clinical correlation and further evaluation by MRI with and without IV contrast recommended. MRI abdomen 4/18/2022:  Markedly limited exam due to patient condition.   Mild gallbladder wall thickening, nonspecific, in the setting of underlying  liver disease.  No biliary duct dilation. Repeat CT abdomen 4/18/2022:  1. Severe diffuse fatty infiltration of the liver. 2. Hematoma involving the right gluteus medius and naomi musculature without significant change.  There is also a partially visualized hematoma surrounding the hamstring musculature on the right.  Continued follow-up is recommended until resolution. US abdomen 4/21/2022:  1. Limited exam.  2. Severe diffuse fatty infiltration of the liver.   3. Sludge is seen within the gallbladder lumen.  The gallbladder wall is  thickened at 6 mm however there is no evidence of a positive sonographic Reynoso sign.  Findings could be related to chronic liver disease. 4. No common bile duct dilatation. GI and surgeon were consulted. CBC on 4/5/2022 was grossly unremarkable with Hg 15.7 and .1. B-12 >2000. ALk phos 539, , , bilirubin 17.1. On admission 4/18/2022 WBC was 19.8, Hg 10.1, .1 and plt 391. ANC was 15.2. Ferritin ws 1266, TIBC <195, saturation <91. Iron 178. Hapto <10. TB was 18.8, direct 15.6 and indirect 3.2. LDH was 327. B 12 > 2000. SHAUN was negative. He received PRBC on 4/202/2022. He is on steroid. I met spouse today. No acute distress. Hg today was 7.0. Plt 239 and WBC 10.1. Will continue to monitor CBC. PHYSICAL EXAM:      Vitals:    BP (!) 136/96   Pulse 56   Temp 97.7 °F (36.5 °C) (Oral)   Resp 16   Ht 6' 3.98\" (1.93 m)   Wt 276 lb 10.8 oz (125.5 kg)   SpO2 94%   BMI 33.69 kg/m²     CONSTITUTIONAL:  Awake and no distress  EYES:  pale, extra-ocular muscles intact and icteric sclerae  NECK:  supple, symmetrical, trachea midline and no lymphadenopathy  LUNGS:  clear to auscultation, no crackles or wheezing  CARDIOVASCULAR:  normal S1 and S2 and no murmur noted  ABDOMEN:  normal bowel sounds, soft, non-distended, non-tender and  pierce cath noted  MUSCULOSKELETAL:  there is no redness, warmth, or swelling of the joints. full range of motion noted  NEUROLOGIC: cranial nerves II-XII are grossly intact  SKIN:  Hematoma noted. No bleeding.  Jaundice noted    DATA:    Labs:  General Labs:    CBC with Differential:    Lab Results   Component Value Date    WBC 10.1 04/23/2022    RBC 2.22 04/23/2022    HGB 7.0 04/23/2022    HCT 22.3 04/23/2022     04/23/2022    .5 04/23/2022    MCH 31.5 04/23/2022    MCHC 31.4 04/23/2022    RDW 26.0 04/23/2022    NRBC 5 04/22/2022    SEGSPCT 71.0 04/22/2022    BANDSPCT 6 04/22/2022    LYMPHOPCT 15.0 04/22/2022    MONOPCT 6.0 04/22/2022    MYELOPCT 2 04/22/2022    BASOPCT 0.5 04/18/2022    MONOSABS 0.6 04/22/2022    LYMPHSABS 1.6 04/22/2022    EOSABS 0.2 04/19/2022    BASOSABS 0.1 04/18/2022    DIFFTYPE MANUAL DIFFERENTIAL 04/22/2022     CMP:    Lab Results   Component Value Date     04/23/2022    K 4.5 04/23/2022     04/23/2022    CO2 23 04/23/2022    BUN 18 04/23/2022    CREATININE <0.5 04/23/2022    GFRAA NOT CALCULATED 04/23/2022    AGRATIO 1.9 04/05/2022    LABGLOM NOT CALCULATED 04/23/2022    GLUCOSE 136 04/23/2022    PROT 3.4 04/23/2022    LABALBU 2.3 04/23/2022    CALCIUM 7.2 04/23/2022    BILITOT 22.2 04/23/2022    ALKPHOS 252 04/23/2022     04/23/2022     04/23/2022       IMPRESSION/RECOMMENDATIONS:    1. Anemia is likely related to acute blood loss. He has intramuscular hematoma. Less likely hemolytic anemia. Will monitor CBC and transfuse PRN. 2. He has severe alcoholic hepatitis. On Steroid. Will follow up.

## (undated) DEVICE — GLOVE SURG SZ 65 CRM LTX FREE POLYISOPRENE POLYMER BEAD ANTI

## (undated) DEVICE — GOWN,ECLIPSE,POLYRNF,BRTHSLV,L,30/CS: Brand: MEDLINE

## (undated) DEVICE — PENCIL ES CRD L10FT HND SWCHING ROCK SWCH W/ EDGE COAT BLDE

## (undated) DEVICE — SPONGE,TONSIL,DBL STRNG,XRAY,SM,7/8",ST: Brand: MEDLINE INDUSTRIES, INC.

## (undated) DEVICE — BLADE CLIPPER GEN PURP NS

## (undated) DEVICE — APPLICATOR MEDICATED 26 CC SOLUTION HI LT ORNG CHLORAPREP

## (undated) DEVICE — ELECTRODE ES AD CRDLSS PT RET REM POLYHESIVE

## (undated) DEVICE — SUTURE PDS II SZ 2-0 L27IN ABSRB VLT L26MM CT-2 1/2 CIR Z333H

## (undated) DEVICE — YANKAUER,FLEXIBLE HANDLE,REGLR CAPACITY: Brand: MEDLINE INDUSTRIES, INC.

## (undated) DEVICE — GLOVE SURG SZ 65 THK91MIL LTX FREE SYN POLYISOPRENE

## (undated) DEVICE — COUNTER NDL 30 COUNT FOAM STRP SGL MAG

## (undated) DEVICE — MARKER SURG SKIN UTIL REGULAR/FINE 2 TIP W/ RUL AND 9 LBL

## (undated) DEVICE — TOWEL,OR,DSP,ST,BLUE,STD,6/PK,12PK/CS: Brand: MEDLINE

## (undated) DEVICE — BINDER ABD UNISX 4 PNL PREM 6INX6INX12IN L XL 4

## (undated) DEVICE — ADHESIVE SKIN CLSR 0.7ML TOP DERMBND ADV

## (undated) DEVICE — SYRINGE MED 20ML STD CLR PLAS LUERLOCK TIP N CTRL DISP

## (undated) DEVICE — TUBING, SUCTION, 9/32" X 10', STRAIGHT: Brand: MEDLINE

## (undated) DEVICE — DRAPE SHEET ULTRAGARD: Brand: MEDLINE

## (undated) DEVICE — GAUZE,SPONGE,4"X4",16PLY,XRAY,STRL,LF: Brand: MEDLINE

## (undated) DEVICE — DRESSING TRNSPAR W5XL4.5IN FLM SHT SEMIPERMEABLE WIND